# Patient Record
Sex: MALE | Race: WHITE | Employment: FULL TIME | URBAN - METROPOLITAN AREA
[De-identification: names, ages, dates, MRNs, and addresses within clinical notes are randomized per-mention and may not be internally consistent; named-entity substitution may affect disease eponyms.]

---

## 2020-02-20 ENCOUNTER — HOSPITAL ENCOUNTER (OUTPATIENT)
Dept: MRI IMAGING | Age: 75
Discharge: HOME | End: 2020-02-20
Attending: FAMILY MEDICINE
Payer: MEDICARE

## 2020-02-20 DIAGNOSIS — M25.551 PAIN IN RIGHT HIP: ICD-10-CM

## 2020-02-20 PROCEDURE — 73721 MRI JNT OF LWR EXTRE W/O DYE: CPT

## 2020-11-23 ENCOUNTER — APPOINTMENT (RX ONLY)
Dept: URBAN - METROPOLITAN AREA CLINIC 23 | Facility: CLINIC | Age: 75
Setting detail: DERMATOLOGY
End: 2020-11-23

## 2023-03-21 ENCOUNTER — OFFICE VISIT (OUTPATIENT)
Dept: PRIMARY CARE CLINIC | Facility: CLINIC | Age: 78
End: 2023-03-21
Payer: COMMERCIAL

## 2023-03-21 VITALS
SYSTOLIC BLOOD PRESSURE: 117 MMHG | WEIGHT: 140.6 LBS | DIASTOLIC BLOOD PRESSURE: 65 MMHG | OXYGEN SATURATION: 96 % | TEMPERATURE: 97.5 F | BODY MASS INDEX: 25.88 KG/M2 | HEIGHT: 62 IN | RESPIRATION RATE: 14 BRPM | HEART RATE: 75 BPM

## 2023-03-21 DIAGNOSIS — Z79.899 MEDICATION MANAGEMENT: ICD-10-CM

## 2023-03-21 DIAGNOSIS — D64.9 ANEMIA, UNSPECIFIED TYPE: ICD-10-CM

## 2023-03-21 DIAGNOSIS — I10 HYPERTENSION COMPLICATIONS: ICD-10-CM

## 2023-03-21 DIAGNOSIS — C91.10 CHRONIC LYMPHOCYTIC LEUKEMIA OF B-CELL TYPE NOT HAVING ACHIEVED REMISSION (HCC): ICD-10-CM

## 2023-03-21 DIAGNOSIS — I25.10 CORONARY ARTERY DISEASE DUE TO CALCIFIED CORONARY LESION: ICD-10-CM

## 2023-03-21 DIAGNOSIS — Z87.891 EX-SMOKER: ICD-10-CM

## 2023-03-21 DIAGNOSIS — E78.41 ELEVATED LIPOPROTEIN(A): ICD-10-CM

## 2023-03-21 DIAGNOSIS — J44.1 COPD EXACERBATION (HCC): ICD-10-CM

## 2023-03-21 DIAGNOSIS — I25.84 CORONARY ARTERY DISEASE DUE TO CALCIFIED CORONARY LESION: ICD-10-CM

## 2023-03-21 DIAGNOSIS — Z71.89 ADVANCE CARE PLANNING: ICD-10-CM

## 2023-03-21 DIAGNOSIS — R06.02 SHORTNESS OF BREATH: ICD-10-CM

## 2023-03-21 DIAGNOSIS — J90 BILATERAL PLEURAL EFFUSION: ICD-10-CM

## 2023-03-21 DIAGNOSIS — R06.02 SHORTNESS OF BREATH: Primary | ICD-10-CM

## 2023-03-21 PROBLEM — F32.A DEPRESSION: Status: ACTIVE | Noted: 2020-11-05

## 2023-03-21 PROBLEM — I69.30 UNSPECIFIED SEQUELAE OF CEREBRAL INFARCTION: Status: ACTIVE | Noted: 2020-11-08

## 2023-03-21 PROBLEM — F32.9 MAJOR DEPRESSIVE DISORDER, SINGLE EPISODE, UNSPECIFIED: Status: ACTIVE | Noted: 2020-11-08

## 2023-03-21 PROBLEM — K21.9 GASTRO-ESOPHAGEAL REFLUX DISEASE WITHOUT ESOPHAGITIS: Status: ACTIVE | Noted: 2020-11-08

## 2023-03-21 PROBLEM — J44.9 CHRONIC OBSTRUCTIVE PULMONARY DISEASE, UNSPECIFIED (HCC): Status: ACTIVE | Noted: 2020-11-08

## 2023-03-21 PROBLEM — M62.81 MUSCLE WEAKNESS (GENERALIZED): Status: ACTIVE | Noted: 2020-11-09

## 2023-03-21 PROBLEM — F41.9 ANXIETY DISORDER, UNSPECIFIED: Status: ACTIVE | Noted: 2020-11-08

## 2023-03-21 PROBLEM — E78.5 HYPERLIPEMIA: Status: ACTIVE | Noted: 2020-11-05

## 2023-03-21 LAB
ALBUMIN SERPL-MCNC: 3.7 G/DL (ref 3.2–4.6)
ALBUMIN/GLOB SERPL: 1.5 (ref 0.4–1.6)
ALP SERPL-CCNC: 85 U/L (ref 50–136)
ALT SERPL-CCNC: 26 U/L (ref 12–65)
ANION GAP SERPL CALC-SCNC: 8 MMOL/L (ref 2–11)
AST SERPL-CCNC: 20 U/L (ref 15–37)
BASOPHILS # BLD: 0 K/UL (ref 0–0.2)
BASOPHILS NFR BLD: 0 % (ref 0–2)
BILIRUB SERPL-MCNC: 0.5 MG/DL (ref 0.2–1.1)
BUN SERPL-MCNC: 21 MG/DL (ref 8–23)
CALCIUM SERPL-MCNC: 8.9 MG/DL (ref 8.3–10.4)
CHLORIDE SERPL-SCNC: 106 MMOL/L (ref 101–110)
CO2 SERPL-SCNC: 25 MMOL/L (ref 21–32)
CREAT SERPL-MCNC: 0.8 MG/DL (ref 0.8–1.5)
DIFFERENTIAL METHOD BLD: ABNORMAL
EOSINOPHIL # BLD: 0.1 K/UL (ref 0–0.8)
EOSINOPHIL NFR BLD: 1 % (ref 0.5–7.8)
ERYTHROCYTE [DISTWIDTH] IN BLOOD BY AUTOMATED COUNT: 16.1 % (ref 11.9–14.6)
GLOBULIN SER CALC-MCNC: 2.5 G/DL (ref 2.8–4.5)
GLUCOSE SERPL-MCNC: 90 MG/DL (ref 65–100)
HCT VFR BLD AUTO: 34.6 % (ref 41.1–50.3)
HGB BLD-MCNC: 10.2 G/DL (ref 13.6–17.2)
IMM GRANULOCYTES # BLD AUTO: 0 K/UL (ref 0–0.5)
IMM GRANULOCYTES NFR BLD AUTO: 0 % (ref 0–5)
LDLC SERPL DIRECT ASSAY-MCNC: 105 MG/DL
LYMPHOCYTES # BLD: 4.8 K/UL (ref 0.5–4.6)
LYMPHOCYTES NFR BLD: 51 % (ref 13–44)
MCH RBC QN AUTO: 24.6 PG (ref 26.1–32.9)
MCHC RBC AUTO-ENTMCNC: 29.5 G/DL (ref 31.4–35)
MCV RBC AUTO: 83.6 FL (ref 82–102)
MONOCYTES # BLD: 0.6 K/UL (ref 0.1–1.3)
MONOCYTES NFR BLD: 6 % (ref 4–12)
NEUTS SEG # BLD: 4 K/UL (ref 1.7–8.2)
NEUTS SEG NFR BLD: 42 % (ref 43–78)
NRBC # BLD: 0 K/UL (ref 0–0.2)
PLATELET # BLD AUTO: 320 K/UL (ref 150–450)
PLATELET COMMENT: ADEQUATE
PMV BLD AUTO: 10.3 FL (ref 9.4–12.3)
POTASSIUM SERPL-SCNC: 3.8 MMOL/L (ref 3.5–5.1)
PROT SERPL-MCNC: 6.2 G/DL (ref 6.3–8.2)
RBC # BLD AUTO: 4.14 M/UL (ref 4.23–5.6)
RBC MORPH BLD: ABNORMAL
SODIUM SERPL-SCNC: 139 MMOL/L (ref 133–143)
TSH, 3RD GENERATION: 2.53 UIU/ML (ref 0.36–3.74)
WBC # BLD AUTO: 9.5 K/UL (ref 4.3–11.1)
WBC MORPH BLD: ABNORMAL

## 2023-03-21 PROCEDURE — 99204 OFFICE O/P NEW MOD 45 MIN: CPT | Performed by: FAMILY MEDICINE

## 2023-03-21 PROCEDURE — 3078F DIAST BP <80 MM HG: CPT | Performed by: FAMILY MEDICINE

## 2023-03-21 PROCEDURE — 1123F ACP DISCUSS/DSCN MKR DOCD: CPT | Performed by: FAMILY MEDICINE

## 2023-03-21 PROCEDURE — 3074F SYST BP LT 130 MM HG: CPT | Performed by: FAMILY MEDICINE

## 2023-03-21 RX ORDER — CLOPIDOGREL BISULFATE 75 MG/1
75 TABLET ORAL DAILY
COMMUNITY
End: 2023-03-21 | Stop reason: SDUPTHER

## 2023-03-21 RX ORDER — DEXAMETHASONE 0.5 MG/1
TABLET ORAL
Qty: 30 TABLET | Refills: 0 | Status: SHIPPED | OUTPATIENT
Start: 2023-03-21

## 2023-03-21 RX ORDER — SPIRONOLACTONE 50 MG/1
50 TABLET, FILM COATED ORAL DAILY
Qty: 90 TABLET | Refills: 3 | Status: SHIPPED | OUTPATIENT
Start: 2023-03-21

## 2023-03-21 RX ORDER — ROSUVASTATIN CALCIUM 20 MG/1
10 TABLET, COATED ORAL NIGHTLY
COMMUNITY
End: 2023-03-21 | Stop reason: SDUPTHER

## 2023-03-21 RX ORDER — OMEPRAZOLE 20 MG/1
20 CAPSULE, DELAYED RELEASE ORAL DAILY
COMMUNITY
End: 2023-03-21 | Stop reason: SDUPTHER

## 2023-03-21 RX ORDER — CHLORTHALIDONE 25 MG/1
25 TABLET ORAL DAILY
Qty: 90 TABLET | Refills: 3 | Status: SHIPPED | OUTPATIENT
Start: 2023-03-21

## 2023-03-21 RX ORDER — BUDESONIDE AND FORMOTEROL FUMARATE DIHYDRATE 160; 4.5 UG/1; UG/1
1 AEROSOL RESPIRATORY (INHALATION) 2 TIMES DAILY
Qty: 10.2 G | Refills: 5 | Status: SHIPPED | OUTPATIENT
Start: 2023-03-21

## 2023-03-21 RX ORDER — MULTIVIT-MIN/IRON FUM/FOLIC AC 7.5 MG-4
TABLET ORAL
Qty: 100 TABLET | Refills: 3 | Status: SHIPPED | OUTPATIENT
Start: 2023-03-21

## 2023-03-21 RX ORDER — TAMSULOSIN HYDROCHLORIDE 0.4 MG/1
0.4 CAPSULE ORAL DAILY
Qty: 90 CAPSULE | Refills: 5 | Status: SHIPPED | OUTPATIENT
Start: 2023-03-21

## 2023-03-21 RX ORDER — CLOPIDOGREL BISULFATE 75 MG/1
75 TABLET ORAL DAILY
Qty: 90 TABLET | Refills: 5 | Status: SHIPPED | OUTPATIENT
Start: 2023-03-21

## 2023-03-21 RX ORDER — BUDESONIDE AND FORMOTEROL FUMARATE DIHYDRATE 160; 4.5 UG/1; UG/1
AEROSOL RESPIRATORY (INHALATION) 2 TIMES DAILY
COMMUNITY
End: 2023-03-21 | Stop reason: SDUPTHER

## 2023-03-21 RX ORDER — SERTRALINE HYDROCHLORIDE 100 MG/1
50 TABLET, FILM COATED ORAL DAILY
COMMUNITY
End: 2023-03-21 | Stop reason: SDUPTHER

## 2023-03-21 RX ORDER — OMEPRAZOLE 20 MG/1
20 CAPSULE, DELAYED RELEASE ORAL DAILY
Qty: 30 CAPSULE | Refills: 3 | Status: SHIPPED | OUTPATIENT
Start: 2023-03-21

## 2023-03-21 RX ORDER — SERTRALINE HYDROCHLORIDE 100 MG/1
50 TABLET, FILM COATED ORAL DAILY
Qty: 30 TABLET | Refills: 3 | Status: SHIPPED | OUTPATIENT
Start: 2023-03-21

## 2023-03-21 RX ORDER — AMLODIPINE BESYLATE 10 MG/1
10 TABLET ORAL DAILY
COMMUNITY
End: 2023-03-21 | Stop reason: SDUPTHER

## 2023-03-21 RX ORDER — AMOXICILLIN AND CLAVULANATE POTASSIUM 875; 125 MG/1; MG/1
1 TABLET, FILM COATED ORAL 2 TIMES DAILY
Qty: 20 TABLET | Refills: 0 | Status: SHIPPED | OUTPATIENT
Start: 2023-03-21 | End: 2023-03-31

## 2023-03-21 RX ORDER — AMLODIPINE BESYLATE 5 MG/1
5 TABLET ORAL DAILY
Qty: 90 TABLET | Refills: 5 | Status: SHIPPED | OUTPATIENT
Start: 2023-03-21

## 2023-03-21 RX ORDER — TAMSULOSIN HYDROCHLORIDE 0.4 MG/1
0.4 CAPSULE ORAL DAILY
COMMUNITY
End: 2023-03-21 | Stop reason: SDUPTHER

## 2023-03-21 RX ORDER — ROSUVASTATIN CALCIUM 20 MG/1
10 TABLET, COATED ORAL NIGHTLY
Qty: 90 TABLET | Refills: 5 | Status: SHIPPED | OUTPATIENT
Start: 2023-03-21

## 2023-03-21 ASSESSMENT — PATIENT HEALTH QUESTIONNAIRE - PHQ9
2. FEELING DOWN, DEPRESSED OR HOPELESS: 0
SUM OF ALL RESPONSES TO PHQ QUESTIONS 1-9: 0
SUM OF ALL RESPONSES TO PHQ9 QUESTIONS 1 & 2: 0
1. LITTLE INTEREST OR PLEASURE IN DOING THINGS: 0

## 2023-03-21 ASSESSMENT — ENCOUNTER SYMPTOMS
CONSTIPATION: 0
CHOKING: 0
VOMITING: 0
EYE DISCHARGE: 0
EYE REDNESS: 0
VOICE CHANGE: 0
ABDOMINAL PAIN: 0
TROUBLE SWALLOWING: 0
WHEEZING: 0
DIARRHEA: 0
PHOTOPHOBIA: 0
BLOOD IN STOOL: 0
ABDOMINAL DISTENTION: 0
SINUS PAIN: 0
RHINORRHEA: 0
SHORTNESS OF BREATH: 1
CHEST TIGHTNESS: 0
COLOR CHANGE: 0
EYE PAIN: 0
BACK PAIN: 0
NAUSEA: 0
SORE THROAT: 0
COUGH: 1
SINUS PRESSURE: 0

## 2023-03-21 NOTE — PROGRESS NOTES
tenderness, left CVA tenderness or guarding. Musculoskeletal:         General: No swelling, tenderness, deformity or signs of injury. Cervical back: Neck supple. No rigidity. Right lower leg: No edema. Left lower leg: No edema. Lymphadenopathy:      Cervical: No cervical adenopathy. Skin:     Coloration: Skin is not jaundiced. Findings: No bruising, erythema or lesion. Neurological:      General: No focal deficit present. Mental Status: He is alert and oriented to person, place, and time. Mental status is at baseline. Cranial Nerves: No cranial nerve deficit. Sensory: No sensory deficit. Motor: Weakness present. Coordination: Coordination normal.      Gait: Gait abnormal.      Deep Tendon Reflexes: Reflexes normal.   Psychiatric:         Attention and Perception: Attention normal. He is attentive. Mood and Affect: Mood normal. Mood is not anxious, depressed or elated. Affect is not labile, blunt, flat, angry, tearful or inappropriate. Speech: Speech normal. He is communicative. Speech is not rapid and pressured, delayed, slurred or tangential.         Behavior: Behavior normal. Behavior is not agitated, slowed, aggressive, withdrawn, hyperactive or combative. Behavior is cooperative. Thought Content: Thought content normal. Thought content does not include suicidal ideation. Thought content does not include suicidal plan. Cognition and Memory: Cognition is not impaired. Judgment: Judgment normal.        1. Shortness of breath  Extensive hospital evaluation reviewed bilateral pleural effusion 2D echocardiogram normal  - [address] Hematology & Oncology  - [address] Pulmonary and Critical Care  - CBC with Auto Differential; Future  - XR CHEST PA LAT (2 VIEWS); Future    2. Bilateral pleural effusion    - [facility] Pulmonary and Critical Care    3.  Chronic lymphocytic leukemia of B-cell type not having

## 2023-03-22 ENCOUNTER — CLINICAL DOCUMENTATION (OUTPATIENT)
Dept: SPIRITUAL SERVICES | Age: 78
End: 2023-03-22

## 2023-03-22 NOTE — ACP (ADVANCE CARE PLANNING)
letter mailed to patient with invitation to contact ACP Specialist if / when ready. [] Other (Specify here):       Thank you for this referral.

## 2023-03-23 ENCOUNTER — TELEPHONE (OUTPATIENT)
Dept: PRIMARY CARE CLINIC | Facility: CLINIC | Age: 78
End: 2023-03-23

## 2023-04-17 ENCOUNTER — OFFICE VISIT (OUTPATIENT)
Dept: PULMONOLOGY | Age: 78
End: 2023-04-17
Payer: OTHER GOVERNMENT

## 2023-04-17 VITALS
SYSTOLIC BLOOD PRESSURE: 124 MMHG | DIASTOLIC BLOOD PRESSURE: 78 MMHG | TEMPERATURE: 97.1 F | WEIGHT: 144 LBS | HEART RATE: 96 BPM | RESPIRATION RATE: 14 BRPM | HEIGHT: 62 IN | BODY MASS INDEX: 26.5 KG/M2 | OXYGEN SATURATION: 95 %

## 2023-04-17 DIAGNOSIS — Z87.891 PERSONAL HISTORY OF TOBACCO USE, PRESENTING HAZARDS TO HEALTH: ICD-10-CM

## 2023-04-17 DIAGNOSIS — Z12.9 CANCER SCREENING: ICD-10-CM

## 2023-04-17 DIAGNOSIS — J44.9 CHRONIC OBSTRUCTIVE PULMONARY DISEASE, UNSPECIFIED COPD TYPE (HCC): Primary | ICD-10-CM

## 2023-04-17 PROCEDURE — 3078F DIAST BP <80 MM HG: CPT | Performed by: INTERNAL MEDICINE

## 2023-04-17 PROCEDURE — 99204 OFFICE O/P NEW MOD 45 MIN: CPT | Performed by: INTERNAL MEDICINE

## 2023-04-17 PROCEDURE — G0296 VISIT TO DETERM LDCT ELIG: HCPCS | Performed by: INTERNAL MEDICINE

## 2023-04-17 PROCEDURE — 3074F SYST BP LT 130 MM HG: CPT | Performed by: INTERNAL MEDICINE

## 2023-04-17 PROCEDURE — 1123F ACP DISCUSS/DSCN MKR DOCD: CPT | Performed by: INTERNAL MEDICINE

## 2023-04-17 RX ORDER — PANTOPRAZOLE SODIUM 40 MG/1
40 GRANULE, DELAYED RELEASE ORAL
COMMUNITY

## 2023-04-17 RX ORDER — ALBUTEROL SULFATE 90 UG/1
INHALANT RESPIRATORY (INHALATION)
COMMUNITY

## 2023-04-17 RX ORDER — FLUTICASONE PROPIONATE AND SALMETEROL 500; 50 UG/1; UG/1
1 POWDER RESPIRATORY (INHALATION) EVERY 12 HOURS
COMMUNITY

## 2023-04-17 RX ORDER — DULOXETIN HYDROCHLORIDE 60 MG/1
60 CAPSULE, DELAYED RELEASE ORAL DAILY
COMMUNITY

## 2023-04-17 RX ORDER — ROFLUMILAST 500 UG/1
500 TABLET ORAL DAILY
Qty: 30 TABLET | Refills: 11 | Status: SHIPPED | OUTPATIENT
Start: 2023-04-17

## 2023-04-17 RX ORDER — LANOLIN ALCOHOL/MO/W.PET/CERES
3 CREAM (GRAM) TOPICAL DAILY
COMMUNITY

## 2023-04-17 RX ORDER — IBRUTINIB 420 MG/1
420 TABLET, FILM COATED ORAL DAILY
COMMUNITY

## 2023-04-17 RX ORDER — GUAIFENESIN 600 MG/1
1200 TABLET, EXTENDED RELEASE ORAL 2 TIMES DAILY
COMMUNITY

## 2023-04-17 NOTE — PROGRESS NOTES
Allergen Reactions    Ranitidine Hcl Hives     Current Outpatient Medications   Medication Instructions    Albuterol Sulfate, sensor, (PROAIR DIGIHALER) 108 (90 Base) MCG/ACT AEPB Inhalation    amLODIPine (NORVASC) 5 mg, Oral, DAILY    budesonide-formoterol (SYMBICORT) 160-4.5 MCG/ACT AERO 1 puff, Inhalation, 2 TIMES DAILY    chlorthalidone (HYGROTON) 25 mg, Oral, DAILY    clopidogrel (PLAVIX) 75 mg, Oral, DAILY    dexamethasone (DECADRON) 0.5 MG tablet 2 twice a day for 5 days, then once a day for 10 days    DULoxetine (CYMBALTA) 60 mg, Oral, DAILY    fluticasone-salmeterol (ADVAIR DISKUS) 500-50 MCG/ACT AEPB diskus inhaler 1 puff, Inhalation, EVERY 12 HOURS    guaiFENesin (MUCUS RELIEF) 1,200 mg, Oral, 2 TIMES DAILY    Imbruvica 420 mg, Oral, DAILY    melatonin 3 mg, Oral, DAILY    Multiple Vitamins-Minerals (MULTIVITAMIN WITH MINERALS) tablet Once daily OTC    omeprazole (PRILOSEC) 20 mg, Oral, DAILY    pantoprazole sodium (PROTONIX) 40 mg, Oral, DAILY BEFORE BREAKFAST    rosuvastatin (CRESTOR) 10 mg, Oral, NIGHTLY    sertraline (ZOLOFT) 50 mg, Oral, DAILY    spironolactone (ALDACTONE) 50 mg, Oral, DAILY    tamsulosin (FLOMAX) 0.4 mg, Oral, DAILY    tiotropium (SPIRIVA RESPIMAT) 2.5 MCG/ACT AERS inhaler 2 puffs, Inhalation, DAILY    tiotropium (SPIRIVA) 18 MCG inhalation capsule 1 capsule, Inhalation

## 2023-04-18 ENCOUNTER — OFFICE VISIT (OUTPATIENT)
Dept: PRIMARY CARE CLINIC | Facility: CLINIC | Age: 78
End: 2023-04-18
Payer: COMMERCIAL

## 2023-04-18 VITALS
OXYGEN SATURATION: 94 % | SYSTOLIC BLOOD PRESSURE: 113 MMHG | WEIGHT: 141.6 LBS | RESPIRATION RATE: 14 BRPM | DIASTOLIC BLOOD PRESSURE: 53 MMHG | BODY MASS INDEX: 26.06 KG/M2 | TEMPERATURE: 97.2 F | HEIGHT: 62 IN | HEART RATE: 78 BPM

## 2023-04-18 DIAGNOSIS — I50.42 CHRONIC COMBINED SYSTOLIC AND DIASTOLIC CONGESTIVE HEART FAILURE (HCC): ICD-10-CM

## 2023-04-18 DIAGNOSIS — Z87.891 EX-SMOKER: ICD-10-CM

## 2023-04-18 DIAGNOSIS — J43.9 PULMONARY EMPHYSEMA, UNSPECIFIED EMPHYSEMA TYPE (HCC): ICD-10-CM

## 2023-04-18 DIAGNOSIS — J18.9 PNEUMONIA OF LEFT LOWER LOBE DUE TO INFECTIOUS ORGANISM: Primary | ICD-10-CM

## 2023-04-18 DIAGNOSIS — M62.81 MUSCLE WEAKNESS (GENERALIZED): ICD-10-CM

## 2023-04-18 DIAGNOSIS — F32.9 CURRENT EPISODE OF MAJOR DEPRESSIVE DISORDER WITHOUT PRIOR EPISODE, UNSPECIFIED DEPRESSION EPISODE SEVERITY: ICD-10-CM

## 2023-04-18 DIAGNOSIS — C91.10 CHRONIC LYMPHOCYTIC LEUKEMIA OF B-CELL TYPE NOT HAVING ACHIEVED REMISSION (HCC): ICD-10-CM

## 2023-04-18 DIAGNOSIS — J44.1 COPD EXACERBATION (HCC): ICD-10-CM

## 2023-04-18 PROBLEM — C44.311 BASAL CELL CARCINOMA OF NOSE: Status: ACTIVE | Noted: 2023-04-18

## 2023-04-18 PROCEDURE — 3078F DIAST BP <80 MM HG: CPT | Performed by: FAMILY MEDICINE

## 2023-04-18 PROCEDURE — 1123F ACP DISCUSS/DSCN MKR DOCD: CPT | Performed by: FAMILY MEDICINE

## 2023-04-18 PROCEDURE — 3074F SYST BP LT 130 MM HG: CPT | Performed by: FAMILY MEDICINE

## 2023-04-18 PROCEDURE — 99214 OFFICE O/P EST MOD 30 MIN: CPT | Performed by: FAMILY MEDICINE

## 2023-04-18 RX ORDER — DEXAMETHASONE 0.5 MG/1
TABLET ORAL
Qty: 90 TABLET | Refills: 5 | Status: SHIPPED | OUTPATIENT
Start: 2023-04-18

## 2023-04-18 RX ORDER — VALSARTAN 80 MG/1
80 TABLET ORAL DAILY
Qty: 90 TABLET | Refills: 1 | Status: SHIPPED | OUTPATIENT
Start: 2023-04-18

## 2023-04-18 RX ORDER — LEVOFLOXACIN 500 MG/1
500 TABLET, FILM COATED ORAL DAILY
Qty: 10 TABLET | Refills: 0 | Status: SHIPPED | OUTPATIENT
Start: 2023-04-18 | End: 2023-04-28

## 2023-04-18 RX ORDER — AMOXICILLIN AND CLAVULANATE POTASSIUM 875; 125 MG/1; MG/1
1 TABLET, FILM COATED ORAL 2 TIMES DAILY
Qty: 20 TABLET | Refills: 0 | Status: SHIPPED | OUTPATIENT
Start: 2023-04-18 | End: 2023-04-28

## 2023-04-18 SDOH — ECONOMIC STABILITY: HOUSING INSECURITY
IN THE LAST 12 MONTHS, WAS THERE A TIME WHEN YOU DID NOT HAVE A STEADY PLACE TO SLEEP OR SLEPT IN A SHELTER (INCLUDING NOW)?: NO

## 2023-04-18 SDOH — ECONOMIC STABILITY: INCOME INSECURITY: HOW HARD IS IT FOR YOU TO PAY FOR THE VERY BASICS LIKE FOOD, HOUSING, MEDICAL CARE, AND HEATING?: NOT HARD AT ALL

## 2023-04-18 SDOH — ECONOMIC STABILITY: FOOD INSECURITY: WITHIN THE PAST 12 MONTHS, YOU WORRIED THAT YOUR FOOD WOULD RUN OUT BEFORE YOU GOT MONEY TO BUY MORE.: NEVER TRUE

## 2023-04-18 SDOH — ECONOMIC STABILITY: FOOD INSECURITY: WITHIN THE PAST 12 MONTHS, THE FOOD YOU BOUGHT JUST DIDN'T LAST AND YOU DIDN'T HAVE MONEY TO GET MORE.: NEVER TRUE

## 2023-04-18 ASSESSMENT — ENCOUNTER SYMPTOMS
CHEST TIGHTNESS: 0
VOMITING: 0
EYE DISCHARGE: 0
CONSTIPATION: 0
SHORTNESS OF BREATH: 1
WHEEZING: 0
NAUSEA: 0
CHOKING: 0
VOICE CHANGE: 0
BACK PAIN: 0
BLOOD IN STOOL: 0
DIARRHEA: 0
COLOR CHANGE: 0
EYE PAIN: 0
ABDOMINAL PAIN: 0
EYE REDNESS: 0
SORE THROAT: 0
COUGH: 1
PHOTOPHOBIA: 0
RHINORRHEA: 0
SINUS PRESSURE: 0
ABDOMINAL DISTENTION: 0
SINUS PAIN: 0
TROUBLE SWALLOWING: 0

## 2023-04-18 ASSESSMENT — PATIENT HEALTH QUESTIONNAIRE - PHQ9
1. LITTLE INTEREST OR PLEASURE IN DOING THINGS: 0
SUM OF ALL RESPONSES TO PHQ QUESTIONS 1-9: 0
SUM OF ALL RESPONSES TO PHQ9 QUESTIONS 1 & 2: 0
SUM OF ALL RESPONSES TO PHQ QUESTIONS 1-9: 0
2. FEELING DOWN, DEPRESSED OR HOPELESS: 0

## 2023-04-18 NOTE — PROGRESS NOTES
and rash. Neurological:  Negative for dizziness, tremors, seizures, syncope, speech difficulty, weakness, numbness and headaches. Hematological:  Negative for adenopathy. Does not bruise/bleed easily. Psychiatric/Behavioral:  Negative for behavioral problems, confusion, decreased concentration, hallucinations, self-injury, sleep disturbance and suicidal ideas. The patient is not nervous/anxious. Physical Exam  Vitals and nursing note reviewed. Constitutional:       General: He is not in acute distress. Appearance: He is not ill-appearing, toxic-appearing or diaphoretic. Comments: General debility   HENT:      Head: Atraumatic. Right Ear: External ear normal.      Left Ear: External ear normal.      Nose: Nose normal. No congestion or rhinorrhea. Mouth/Throat:      Mouth: Mucous membranes are dry. Pharynx: No oropharyngeal exudate or posterior oropharyngeal erythema. Comments: Left side seems to be dry with rash and some cracking  Eyes:      General: No scleral icterus. Right eye: No discharge. Left eye: No discharge. Extraocular Movements: Extraocular movements intact. Conjunctiva/sclera: Conjunctivae normal.      Pupils: Pupils are equal, round, and reactive to light. Cardiovascular:      Rate and Rhythm: Normal rate and regular rhythm. Pulses: Normal pulses. Heart sounds: Normal heart sounds. No murmur heard. No friction rub. Pulmonary:      Effort: Pulmonary effort is normal. No respiratory distress. Breath sounds: No stridor. Wheezing and rhonchi present. No rales. Comments: Mild wheezing rhonchi  Chest:      Chest wall: No tenderness. Abdominal:      General: Abdomen is flat. There is no distension. Palpations: Abdomen is soft. There is no mass. Tenderness: There is no abdominal tenderness. There is no right CVA tenderness, left CVA tenderness or guarding. Hernia: No hernia is present.

## 2023-04-19 ENCOUNTER — TELEPHONE (OUTPATIENT)
Dept: PRIMARY CARE CLINIC | Facility: CLINIC | Age: 78
End: 2023-04-19

## 2023-04-19 ENCOUNTER — CLINICAL DOCUMENTATION (OUTPATIENT)
Dept: CARE COORDINATION | Facility: CLINIC | Age: 78
End: 2023-04-19

## 2023-04-28 ENCOUNTER — CLINICAL DOCUMENTATION (OUTPATIENT)
Dept: CARE COORDINATION | Facility: CLINIC | Age: 78
End: 2023-04-28

## 2023-05-02 NOTE — ACP (ADVANCE CARE PLANNING)
Advance Care Planning     Advance Care Planning Clinical Specialist  Conversation Note      Date of ACP Conversation: 5/2/2023    Conversation Conducted with: Patient with Decision Making Capacity    ACP Clinical Specialist: PEMA Perez 33 Decision Maker:     Current Designated Healthcare Decision Maker:     Primary Decision Maker: Dayan Barros - [phone]  Click here to complete Healthcare Decision Makers including section of the Healthcare Decision Maker Relationship (ie \"Primary\")      Care Preferences    Hospitalization: \"If your health worsens and it becomes clear that your chance of recovery is unlikely, what would your preference be regarding hospitalization? \"    Choice:  [] The patient wants hospitalization. [x] The patient prefers comfort-focused treatment without hospitalization. Ventilation: \"If you were in your present state of health and suddenly became very ill and were unable to breathe on your own, what would your preference be about the use of a ventilator (breathing machine) if it were available to you? \"      If the patient would desire the use of ventilator (breathing machine), answer \"yes\". If not, \"no\": yes    \"If your health worsens and it becomes clear that your chance of recovery is unlikely, what would your preference be about the use of a ventilator (breathing machine) if it were available to you? \"     Would the patient desire the use of ventilator (breathing machine)?: No      Resuscitation  \"CPR works best to restart the heart when there is a sudden event, like a heart attack, in someone who is otherwise healthy. Unfortunately, CPR does not typically restart the heart for people who have serious health conditions or who are very sick. \"    \"In the event your heart stopped as a result of an underlying serious health condition, would you want attempts to be made to restart your heart (answer \"yes\" for attempt to resuscitate) or would you prefer a

## 2023-06-05 ENCOUNTER — APPOINTMENT (OUTPATIENT)
Dept: GENERAL RADIOLOGY | Age: 78
End: 2023-06-05
Payer: OTHER GOVERNMENT

## 2023-06-05 ENCOUNTER — HOSPITAL ENCOUNTER (EMERGENCY)
Age: 78
Discharge: HOME | End: 2023-06-05
Attending: EMERGENCY MEDICINE
Payer: OTHER GOVERNMENT

## 2023-06-05 VITALS
RESPIRATION RATE: 19 BRPM | HEART RATE: 80 BPM | DIASTOLIC BLOOD PRESSURE: 76 MMHG | OXYGEN SATURATION: 94 % | SYSTOLIC BLOOD PRESSURE: 139 MMHG | TEMPERATURE: 97.7 F

## 2023-06-05 DIAGNOSIS — R06.89 DYSPNEA AND RESPIRATORY ABNORMALITIES: Primary | ICD-10-CM

## 2023-06-05 DIAGNOSIS — R05.1 ACUTE COUGH: ICD-10-CM

## 2023-06-05 DIAGNOSIS — R06.00 DYSPNEA AND RESPIRATORY ABNORMALITIES: Primary | ICD-10-CM

## 2023-06-05 DIAGNOSIS — S70.01XA CONTUSION OF RIGHT HIP, INITIAL ENCOUNTER: ICD-10-CM

## 2023-06-05 LAB
ALBUMIN SERPL-MCNC: 3.1 G/DL (ref 3.2–4.6)
ALBUMIN/GLOB SERPL: 1.3 (ref 0.4–1.6)
ALP SERPL-CCNC: 58 U/L (ref 50–136)
ALT SERPL-CCNC: 20 U/L (ref 12–65)
ANION GAP SERPL CALC-SCNC: 8 MMOL/L (ref 2–11)
APPEARANCE UR: CLEAR
AST SERPL-CCNC: 37 U/L (ref 15–37)
BACTERIA URNS QL MICRO: NEGATIVE /HPF
BASOPHILS # BLD: 0 K/UL (ref 0–0.2)
BASOPHILS NFR BLD: 0 % (ref 0–2)
BILIRUB SERPL-MCNC: 0.8 MG/DL (ref 0.2–1.1)
BILIRUB UR QL: NEGATIVE
BUN SERPL-MCNC: 25 MG/DL (ref 8–23)
CALCIUM SERPL-MCNC: 8.2 MG/DL (ref 8.3–10.4)
CASTS URNS QL MICRO: ABNORMAL /LPF
CHLORIDE SERPL-SCNC: 111 MMOL/L (ref 101–110)
CO2 SERPL-SCNC: 20 MMOL/L (ref 21–32)
COLOR UR: ABNORMAL
CREAT SERPL-MCNC: 0.5 MG/DL (ref 0.8–1.5)
DIFFERENTIAL METHOD BLD: ABNORMAL
EOSINOPHIL # BLD: 0.1 K/UL (ref 0–0.8)
EOSINOPHIL NFR BLD: 1 % (ref 0.5–7.8)
EPI CELLS #/AREA URNS HPF: ABNORMAL /HPF
GLOBULIN SER CALC-MCNC: 2.3 G/DL (ref 2.8–4.5)
GLUCOSE SERPL-MCNC: 97 MG/DL (ref 65–100)
GLUCOSE UR STRIP.AUTO-MCNC: NEGATIVE MG/DL
HCT VFR BLD AUTO: 31.5 % (ref 41.1–50.3)
HGB BLD-MCNC: 9.5 G/DL (ref 13.6–17.2)
HGB UR QL STRIP: NEGATIVE
IMM GRANULOCYTES # BLD AUTO: 0 K/UL (ref 0–0.5)
IMM GRANULOCYTES NFR BLD AUTO: 0 % (ref 0–5)
KETONES UR QL STRIP.AUTO: 15 MG/DL
LACTATE SERPL-SCNC: 1.5 MMOL/L (ref 0.4–2)
LEUKOCYTE ESTERASE UR QL STRIP.AUTO: ABNORMAL
LYMPHOCYTES # BLD: 4 K/UL (ref 0.5–4.6)
LYMPHOCYTES NFR BLD: 51 % (ref 13–44)
MCH RBC QN AUTO: 25.7 PG (ref 26.1–32.9)
MCHC RBC AUTO-ENTMCNC: 30.2 G/DL (ref 31.4–35)
MCV RBC AUTO: 85.1 FL (ref 82–102)
MONOCYTES # BLD: 0.6 K/UL (ref 0.1–1.3)
MONOCYTES NFR BLD: 7 % (ref 4–12)
NEUTS SEG # BLD: 3.3 K/UL (ref 1.7–8.2)
NEUTS SEG NFR BLD: 41 % (ref 43–78)
NITRITE UR QL STRIP.AUTO: NEGATIVE
NRBC # BLD: 0 K/UL (ref 0–0.2)
PH UR STRIP: 5.5 (ref 5–9)
PLATELET # BLD AUTO: 164 K/UL (ref 150–450)
PLATELET COMMENT: ADEQUATE
PMV BLD AUTO: 12.6 FL (ref 9.4–12.3)
POTASSIUM SERPL-SCNC: 4.1 MMOL/L (ref 3.5–5.1)
PROCALCITONIN SERPL-MCNC: <0.05 NG/ML (ref 0–0.49)
PROT SERPL-MCNC: 5.4 G/DL (ref 6.3–8.2)
PROT UR STRIP-MCNC: 30 MG/DL
RBC # BLD AUTO: 3.7 M/UL (ref 4.23–5.6)
RBC #/AREA URNS HPF: ABNORMAL /HPF
RBC MORPH BLD: ABNORMAL
SODIUM SERPL-SCNC: 139 MMOL/L (ref 133–143)
SP GR UR REFRACTOMETRY: 1.03 (ref 1–1.02)
UROBILINOGEN UR QL STRIP.AUTO: 1 EU/DL (ref 0.2–1)
WBC # BLD AUTO: 8 K/UL (ref 4.3–11.1)
WBC URNS QL MICRO: ABNORMAL /HPF

## 2023-06-05 PROCEDURE — 81001 URINALYSIS AUTO W/SCOPE: CPT

## 2023-06-05 PROCEDURE — 87040 BLOOD CULTURE FOR BACTERIA: CPT

## 2023-06-05 PROCEDURE — 94640 AIRWAY INHALATION TREATMENT: CPT

## 2023-06-05 PROCEDURE — 6360000002 HC RX W HCPCS: Performed by: EMERGENCY MEDICINE

## 2023-06-05 PROCEDURE — 96374 THER/PROPH/DIAG INJ IV PUSH: CPT

## 2023-06-05 PROCEDURE — 6370000000 HC RX 637 (ALT 250 FOR IP): Performed by: EMERGENCY MEDICINE

## 2023-06-05 PROCEDURE — 83605 ASSAY OF LACTIC ACID: CPT

## 2023-06-05 PROCEDURE — 94664 DEMO&/EVAL PT USE INHALER: CPT

## 2023-06-05 PROCEDURE — 99285 EMERGENCY DEPT VISIT HI MDM: CPT

## 2023-06-05 PROCEDURE — 73502 X-RAY EXAM HIP UNI 2-3 VIEWS: CPT

## 2023-06-05 PROCEDURE — 84145 PROCALCITONIN (PCT): CPT

## 2023-06-05 PROCEDURE — 93005 ELECTROCARDIOGRAM TRACING: CPT | Performed by: EMERGENCY MEDICINE

## 2023-06-05 PROCEDURE — 85025 COMPLETE CBC W/AUTO DIFF WBC: CPT

## 2023-06-05 PROCEDURE — 94760 N-INVAS EAR/PLS OXIMETRY 1: CPT

## 2023-06-05 PROCEDURE — 80053 COMPREHEN METABOLIC PANEL: CPT

## 2023-06-05 PROCEDURE — 71045 X-RAY EXAM CHEST 1 VIEW: CPT

## 2023-06-05 RX ORDER — IPRATROPIUM BROMIDE AND ALBUTEROL SULFATE 2.5; .5 MG/3ML; MG/3ML
1 SOLUTION RESPIRATORY (INHALATION)
Status: COMPLETED | OUTPATIENT
Start: 2023-06-05 | End: 2023-06-05

## 2023-06-05 RX ORDER — DOXYCYCLINE HYCLATE 100 MG
100 TABLET ORAL 2 TIMES DAILY
Qty: 14 TABLET | Refills: 0 | Status: SHIPPED | OUTPATIENT
Start: 2023-06-05 | End: 2023-06-05 | Stop reason: SDUPTHER

## 2023-06-05 RX ORDER — DOXYCYCLINE HYCLATE 100 MG
100 TABLET ORAL 2 TIMES DAILY
Qty: 14 TABLET | Refills: 0 | Status: ON HOLD | OUTPATIENT
Start: 2023-06-05 | End: 2023-06-12

## 2023-06-05 RX ORDER — METHYLPREDNISOLONE SODIUM SUCCINATE 125 MG/2ML
125 INJECTION, POWDER, LYOPHILIZED, FOR SOLUTION INTRAMUSCULAR; INTRAVENOUS
Status: COMPLETED | OUTPATIENT
Start: 2023-06-05 | End: 2023-06-05

## 2023-06-05 RX ORDER — DEXAMETHASONE 2 MG/1
TABLET ORAL
Qty: 42 TABLET | Refills: 0 | Status: ON HOLD | OUTPATIENT
Start: 2023-06-05 | End: 2023-06-17

## 2023-06-05 RX ORDER — DEXAMETHASONE 2 MG/1
TABLET ORAL
Qty: 42 TABLET | Refills: 0 | Status: SHIPPED | OUTPATIENT
Start: 2023-06-05 | End: 2023-06-05 | Stop reason: SDUPTHER

## 2023-06-05 RX ADMIN — IPRATROPIUM BROMIDE AND ALBUTEROL SULFATE 1 DOSE: 2.5; .5 SOLUTION RESPIRATORY (INHALATION) at 16:52

## 2023-06-05 RX ADMIN — METHYLPREDNISOLONE SODIUM SUCCINATE 125 MG: 125 INJECTION, POWDER, FOR SOLUTION INTRAMUSCULAR; INTRAVENOUS at 16:35

## 2023-06-05 ASSESSMENT — PAIN DESCRIPTION - LOCATION: LOCATION: HIP

## 2023-06-05 ASSESSMENT — PAIN - FUNCTIONAL ASSESSMENT: PAIN_FUNCTIONAL_ASSESSMENT: 0-10

## 2023-06-05 NOTE — ED TRIAGE NOTES
Pt here from [place] clinic with c/o right hip pain and decrease in mobility. Per EMS, pt seen at [facility] on 6/1 after a mechanical fall and dx with cervical fx. Neck brace in place. Pt with recent dx of pneumonia. Duoneb by EMS enroute PTA.

## 2023-06-05 NOTE — ED PROVIDER NOTES
Emergency Department Provider Note       PCP: Ronda Stevens DO   Age: 66 y.o. Sex: male     DISPOSITION Decision To Discharge 06/05/2023 07:24:50 PM       ICD-10-CM    1. Dyspnea and respiratory abnormalities  R06.00     R06.89       2. Acute cough  R05.1       3. Contusion of right hip, initial encounter  S70.01XA           Medical Decision Making     Complexity of Problems Addressed:  1 or more acute illnesses that pose a threat to life or bodily function. Data Reviewed and Analyzed:  Category 1:   I independently ordered and reviewed each unique test.  I reviewed external records: ED visit note from an outside group. I reviewed external records: provider visit note from outside specialist.   Pt seen 5/31 for fall. He had an MRI with a C5 transverse process fracture. Seen by Ortho. He was admitted in April of this year for PNA. He had multiple rounds of steroids and abx prior to that. Category 2:   I independently ordered and interpreted the ED EKG in the absence of a Cardiologist.    Rate: 75  EKG Interpretation: EKG Interpretation: sinus rhythm, no evidence of arrhythmia  ST Segments: Nonspecific ST segments - NO STEMI  I interpreted the X-rays no acute infiltrate. Category 3: Discussion of management or test interpretation. Patient was seen by respiratory therapy. He was given a flutter valve. He had improved lung sounds and ability to clear secretions. He feels much better. He will be discharged with antibiotics given the radiology read of his chest x-ray and his congestion as well as steroids for congestion which will also likely help his bone pain. Risk of Complications and/or Morbidity of Patient Management:  Prescription drug management performed. Is this patient to be included in the SEP-1 core measure due to severe sepsis or septic shock?  No Exclusion criteria - the patient is NOT to be included for SEP-1 Core Measure due to: May have criteria for sepsis, but does

## 2023-06-05 NOTE — DISCHARGE INSTRUCTIONS
Use the flutter valve as instructed by respiratory therapy. Return for worsening shortness of breath, increased pain, or other concerns.

## 2023-06-06 LAB
EKG ATRIAL RATE: 75 BPM
EKG DIAGNOSIS: NORMAL
EKG P AXIS: 66 DEGREES
EKG P-R INTERVAL: 127 MS
EKG Q-T INTERVAL: 337 MS
EKG QRS DURATION: 82 MS
EKG QTC CALCULATION (BAZETT): 377 MS
EKG R AXIS: 75 DEGREES
EKG T AXIS: -47 DEGREES
EKG VENTRICULAR RATE: 75 BPM

## 2023-06-06 PROCEDURE — 93010 ELECTROCARDIOGRAM REPORT: CPT | Performed by: INTERNAL MEDICINE

## 2023-06-07 ENCOUNTER — TELEPHONE (OUTPATIENT)
Dept: PRIMARY CARE CLINIC | Facility: CLINIC | Age: 78
End: 2023-06-07

## 2023-06-07 DIAGNOSIS — C91.10 CHRONIC LYMPHOCYTIC LEUKEMIA OF B-CELL TYPE NOT HAVING ACHIEVED REMISSION (HCC): Primary | ICD-10-CM

## 2023-06-07 NOTE — PROGRESS NOTES
New Patient Abstract      Reason for Referral: Chronic lymphocytic leukemia of B-cell type not having achieved remission and Anemia, unspecified type    Referring Provider: Neil Ariza MD     Primary Care Provider: Neil Ariza MD    Family History of Cancer/Hematologic Disorders: No known family history    Presenting Symptoms: Night sweats, weakness, fatigue, anemia, and leukocytosis    Narrative with recent with Results/Procedures/Biopsies and Dates completed: Mr. Abril Yo is a 58-year-old white male with a history of tobacco use (1 pack per day x 20 years; quit in 2019), HLD, HTN, COPD, TIA/stroke, anemia, vertigo, osteopenia, osteoarthritis, GERD, HSV2, insomnia, major depressive disorder, fracture of left femur, and umbilical hernia. He also has a history of chronic lymphocytic leukemia of B-cell type for which he has been followed by Jm Hammond, Dr. Tiffany Pryor, and is currently being treated with Ibrutinib 420 mg PO daily and dexamethasone. His anemia is treated with 324 mg of ferrous gluconate PO BID taken with 250 mg of ascorbic acid PO BID and intermittent IV iron infusions. He was last treated with IV Iron dextran on 5/8/23. He was last seen by a heme/onc NP at the [facility] on 5/5/23. He was assessed with increasing fatigue, weakness, anemia, and leukocytosis. Labs drawn at the [place] on 5/2/23 were significant for WBC 15.6, RBC 4.09, HGB 9.0, HCT 29.8, MCV 73.0, MCH 22.1, MCHC 30.3, RDW 18.1, gran% 34.2, lymph% 59.7, lymph# 9.3, mono# 0.9, ferritin 7, and iron 13. SPEP also drawn the same day was significant for albumin 3.3, alpha-1 0.4 gamma 0.4, and total protein 5.3. A possible restricted band (M-Mohinder) was seen migrating in the gamma globin region, which was too small to quantitate. Immunofixation analysis showed no abnormal bands present.      Mr. Abril oY is now being referred to [facility] for Hematology community care as he lives in [place] and will benefit from care closer to

## 2023-06-07 NOTE — TELEPHONE ENCOUNTER
Ms Rex Vu nurse from [facility] phone number [phone] called and need dr Aleksey Bowman assistant please call her back regarding re certification for this patient because patient have a fall . Please call ms munoz back as soon is possible, thanks.

## 2023-06-08 PROBLEM — G93.40 ACUTE ENCEPHALOPATHY: Status: ACTIVE | Noted: 2023-06-08

## 2023-06-08 PROBLEM — M62.81 MUSCLE WEAKNESS (GENERALIZED): Status: RESOLVED | Noted: 2020-11-09 | Resolved: 2023-06-08

## 2023-06-08 PROBLEM — S12.400A: Status: ACTIVE | Noted: 2023-06-08

## 2023-06-08 PROBLEM — I63.81 RIGHT-SIDED LACUNAR INFARCTION (HCC): Status: ACTIVE | Noted: 2023-06-08

## 2023-06-08 PROBLEM — I10 HYPERTENSION COMPLICATIONS: Status: RESOLVED | Noted: 2020-11-05 | Resolved: 2023-06-08

## 2023-06-08 PROBLEM — J18.9 LEFT LOWER LOBE PNEUMONIA: Status: RESOLVED | Noted: 2023-04-03 | Resolved: 2023-06-08

## 2023-06-08 PROBLEM — J98.11 ATELECTASIS OF LEFT LUNG: Status: ACTIVE | Noted: 2023-06-08

## 2023-06-08 PROBLEM — J90 BILATERAL PLEURAL EFFUSION: Status: RESOLVED | Noted: 2023-03-21 | Resolved: 2023-06-08

## 2023-06-08 PROBLEM — Z87.891 EX-SMOKER: Status: RESOLVED | Noted: 2023-03-21 | Resolved: 2023-06-08

## 2023-06-08 PROBLEM — R06.02 SHORTNESS OF BREATH: Status: RESOLVED | Noted: 2023-03-21 | Resolved: 2023-06-08

## 2023-06-08 PROBLEM — Z79.899 MEDICATION MANAGEMENT: Status: RESOLVED | Noted: 2023-03-21 | Resolved: 2023-06-08

## 2023-06-10 PROBLEM — E87.6 HYPOKALEMIA: Status: ACTIVE | Noted: 2023-06-10

## 2023-06-10 LAB
BACTERIA SPEC CULT: NORMAL
SERVICE CMNT-IMP: NORMAL

## 2023-06-12 PROBLEM — R40.4 TRANSIENT ALTERATION OF AWARENESS: Status: ACTIVE | Noted: 2023-06-12

## 2023-07-26 ENCOUNTER — TELEPHONE (OUTPATIENT)
Dept: ONCOLOGY | Age: 78
End: 2023-07-26

## 2023-08-06 ENCOUNTER — APPOINTMENT (OUTPATIENT)
Dept: GENERAL RADIOLOGY | Age: 78
DRG: 193 | End: 2023-08-06
Payer: MEDICARE

## 2023-08-06 ENCOUNTER — HOSPITAL ENCOUNTER (INPATIENT)
Age: 78
LOS: 10 days | Discharge: HOME HEALTH | DRG: 193 | End: 2023-08-17
Attending: STUDENT IN AN ORGANIZED HEALTH CARE EDUCATION/TRAINING PROGRAM | Admitting: FAMILY MEDICINE
Payer: MEDICARE

## 2023-08-06 DIAGNOSIS — J44.9 STAGE 4 VERY SEVERE COPD BY GOLD CLASSIFICATION (HCC): Chronic | ICD-10-CM

## 2023-08-06 DIAGNOSIS — J18.9 PNEUMONIA OF LEFT LOWER LOBE DUE TO INFECTIOUS ORGANISM: Primary | ICD-10-CM

## 2023-08-06 PROCEDURE — 87635 SARS-COV-2 COVID-19 AMP PRB: CPT

## 2023-08-06 PROCEDURE — 93005 ELECTROCARDIOGRAM TRACING: CPT | Performed by: STUDENT IN AN ORGANIZED HEALTH CARE EDUCATION/TRAINING PROGRAM

## 2023-08-06 PROCEDURE — 84145 PROCALCITONIN (PCT): CPT

## 2023-08-06 PROCEDURE — 83605 ASSAY OF LACTIC ACID: CPT

## 2023-08-06 PROCEDURE — 83735 ASSAY OF MAGNESIUM: CPT

## 2023-08-06 PROCEDURE — 6370000000 HC RX 637 (ALT 250 FOR IP): Performed by: STUDENT IN AN ORGANIZED HEALTH CARE EDUCATION/TRAINING PROGRAM

## 2023-08-06 PROCEDURE — 71045 X-RAY EXAM CHEST 1 VIEW: CPT

## 2023-08-06 PROCEDURE — 96365 THER/PROPH/DIAG IV INF INIT: CPT

## 2023-08-06 PROCEDURE — 87040 BLOOD CULTURE FOR BACTERIA: CPT

## 2023-08-06 PROCEDURE — 96367 TX/PROPH/DG ADDL SEQ IV INF: CPT

## 2023-08-06 PROCEDURE — 94640 AIRWAY INHALATION TREATMENT: CPT

## 2023-08-06 PROCEDURE — 99285 EMERGENCY DEPT VISIT HI MDM: CPT

## 2023-08-06 PROCEDURE — 94664 DEMO&/EVAL PT USE INHALER: CPT

## 2023-08-06 RX ORDER — IPRATROPIUM BROMIDE AND ALBUTEROL SULFATE 2.5; .5 MG/3ML; MG/3ML
1 SOLUTION RESPIRATORY (INHALATION)
Status: COMPLETED | OUTPATIENT
Start: 2023-08-06 | End: 2023-08-06

## 2023-08-06 RX ADMIN — IPRATROPIUM BROMIDE AND ALBUTEROL SULFATE 1 DOSE: .5; 3 SOLUTION RESPIRATORY (INHALATION) at 23:47

## 2023-08-06 ASSESSMENT — ENCOUNTER SYMPTOMS
SHORTNESS OF BREATH: 1
COUGH: 1

## 2023-08-06 ASSESSMENT — PAIN - FUNCTIONAL ASSESSMENT: PAIN_FUNCTIONAL_ASSESSMENT: NONE - DENIES PAIN

## 2023-08-07 PROBLEM — J18.9 COMMUNITY ACQUIRED PNEUMONIA, UNSPECIFIED LATERALITY: Status: ACTIVE | Noted: 2023-08-07

## 2023-08-07 LAB
ALBUMIN SERPL-MCNC: 2.7 G/DL (ref 3.2–4.6)
ALBUMIN/GLOB SERPL: 1 (ref 0.4–1.6)
ALP SERPL-CCNC: 79 U/L (ref 50–136)
ALT SERPL-CCNC: 21 U/L (ref 12–65)
ANION GAP SERPL CALC-SCNC: 8 MMOL/L (ref 2–11)
ANION GAP SERPL CALC-SCNC: 9 MMOL/L (ref 2–11)
AST SERPL-CCNC: 17 U/L (ref 15–37)
BASOPHILS # BLD: 0.1 K/UL (ref 0–0.2)
BASOPHILS # BLD: 0.1 K/UL (ref 0–0.2)
BASOPHILS NFR BLD: 1 % (ref 0–2)
BASOPHILS NFR BLD: 1 % (ref 0–2)
BILIRUB SERPL-MCNC: 0.4 MG/DL (ref 0.2–1.1)
BUN SERPL-MCNC: 13 MG/DL (ref 8–23)
BUN SERPL-MCNC: 14 MG/DL (ref 8–23)
CALCIUM SERPL-MCNC: 8.7 MG/DL (ref 8.3–10.4)
CALCIUM SERPL-MCNC: 8.9 MG/DL (ref 8.3–10.4)
CHLORIDE SERPL-SCNC: 106 MMOL/L (ref 101–110)
CHLORIDE SERPL-SCNC: 108 MMOL/L (ref 101–110)
CO2 SERPL-SCNC: 21 MMOL/L (ref 21–32)
CO2 SERPL-SCNC: 24 MMOL/L (ref 21–32)
CREAT SERPL-MCNC: 0.6 MG/DL (ref 0.8–1.5)
CREAT SERPL-MCNC: 0.7 MG/DL (ref 0.8–1.5)
DIFFERENTIAL METHOD BLD: ABNORMAL
DIFFERENTIAL METHOD BLD: ABNORMAL
EKG ATRIAL RATE: 73 BPM
EKG DIAGNOSIS: NORMAL
EKG P AXIS: 69 DEGREES
EKG P-R INTERVAL: 127 MS
EKG Q-T INTERVAL: 387 MS
EKG QRS DURATION: 79 MS
EKG QTC CALCULATION (BAZETT): 421 MS
EKG R AXIS: 76 DEGREES
EKG T AXIS: 39 DEGREES
EKG VENTRICULAR RATE: 71 BPM
EOSINOPHIL # BLD: 0 K/UL (ref 0–0.8)
EOSINOPHIL # BLD: 0.1 K/UL (ref 0–0.8)
EOSINOPHIL NFR BLD: 0 % (ref 0.5–7.8)
EOSINOPHIL NFR BLD: 1 % (ref 0.5–7.8)
ERYTHROCYTE [DISTWIDTH] IN BLOOD BY AUTOMATED COUNT: 16.2 % (ref 11.9–14.6)
ERYTHROCYTE [DISTWIDTH] IN BLOOD BY AUTOMATED COUNT: 16.5 % (ref 11.9–14.6)
GLOBULIN SER CALC-MCNC: 2.8 G/DL (ref 2.8–4.5)
GLUCOSE SERPL-MCNC: 111 MG/DL (ref 65–100)
GLUCOSE SERPL-MCNC: 97 MG/DL (ref 65–100)
HCT VFR BLD AUTO: 34.5 % (ref 41.1–50.3)
HCT VFR BLD AUTO: 36.9 % (ref 41.1–50.3)
HGB BLD-MCNC: 10.7 G/DL (ref 13.6–17.2)
HGB BLD-MCNC: 11.2 G/DL (ref 13.6–17.2)
IMM GRANULOCYTES # BLD AUTO: 0 K/UL (ref 0–0.5)
IMM GRANULOCYTES # BLD AUTO: 0 K/UL (ref 0–0.5)
IMM GRANULOCYTES NFR BLD AUTO: 0 % (ref 0–5)
IMM GRANULOCYTES NFR BLD AUTO: 0 % (ref 0–5)
LACTATE SERPL-SCNC: 0.8 MMOL/L (ref 0.4–2)
LYMPHOCYTES # BLD: 3.5 K/UL (ref 0.5–4.6)
LYMPHOCYTES # BLD: 4.1 K/UL (ref 0.5–4.6)
LYMPHOCYTES NFR BLD: 40 % (ref 13–44)
LYMPHOCYTES NFR BLD: 48 % (ref 13–44)
MAGNESIUM SERPL-MCNC: 1.8 MG/DL (ref 1.8–2.4)
MAGNESIUM SERPL-MCNC: 1.9 MG/DL (ref 1.8–2.4)
MCH RBC QN AUTO: 26.5 PG (ref 26.1–32.9)
MCH RBC QN AUTO: 26.5 PG (ref 26.1–32.9)
MCHC RBC AUTO-ENTMCNC: 30.4 G/DL (ref 31.4–35)
MCHC RBC AUTO-ENTMCNC: 31 G/DL (ref 31.4–35)
MCV RBC AUTO: 85.4 FL (ref 82–102)
MCV RBC AUTO: 87.4 FL (ref 82–102)
MONOCYTES # BLD: 0.7 K/UL (ref 0.1–1.3)
MONOCYTES # BLD: 0.8 K/UL (ref 0.1–1.3)
MONOCYTES NFR BLD: 9 % (ref 4–12)
MONOCYTES NFR BLD: 9 % (ref 4–12)
NEUTS SEG # BLD: 3.5 K/UL (ref 1.7–8.2)
NEUTS SEG # BLD: 4.3 K/UL (ref 1.7–8.2)
NEUTS SEG NFR BLD: 41 % (ref 43–78)
NEUTS SEG NFR BLD: 50 % (ref 43–78)
NRBC # BLD: 0 K/UL (ref 0–0.2)
NRBC # BLD: 0 K/UL (ref 0–0.2)
PLATELET # BLD AUTO: 199 K/UL (ref 150–450)
PLATELET # BLD AUTO: 204 K/UL (ref 150–450)
PLATELET COMMENT: ADEQUATE
PMV BLD AUTO: 11.3 FL (ref 9.4–12.3)
PMV BLD AUTO: 11.5 FL (ref 9.4–12.3)
POTASSIUM SERPL-SCNC: 3.2 MMOL/L (ref 3.5–5.1)
POTASSIUM SERPL-SCNC: 3.2 MMOL/L (ref 3.5–5.1)
PROCALCITONIN SERPL-MCNC: <0.05 NG/ML (ref 0–0.49)
PROT SERPL-MCNC: 5.5 G/DL (ref 6.3–8.2)
RBC # BLD AUTO: 4.04 M/UL (ref 4.23–5.6)
RBC # BLD AUTO: 4.22 M/UL (ref 4.23–5.6)
RBC MORPH BLD: ABNORMAL
SARS-COV-2 RDRP RESP QL NAA+PROBE: NOT DETECTED
SODIUM SERPL-SCNC: 138 MMOL/L (ref 133–143)
SODIUM SERPL-SCNC: 138 MMOL/L (ref 133–143)
SOURCE: NORMAL
WBC # BLD AUTO: 8.6 K/UL (ref 4.3–11.1)
WBC # BLD AUTO: 8.7 K/UL (ref 4.3–11.1)
WBC MORPH BLD: ABNORMAL

## 2023-08-07 PROCEDURE — 2580000003 HC RX 258: Performed by: FAMILY MEDICINE

## 2023-08-07 PROCEDURE — 6360000002 HC RX W HCPCS: Performed by: FAMILY MEDICINE

## 2023-08-07 PROCEDURE — 93010 ELECTROCARDIOGRAM REPORT: CPT | Performed by: INTERNAL MEDICINE

## 2023-08-07 PROCEDURE — 94760 N-INVAS EAR/PLS OXIMETRY 1: CPT

## 2023-08-07 PROCEDURE — 36415 COLL VENOUS BLD VENIPUNCTURE: CPT

## 2023-08-07 PROCEDURE — 6360000002 HC RX W HCPCS: Performed by: STUDENT IN AN ORGANIZED HEALTH CARE EDUCATION/TRAINING PROGRAM

## 2023-08-07 PROCEDURE — 83735 ASSAY OF MAGNESIUM: CPT

## 2023-08-07 PROCEDURE — 6370000000 HC RX 637 (ALT 250 FOR IP): Performed by: HOSPITALIST

## 2023-08-07 PROCEDURE — 85025 COMPLETE CBC W/AUTO DIFF WBC: CPT

## 2023-08-07 PROCEDURE — 94640 AIRWAY INHALATION TREATMENT: CPT

## 2023-08-07 PROCEDURE — 6370000000 HC RX 637 (ALT 250 FOR IP): Performed by: FAMILY MEDICINE

## 2023-08-07 PROCEDURE — 80053 COMPREHEN METABOLIC PANEL: CPT

## 2023-08-07 PROCEDURE — 2700000000 HC OXYGEN THERAPY PER DAY

## 2023-08-07 PROCEDURE — 94761 N-INVAS EAR/PLS OXIMETRY MLT: CPT

## 2023-08-07 PROCEDURE — 2580000003 HC RX 258: Performed by: HOSPITALIST

## 2023-08-07 PROCEDURE — 6360000002 HC RX W HCPCS: Performed by: HOSPITALIST

## 2023-08-07 PROCEDURE — 1100000000 HC RM PRIVATE

## 2023-08-07 PROCEDURE — 2580000003 HC RX 258: Performed by: STUDENT IN AN ORGANIZED HEALTH CARE EDUCATION/TRAINING PROGRAM

## 2023-08-07 RX ORDER — FLUTICASONE PROPIONATE 110 UG/1
2 AEROSOL, METERED RESPIRATORY (INHALATION)
Status: DISCONTINUED | OUTPATIENT
Start: 2023-08-07 | End: 2023-08-09

## 2023-08-07 RX ORDER — POTASSIUM CHLORIDE 7.45 MG/ML
10 INJECTION INTRAVENOUS PRN
Status: DISCONTINUED | OUTPATIENT
Start: 2023-08-07 | End: 2023-08-07

## 2023-08-07 RX ORDER — ACETAMINOPHEN 650 MG/1
650 SUPPOSITORY RECTAL EVERY 6 HOURS PRN
Status: DISCONTINUED | OUTPATIENT
Start: 2023-08-07 | End: 2023-08-17 | Stop reason: HOSPADM

## 2023-08-07 RX ORDER — IPRATROPIUM BROMIDE AND ALBUTEROL SULFATE 2.5; .5 MG/3ML; MG/3ML
1 SOLUTION RESPIRATORY (INHALATION) EVERY 4 HOURS PRN
Status: DISCONTINUED | OUTPATIENT
Start: 2023-08-07 | End: 2023-08-17 | Stop reason: HOSPADM

## 2023-08-07 RX ORDER — SODIUM CHLORIDE 0.9 % (FLUSH) 0.9 %
5-40 SYRINGE (ML) INJECTION PRN
Status: DISCONTINUED | OUTPATIENT
Start: 2023-08-07 | End: 2023-08-17 | Stop reason: HOSPADM

## 2023-08-07 RX ORDER — ACETAMINOPHEN 325 MG/1
650 TABLET ORAL EVERY 6 HOURS PRN
Status: DISCONTINUED | OUTPATIENT
Start: 2023-08-07 | End: 2023-08-17 | Stop reason: HOSPADM

## 2023-08-07 RX ORDER — ALBUTEROL SULFATE 2.5 MG/3ML
2.5 SOLUTION RESPIRATORY (INHALATION) EVERY 4 HOURS PRN
Status: DISCONTINUED | OUTPATIENT
Start: 2023-08-07 | End: 2023-08-17 | Stop reason: HOSPADM

## 2023-08-07 RX ORDER — MAGNESIUM SULFATE IN WATER 40 MG/ML
2000 INJECTION, SOLUTION INTRAVENOUS PRN
Status: DISCONTINUED | OUTPATIENT
Start: 2023-08-07 | End: 2023-08-07

## 2023-08-07 RX ORDER — GUAIFENESIN 600 MG/1
1200 TABLET, EXTENDED RELEASE ORAL 2 TIMES DAILY
Status: DISCONTINUED | OUTPATIENT
Start: 2023-08-07 | End: 2023-08-07

## 2023-08-07 RX ORDER — AZITHROMYCIN 250 MG/1
500 TABLET, FILM COATED ORAL DAILY
Status: COMPLETED | OUTPATIENT
Start: 2023-08-07 | End: 2023-08-11

## 2023-08-07 RX ORDER — CODEINE PHOSPHATE AND GUAIFENESIN 10; 100 MG/5ML; MG/5ML
10 SOLUTION ORAL 4 TIMES DAILY
Status: DISCONTINUED | OUTPATIENT
Start: 2023-08-07 | End: 2023-08-07

## 2023-08-07 RX ORDER — HYDROCODONE BITARTRATE AND HOMATROPINE METHYLBROMIDE ORAL SOLUTION 5; 1.5 MG/5ML; MG/5ML
5 LIQUID ORAL EVERY 4 HOURS PRN
Status: DISCONTINUED | OUTPATIENT
Start: 2023-08-07 | End: 2023-08-07

## 2023-08-07 RX ORDER — ROFLUMILAST 500 UG/1
500 TABLET ORAL DAILY
Status: DISCONTINUED | OUTPATIENT
Start: 2023-08-07 | End: 2023-08-17 | Stop reason: HOSPADM

## 2023-08-07 RX ORDER — PROMETHAZINE HYDROCHLORIDE 25 MG/1
12.5 TABLET ORAL EVERY 6 HOURS PRN
Status: DISCONTINUED | OUTPATIENT
Start: 2023-08-07 | End: 2023-08-07

## 2023-08-07 RX ORDER — PREDNISONE 20 MG/1
20 TABLET ORAL DAILY
Status: DISCONTINUED | OUTPATIENT
Start: 2023-08-07 | End: 2023-08-07

## 2023-08-07 RX ORDER — HYDROCODONE BITARTRATE AND HOMATROPINE METHYLBROMIDE ORAL SOLUTION 5; 1.5 MG/5ML; MG/5ML
5 LIQUID ORAL EVERY 6 HOURS PRN
Status: DISCONTINUED | OUTPATIENT
Start: 2023-08-07 | End: 2023-08-17 | Stop reason: HOSPADM

## 2023-08-07 RX ORDER — MIDODRINE HYDROCHLORIDE 5 MG/1
5 TABLET ORAL
Status: DISCONTINUED | OUTPATIENT
Start: 2023-08-07 | End: 2023-08-17 | Stop reason: HOSPADM

## 2023-08-07 RX ORDER — IPRATROPIUM BROMIDE AND ALBUTEROL SULFATE 2.5; .5 MG/3ML; MG/3ML
1 SOLUTION RESPIRATORY (INHALATION) 3 TIMES DAILY
Status: DISCONTINUED | OUTPATIENT
Start: 2023-08-07 | End: 2023-08-07

## 2023-08-07 RX ORDER — TAMSULOSIN HYDROCHLORIDE 0.4 MG/1
0.4 CAPSULE ORAL DAILY
Status: DISCONTINUED | OUTPATIENT
Start: 2023-08-07 | End: 2023-08-17 | Stop reason: HOSPADM

## 2023-08-07 RX ORDER — ENOXAPARIN SODIUM 100 MG/ML
40 INJECTION SUBCUTANEOUS DAILY
Status: DISCONTINUED | OUTPATIENT
Start: 2023-08-07 | End: 2023-08-07

## 2023-08-07 RX ORDER — LANOLIN ALCOHOL/MO/W.PET/CERES
400 CREAM (GRAM) TOPICAL DAILY
Status: DISCONTINUED | OUTPATIENT
Start: 2023-08-07 | End: 2023-08-17 | Stop reason: HOSPADM

## 2023-08-07 RX ORDER — MAGNESIUM HYDROXIDE/ALUMINUM HYDROXICE/SIMETHICONE 120; 1200; 1200 MG/30ML; MG/30ML; MG/30ML
30 SUSPENSION ORAL EVERY 6 HOURS PRN
Status: DISCONTINUED | OUTPATIENT
Start: 2023-08-07 | End: 2023-08-17 | Stop reason: HOSPADM

## 2023-08-07 RX ORDER — POLYETHYLENE GLYCOL 3350 17 G/17G
17 POWDER, FOR SOLUTION ORAL DAILY
Status: DISCONTINUED | OUTPATIENT
Start: 2023-08-07 | End: 2023-08-07

## 2023-08-07 RX ORDER — PANTOPRAZOLE SODIUM 40 MG/1
40 TABLET, DELAYED RELEASE ORAL
Status: DISCONTINUED | OUTPATIENT
Start: 2023-08-07 | End: 2023-08-17 | Stop reason: HOSPADM

## 2023-08-07 RX ORDER — LANOLIN ALCOHOL/MO/W.PET/CERES
3 CREAM (GRAM) TOPICAL
Status: DISCONTINUED | OUTPATIENT
Start: 2023-08-07 | End: 2023-08-07

## 2023-08-07 RX ORDER — IPRATROPIUM BROMIDE AND ALBUTEROL SULFATE 2.5; .5 MG/3ML; MG/3ML
1 SOLUTION RESPIRATORY (INHALATION)
Status: DISCONTINUED | OUTPATIENT
Start: 2023-08-07 | End: 2023-08-09

## 2023-08-07 RX ORDER — BISACODYL 5 MG/1
5 TABLET, DELAYED RELEASE ORAL DAILY PRN
Status: DISCONTINUED | OUTPATIENT
Start: 2023-08-07 | End: 2023-08-17 | Stop reason: HOSPADM

## 2023-08-07 RX ORDER — SODIUM CHLORIDE 0.9 % (FLUSH) 0.9 %
5-40 SYRINGE (ML) INJECTION EVERY 12 HOURS SCHEDULED
Status: DISCONTINUED | OUTPATIENT
Start: 2023-08-07 | End: 2023-08-17 | Stop reason: HOSPADM

## 2023-08-07 RX ORDER — ASPIRIN 81 MG/1
81 TABLET, CHEWABLE ORAL DAILY
Status: DISCONTINUED | OUTPATIENT
Start: 2023-08-07 | End: 2023-08-17 | Stop reason: HOSPADM

## 2023-08-07 RX ORDER — POTASSIUM CHLORIDE 20 MEQ/1
40 TABLET, EXTENDED RELEASE ORAL PRN
Status: DISCONTINUED | OUTPATIENT
Start: 2023-08-07 | End: 2023-08-07

## 2023-08-07 RX ORDER — SODIUM CHLORIDE 9 MG/ML
INJECTION, SOLUTION INTRAVENOUS PRN
Status: DISCONTINUED | OUTPATIENT
Start: 2023-08-07 | End: 2023-08-17 | Stop reason: HOSPADM

## 2023-08-07 RX ORDER — ROSUVASTATIN CALCIUM 20 MG/1
40 TABLET, COATED ORAL NIGHTLY
Status: DISCONTINUED | OUTPATIENT
Start: 2023-08-07 | End: 2023-08-07

## 2023-08-07 RX ORDER — POTASSIUM CHLORIDE 20 MEQ/1
40 TABLET, EXTENDED RELEASE ORAL 2 TIMES DAILY WITH MEALS
Status: COMPLETED | OUTPATIENT
Start: 2023-08-07 | End: 2023-08-08

## 2023-08-07 RX ORDER — DULOXETIN HYDROCHLORIDE 30 MG/1
30 CAPSULE, DELAYED RELEASE ORAL DAILY
Status: DISCONTINUED | OUTPATIENT
Start: 2023-08-08 | End: 2023-08-17 | Stop reason: HOSPADM

## 2023-08-07 RX ORDER — ONDANSETRON 2 MG/ML
4 INJECTION INTRAMUSCULAR; INTRAVENOUS EVERY 6 HOURS PRN
Status: DISCONTINUED | OUTPATIENT
Start: 2023-08-07 | End: 2023-08-17 | Stop reason: HOSPADM

## 2023-08-07 RX ORDER — GUAIFENESIN 600 MG/1
1200 TABLET, EXTENDED RELEASE ORAL 3 TIMES DAILY PRN
Status: DISCONTINUED | OUTPATIENT
Start: 2023-08-07 | End: 2023-08-14

## 2023-08-07 RX ORDER — POLYETHYLENE GLYCOL 3350 17 G/17G
17 POWDER, FOR SOLUTION ORAL DAILY PRN
Status: DISCONTINUED | OUTPATIENT
Start: 2023-08-07 | End: 2023-08-17 | Stop reason: HOSPADM

## 2023-08-07 RX ORDER — DULOXETIN HYDROCHLORIDE 60 MG/1
60 CAPSULE, DELAYED RELEASE ORAL DAILY
Status: DISCONTINUED | OUTPATIENT
Start: 2023-08-07 | End: 2023-08-07

## 2023-08-07 RX ORDER — CLOPIDOGREL BISULFATE 75 MG/1
75 TABLET ORAL DAILY
Status: DISCONTINUED | OUTPATIENT
Start: 2023-08-07 | End: 2023-08-17 | Stop reason: HOSPADM

## 2023-08-07 RX ADMIN — ASPIRIN 81 MG 81 MG: 81 TABLET ORAL at 08:49

## 2023-08-07 RX ADMIN — HYDROCODONE BITARTRATE AND HOMATROPINE METHYLBROMIDE 5 ML: 5; 1.5 SOLUTION ORAL at 23:34

## 2023-08-07 RX ADMIN — FLUTICASONE PROPIONATE 2 PUFF: 110 AEROSOL, METERED RESPIRATORY (INHALATION) at 21:29

## 2023-08-07 RX ADMIN — SODIUM CHLORIDE, PRESERVATIVE FREE 10 ML: 5 INJECTION INTRAVENOUS at 08:51

## 2023-08-07 RX ADMIN — TAMSULOSIN HYDROCHLORIDE 0.4 MG: 0.4 CAPSULE ORAL at 08:49

## 2023-08-07 RX ADMIN — VANCOMYCIN HYDROCHLORIDE 1000 MG: 1 INJECTION, POWDER, LYOPHILIZED, FOR SOLUTION INTRAVENOUS at 01:00

## 2023-08-07 RX ADMIN — TIOTROPIUM BROMIDE INHALATION SPRAY 2 PUFF: 3.12 SPRAY, METERED RESPIRATORY (INHALATION) at 07:51

## 2023-08-07 RX ADMIN — GUAIFENESIN AND CODEINE PHOSPHATE 10 ML: 100; 10 SOLUTION ORAL at 16:04

## 2023-08-07 RX ADMIN — ONDANSETRON 4 MG: 2 INJECTION INTRAMUSCULAR; INTRAVENOUS at 23:34

## 2023-08-07 RX ADMIN — HYDROCODONE BITARTRATE AND HOMATROPINE METHYLBROMIDE 5 ML: 5; 1.5 SOLUTION ORAL at 04:21

## 2023-08-07 RX ADMIN — DULOXETINE HYDROCHLORIDE 60 MG: 60 CAPSULE, DELAYED RELEASE ORAL at 08:49

## 2023-08-07 RX ADMIN — SERTRALINE 50 MG: 50 TABLET, FILM COATED ORAL at 08:49

## 2023-08-07 RX ADMIN — PREDNISONE 20 MG: 20 TABLET ORAL at 10:27

## 2023-08-07 RX ADMIN — CEFTRIAXONE 1000 MG: 1 INJECTION, POWDER, FOR SOLUTION INTRAMUSCULAR; INTRAVENOUS at 04:21

## 2023-08-07 RX ADMIN — SODIUM CHLORIDE, PRESERVATIVE FREE 10 ML: 5 INJECTION INTRAVENOUS at 21:13

## 2023-08-07 RX ADMIN — IPRATROPIUM BROMIDE AND ALBUTEROL SULFATE 1 DOSE: 2.5; .5 SOLUTION RESPIRATORY (INHALATION) at 14:46

## 2023-08-07 RX ADMIN — IPRATROPIUM BROMIDE AND ALBUTEROL SULFATE 1 DOSE: 2.5; .5 SOLUTION RESPIRATORY (INHALATION) at 11:36

## 2023-08-07 RX ADMIN — MOMETASONE FUROATE AND FORMOTEROL FUMARATE DIHYDRATE 2 PUFF: 200; 5 AEROSOL RESPIRATORY (INHALATION) at 19:52

## 2023-08-07 RX ADMIN — MIDODRINE HYDROCHLORIDE 5 MG: 5 TABLET ORAL at 16:04

## 2023-08-07 RX ADMIN — IPRATROPIUM BROMIDE AND ALBUTEROL SULFATE 1 DOSE: 2.5; .5 SOLUTION RESPIRATORY (INHALATION) at 19:52

## 2023-08-07 RX ADMIN — POTASSIUM CHLORIDE 40 MEQ: 1500 TABLET, EXTENDED RELEASE ORAL at 08:55

## 2023-08-07 RX ADMIN — GUAIFENESIN 1200 MG: 600 TABLET ORAL at 23:34

## 2023-08-07 RX ADMIN — PANTOPRAZOLE SODIUM 40 MG: 40 TABLET, DELAYED RELEASE ORAL at 05:12

## 2023-08-07 RX ADMIN — POTASSIUM CHLORIDE 40 MEQ: 1500 TABLET, EXTENDED RELEASE ORAL at 16:04

## 2023-08-07 RX ADMIN — MIDODRINE HYDROCHLORIDE 5 MG: 5 TABLET ORAL at 12:02

## 2023-08-07 RX ADMIN — AZITHROMYCIN DIHYDRATE 500 MG: 250 TABLET ORAL at 08:49

## 2023-08-07 RX ADMIN — METHYLPREDNISOLONE SODIUM SUCCINATE 60 MG: 40 INJECTION, POWDER, LYOPHILIZED, FOR SOLUTION INTRAMUSCULAR; INTRAVENOUS at 21:41

## 2023-08-07 RX ADMIN — ACETAMINOPHEN 650 MG: 325 TABLET ORAL at 05:11

## 2023-08-07 RX ADMIN — GUAIFENESIN AND CODEINE PHOSPHATE 10 ML: 100; 10 SOLUTION ORAL at 12:02

## 2023-08-07 RX ADMIN — MOMETASONE FUROATE AND FORMOTEROL FUMARATE DIHYDRATE 2 PUFF: 200; 5 AEROSOL RESPIRATORY (INHALATION) at 07:51

## 2023-08-07 RX ADMIN — CLOPIDOGREL BISULFATE 75 MG: 75 TABLET ORAL at 08:49

## 2023-08-07 RX ADMIN — GUAIFENESIN 1200 MG: 600 TABLET ORAL at 08:49

## 2023-08-07 RX ADMIN — ENOXAPARIN SODIUM 40 MG: 100 INJECTION SUBCUTANEOUS at 08:50

## 2023-08-07 RX ADMIN — MIDODRINE HYDROCHLORIDE 5 MG: 5 TABLET ORAL at 08:49

## 2023-08-07 RX ADMIN — CEFEPIME 2000 MG: 2 INJECTION, POWDER, FOR SOLUTION INTRAVENOUS at 00:17

## 2023-08-07 RX ADMIN — MAGNESIUM GLUCONATE 500 MG ORAL TABLET 400 MG: 500 TABLET ORAL at 08:55

## 2023-08-07 ASSESSMENT — PAIN DESCRIPTION - LOCATION: LOCATION: HEAD

## 2023-08-07 ASSESSMENT — PAIN DESCRIPTION - DESCRIPTORS: DESCRIPTORS: ACHING

## 2023-08-07 ASSESSMENT — PAIN SCALES - GENERAL
PAINLEVEL_OUTOF10: 5
PAINLEVEL_OUTOF10: 2

## 2023-08-07 NOTE — H&P
Hospitalist History and Physical   Admit Date:  2023 11:27 PM   Name:  Yuliya Hsieh   Age:  66 y.o. Sex:  male  :  1945   MRN:  [id number]   Room:  ER08/08    Presenting Complaint: Cough     Reason(s) for Admission: Community acquired pneumonia, unspecified laterality [J18.9]     History of Present Illness:   Patient was discussed with the ER provider prior to seeing the patient. Yuliya Hsieh is a 66 y.o. male with medical history of COPD, who presented with cough and excessive mucus. Patient reports he has had an increased cough, some mild shortness of breath, and he is unable to \"cough this stuff up\". He denies fever/chills, NVD, abdominal pain, chest pain. He does report he has been getting worse over the last few days. He does not use oxygen at home because his concentrator is broken. However his O2 saturations have been in the 90s on room air. ER was concerned about a developing pneumonia and asked that we admit for observation. Review of Systems:  10 systems reviewed and negative except as noted in HPI. Assessment & Plan:     Principal Problem:    Community acquired pneumonia, unspecified laterality  Plan: I am not convinced that he has pneumonia, but only mild exacerbation of his COPD.   Will continue antibiotics of IV Rocephin and Zithromax for CAP versus COPD  Mucinex  Active Problems:    Chronic lymphocytic leukemia of B-cell type not having achieved remission (HCC)  Plan: Noted    Chronic obstructive pulmonary disease, unspecified ([address])  Plan: Continue home inhalers and as needed albuterol      Dispo/Discharge Planning:   Inpatient    Diet: Regular  VTE ppx:  Lovenox  Code status: Full    Hospital Problems             Last Modified POA    * (Principal) Community acquired pneumonia, unspecified laterality 2023 Yes    Chronic lymphocytic leukemia of B-cell type not having achieved remission ([address]) 2023 Yes    Chronic obstructive pulmonary disease,

## 2023-08-07 NOTE — ED PROVIDER NOTES
Emergency Department Provider Note       PCP: Seamus De La Cruz DO   Age: 66 y.o. Sex: male     DISPOSITION Decision To Admit 08/07/2023 02:36:35 AM       ICD-10-CM    1. Pneumonia of left lower lobe due to infectious organism  J18.9           Medical Decision Making     Complexity of Problems Addressed:  1 or more chronic illnesses with a severe exacerbation or progression. 1 or more acute illnesses that pose a threat to life or bodily function. Data Reviewed and Analyzed:  Category 1:   I independently ordered and reviewed each unique test.  I reviewed external records: ED visit note from an outside group. I reviewed external records: provider visit note from PCP. I reviewed external records: provider visit note from outside specialist.   The patients assessment required an independent historian: EMS providers at bedside. The reason they were needed is important historical information not provided by the patient. Category 2:   I interpreted the X-rays developing pneumonia in the left lower lobe. Category 3: Discussion of management or test interpretation. 55-year-old male patient presents from home via EMS with reports of shortness of breath and progressive cough/congestion. History of frequent respiratory infections requiring antibiotic treatment. Orders placed for labs, procalcitonin and lactic acid blood cultures. We will get COVID swab and x-ray. I will attempt DuoNeb treatment as well   Suspect patient will likely need admission. Awaiting lab work. 12:34 AM EDT The patient's care will be transitioned to Dr. Susan Ballesteros. At this time, the plan is await labs, admit. Please see that provider's documentation for further information. The patient was admitted and I have discussed patient management with the admitting provider.     Risk of Complications and/or Morbidity of Patient Management:      ED Course as of 08/07/23 0237   [facility] Aug 06, 2023   2349 EKG interpretation: Sinus rhythm, rate of

## 2023-08-07 NOTE — ED NOTES
TRANSFER - OUT REPORT:    Verbal report given to [facility] on [address]  being transferred to [phone] for routine progression of patient care       Report consisted of patient's Situation, Background, Assessment and   Recommendations(SBAR). Information from the following report(s) ED SBAR was reviewed with the receiving nurse. June Fall Assessment:    Presents to emergency department  because of falls (Syncope, seizure, or loss of consciousness): No  Age > 70: Yes  Altered Mental Status, Intoxication with alcohol or substance confusion (Disorientation, impaired judgment, poor safety awaremess, or inability to follow instructions): No  Impaired Mobility: Ambulates or transfers with assistive devices or assistance; Unable to ambulate or transer.: Yes  Nursing Judgement: Yes          Lines:   Peripheral IV 08/06/23 Distal;Left Forearm (Active)   Site Assessment Clean, dry & intact 08/07/23 0005   Line Status Blood return noted 08/07/23 0005   Phlebitis Assessment No symptoms 08/07/23 0005   Infiltration Assessment 0 08/07/23 0005        Opportunity for questions and clarification was provided.       Patient transported with:  Gregory Marcus RN  08/07/23 8056

## 2023-08-07 NOTE — PLAN OF CARE
Problem: Respiratory - Adult  Goal: Achieves optimal ventilation and oxygenation  Outcome: Progressing  Flowsheets (Taken 8/7/2023 0754)  Achieves optimal ventilation and oxygenation:   Assess for changes in respiratory status   Position to facilitate oxygenation and minimize respiratory effort   Respiratory therapy support as indicated   Assess and instruct to report shortness of breath or any respiratory difficulty   Encourage broncho-pulmonary hygiene including cough, deep breathe, incentive spirometry   Assess for changes in mentation and behavior

## 2023-08-07 NOTE — ED TRIAGE NOTES
Pt to ED via EMS with complaints by family of cough and pt unable to clear mucous. Pt was 93% RA on EMS arrival.   Ems given 3.5 combuvant,   EMS VS: 96% on RA, RR 30, /84, Hr 70, temp 99.0, BGL 96.

## 2023-08-08 PROBLEM — G93.40 ACUTE ENCEPHALOPATHY: Status: RESOLVED | Noted: 2023-06-08 | Resolved: 2023-08-08

## 2023-08-08 PROCEDURE — 94640 AIRWAY INHALATION TREATMENT: CPT

## 2023-08-08 PROCEDURE — 6370000000 HC RX 637 (ALT 250 FOR IP): Performed by: HOSPITALIST

## 2023-08-08 PROCEDURE — 6370000000 HC RX 637 (ALT 250 FOR IP): Performed by: FAMILY MEDICINE

## 2023-08-08 PROCEDURE — 2580000003 HC RX 258: Performed by: HOSPITALIST

## 2023-08-08 PROCEDURE — 2580000003 HC RX 258: Performed by: FAMILY MEDICINE

## 2023-08-08 PROCEDURE — 2700000000 HC OXYGEN THERAPY PER DAY

## 2023-08-08 PROCEDURE — 1100000003 HC PRIVATE W/ TELEMETRY

## 2023-08-08 PROCEDURE — 6360000002 HC RX W HCPCS: Performed by: HOSPITALIST

## 2023-08-08 PROCEDURE — 94760 N-INVAS EAR/PLS OXIMETRY 1: CPT

## 2023-08-08 RX ORDER — POTASSIUM CHLORIDE 7.45 MG/ML
10 INJECTION INTRAVENOUS PRN
Status: DISCONTINUED | OUTPATIENT
Start: 2023-08-08 | End: 2023-08-13

## 2023-08-08 RX ORDER — MAGNESIUM SULFATE IN WATER 40 MG/ML
2000 INJECTION, SOLUTION INTRAVENOUS PRN
Status: DISCONTINUED | OUTPATIENT
Start: 2023-08-08 | End: 2023-08-13

## 2023-08-08 RX ORDER — POTASSIUM CHLORIDE 20 MEQ/1
40 TABLET, EXTENDED RELEASE ORAL PRN
Status: DISCONTINUED | OUTPATIENT
Start: 2023-08-08 | End: 2023-08-13

## 2023-08-08 RX ADMIN — FLUTICASONE PROPIONATE 2 PUFF: 110 AEROSOL, METERED RESPIRATORY (INHALATION) at 08:44

## 2023-08-08 RX ADMIN — AZITHROMYCIN DIHYDRATE 500 MG: 250 TABLET ORAL at 08:27

## 2023-08-08 RX ADMIN — SODIUM CHLORIDE, PRESERVATIVE FREE 10 ML: 5 INJECTION INTRAVENOUS at 08:37

## 2023-08-08 RX ADMIN — GUAIFENESIN 1200 MG: 600 TABLET ORAL at 21:20

## 2023-08-08 RX ADMIN — HYDROCODONE BITARTRATE AND HOMATROPINE METHYLBROMIDE 5 ML: 5; 1.5 SOLUTION ORAL at 18:53

## 2023-08-08 RX ADMIN — IPRATROPIUM BROMIDE AND ALBUTEROL SULFATE 1 DOSE: 2.5; .5 SOLUTION RESPIRATORY (INHALATION) at 19:07

## 2023-08-08 RX ADMIN — TAMSULOSIN HYDROCHLORIDE 0.4 MG: 0.4 CAPSULE ORAL at 08:27

## 2023-08-08 RX ADMIN — SERTRALINE 25 MG: 50 TABLET, FILM COATED ORAL at 08:29

## 2023-08-08 RX ADMIN — METHYLPREDNISOLONE SODIUM SUCCINATE 60 MG: 40 INJECTION, POWDER, LYOPHILIZED, FOR SOLUTION INTRAMUSCULAR; INTRAVENOUS at 14:59

## 2023-08-08 RX ADMIN — POTASSIUM CHLORIDE 40 MEQ: 1500 TABLET, EXTENDED RELEASE ORAL at 08:26

## 2023-08-08 RX ADMIN — MIDODRINE HYDROCHLORIDE 5 MG: 5 TABLET ORAL at 11:23

## 2023-08-08 RX ADMIN — PANTOPRAZOLE SODIUM 40 MG: 40 TABLET, DELAYED RELEASE ORAL at 05:32

## 2023-08-08 RX ADMIN — CLOPIDOGREL BISULFATE 75 MG: 75 TABLET ORAL at 08:26

## 2023-08-08 RX ADMIN — IPRATROPIUM BROMIDE AND ALBUTEROL SULFATE 1 DOSE: 2.5; .5 SOLUTION RESPIRATORY (INHALATION) at 08:39

## 2023-08-08 RX ADMIN — HYDROCODONE BITARTRATE AND HOMATROPINE METHYLBROMIDE 5 ML: 5; 1.5 SOLUTION ORAL at 11:23

## 2023-08-08 RX ADMIN — METHYLPREDNISOLONE SODIUM SUCCINATE 60 MG: 40 INJECTION, POWDER, LYOPHILIZED, FOR SOLUTION INTRAMUSCULAR; INTRAVENOUS at 05:32

## 2023-08-08 RX ADMIN — MIDODRINE HYDROCHLORIDE 5 MG: 5 TABLET ORAL at 08:26

## 2023-08-08 RX ADMIN — MOMETASONE FUROATE AND FORMOTEROL FUMARATE DIHYDRATE 2 PUFF: 200; 5 AEROSOL RESPIRATORY (INHALATION) at 08:44

## 2023-08-08 RX ADMIN — METHYLPREDNISOLONE SODIUM SUCCINATE 60 MG: 40 INJECTION, POWDER, LYOPHILIZED, FOR SOLUTION INTRAMUSCULAR; INTRAVENOUS at 21:13

## 2023-08-08 RX ADMIN — IPRATROPIUM BROMIDE AND ALBUTEROL SULFATE 1 DOSE: 2.5; .5 SOLUTION RESPIRATORY (INHALATION) at 14:52

## 2023-08-08 RX ADMIN — DULOXETINE HYDROCHLORIDE 30 MG: 30 CAPSULE, DELAYED RELEASE ORAL at 08:27

## 2023-08-08 RX ADMIN — FLUTICASONE PROPIONATE 2 PUFF: 110 AEROSOL, METERED RESPIRATORY (INHALATION) at 19:07

## 2023-08-08 RX ADMIN — MIDODRINE HYDROCHLORIDE 5 MG: 5 TABLET ORAL at 17:11

## 2023-08-08 RX ADMIN — MAGNESIUM GLUCONATE 500 MG ORAL TABLET 400 MG: 500 TABLET ORAL at 08:27

## 2023-08-08 RX ADMIN — MOMETASONE FUROATE AND FORMOTEROL FUMARATE DIHYDRATE 2 PUFF: 200; 5 AEROSOL RESPIRATORY (INHALATION) at 19:07

## 2023-08-08 RX ADMIN — ASPIRIN 81 MG 81 MG: 81 TABLET ORAL at 08:26

## 2023-08-08 RX ADMIN — CEFTRIAXONE SODIUM 2000 MG: 2 INJECTION, POWDER, FOR SOLUTION INTRAMUSCULAR; INTRAVENOUS at 05:31

## 2023-08-08 RX ADMIN — SODIUM CHLORIDE, PRESERVATIVE FREE 10 ML: 5 INJECTION INTRAVENOUS at 21:13

## 2023-08-08 ASSESSMENT — PAIN SCALES - GENERAL: PAINLEVEL_OUTOF10: 0

## 2023-08-09 PROCEDURE — 99222 1ST HOSP IP/OBS MODERATE 55: CPT | Performed by: INTERNAL MEDICINE

## 2023-08-09 PROCEDURE — 2580000003 HC RX 258: Performed by: HOSPITALIST

## 2023-08-09 PROCEDURE — 6360000002 HC RX W HCPCS: Performed by: INTERNAL MEDICINE

## 2023-08-09 PROCEDURE — 2580000003 HC RX 258: Performed by: FAMILY MEDICINE

## 2023-08-09 PROCEDURE — 2700000000 HC OXYGEN THERAPY PER DAY

## 2023-08-09 PROCEDURE — 94640 AIRWAY INHALATION TREATMENT: CPT

## 2023-08-09 PROCEDURE — 6370000000 HC RX 637 (ALT 250 FOR IP): Performed by: FAMILY MEDICINE

## 2023-08-09 PROCEDURE — 6370000000 HC RX 637 (ALT 250 FOR IP): Performed by: HOSPITALIST

## 2023-08-09 PROCEDURE — 6360000002 HC RX W HCPCS: Performed by: HOSPITALIST

## 2023-08-09 PROCEDURE — 1100000003 HC PRIVATE W/ TELEMETRY

## 2023-08-09 PROCEDURE — 94761 N-INVAS EAR/PLS OXIMETRY MLT: CPT

## 2023-08-09 PROCEDURE — 6370000000 HC RX 637 (ALT 250 FOR IP): Performed by: INTERNAL MEDICINE

## 2023-08-09 RX ORDER — BUDESONIDE 0.5 MG/2ML
0.5 INHALANT ORAL
Status: DISCONTINUED | OUTPATIENT
Start: 2023-08-09 | End: 2023-08-17 | Stop reason: HOSPADM

## 2023-08-09 RX ORDER — IPRATROPIUM BROMIDE AND ALBUTEROL SULFATE 2.5; .5 MG/3ML; MG/3ML
1 SOLUTION RESPIRATORY (INHALATION)
Status: DISCONTINUED | OUTPATIENT
Start: 2023-08-09 | End: 2023-08-12

## 2023-08-09 RX ADMIN — METHYLPREDNISOLONE SODIUM SUCCINATE 60 MG: 40 INJECTION, POWDER, LYOPHILIZED, FOR SOLUTION INTRAMUSCULAR; INTRAVENOUS at 06:12

## 2023-08-09 RX ADMIN — SODIUM CHLORIDE, PRESERVATIVE FREE 10 ML: 5 INJECTION INTRAVENOUS at 08:47

## 2023-08-09 RX ADMIN — MAGNESIUM GLUCONATE 500 MG ORAL TABLET 400 MG: 500 TABLET ORAL at 08:35

## 2023-08-09 RX ADMIN — METHYLPREDNISOLONE SODIUM SUCCINATE 60 MG: 40 INJECTION, POWDER, LYOPHILIZED, FOR SOLUTION INTRAMUSCULAR; INTRAVENOUS at 22:23

## 2023-08-09 RX ADMIN — METHYLPREDNISOLONE SODIUM SUCCINATE 60 MG: 40 INJECTION, POWDER, LYOPHILIZED, FOR SOLUTION INTRAMUSCULAR; INTRAVENOUS at 14:20

## 2023-08-09 RX ADMIN — IPRATROPIUM BROMIDE AND ALBUTEROL SULFATE 1 DOSE: 2.5; .5 SOLUTION RESPIRATORY (INHALATION) at 13:43

## 2023-08-09 RX ADMIN — TAMSULOSIN HYDROCHLORIDE 0.4 MG: 0.4 CAPSULE ORAL at 08:34

## 2023-08-09 RX ADMIN — HYDROCODONE BITARTRATE AND HOMATROPINE METHYLBROMIDE 5 ML: 5; 1.5 SOLUTION ORAL at 21:32

## 2023-08-09 RX ADMIN — IPRATROPIUM BROMIDE AND ALBUTEROL SULFATE 1 DOSE: 2.5; .5 SOLUTION RESPIRATORY (INHALATION) at 05:35

## 2023-08-09 RX ADMIN — MOMETASONE FUROATE AND FORMOTEROL FUMARATE DIHYDRATE 2 PUFF: 200; 5 AEROSOL RESPIRATORY (INHALATION) at 08:11

## 2023-08-09 RX ADMIN — FLUTICASONE PROPIONATE 2 PUFF: 110 AEROSOL, METERED RESPIRATORY (INHALATION) at 08:11

## 2023-08-09 RX ADMIN — IPRATROPIUM BROMIDE AND ALBUTEROL SULFATE 1 DOSE: 2.5; .5 SOLUTION RESPIRATORY (INHALATION) at 08:11

## 2023-08-09 RX ADMIN — AZITHROMYCIN DIHYDRATE 500 MG: 250 TABLET ORAL at 08:35

## 2023-08-09 RX ADMIN — DULOXETINE HYDROCHLORIDE 30 MG: 30 CAPSULE, DELAYED RELEASE ORAL at 08:34

## 2023-08-09 RX ADMIN — SODIUM CHLORIDE, PRESERVATIVE FREE 10 ML: 5 INJECTION INTRAVENOUS at 23:14

## 2023-08-09 RX ADMIN — MOMETASONE FUROATE AND FORMOTEROL FUMARATE DIHYDRATE 2 PUFF: 200; 5 AEROSOL RESPIRATORY (INHALATION) at 20:07

## 2023-08-09 RX ADMIN — CEFTRIAXONE SODIUM 2000 MG: 2 INJECTION, POWDER, FOR SOLUTION INTRAMUSCULAR; INTRAVENOUS at 06:11

## 2023-08-09 RX ADMIN — MIDODRINE HYDROCHLORIDE 5 MG: 5 TABLET ORAL at 17:13

## 2023-08-09 RX ADMIN — ASPIRIN 81 MG 81 MG: 81 TABLET ORAL at 08:35

## 2023-08-09 RX ADMIN — SERTRALINE 25 MG: 50 TABLET, FILM COATED ORAL at 08:35

## 2023-08-09 RX ADMIN — IPRATROPIUM BROMIDE AND ALBUTEROL SULFATE 1 DOSE: 2.5; .5 SOLUTION RESPIRATORY (INHALATION) at 20:06

## 2023-08-09 RX ADMIN — MIDODRINE HYDROCHLORIDE 5 MG: 5 TABLET ORAL at 12:10

## 2023-08-09 RX ADMIN — PANTOPRAZOLE SODIUM 40 MG: 40 TABLET, DELAYED RELEASE ORAL at 06:12

## 2023-08-09 RX ADMIN — MIDODRINE HYDROCHLORIDE 5 MG: 5 TABLET ORAL at 08:35

## 2023-08-09 RX ADMIN — BUDESONIDE 500 MCG: 0.5 INHALANT RESPIRATORY (INHALATION) at 20:06

## 2023-08-09 RX ADMIN — CLOPIDOGREL BISULFATE 75 MG: 75 TABLET ORAL at 08:35

## 2023-08-09 RX ADMIN — HYDROCODONE BITARTRATE AND HOMATROPINE METHYLBROMIDE 5 ML: 5; 1.5 SOLUTION ORAL at 01:38

## 2023-08-09 RX ADMIN — HYDROCODONE BITARTRATE AND HOMATROPINE METHYLBROMIDE 5 ML: 5; 1.5 SOLUTION ORAL at 08:36

## 2023-08-09 RX ADMIN — TIOTROPIUM BROMIDE INHALATION SPRAY 2 PUFF: 3.12 SPRAY, METERED RESPIRATORY (INHALATION) at 08:11

## 2023-08-09 ASSESSMENT — PAIN SCALES - GENERAL: PAINLEVEL_OUTOF10: 0

## 2023-08-09 NOTE — ADT AUTH CERT
Supplied)  500 mcg Oral Daily    tamsulosin (FLOMAX) capsule 0.4 mg  0.4 mg Oral Daily    tiotropium (SPIRIVA RESPIMAT) 2.5 MCG/ACT inhaler 2 puff  2 puff Inhalation Daily RT    sodium chloride flush 0.9 % injection 5-40 mL  5-40 mL IntraVENous 2 times per day    sodium chloride flush 0.9 % injection 5-40 mL  5-40 mL IntraVENous PRN    0.9 % sodium chloride infusion   IntraVENous PRN    ondansetron (ZOFRAN) injection 4 mg  4 mg IntraVENous Q6H PRN    bisacodyl (DULCOLAX) EC tablet 5 mg  5 mg Oral Daily PRN    aluminum & magnesium hydroxide-simethicone (MAALOX) 200-200-20 MG/5ML suspension 30 mL  30 mL Oral Q6H PRN    acetaminophen (TYLENOL) tablet 650 mg  650 mg Oral Q6H PRN     Or    acetaminophen (TYLENOL) suppository 650 mg  650 mg Rectal Q6H PRN    azithromycin (ZITHROMAX) tablet 500 mg  500 mg Oral Daily    albuterol (PROVENTIL) (2.5 MG/3ML) 0.083% nebulizer solution 2.5 mg  2.5 mg Nebulization Q4H PRN    cefTRIAXone (ROCEPHIN) 2,000 mg in sodium chloride 0.9 % 50 mL IVPB (mini-bag)  2,000 mg IntraVENous Q24H    magnesium oxide (MAG-OX) tablet 400 mg  400 mg Oral Daily    ipratropium 0.5 mg-albuterol 2.5 mg (DUONEB) nebulizer solution 1 Dose  1 Dose Inhalation Q4H PRN    ipratropium 0.5 mg-albuterol 2.5 mg (DUONEB) nebulizer solution 1 Dose  1 Dose Inhalation TID RT    DULoxetine (CYMBALTA) extended release capsule 30 mg  30 mg Oral Daily    guaiFENesin (MUCINEX) extended release tablet 1,200 mg  1,200 mg Oral TID PRN    sertraline (ZOLOFT) tablet 25 mg  25 mg Oral Daily    polyethylene glycol (GLYCOLAX) packet 17 g  17 g Oral Daily PRN    methylPREDNISolone sodium succ (SOLU-MEDROL) 60 mg in sterile water 1.5 mL injection  60 mg IntraVENous Q8H    fluticasone (FLOVENT HFA) 110 MCG/ACT inhaler 2 puff  2 puff Inhalation BID RT    HYDROcodone homatropine (HYCODAN) 5-1.5 MG/5ML solution 5 mL  5 mL Oral Q6H PRN         Signed:  Harjit Kwan MD              ED to Hosp-Admission (Current) on 8/6/2023    ED to

## 2023-08-09 NOTE — MANAGEMENT PLAN
on file   Intimate Partner Violence: Not on file   Housing Stability: Unknown    Unable to Pay for Housing in the Last Year: Not on file    Number of [address] in the Last Year: Not on file    Unstable Housing in the Last Year: No     Current Facility-Administered Medications   Medication Dose Route Frequency Provider Last Rate Last Admin    potassium chloride (KLOR-CON M) extended release tablet 40 mEq  40 mEq Oral PRN Justina Brody MD        Or    potassium bicarb-citric acid (EFFER-K) effervescent tablet 40 mEq  40 mEq Oral PRN Justina Brody MD        Or    potassium chloride 10 mEq/100 mL IVPB (Peripheral Line)  10 mEq IntraVENous PRN Justina Brody MD        magnesium sulfate 2000 mg in 50 mL IVPB premix  2,000 mg IntraVENous PRN Justina Brody MD        sodium phosphate 10 mmol in sodium chloride 0.9 % 250 mL IVPB  10 mmol IntraVENous PRN Justina Brody MD        Or    sodium phosphate 15 mmol in sodium chloride 0.9 % 250 mL IVPB  15 mmol IntraVENous PRJOSE Brody MD        Or    sodium phosphate 20 mmol in sodium chloride 0.9 % 500 mL IVPB  20 mmol IntraVENous PRN Justina Brody MD        aspirin chewable tablet 81 mg  81 mg Oral Daily Donny West MD   81 mg at 08/09/23 0835    mometasone-formoterol (DULERA) 200-5 MCG/ACT inhaler 2 puff  2 puff Inhalation BID RT Donny West MD   2 puff at 08/09/23 0811    clopidogrel (PLAVIX) tablet 75 mg  75 mg Oral Daily Donny West MD   75 mg at 08/09/23 0835    ibrutinib (IMBRUVICA) chemo tablet 420 mg- patient supplied (Patient Supplied)  420 mg Oral Daily Donny West MD   420 mg at 08/09/23 0846    midodrine (PROAMATINE) tablet 5 mg  5 mg Oral TID WC Donny West MD   5 mg at 08/09/23 1210    pantoprazole (PROTONIX) tablet 40 mg  40 mg Oral QAM AC Donny West MD   40 mg at 08/09/23 0612    Roflumilast (DALIRESP) tablet 500 mcg- patient supplied (Patient Supplied)  500 mcg Oral Daily Singh Plummer

## 2023-08-09 NOTE — PLAN OF CARE
Problem: Respiratory - Adult  Goal: Achieves optimal ventilation and oxygenation  Outcome: Progressing  Flowsheets (Taken 8/9/2023 1777)  Achieves optimal ventilation and oxygenation:   Assess for changes in respiratory status   Assess for changes in mentation and behavior   Position to facilitate oxygenation and minimize respiratory effort   Oxygen supplementation based on oxygen saturation or arterial blood gases   Assess and instruct to report shortness of breath or any respiratory difficulty   Respiratory therapy support as indicated

## 2023-08-10 PROCEDURE — 6370000000 HC RX 637 (ALT 250 FOR IP): Performed by: INTERNAL MEDICINE

## 2023-08-10 PROCEDURE — 6370000000 HC RX 637 (ALT 250 FOR IP): Performed by: NURSE PRACTITIONER

## 2023-08-10 PROCEDURE — 94761 N-INVAS EAR/PLS OXIMETRY MLT: CPT

## 2023-08-10 PROCEDURE — 6370000000 HC RX 637 (ALT 250 FOR IP): Performed by: FAMILY MEDICINE

## 2023-08-10 PROCEDURE — 6360000002 HC RX W HCPCS: Performed by: HOSPITALIST

## 2023-08-10 PROCEDURE — 6370000000 HC RX 637 (ALT 250 FOR IP): Performed by: HOSPITALIST

## 2023-08-10 PROCEDURE — 94640 AIRWAY INHALATION TREATMENT: CPT

## 2023-08-10 PROCEDURE — 6360000002 HC RX W HCPCS: Performed by: INTERNAL MEDICINE

## 2023-08-10 PROCEDURE — 2580000003 HC RX 258: Performed by: FAMILY MEDICINE

## 2023-08-10 PROCEDURE — 1100000003 HC PRIVATE W/ TELEMETRY

## 2023-08-10 PROCEDURE — 2700000000 HC OXYGEN THERAPY PER DAY

## 2023-08-10 PROCEDURE — 2580000003 HC RX 258: Performed by: HOSPITALIST

## 2023-08-10 PROCEDURE — 1100000000 HC RM PRIVATE

## 2023-08-10 RX ORDER — SULFAMETHOXAZOLE AND TRIMETHOPRIM 800; 160 MG/1; MG/1
1 TABLET ORAL DAILY
Status: DISCONTINUED | OUTPATIENT
Start: 2023-08-10 | End: 2023-08-14

## 2023-08-10 RX ORDER — LANOLIN ALCOHOL/MO/W.PET/CERES
3 CREAM (GRAM) TOPICAL NIGHTLY PRN
Status: DISCONTINUED | OUTPATIENT
Start: 2023-08-10 | End: 2023-08-17 | Stop reason: HOSPADM

## 2023-08-10 RX ADMIN — METHYLPREDNISOLONE SODIUM SUCCINATE 60 MG: 40 INJECTION, POWDER, LYOPHILIZED, FOR SOLUTION INTRAMUSCULAR; INTRAVENOUS at 21:38

## 2023-08-10 RX ADMIN — METHYLPREDNISOLONE SODIUM SUCCINATE 60 MG: 40 INJECTION, POWDER, LYOPHILIZED, FOR SOLUTION INTRAMUSCULAR; INTRAVENOUS at 05:49

## 2023-08-10 RX ADMIN — DULOXETINE HYDROCHLORIDE 30 MG: 30 CAPSULE, DELAYED RELEASE ORAL at 08:42

## 2023-08-10 RX ADMIN — MAGNESIUM GLUCONATE 500 MG ORAL TABLET 400 MG: 500 TABLET ORAL at 08:42

## 2023-08-10 RX ADMIN — BUDESONIDE 500 MCG: 0.5 INHALANT RESPIRATORY (INHALATION) at 07:12

## 2023-08-10 RX ADMIN — CLOPIDOGREL BISULFATE 75 MG: 75 TABLET ORAL at 08:42

## 2023-08-10 RX ADMIN — ASPIRIN 81 MG 81 MG: 81 TABLET ORAL at 08:42

## 2023-08-10 RX ADMIN — SERTRALINE 25 MG: 50 TABLET, FILM COATED ORAL at 08:43

## 2023-08-10 RX ADMIN — IPRATROPIUM BROMIDE AND ALBUTEROL SULFATE 1 DOSE: 2.5; .5 SOLUTION RESPIRATORY (INHALATION) at 03:28

## 2023-08-10 RX ADMIN — BUDESONIDE 500 MCG: 0.5 INHALANT RESPIRATORY (INHALATION) at 21:08

## 2023-08-10 RX ADMIN — SODIUM CHLORIDE, PRESERVATIVE FREE 10 ML: 5 INJECTION INTRAVENOUS at 08:49

## 2023-08-10 RX ADMIN — METHYLPREDNISOLONE SODIUM SUCCINATE 60 MG: 40 INJECTION, POWDER, LYOPHILIZED, FOR SOLUTION INTRAMUSCULAR; INTRAVENOUS at 14:17

## 2023-08-10 RX ADMIN — AZITHROMYCIN DIHYDRATE 500 MG: 250 TABLET ORAL at 08:42

## 2023-08-10 RX ADMIN — MOMETASONE FUROATE AND FORMOTEROL FUMARATE DIHYDRATE 2 PUFF: 200; 5 AEROSOL RESPIRATORY (INHALATION) at 07:12

## 2023-08-10 RX ADMIN — SULFAMETHOXAZOLE AND TRIMETHOPRIM 1 TABLET: 800; 160 TABLET ORAL at 14:18

## 2023-08-10 RX ADMIN — TIOTROPIUM BROMIDE INHALATION SPRAY 2 PUFF: 3.12 SPRAY, METERED RESPIRATORY (INHALATION) at 07:12

## 2023-08-10 RX ADMIN — MIDODRINE HYDROCHLORIDE 5 MG: 5 TABLET ORAL at 11:44

## 2023-08-10 RX ADMIN — HYDROCODONE BITARTRATE AND HOMATROPINE METHYLBROMIDE 5 ML: 5; 1.5 SOLUTION ORAL at 21:40

## 2023-08-10 RX ADMIN — CEFTRIAXONE SODIUM 2000 MG: 2 INJECTION, POWDER, FOR SOLUTION INTRAMUSCULAR; INTRAVENOUS at 05:49

## 2023-08-10 RX ADMIN — PANTOPRAZOLE SODIUM 40 MG: 40 TABLET, DELAYED RELEASE ORAL at 05:49

## 2023-08-10 RX ADMIN — IPRATROPIUM BROMIDE AND ALBUTEROL SULFATE 1 DOSE: 2.5; .5 SOLUTION RESPIRATORY (INHALATION) at 15:24

## 2023-08-10 RX ADMIN — TAMSULOSIN HYDROCHLORIDE 0.4 MG: 0.4 CAPSULE ORAL at 08:42

## 2023-08-10 RX ADMIN — IPRATROPIUM BROMIDE AND ALBUTEROL SULFATE 1 DOSE: 2.5; .5 SOLUTION RESPIRATORY (INHALATION) at 21:08

## 2023-08-10 RX ADMIN — IPRATROPIUM BROMIDE AND ALBUTEROL SULFATE 1 DOSE: 2.5; .5 SOLUTION RESPIRATORY (INHALATION) at 11:32

## 2023-08-10 RX ADMIN — Medication 3 MG: at 22:01

## 2023-08-10 RX ADMIN — MIDODRINE HYDROCHLORIDE 5 MG: 5 TABLET ORAL at 08:42

## 2023-08-10 RX ADMIN — MIDODRINE HYDROCHLORIDE 5 MG: 5 TABLET ORAL at 17:19

## 2023-08-10 RX ADMIN — IPRATROPIUM BROMIDE AND ALBUTEROL SULFATE 1 DOSE: 2.5; .5 SOLUTION RESPIRATORY (INHALATION) at 07:12

## 2023-08-10 ASSESSMENT — PAIN SCALES - GENERAL: PAINLEVEL_OUTOF10: 0

## 2023-08-11 PROCEDURE — 6360000002 HC RX W HCPCS: Performed by: INTERNAL MEDICINE

## 2023-08-11 PROCEDURE — 2580000003 HC RX 258: Performed by: HOSPITALIST

## 2023-08-11 PROCEDURE — 97535 SELF CARE MNGMENT TRAINING: CPT

## 2023-08-11 PROCEDURE — 5A0945A ASSISTANCE WITH RESPIRATORY VENTILATION, 24-96 CONSECUTIVE HOURS, HIGH NASAL FLOW/VELOCITY: ICD-10-PCS | Performed by: INTERNAL MEDICINE

## 2023-08-11 PROCEDURE — 97165 OT EVAL LOW COMPLEX 30 MIN: CPT

## 2023-08-11 PROCEDURE — 94761 N-INVAS EAR/PLS OXIMETRY MLT: CPT

## 2023-08-11 PROCEDURE — 1100000003 HC PRIVATE W/ TELEMETRY

## 2023-08-11 PROCEDURE — 6370000000 HC RX 637 (ALT 250 FOR IP): Performed by: FAMILY MEDICINE

## 2023-08-11 PROCEDURE — 2700000000 HC OXYGEN THERAPY PER DAY

## 2023-08-11 PROCEDURE — 94760 N-INVAS EAR/PLS OXIMETRY 1: CPT

## 2023-08-11 PROCEDURE — 94640 AIRWAY INHALATION TREATMENT: CPT

## 2023-08-11 PROCEDURE — 6370000000 HC RX 637 (ALT 250 FOR IP): Performed by: INTERNAL MEDICINE

## 2023-08-11 PROCEDURE — 2580000003 HC RX 258: Performed by: FAMILY MEDICINE

## 2023-08-11 PROCEDURE — 6370000000 HC RX 637 (ALT 250 FOR IP): Performed by: HOSPITALIST

## 2023-08-11 PROCEDURE — 1100000000 HC RM PRIVATE

## 2023-08-11 PROCEDURE — 2580000003 HC RX 258: Performed by: INTERNAL MEDICINE

## 2023-08-11 PROCEDURE — 6360000002 HC RX W HCPCS: Performed by: FAMILY MEDICINE

## 2023-08-11 PROCEDURE — 97530 THERAPEUTIC ACTIVITIES: CPT

## 2023-08-11 PROCEDURE — 97161 PT EVAL LOW COMPLEX 20 MIN: CPT

## 2023-08-11 PROCEDURE — 6360000002 HC RX W HCPCS: Performed by: HOSPITALIST

## 2023-08-11 RX ADMIN — ALBUTEROL SULFATE 2.5 MG: 2.5 SOLUTION RESPIRATORY (INHALATION) at 23:59

## 2023-08-11 RX ADMIN — PANTOPRAZOLE SODIUM 40 MG: 40 TABLET, DELAYED RELEASE ORAL at 05:48

## 2023-08-11 RX ADMIN — CLOPIDOGREL BISULFATE 75 MG: 75 TABLET ORAL at 09:44

## 2023-08-11 RX ADMIN — MAGNESIUM GLUCONATE 500 MG ORAL TABLET 400 MG: 500 TABLET ORAL at 09:44

## 2023-08-11 RX ADMIN — SODIUM CHLORIDE, PRESERVATIVE FREE 10 ML: 5 INJECTION INTRAVENOUS at 21:27

## 2023-08-11 RX ADMIN — IPRATROPIUM BROMIDE AND ALBUTEROL SULFATE 1 DOSE: 2.5; .5 SOLUTION RESPIRATORY (INHALATION) at 22:31

## 2023-08-11 RX ADMIN — AZITHROMYCIN DIHYDRATE 500 MG: 250 TABLET ORAL at 09:44

## 2023-08-11 RX ADMIN — MIDODRINE HYDROCHLORIDE 5 MG: 5 TABLET ORAL at 09:44

## 2023-08-11 RX ADMIN — HYDROCODONE BITARTRATE AND HOMATROPINE METHYLBROMIDE 5 ML: 5; 1.5 SOLUTION ORAL at 23:53

## 2023-08-11 RX ADMIN — DULOXETINE HYDROCHLORIDE 30 MG: 30 CAPSULE, DELAYED RELEASE ORAL at 09:44

## 2023-08-11 RX ADMIN — IPRATROPIUM BROMIDE AND ALBUTEROL SULFATE 1 DOSE: 2.5; .5 SOLUTION RESPIRATORY (INHALATION) at 08:53

## 2023-08-11 RX ADMIN — SODIUM CHLORIDE, PRESERVATIVE FREE 10 ML: 5 INJECTION INTRAVENOUS at 09:44

## 2023-08-11 RX ADMIN — SULFAMETHOXAZOLE AND TRIMETHOPRIM 1 TABLET: 800; 160 TABLET ORAL at 09:43

## 2023-08-11 RX ADMIN — IPRATROPIUM BROMIDE AND ALBUTEROL SULFATE 1 DOSE: 2.5; .5 SOLUTION RESPIRATORY (INHALATION) at 16:03

## 2023-08-11 RX ADMIN — MOMETASONE FUROATE AND FORMOTEROL FUMARATE DIHYDRATE 2 PUFF: 200; 5 AEROSOL RESPIRATORY (INHALATION) at 08:53

## 2023-08-11 RX ADMIN — ASPIRIN 81 MG 81 MG: 81 TABLET ORAL at 09:44

## 2023-08-11 RX ADMIN — TIOTROPIUM BROMIDE INHALATION SPRAY 2 PUFF: 3.12 SPRAY, METERED RESPIRATORY (INHALATION) at 08:53

## 2023-08-11 RX ADMIN — CEFTRIAXONE SODIUM 2000 MG: 2 INJECTION, POWDER, FOR SOLUTION INTRAMUSCULAR; INTRAVENOUS at 05:48

## 2023-08-11 RX ADMIN — SERTRALINE 25 MG: 50 TABLET, FILM COATED ORAL at 09:44

## 2023-08-11 RX ADMIN — METHYLPREDNISOLONE SODIUM SUCCINATE 60 MG: 40 INJECTION, POWDER, LYOPHILIZED, FOR SOLUTION INTRAMUSCULAR; INTRAVENOUS at 05:49

## 2023-08-11 RX ADMIN — MOMETASONE FUROATE AND FORMOTEROL FUMARATE DIHYDRATE 2 PUFF: 200; 5 AEROSOL RESPIRATORY (INHALATION) at 22:31

## 2023-08-11 RX ADMIN — TAMSULOSIN HYDROCHLORIDE 0.4 MG: 0.4 CAPSULE ORAL at 09:44

## 2023-08-11 RX ADMIN — MIDODRINE HYDROCHLORIDE 5 MG: 5 TABLET ORAL at 16:50

## 2023-08-11 RX ADMIN — BUDESONIDE 500 MCG: 0.5 INHALANT RESPIRATORY (INHALATION) at 22:31

## 2023-08-11 RX ADMIN — IPRATROPIUM BROMIDE AND ALBUTEROL SULFATE 1 DOSE: 2.5; .5 SOLUTION RESPIRATORY (INHALATION) at 12:07

## 2023-08-11 RX ADMIN — BUDESONIDE 500 MCG: 0.5 INHALANT RESPIRATORY (INHALATION) at 08:53

## 2023-08-11 RX ADMIN — METHYLPREDNISOLONE SODIUM SUCCINATE 60 MG: 40 INJECTION, POWDER, LYOPHILIZED, FOR SOLUTION INTRAMUSCULAR; INTRAVENOUS at 18:17

## 2023-08-11 RX ADMIN — MIDODRINE HYDROCHLORIDE 5 MG: 5 TABLET ORAL at 12:05

## 2023-08-11 RX ADMIN — HYDROCODONE BITARTRATE AND HOMATROPINE METHYLBROMIDE 5 ML: 5; 1.5 SOLUTION ORAL at 15:28

## 2023-08-11 ASSESSMENT — PAIN SCALES - GENERAL
PAINLEVEL_OUTOF10: 0
PAINLEVEL_OUTOF10: 0

## 2023-08-11 NOTE — PLAN OF CARE
Problem: Discharge Planning  Goal: Discharge to home or other facility with appropriate resources  Outcome: Progressing     Problem: Skin/Tissue Integrity  Goal: Absence of new skin breakdown  Description: 1. Monitor for areas of redness and/or skin breakdown  2. Assess vascular access sites hourly  3. Every 4-6 hours minimum:  Change oxygen saturation probe site  4. Every 4-6 hours:  If on nasal continuous positive airway pressure, respiratory therapy assess nares and determine need for appliance change or resting period.   Outcome: Progressing     Problem: Safety - Adult  Goal: Free from fall injury  Outcome: Progressing     Problem: Pain  Goal: Verbalizes/displays adequate comfort level or baseline comfort level  Outcome: Progressing     Problem: Respiratory - Adult  Goal: Achieves optimal ventilation and oxygenation  Outcome: Progressing

## 2023-08-12 ENCOUNTER — APPOINTMENT (OUTPATIENT)
Dept: GENERAL RADIOLOGY | Age: 78
DRG: 193 | End: 2023-08-12
Payer: MEDICARE

## 2023-08-12 LAB
ANION GAP SERPL CALC-SCNC: 9 MMOL/L (ref 2–11)
BACTERIA SPEC CULT: NORMAL
BACTERIA SPEC CULT: NORMAL
BASOPHILS # BLD: 0 K/UL (ref 0–0.2)
BASOPHILS NFR BLD: 0 % (ref 0–2)
BUN SERPL-MCNC: 25 MG/DL (ref 8–23)
CALCIUM SERPL-MCNC: 9.1 MG/DL (ref 8.3–10.4)
CHLORIDE SERPL-SCNC: 103 MMOL/L (ref 101–110)
CO2 SERPL-SCNC: 22 MMOL/L (ref 21–32)
CREAT SERPL-MCNC: 0.9 MG/DL (ref 0.8–1.5)
DIFFERENTIAL METHOD BLD: ABNORMAL
EOSINOPHIL # BLD: 0 K/UL (ref 0–0.8)
EOSINOPHIL NFR BLD: 0 % (ref 0.5–7.8)
ERYTHROCYTE [DISTWIDTH] IN BLOOD BY AUTOMATED COUNT: 16.8 % (ref 11.9–14.6)
GLUCOSE SERPL-MCNC: 234 MG/DL (ref 65–100)
HCT VFR BLD AUTO: 34 % (ref 41.1–50.3)
HGB BLD-MCNC: 11 G/DL (ref 13.6–17.2)
IMM GRANULOCYTES # BLD AUTO: 0.2 K/UL (ref 0–0.5)
IMM GRANULOCYTES NFR BLD AUTO: 2 % (ref 0–5)
LYMPHOCYTES # BLD: 3.6 K/UL (ref 0.5–4.6)
LYMPHOCYTES NFR BLD: 26 % (ref 13–44)
MCH RBC QN AUTO: 27 PG (ref 26.1–32.9)
MCHC RBC AUTO-ENTMCNC: 32.4 G/DL (ref 31.4–35)
MCV RBC AUTO: 83.5 FL (ref 82–102)
MONOCYTES # BLD: 0.4 K/UL (ref 0.1–1.3)
MONOCYTES NFR BLD: 3 % (ref 4–12)
NEUTS SEG # BLD: 9.8 K/UL (ref 1.7–8.2)
NEUTS SEG NFR BLD: 69 % (ref 43–78)
NRBC # BLD: 0 K/UL (ref 0–0.2)
NT PRO BNP: 126 PG/ML
PLATELET # BLD AUTO: 241 K/UL (ref 150–450)
PMV BLD AUTO: 11.4 FL (ref 9.4–12.3)
POTASSIUM SERPL-SCNC: 4.5 MMOL/L (ref 3.5–5.1)
RBC # BLD AUTO: 4.07 M/UL (ref 4.23–5.6)
SERVICE CMNT-IMP: NORMAL
SERVICE CMNT-IMP: NORMAL
SODIUM SERPL-SCNC: 134 MMOL/L (ref 133–143)
WBC # BLD AUTO: 14 K/UL (ref 4.3–11.1)

## 2023-08-12 PROCEDURE — 2700000000 HC OXYGEN THERAPY PER DAY

## 2023-08-12 PROCEDURE — 6370000000 HC RX 637 (ALT 250 FOR IP): Performed by: HOSPITALIST

## 2023-08-12 PROCEDURE — 2580000003 HC RX 258: Performed by: INTERNAL MEDICINE

## 2023-08-12 PROCEDURE — 1100000003 HC PRIVATE W/ TELEMETRY

## 2023-08-12 PROCEDURE — 6370000000 HC RX 637 (ALT 250 FOR IP): Performed by: FAMILY MEDICINE

## 2023-08-12 PROCEDURE — 71045 X-RAY EXAM CHEST 1 VIEW: CPT

## 2023-08-12 PROCEDURE — 6370000000 HC RX 637 (ALT 250 FOR IP): Performed by: INTERNAL MEDICINE

## 2023-08-12 PROCEDURE — 6360000002 HC RX W HCPCS: Performed by: INTERNAL MEDICINE

## 2023-08-12 PROCEDURE — 2580000003 HC RX 258: Performed by: FAMILY MEDICINE

## 2023-08-12 PROCEDURE — 80048 BASIC METABOLIC PNL TOTAL CA: CPT

## 2023-08-12 PROCEDURE — 83880 ASSAY OF NATRIURETIC PEPTIDE: CPT

## 2023-08-12 PROCEDURE — 1100000000 HC RM PRIVATE

## 2023-08-12 PROCEDURE — 94760 N-INVAS EAR/PLS OXIMETRY 1: CPT

## 2023-08-12 PROCEDURE — 85025 COMPLETE CBC W/AUTO DIFF WBC: CPT

## 2023-08-12 PROCEDURE — 36415 COLL VENOUS BLD VENIPUNCTURE: CPT

## 2023-08-12 PROCEDURE — 94761 N-INVAS EAR/PLS OXIMETRY MLT: CPT

## 2023-08-12 PROCEDURE — 93005 ELECTROCARDIOGRAM TRACING: CPT | Performed by: INTERNAL MEDICINE

## 2023-08-12 PROCEDURE — 94640 AIRWAY INHALATION TREATMENT: CPT

## 2023-08-12 RX ORDER — FUROSEMIDE 10 MG/ML
20 INJECTION INTRAMUSCULAR; INTRAVENOUS ONCE
Status: COMPLETED | OUTPATIENT
Start: 2023-08-12 | End: 2023-08-12

## 2023-08-12 RX ORDER — CLONAZEPAM 0.5 MG/1
0.25 TABLET ORAL EVERY 12 HOURS
Status: DISCONTINUED | OUTPATIENT
Start: 2023-08-12 | End: 2023-08-14

## 2023-08-12 RX ORDER — ALBUTEROL SULFATE 2.5 MG/3ML
2.5 SOLUTION RESPIRATORY (INHALATION)
Status: DISCONTINUED | OUTPATIENT
Start: 2023-08-12 | End: 2023-08-17 | Stop reason: HOSPADM

## 2023-08-12 RX ORDER — ARFORMOTEROL TARTRATE 15 UG/2ML
15 SOLUTION RESPIRATORY (INHALATION)
Status: DISCONTINUED | OUTPATIENT
Start: 2023-08-12 | End: 2023-08-17 | Stop reason: HOSPADM

## 2023-08-12 RX ORDER — BUSPIRONE HYDROCHLORIDE 5 MG/1
5 TABLET ORAL 3 TIMES DAILY
Status: DISCONTINUED | OUTPATIENT
Start: 2023-08-12 | End: 2023-08-17 | Stop reason: HOSPADM

## 2023-08-12 RX ORDER — LEVOFLOXACIN 500 MG/1
500 TABLET, FILM COATED ORAL DAILY
Status: DISCONTINUED | OUTPATIENT
Start: 2023-08-12 | End: 2023-08-15

## 2023-08-12 RX ADMIN — ALBUTEROL SULFATE 2.5 MG: 2.5 SOLUTION RESPIRATORY (INHALATION) at 14:56

## 2023-08-12 RX ADMIN — IPRATROPIUM BROMIDE AND ALBUTEROL SULFATE 1 DOSE: 2.5; .5 SOLUTION RESPIRATORY (INHALATION) at 12:36

## 2023-08-12 RX ADMIN — ASPIRIN 81 MG 81 MG: 81 TABLET ORAL at 08:58

## 2023-08-12 RX ADMIN — SERTRALINE 25 MG: 50 TABLET, FILM COATED ORAL at 08:58

## 2023-08-12 RX ADMIN — BUSPIRONE HYDROCHLORIDE 5 MG: 5 TABLET ORAL at 15:33

## 2023-08-12 RX ADMIN — METHYLPREDNISOLONE SODIUM SUCCINATE 60 MG: 40 INJECTION, POWDER, LYOPHILIZED, FOR SOLUTION INTRAMUSCULAR; INTRAVENOUS at 17:39

## 2023-08-12 RX ADMIN — DULOXETINE HYDROCHLORIDE 30 MG: 30 CAPSULE, DELAYED RELEASE ORAL at 08:58

## 2023-08-12 RX ADMIN — TIOTROPIUM BROMIDE INHALATION SPRAY 2 PUFF: 3.12 SPRAY, METERED RESPIRATORY (INHALATION) at 09:28

## 2023-08-12 RX ADMIN — MIDODRINE HYDROCHLORIDE 5 MG: 5 TABLET ORAL at 08:57

## 2023-08-12 RX ADMIN — FUROSEMIDE 20 MG: 10 INJECTION, SOLUTION INTRAMUSCULAR; INTRAVENOUS at 15:33

## 2023-08-12 RX ADMIN — MAGNESIUM GLUCONATE 500 MG ORAL TABLET 400 MG: 500 TABLET ORAL at 08:58

## 2023-08-12 RX ADMIN — TAMSULOSIN HYDROCHLORIDE 0.4 MG: 0.4 CAPSULE ORAL at 08:58

## 2023-08-12 RX ADMIN — HYDROCODONE BITARTRATE AND HOMATROPINE METHYLBROMIDE 5 ML: 5; 1.5 SOLUTION ORAL at 14:54

## 2023-08-12 RX ADMIN — BUDESONIDE 500 MCG: 0.5 INHALANT RESPIRATORY (INHALATION) at 09:04

## 2023-08-12 RX ADMIN — MOMETASONE FUROATE AND FORMOTEROL FUMARATE DIHYDRATE 2 PUFF: 200; 5 AEROSOL RESPIRATORY (INHALATION) at 09:29

## 2023-08-12 RX ADMIN — BUSPIRONE HYDROCHLORIDE 5 MG: 5 TABLET ORAL at 21:13

## 2023-08-12 RX ADMIN — IPRATROPIUM BROMIDE AND ALBUTEROL SULFATE 1 DOSE: 2.5; .5 SOLUTION RESPIRATORY (INHALATION) at 09:04

## 2023-08-12 RX ADMIN — BUDESONIDE 500 MCG: 0.5 INHALANT RESPIRATORY (INHALATION) at 20:27

## 2023-08-12 RX ADMIN — CLOPIDOGREL BISULFATE 75 MG: 75 TABLET ORAL at 08:58

## 2023-08-12 RX ADMIN — ARFORMOTEROL TARTRATE 15 MCG: 15 SOLUTION RESPIRATORY (INHALATION) at 20:27

## 2023-08-12 RX ADMIN — LEVOFLOXACIN 500 MG: 500 TABLET, FILM COATED ORAL at 14:15

## 2023-08-12 RX ADMIN — PANTOPRAZOLE SODIUM 40 MG: 40 TABLET, DELAYED RELEASE ORAL at 06:24

## 2023-08-12 RX ADMIN — SODIUM CHLORIDE, PRESERVATIVE FREE 10 ML: 5 INJECTION INTRAVENOUS at 21:13

## 2023-08-12 RX ADMIN — METHYLPREDNISOLONE SODIUM SUCCINATE 60 MG: 40 INJECTION, POWDER, LYOPHILIZED, FOR SOLUTION INTRAMUSCULAR; INTRAVENOUS at 06:24

## 2023-08-12 RX ADMIN — CLONAZEPAM 0.25 MG: 0.5 TABLET ORAL at 15:33

## 2023-08-12 RX ADMIN — MIDODRINE HYDROCHLORIDE 5 MG: 5 TABLET ORAL at 17:39

## 2023-08-12 RX ADMIN — SULFAMETHOXAZOLE AND TRIMETHOPRIM 1 TABLET: 800; 160 TABLET ORAL at 08:58

## 2023-08-12 RX ADMIN — ALBUTEROL SULFATE 2.5 MG: 2.5 SOLUTION RESPIRATORY (INHALATION) at 20:27

## 2023-08-12 ASSESSMENT — PAIN SCALES - GENERAL
PAINLEVEL_OUTOF10: 0

## 2023-08-12 NOTE — PLAN OF CARE
Problem: Respiratory - Adult  Goal: Achieves optimal ventilation and oxygenation  Outcome: Progressing  Flowsheets  Taken 8/12/2023 0909 by Gideon Ac RCP  Achieves optimal ventilation and oxygenation:   Assess for changes in respiratory status   Assess for changes in mentation and behavior   Assess and instruct to report shortness of breath or any respiratory difficulty   Respiratory therapy support as indicated   Encourage broncho-pulmonary hygiene including cough, deep breathe, incentive spirometry  Taken 8/12/2023 0753 by Angelo Issa RN  Achieves optimal ventilation and oxygenation: Assess for changes in respiratory status

## 2023-08-13 LAB
ANION GAP SERPL CALC-SCNC: 7 MMOL/L (ref 2–11)
BASOPHILS # BLD: 0 K/UL (ref 0–0.2)
BASOPHILS NFR BLD: 0 % (ref 0–2)
BUN SERPL-MCNC: 25 MG/DL (ref 8–23)
CALCIUM SERPL-MCNC: 8.4 MG/DL (ref 8.3–10.4)
CHLORIDE SERPL-SCNC: 104 MMOL/L (ref 101–110)
CO2 SERPL-SCNC: 25 MMOL/L (ref 21–32)
CREAT SERPL-MCNC: 0.7 MG/DL (ref 0.8–1.5)
DIFFERENTIAL METHOD BLD: ABNORMAL
EKG ATRIAL RATE: 85 BPM
EKG DIAGNOSIS: NORMAL
EKG P AXIS: 58 DEGREES
EKG P-R INTERVAL: 124 MS
EKG Q-T INTERVAL: 356 MS
EKG QRS DURATION: 84 MS
EKG QTC CALCULATION (BAZETT): 423 MS
EKG R AXIS: 48 DEGREES
EKG T AXIS: 16 DEGREES
EKG VENTRICULAR RATE: 85 BPM
EOSINOPHIL # BLD: 0 K/UL (ref 0–0.8)
EOSINOPHIL NFR BLD: 0 % (ref 0.5–7.8)
ERYTHROCYTE [DISTWIDTH] IN BLOOD BY AUTOMATED COUNT: 16.6 % (ref 11.9–14.6)
GLUCOSE SERPL-MCNC: 204 MG/DL (ref 65–100)
HCT VFR BLD AUTO: 30.6 % (ref 41.1–50.3)
HGB BLD-MCNC: 9.6 G/DL (ref 13.6–17.2)
IMM GRANULOCYTES # BLD AUTO: 0.2 K/UL (ref 0–0.5)
IMM GRANULOCYTES NFR BLD AUTO: 2 % (ref 0–5)
LYMPHOCYTES # BLD: 3.7 K/UL (ref 0.5–4.6)
LYMPHOCYTES NFR BLD: 40 % (ref 13–44)
MCH RBC QN AUTO: 26.5 PG (ref 26.1–32.9)
MCHC RBC AUTO-ENTMCNC: 31.4 G/DL (ref 31.4–35)
MCV RBC AUTO: 84.5 FL (ref 82–102)
MONOCYTES # BLD: 0.4 K/UL (ref 0.1–1.3)
MONOCYTES NFR BLD: 5 % (ref 4–12)
NEUTS SEG # BLD: 4.8 K/UL (ref 1.7–8.2)
NEUTS SEG NFR BLD: 53 % (ref 43–78)
NRBC # BLD: 0 K/UL (ref 0–0.2)
PLATELET # BLD AUTO: 239 K/UL (ref 150–450)
PMV BLD AUTO: 11.8 FL (ref 9.4–12.3)
POTASSIUM SERPL-SCNC: 4.6 MMOL/L (ref 3.5–5.1)
RBC # BLD AUTO: 3.62 M/UL (ref 4.23–5.6)
SODIUM SERPL-SCNC: 136 MMOL/L (ref 133–143)
WBC # BLD AUTO: 9.1 K/UL (ref 4.3–11.1)

## 2023-08-13 PROCEDURE — 2580000003 HC RX 258: Performed by: NURSE PRACTITIONER

## 2023-08-13 PROCEDURE — 94761 N-INVAS EAR/PLS OXIMETRY MLT: CPT

## 2023-08-13 PROCEDURE — 6370000000 HC RX 637 (ALT 250 FOR IP): Performed by: HOSPITALIST

## 2023-08-13 PROCEDURE — 2580000003 HC RX 258: Performed by: INTERNAL MEDICINE

## 2023-08-13 PROCEDURE — 94640 AIRWAY INHALATION TREATMENT: CPT

## 2023-08-13 PROCEDURE — 6360000002 HC RX W HCPCS: Performed by: INTERNAL MEDICINE

## 2023-08-13 PROCEDURE — 6360000002 HC RX W HCPCS: Performed by: NURSE PRACTITIONER

## 2023-08-13 PROCEDURE — 1100000000 HC RM PRIVATE

## 2023-08-13 PROCEDURE — 6370000000 HC RX 637 (ALT 250 FOR IP): Performed by: INTERNAL MEDICINE

## 2023-08-13 PROCEDURE — 85025 COMPLETE CBC W/AUTO DIFF WBC: CPT

## 2023-08-13 PROCEDURE — 2580000003 HC RX 258: Performed by: FAMILY MEDICINE

## 2023-08-13 PROCEDURE — 2700000000 HC OXYGEN THERAPY PER DAY

## 2023-08-13 PROCEDURE — 36415 COLL VENOUS BLD VENIPUNCTURE: CPT

## 2023-08-13 PROCEDURE — 6370000000 HC RX 637 (ALT 250 FOR IP): Performed by: FAMILY MEDICINE

## 2023-08-13 PROCEDURE — 93010 ELECTROCARDIOGRAM REPORT: CPT | Performed by: INTERNAL MEDICINE

## 2023-08-13 PROCEDURE — 80048 BASIC METABOLIC PNL TOTAL CA: CPT

## 2023-08-13 PROCEDURE — 1100000003 HC PRIVATE W/ TELEMETRY

## 2023-08-13 RX ADMIN — MIDODRINE HYDROCHLORIDE 5 MG: 5 TABLET ORAL at 08:51

## 2023-08-13 RX ADMIN — ASPIRIN 81 MG 81 MG: 81 TABLET ORAL at 08:51

## 2023-08-13 RX ADMIN — ALBUTEROL SULFATE 2.5 MG: 2.5 SOLUTION RESPIRATORY (INHALATION) at 07:34

## 2023-08-13 RX ADMIN — METHYLPREDNISOLONE SODIUM SUCCINATE 60 MG: 40 INJECTION, POWDER, LYOPHILIZED, FOR SOLUTION INTRAMUSCULAR; INTRAVENOUS at 06:02

## 2023-08-13 RX ADMIN — DULOXETINE HYDROCHLORIDE 30 MG: 30 CAPSULE, DELAYED RELEASE ORAL at 08:50

## 2023-08-13 RX ADMIN — CLONAZEPAM 0.25 MG: 0.5 TABLET ORAL at 15:24

## 2023-08-13 RX ADMIN — BUSPIRONE HYDROCHLORIDE 5 MG: 5 TABLET ORAL at 08:51

## 2023-08-13 RX ADMIN — LEVOFLOXACIN 500 MG: 500 TABLET, FILM COATED ORAL at 08:51

## 2023-08-13 RX ADMIN — SODIUM CHLORIDE, PRESERVATIVE FREE 10 ML: 5 INJECTION INTRAVENOUS at 21:03

## 2023-08-13 RX ADMIN — TAMSULOSIN HYDROCHLORIDE 0.4 MG: 0.4 CAPSULE ORAL at 08:51

## 2023-08-13 RX ADMIN — ALBUTEROL SULFATE 2.5 MG: 2.5 SOLUTION RESPIRATORY (INHALATION) at 15:44

## 2023-08-13 RX ADMIN — SODIUM CHLORIDE, PRESERVATIVE FREE 10 ML: 5 INJECTION INTRAVENOUS at 08:52

## 2023-08-13 RX ADMIN — SULFAMETHOXAZOLE AND TRIMETHOPRIM 1 TABLET: 800; 160 TABLET ORAL at 08:50

## 2023-08-13 RX ADMIN — CLOPIDOGREL BISULFATE 75 MG: 75 TABLET ORAL at 08:51

## 2023-08-13 RX ADMIN — BUSPIRONE HYDROCHLORIDE 5 MG: 5 TABLET ORAL at 13:54

## 2023-08-13 RX ADMIN — ALBUTEROL SULFATE 2.5 MG: 2.5 SOLUTION RESPIRATORY (INHALATION) at 11:46

## 2023-08-13 RX ADMIN — ARFORMOTEROL TARTRATE 15 MCG: 15 SOLUTION RESPIRATORY (INHALATION) at 07:33

## 2023-08-13 RX ADMIN — BUDESONIDE 500 MCG: 0.5 INHALANT RESPIRATORY (INHALATION) at 19:58

## 2023-08-13 RX ADMIN — CLONAZEPAM 0.25 MG: 0.5 TABLET ORAL at 03:19

## 2023-08-13 RX ADMIN — ALBUTEROL SULFATE 2.5 MG: 2.5 SOLUTION RESPIRATORY (INHALATION) at 19:58

## 2023-08-13 RX ADMIN — MAGNESIUM GLUCONATE 500 MG ORAL TABLET 400 MG: 500 TABLET ORAL at 08:50

## 2023-08-13 RX ADMIN — SERTRALINE 25 MG: 50 TABLET, FILM COATED ORAL at 08:50

## 2023-08-13 RX ADMIN — PANTOPRAZOLE SODIUM 40 MG: 40 TABLET, DELAYED RELEASE ORAL at 06:06

## 2023-08-13 RX ADMIN — MIDODRINE HYDROCHLORIDE 5 MG: 5 TABLET ORAL at 15:24

## 2023-08-13 RX ADMIN — METHYLPREDNISOLONE SODIUM SUCCINATE 40 MG: 40 INJECTION, POWDER, LYOPHILIZED, FOR SOLUTION INTRAMUSCULAR; INTRAVENOUS at 16:48

## 2023-08-13 RX ADMIN — HYDROCODONE BITARTRATE AND HOMATROPINE METHYLBROMIDE 5 ML: 5; 1.5 SOLUTION ORAL at 09:52

## 2023-08-13 RX ADMIN — MIDODRINE HYDROCHLORIDE 5 MG: 5 TABLET ORAL at 12:11

## 2023-08-13 RX ADMIN — BUDESONIDE 500 MCG: 0.5 INHALANT RESPIRATORY (INHALATION) at 07:34

## 2023-08-13 RX ADMIN — BUSPIRONE HYDROCHLORIDE 5 MG: 5 TABLET ORAL at 21:03

## 2023-08-13 RX ADMIN — TIOTROPIUM BROMIDE INHALATION SPRAY 2 PUFF: 3.12 SPRAY, METERED RESPIRATORY (INHALATION) at 07:44

## 2023-08-13 ASSESSMENT — PAIN SCALES - GENERAL
PAINLEVEL_OUTOF10: 0

## 2023-08-14 ENCOUNTER — APPOINTMENT (OUTPATIENT)
Dept: CT IMAGING | Age: 78
DRG: 193 | End: 2023-08-14
Payer: MEDICARE

## 2023-08-14 PROBLEM — F41.9 ANXIETY DISORDER, UNSPECIFIED: Chronic | Status: ACTIVE | Noted: 2020-11-08

## 2023-08-14 PROBLEM — K21.00 GASTROESOPHAGEAL REFLUX DISEASE WITH ESOPHAGITIS WITHOUT HEMORRHAGE: Status: ACTIVE | Noted: 2023-08-14

## 2023-08-14 PROBLEM — I69.30 UNSPECIFIED SEQUELAE OF CEREBRAL INFARCTION: Chronic | Status: ACTIVE | Noted: 2020-11-08

## 2023-08-14 PROBLEM — K22.2 STRICTURE ESOPHAGUS: Status: ACTIVE | Noted: 2023-08-14

## 2023-08-14 PROBLEM — J69.0 ASPIRATION PNEUMONIA OF BOTH LOWER LOBES (HCC): Status: ACTIVE | Noted: 2023-08-14

## 2023-08-14 PROBLEM — J44.9 COPD, SEVERE (HCC): Chronic | Status: ACTIVE | Noted: 2020-11-08

## 2023-08-14 PROBLEM — C91.10 CHRONIC LYMPHOCYTIC LEUKEMIA OF B-CELL TYPE NOT HAVING ACHIEVED REMISSION (HCC): Chronic | Status: ACTIVE | Noted: 2020-11-08

## 2023-08-14 PROBLEM — K21.9 GASTRO-ESOPHAGEAL REFLUX DISEASE WITHOUT ESOPHAGITIS: Chronic | Status: ACTIVE | Noted: 2020-11-08

## 2023-08-14 LAB
ARTERIAL PATENCY WRIST A: POSITIVE
BASE EXCESS BLD CALC-SCNC: 0.4 MMOL/L
BDY SITE: ABNORMAL
GAS FLOW.O2 O2 DELIVERY SYS: ABNORMAL
HCO3 BLD-SCNC: 22.8 MMOL/L (ref 22–26)
PCO2 BLD: 28.7 MMHG (ref 35–45)
PH BLD: 7.51 (ref 7.35–7.45)
PO2 BLD: 59 MMHG (ref 75–100)
SAO2 % BLD: 92.9 % (ref 95–98)
SERVICE CMNT-IMP: ABNORMAL
SPECIMEN TYPE: ABNORMAL

## 2023-08-14 PROCEDURE — 94640 AIRWAY INHALATION TREATMENT: CPT

## 2023-08-14 PROCEDURE — 6360000002 HC RX W HCPCS: Performed by: FAMILY MEDICINE

## 2023-08-14 PROCEDURE — 2580000003 HC RX 258: Performed by: FAMILY MEDICINE

## 2023-08-14 PROCEDURE — 6370000000 HC RX 637 (ALT 250 FOR IP): Performed by: HOSPITALIST

## 2023-08-14 PROCEDURE — 6360000002 HC RX W HCPCS: Performed by: INTERNAL MEDICINE

## 2023-08-14 PROCEDURE — 6370000000 HC RX 637 (ALT 250 FOR IP): Performed by: FAMILY MEDICINE

## 2023-08-14 PROCEDURE — 2580000003 HC RX 258: Performed by: INTERNAL MEDICINE

## 2023-08-14 PROCEDURE — 71250 CT THORAX DX C-: CPT

## 2023-08-14 PROCEDURE — 2700000000 HC OXYGEN THERAPY PER DAY

## 2023-08-14 PROCEDURE — 2580000003 HC RX 258: Performed by: NURSE PRACTITIONER

## 2023-08-14 PROCEDURE — 36600 WITHDRAWAL OF ARTERIAL BLOOD: CPT

## 2023-08-14 PROCEDURE — 6370000000 HC RX 637 (ALT 250 FOR IP): Performed by: INTERNAL MEDICINE

## 2023-08-14 PROCEDURE — 6360000002 HC RX W HCPCS: Performed by: NURSE PRACTITIONER

## 2023-08-14 PROCEDURE — 99223 1ST HOSP IP/OBS HIGH 75: CPT | Performed by: INTERNAL MEDICINE

## 2023-08-14 PROCEDURE — 82803 BLOOD GASES ANY COMBINATION: CPT

## 2023-08-14 PROCEDURE — 97110 THERAPEUTIC EXERCISES: CPT

## 2023-08-14 PROCEDURE — 1100000000 HC RM PRIVATE

## 2023-08-14 PROCEDURE — 94761 N-INVAS EAR/PLS OXIMETRY MLT: CPT

## 2023-08-14 RX ORDER — CLONAZEPAM 0.5 MG/1
0.25 TABLET ORAL EVERY 12 HOURS PRN
Status: DISCONTINUED | OUTPATIENT
Start: 2023-08-14 | End: 2023-08-17 | Stop reason: HOSPADM

## 2023-08-14 RX ORDER — PREDNISONE 20 MG/1
40 TABLET ORAL
Status: DISCONTINUED | OUTPATIENT
Start: 2023-08-14 | End: 2023-08-17 | Stop reason: HOSPADM

## 2023-08-14 RX ORDER — GUAIFENESIN 600 MG/1
1200 TABLET, EXTENDED RELEASE ORAL 2 TIMES DAILY
Status: DISCONTINUED | OUTPATIENT
Start: 2023-08-14 | End: 2023-08-17 | Stop reason: HOSPADM

## 2023-08-14 RX ORDER — BUSPIRONE HYDROCHLORIDE 10 MG/1
10 TABLET ORAL 3 TIMES DAILY
Qty: 90 TABLET | Refills: 1 | Status: SHIPPED | OUTPATIENT
Start: 2023-08-14 | End: 2023-08-17 | Stop reason: SDUPTHER

## 2023-08-14 RX ORDER — DEXTROMETHORPHAN HBR. AND GUAIFENESIN 10; 100 MG/5ML; MG/5ML
10 SOLUTION ORAL EVERY 4 HOURS PRN
Qty: 420 ML | Refills: 0 | COMMUNITY
Start: 2023-08-14 | End: 2023-08-17 | Stop reason: HOSPADM

## 2023-08-14 RX ORDER — PREDNISONE 10 MG/1
TABLET ORAL
Qty: 18 TABLET | Refills: 0 | Status: SHIPPED | OUTPATIENT
Start: 2023-08-14 | End: 2023-08-17 | Stop reason: SDUPTHER

## 2023-08-14 RX ORDER — SODIUM CHLORIDE FOR INHALATION 3 %
4 VIAL, NEBULIZER (ML) INHALATION
Status: DISCONTINUED | OUTPATIENT
Start: 2023-08-14 | End: 2023-08-17 | Stop reason: HOSPADM

## 2023-08-14 RX ADMIN — CLONAZEPAM 0.25 MG: 0.5 TABLET ORAL at 03:27

## 2023-08-14 RX ADMIN — MIDODRINE HYDROCHLORIDE 5 MG: 5 TABLET ORAL at 17:01

## 2023-08-14 RX ADMIN — SERTRALINE 25 MG: 50 TABLET, FILM COATED ORAL at 09:19

## 2023-08-14 RX ADMIN — ALBUTEROL SULFATE 2.5 MG: 2.5 SOLUTION RESPIRATORY (INHALATION) at 11:51

## 2023-08-14 RX ADMIN — TAMSULOSIN HYDROCHLORIDE 0.4 MG: 0.4 CAPSULE ORAL at 09:19

## 2023-08-14 RX ADMIN — ARFORMOTEROL TARTRATE 15 MCG: 15 SOLUTION RESPIRATORY (INHALATION) at 19:27

## 2023-08-14 RX ADMIN — BUDESONIDE 500 MCG: 0.5 INHALANT RESPIRATORY (INHALATION) at 19:27

## 2023-08-14 RX ADMIN — ALBUTEROL SULFATE 2.5 MG: 2.5 SOLUTION RESPIRATORY (INHALATION) at 08:12

## 2023-08-14 RX ADMIN — HYDROCODONE BITARTRATE AND HOMATROPINE METHYLBROMIDE 5 ML: 5; 1.5 SOLUTION ORAL at 10:48

## 2023-08-14 RX ADMIN — SODIUM CHLORIDE, PRESERVATIVE FREE 5 ML: 5 INJECTION INTRAVENOUS at 20:01

## 2023-08-14 RX ADMIN — METHYLPREDNISOLONE SODIUM SUCCINATE 40 MG: 40 INJECTION, POWDER, LYOPHILIZED, FOR SOLUTION INTRAMUSCULAR; INTRAVENOUS at 06:10

## 2023-08-14 RX ADMIN — MAGNESIUM GLUCONATE 500 MG ORAL TABLET 400 MG: 500 TABLET ORAL at 09:19

## 2023-08-14 RX ADMIN — ALBUTEROL SULFATE 2.5 MG: 2.5 SOLUTION RESPIRATORY (INHALATION) at 19:27

## 2023-08-14 RX ADMIN — TIOTROPIUM BROMIDE INHALATION SPRAY 2 PUFF: 3.12 SPRAY, METERED RESPIRATORY (INHALATION) at 08:12

## 2023-08-14 RX ADMIN — ASPIRIN 81 MG 81 MG: 81 TABLET ORAL at 09:19

## 2023-08-14 RX ADMIN — SULFAMETHOXAZOLE AND TRIMETHOPRIM 1 TABLET: 800; 160 TABLET ORAL at 09:19

## 2023-08-14 RX ADMIN — CLOPIDOGREL BISULFATE 75 MG: 75 TABLET ORAL at 09:19

## 2023-08-14 RX ADMIN — BUSPIRONE HYDROCHLORIDE 5 MG: 5 TABLET ORAL at 20:04

## 2023-08-14 RX ADMIN — PANTOPRAZOLE SODIUM 40 MG: 40 TABLET, DELAYED RELEASE ORAL at 06:10

## 2023-08-14 RX ADMIN — HYDROCODONE BITARTRATE AND HOMATROPINE METHYLBROMIDE 5 ML: 5; 1.5 SOLUTION ORAL at 23:27

## 2023-08-14 RX ADMIN — SODIUM CHLORIDE SOLN NEBU 3% 4 ML: 3 NEBU SOLN at 19:27

## 2023-08-14 RX ADMIN — DULOXETINE HYDROCHLORIDE 30 MG: 30 CAPSULE, DELAYED RELEASE ORAL at 09:19

## 2023-08-14 RX ADMIN — SODIUM CHLORIDE, PRESERVATIVE FREE 10 ML: 5 INJECTION INTRAVENOUS at 09:48

## 2023-08-14 RX ADMIN — GUAIFENESIN 1200 MG: 600 TABLET ORAL at 20:01

## 2023-08-14 RX ADMIN — ALBUTEROL SULFATE 2.5 MG: 2.5 SOLUTION RESPIRATORY (INHALATION) at 15:52

## 2023-08-14 RX ADMIN — GUAIFENESIN 1200 MG: 600 TABLET ORAL at 14:00

## 2023-08-14 RX ADMIN — ARFORMOTEROL TARTRATE 15 MCG: 15 SOLUTION RESPIRATORY (INHALATION) at 08:12

## 2023-08-14 RX ADMIN — BUDESONIDE 500 MCG: 0.5 INHALANT RESPIRATORY (INHALATION) at 08:12

## 2023-08-14 RX ADMIN — BUSPIRONE HYDROCHLORIDE 5 MG: 5 TABLET ORAL at 09:19

## 2023-08-14 RX ADMIN — BUSPIRONE HYDROCHLORIDE 5 MG: 5 TABLET ORAL at 14:00

## 2023-08-14 RX ADMIN — ALBUTEROL SULFATE 2.5 MG: 2.5 SOLUTION RESPIRATORY (INHALATION) at 23:53

## 2023-08-14 RX ADMIN — MIDODRINE HYDROCHLORIDE 5 MG: 5 TABLET ORAL at 09:19

## 2023-08-14 RX ADMIN — LEVOFLOXACIN 500 MG: 500 TABLET, FILM COATED ORAL at 09:19

## 2023-08-14 RX ADMIN — MIDODRINE HYDROCHLORIDE 5 MG: 5 TABLET ORAL at 12:28

## 2023-08-14 RX ADMIN — PREDNISONE 40 MG: 20 TABLET ORAL at 17:01

## 2023-08-14 ASSESSMENT — PAIN SCALES - GENERAL: PAINLEVEL_OUTOF10: 0

## 2023-08-14 NOTE — PLAN OF CARE
Problem: Respiratory - Adult  Goal: Achieves optimal ventilation and oxygenation  Outcome: Progressing  Flowsheets (Taken 8/12/2023 0909 by Lilian Aldana RCP)  Achieves optimal ventilation and oxygenation:   Assess for changes in respiratory status   Assess for changes in mentation and behavior   Assess and instruct to report shortness of breath or any respiratory difficulty   Respiratory therapy support as indicated   Encourage broncho-pulmonary hygiene including cough, deep breathe, incentive spirometry

## 2023-08-15 ENCOUNTER — ANESTHESIA (OUTPATIENT)
Dept: ENDOSCOPY | Age: 78
DRG: 193 | End: 2023-08-15
Payer: MEDICARE

## 2023-08-15 ENCOUNTER — ANESTHESIA EVENT (OUTPATIENT)
Dept: ENDOSCOPY | Age: 78
DRG: 193 | End: 2023-08-15
Payer: MEDICARE

## 2023-08-15 ENCOUNTER — APPOINTMENT (OUTPATIENT)
Dept: GENERAL RADIOLOGY | Age: 78
DRG: 193 | End: 2023-08-15
Payer: MEDICARE

## 2023-08-15 PROCEDURE — 2580000003 HC RX 258: Performed by: NURSE PRACTITIONER

## 2023-08-15 PROCEDURE — 6370000000 HC RX 637 (ALT 250 FOR IP): Performed by: HOSPITALIST

## 2023-08-15 PROCEDURE — 0DB68ZX EXCISION OF STOMACH, VIA NATURAL OR ARTIFICIAL OPENING ENDOSCOPIC, DIAGNOSTIC: ICD-10-PCS | Performed by: INTERNAL MEDICINE

## 2023-08-15 PROCEDURE — 2580000003 HC RX 258: Performed by: INTERNAL MEDICINE

## 2023-08-15 PROCEDURE — 7100000000 HC PACU RECOVERY - FIRST 15 MIN: Performed by: INTERNAL MEDICINE

## 2023-08-15 PROCEDURE — 6370000000 HC RX 637 (ALT 250 FOR IP): Performed by: ANESTHESIOLOGY

## 2023-08-15 PROCEDURE — 6360000002 HC RX W HCPCS: Performed by: NURSE ANESTHETIST, CERTIFIED REGISTERED

## 2023-08-15 PROCEDURE — 6370000000 HC RX 637 (ALT 250 FOR IP): Performed by: INTERNAL MEDICINE

## 2023-08-15 PROCEDURE — 88305 TISSUE EXAM BY PATHOLOGIST: CPT

## 2023-08-15 PROCEDURE — 7100000001 HC PACU RECOVERY - ADDTL 15 MIN: Performed by: INTERNAL MEDICINE

## 2023-08-15 PROCEDURE — 2700000000 HC OXYGEN THERAPY PER DAY

## 2023-08-15 PROCEDURE — 3700000001 HC ADD 15 MINUTES (ANESTHESIA): Performed by: INTERNAL MEDICINE

## 2023-08-15 PROCEDURE — 97110 THERAPEUTIC EXERCISES: CPT

## 2023-08-15 PROCEDURE — 1100000000 HC RM PRIVATE

## 2023-08-15 PROCEDURE — 3700000000 HC ANESTHESIA ATTENDED CARE: Performed by: INTERNAL MEDICINE

## 2023-08-15 PROCEDURE — 0DB58ZX EXCISION OF ESOPHAGUS, VIA NATURAL OR ARTIFICIAL OPENING ENDOSCOPIC, DIAGNOSTIC: ICD-10-PCS | Performed by: INTERNAL MEDICINE

## 2023-08-15 PROCEDURE — 2500000003 HC RX 250 WO HCPCS: Performed by: NURSE ANESTHETIST, CERTIFIED REGISTERED

## 2023-08-15 PROCEDURE — 99232 SBSQ HOSP IP/OBS MODERATE 35: CPT | Performed by: INTERNAL MEDICINE

## 2023-08-15 PROCEDURE — 94640 AIRWAY INHALATION TREATMENT: CPT

## 2023-08-15 PROCEDURE — 6360000002 HC RX W HCPCS: Performed by: INTERNAL MEDICINE

## 2023-08-15 PROCEDURE — 2580000003 HC RX 258: Performed by: FAMILY MEDICINE

## 2023-08-15 PROCEDURE — 3609012400 HC EGD TRANSORAL BIOPSY SINGLE/MULTIPLE: Performed by: INTERNAL MEDICINE

## 2023-08-15 PROCEDURE — 2580000003 HC RX 258: Performed by: NURSE ANESTHETIST, CERTIFIED REGISTERED

## 2023-08-15 PROCEDURE — 6370000000 HC RX 637 (ALT 250 FOR IP): Performed by: NURSE PRACTITIONER

## 2023-08-15 PROCEDURE — 88312 SPECIAL STAINS GROUP 1: CPT

## 2023-08-15 PROCEDURE — 6370000000 HC RX 637 (ALT 250 FOR IP): Performed by: FAMILY MEDICINE

## 2023-08-15 PROCEDURE — 94761 N-INVAS EAR/PLS OXIMETRY MLT: CPT

## 2023-08-15 PROCEDURE — 92610 EVALUATE SWALLOWING FUNCTION: CPT

## 2023-08-15 PROCEDURE — 2709999900 HC NON-CHARGEABLE SUPPLY: Performed by: INTERNAL MEDICINE

## 2023-08-15 RX ORDER — ROCURONIUM BROMIDE 10 MG/ML
INJECTION, SOLUTION INTRAVENOUS PRN
Status: DISCONTINUED | OUTPATIENT
Start: 2023-08-15 | End: 2023-08-15 | Stop reason: SDUPTHER

## 2023-08-15 RX ORDER — SENNOSIDES A AND B 8.6 MG/1
1 TABLET, FILM COATED ORAL NIGHTLY
Status: DISCONTINUED | OUTPATIENT
Start: 2023-08-15 | End: 2023-08-17 | Stop reason: HOSPADM

## 2023-08-15 RX ORDER — LIDOCAINE HYDROCHLORIDE 20 MG/ML
INJECTION, SOLUTION EPIDURAL; INFILTRATION; INTRACAUDAL; PERINEURAL PRN
Status: DISCONTINUED | OUTPATIENT
Start: 2023-08-15 | End: 2023-08-15 | Stop reason: SDUPTHER

## 2023-08-15 RX ORDER — DEXAMETHASONE SODIUM PHOSPHATE 10 MG/ML
INJECTION INTRAMUSCULAR; INTRAVENOUS PRN
Status: DISCONTINUED | OUTPATIENT
Start: 2023-08-15 | End: 2023-08-15 | Stop reason: SDUPTHER

## 2023-08-15 RX ORDER — SODIUM CHLORIDE, SODIUM LACTATE, POTASSIUM CHLORIDE, CALCIUM CHLORIDE 600; 310; 30; 20 MG/100ML; MG/100ML; MG/100ML; MG/100ML
INJECTION, SOLUTION INTRAVENOUS CONTINUOUS
Status: DISCONTINUED | OUTPATIENT
Start: 2023-08-15 | End: 2023-08-17

## 2023-08-15 RX ORDER — ONDANSETRON 2 MG/ML
INJECTION INTRAMUSCULAR; INTRAVENOUS PRN
Status: DISCONTINUED | OUTPATIENT
Start: 2023-08-15 | End: 2023-08-15 | Stop reason: SDUPTHER

## 2023-08-15 RX ORDER — IPRATROPIUM BROMIDE AND ALBUTEROL SULFATE 2.5; .5 MG/3ML; MG/3ML
1 SOLUTION RESPIRATORY (INHALATION)
Status: COMPLETED | OUTPATIENT
Start: 2023-08-15 | End: 2023-08-15

## 2023-08-15 RX ORDER — EPHEDRINE SULFATE/0.9% NACL/PF 50 MG/5 ML
SYRINGE (ML) INTRAVENOUS PRN
Status: DISCONTINUED | OUTPATIENT
Start: 2023-08-15 | End: 2023-08-15 | Stop reason: SDUPTHER

## 2023-08-15 RX ORDER — SUCCINYLCHOLINE/SOD CL,ISO/PF 200MG/10ML
SYRINGE (ML) INTRAVENOUS PRN
Status: DISCONTINUED | OUTPATIENT
Start: 2023-08-15 | End: 2023-08-15 | Stop reason: SDUPTHER

## 2023-08-15 RX ORDER — AMOXICILLIN AND CLAVULANATE POTASSIUM 875; 125 MG/1; MG/1
1 TABLET, FILM COATED ORAL EVERY 12 HOURS SCHEDULED
Status: DISCONTINUED | OUTPATIENT
Start: 2023-08-15 | End: 2023-08-17 | Stop reason: HOSPADM

## 2023-08-15 RX ORDER — PROPOFOL 10 MG/ML
INJECTION, EMULSION INTRAVENOUS PRN
Status: DISCONTINUED | OUTPATIENT
Start: 2023-08-15 | End: 2023-08-15 | Stop reason: SDUPTHER

## 2023-08-15 RX ADMIN — MAGNESIUM GLUCONATE 500 MG ORAL TABLET 400 MG: 500 TABLET ORAL at 09:17

## 2023-08-15 RX ADMIN — Medication 5 MG: at 13:39

## 2023-08-15 RX ADMIN — DULOXETINE HYDROCHLORIDE 30 MG: 30 CAPSULE, DELAYED RELEASE ORAL at 09:26

## 2023-08-15 RX ADMIN — PANTOPRAZOLE SODIUM 40 MG: 40 TABLET, DELAYED RELEASE ORAL at 05:33

## 2023-08-15 RX ADMIN — AMOXICILLIN AND CLAVULANATE POTASSIUM 1 TABLET: 875; 125 TABLET, FILM COATED ORAL at 20:53

## 2023-08-15 RX ADMIN — SODIUM CHLORIDE SOLN NEBU 3% 4 ML: 3 NEBU SOLN at 21:12

## 2023-08-15 RX ADMIN — GUAIFENESIN 1200 MG: 600 TABLET ORAL at 09:17

## 2023-08-15 RX ADMIN — BUDESONIDE 500 MCG: 0.5 INHALANT RESPIRATORY (INHALATION) at 07:29

## 2023-08-15 RX ADMIN — BUSPIRONE HYDROCHLORIDE 5 MG: 5 TABLET ORAL at 20:53

## 2023-08-15 RX ADMIN — ROCURONIUM BROMIDE 5 MG: 10 INJECTION, SOLUTION INTRAVENOUS at 13:33

## 2023-08-15 RX ADMIN — SODIUM CHLORIDE, POTASSIUM CHLORIDE, SODIUM LACTATE AND CALCIUM CHLORIDE: 600; 310; 30; 20 INJECTION, SOLUTION INTRAVENOUS at 09:30

## 2023-08-15 RX ADMIN — AMOXICILLIN AND CLAVULANATE POTASSIUM 1 TABLET: 875; 125 TABLET, FILM COATED ORAL at 09:00

## 2023-08-15 RX ADMIN — PROPOFOL 150 MG: 10 INJECTION, EMULSION INTRAVENOUS at 13:33

## 2023-08-15 RX ADMIN — SODIUM CHLORIDE SOLN NEBU 3% 4 ML: 3 NEBU SOLN at 07:29

## 2023-08-15 RX ADMIN — ARFORMOTEROL TARTRATE 15 MCG: 15 SOLUTION RESPIRATORY (INHALATION) at 21:11

## 2023-08-15 RX ADMIN — TIOTROPIUM BROMIDE INHALATION SPRAY 2 PUFF: 3.12 SPRAY, METERED RESPIRATORY (INHALATION) at 07:31

## 2023-08-15 RX ADMIN — IPRATROPIUM BROMIDE AND ALBUTEROL SULFATE 1 DOSE: 2.5; .5 SOLUTION RESPIRATORY (INHALATION) at 14:25

## 2023-08-15 RX ADMIN — ONDANSETRON 4 MG: 2 INJECTION INTRAMUSCULAR; INTRAVENOUS at 13:44

## 2023-08-15 RX ADMIN — LIDOCAINE HYDROCHLORIDE 100 MG: 20 INJECTION, SOLUTION EPIDURAL; INFILTRATION; INTRACAUDAL; PERINEURAL at 13:33

## 2023-08-15 RX ADMIN — MIDODRINE HYDROCHLORIDE 5 MG: 5 TABLET ORAL at 09:17

## 2023-08-15 RX ADMIN — ALBUTEROL SULFATE 2.5 MG: 2.5 SOLUTION RESPIRATORY (INHALATION) at 16:11

## 2023-08-15 RX ADMIN — SODIUM CHLORIDE, POTASSIUM CHLORIDE, SODIUM LACTATE AND CALCIUM CHLORIDE: 600; 310; 30; 20 INJECTION, SOLUTION INTRAVENOUS at 15:51

## 2023-08-15 RX ADMIN — GUAIFENESIN 1200 MG: 600 TABLET ORAL at 20:53

## 2023-08-15 RX ADMIN — BUSPIRONE HYDROCHLORIDE 5 MG: 5 TABLET ORAL at 15:56

## 2023-08-15 RX ADMIN — SODIUM CHLORIDE, PRESERVATIVE FREE 10 ML: 5 INJECTION INTRAVENOUS at 09:28

## 2023-08-15 RX ADMIN — MIDODRINE HYDROCHLORIDE 5 MG: 5 TABLET ORAL at 15:56

## 2023-08-15 RX ADMIN — PREDNISONE 40 MG: 20 TABLET ORAL at 15:56

## 2023-08-15 RX ADMIN — CLOPIDOGREL BISULFATE 75 MG: 75 TABLET ORAL at 09:17

## 2023-08-15 RX ADMIN — BUDESONIDE 500 MCG: 0.5 INHALANT RESPIRATORY (INHALATION) at 21:09

## 2023-08-15 RX ADMIN — PHENYLEPHRINE HYDROCHLORIDE 100 MCG: 10 INJECTION INTRAVENOUS at 13:39

## 2023-08-15 RX ADMIN — SERTRALINE 25 MG: 50 TABLET, FILM COATED ORAL at 09:18

## 2023-08-15 RX ADMIN — SODIUM CHLORIDE, PRESERVATIVE FREE 10 ML: 5 INJECTION INTRAVENOUS at 20:56

## 2023-08-15 RX ADMIN — ARFORMOTEROL TARTRATE 15 MCG: 15 SOLUTION RESPIRATORY (INHALATION) at 07:29

## 2023-08-15 RX ADMIN — TAMSULOSIN HYDROCHLORIDE 0.4 MG: 0.4 CAPSULE ORAL at 09:17

## 2023-08-15 RX ADMIN — ALBUTEROL SULFATE 2.5 MG: 2.5 SOLUTION RESPIRATORY (INHALATION) at 07:29

## 2023-08-15 RX ADMIN — SENNOSIDES 8.6 MG: 8.6 TABLET, FILM COATED ORAL at 20:54

## 2023-08-15 RX ADMIN — ALBUTEROL SULFATE 2.5 MG: 2.5 SOLUTION RESPIRATORY (INHALATION) at 21:10

## 2023-08-15 RX ADMIN — Medication 120 MG: at 13:33

## 2023-08-15 RX ADMIN — BUSPIRONE HYDROCHLORIDE 5 MG: 5 TABLET ORAL at 09:26

## 2023-08-15 RX ADMIN — SODIUM CHLORIDE, POTASSIUM CHLORIDE, SODIUM LACTATE AND CALCIUM CHLORIDE: 600; 310; 30; 20 INJECTION, SOLUTION INTRAVENOUS at 12:36

## 2023-08-15 RX ADMIN — DEXAMETHASONE SODIUM PHOSPHATE 10 MG: 10 INJECTION INTRAMUSCULAR; INTRAVENOUS at 13:44

## 2023-08-15 ASSESSMENT — PAIN SCALES - GENERAL
PAINLEVEL_OUTOF10: 0

## 2023-08-15 ASSESSMENT — PAIN - FUNCTIONAL ASSESSMENT: PAIN_FUNCTIONAL_ASSESSMENT: 0-10

## 2023-08-15 NOTE — ANESTHESIA PRE PROCEDURE
Department of Anesthesiology  Preprocedure Note       Name:  Radha Vázquez   Age:  66 y.o.  :  1945                                          MRN:  [id number]         Date:  8/15/2023      Surgeon: Leslie Newman):  Sweetie Beal MD    Procedure: Procedure(s):  EGD ESOPHAGOGASTRODUODENOSCOPY    Medications prior to admission:   Prior to Admission medications    Medication Sig Start Date End Date Taking? Authorizing Provider   dextromethorphan-guaiFENesin (ROBITUSSIN-DM)  MG/5ML syrup Take 10 mLs by mouth every 4 hours as needed for Cough 23  Yes Frannie Taylor MD   predniSONE (DELTASONE) 10 MG tablet Take 40mg daily with food x3d, then 20mg daily x2d, then 10mg daily x2d 23  Yes Frannie Taylor MD   busPIRone (BUSPAR) 10 MG tablet Take 1 tablet by mouth 3 times daily 23  Yes Frannie Taylor MD   aspirin 81 MG chewable tablet Take 1 tablet by mouth daily 23   Ani Brannon MD   rosuvastatin (CRESTOR) 40 MG tablet Take 1 tablet by mouth nightly 23   Ani Brannon MD   midodrine (PROAMATINE) 5 MG tablet Take 1 tablet by mouth 3 times daily (with meals) Hold if BP > 140/80 23   Ani Brannon MD   tiotropium (SPIRIVA RESPIMAT) 2.5 MCG/ACT AERS inhaler Inhale 2 puffs into the lungs daily    Historical Provider, MD   melatonin 3 MG TABS tablet Take 1 tablet by mouth daily    Historical Provider, MD   DULoxetine (CYMBALTA) 60 MG extended release capsule Take 1 capsule by mouth daily  Patient not taking: Reported on 2023    Historical Provider, MD   fluticasone-salmeterol (ADVAIR) 500-50 MCG/ACT AEPB diskus inhaler Inhale 1 puff into the lungs in the morning and 1 puff in the evening.     Historical Provider, MD   Albuterol Sulfate, sensor, (PROAIR DIGIHALER) 108 (90 Base) MCG/ACT AEPB Inhale into the lungs    Historical Provider, MD   guaiFENesin (MUCINEX) 600 MG extended release tablet Take 2 tablets by mouth 2 times daily    Historical Provider, MD

## 2023-08-15 NOTE — OP NOTE
condition. Findings long segments barretts, hital hernia, nonerosive gastritis       Recommendation:  follow up path, ppi daily. Repeat egd in 1 year.         Signed By: Erin Serna MD     8/15/2023  1:49 PM

## 2023-08-15 NOTE — CONSULTS
[facility]        Inpatient Hematology / Oncology Consult Note    Reason for Consult:  Pneumonia of left lower lobe due to infectious organism [J18.9]  Community acquired pneumonia, unspecified laterality [J18.9]  Referring Physician:  Ricarda Harada, MD    History of Present Illness:  Mr. Barbara Quick is a 66 y.o. male admitted on 8/6/2023 with a primary diagnosis of The encounter diagnosis was Pneumonia of left lower lobe due to infectious organism. .      66 y.o. male past medical history of COPD, hyperlipidemia, hypertension and CLL managed on ibrutinib by [place] oncology who presented to ER on 8/6/2023 with coughing and excessive mucus, chest x-ray showed left lower lobe atelectasis with small effusion present, admitted with pneumonia of left lower lobe and started azithromycin, ceftriaxone, oxygen, consulted oncology due to concern of ibrutinib causes immunosuppression given current pneumonia. Review of Systems:  Constitutional Denies fever, chills, weight loss, appetite changes, fatigue, night sweats. HEENT Denies trauma, blurry vision, hearing loss, ear pain, nosebleeds, sore throat, neck pain and ear discharge. Skin Denies lesions or rashes. Lungs dyspnea, cough, sputum production. Denies hemoptysis. Cardiovascular Denies chest pain, palpitations, or lower extremity edema. Gastrointestinal Denies nausea, vomiting, changes in bowel habits, bloody or black stools, abdominal pain.  Denies dysuria, frequency or hesitancy of urination. Neuro Denies headaches, visual changes or ataxia. Denies dizziness, tingling, tremors, sensory change, speech change, focal weakness or headaches. Hematology History of CLL. Denies easy bruising or bleeding, denies gingival bleeding or epistaxis. Endo Denies heat/cold intolerance, denies diabetes or thyroid abnormalities. MSK Denies back pain, arthralgias, myalgias or frequent falls.      Psychiatric/Behavioral Denies
History and Physical Initial Visit NOTE           8/14/2023    Anneliese Zurita                        Date of Admission:  8/6/2023    The patient's chart is reviewed and the patient is discussed with the staff. Subjective:     Patient is a 66 y.o.  male seen and evaluated at the request of Dr. Dimple Stanley for family request.    He has a history of former tobacco abuse, CLL being treated ibrutinib (on hold with recent infection) and was seen by us in April for evaluation of COPD. He has had frequent ER visits in the past year on maximal inhaled therapy. Recent hosptialization and CXR 4/3 showing LLL infiltrates which need to be followed for resolution. He was here in June and admitted again over a week ago for CAP. He has no PFT to date. At home, he is on spiriva, wixela 500 and prn albuterol. Lena Heymann was added due to frequent exacerbations. Patient has 25 pack year history of cigarette smoking and currently quit in 2019. He has received most of his care at [facility] prior to 2023. He is on oxygen at home. He was admitted here on 8/6 for pneumonia. He completed antibiotics. Chest CT was done here showing B patulous esophagus with large amount of debris within the esophageal lumenconsolidation concerning for aspiration and . He is on 4 lpm with sat of 95%. Plan was to discharge home but we were consulted to see by family request. Speech has not seen to date that I can see.    + nonproductive cough. He was prescribed Oxygen at home but his tank\" messed up. \" SW now trying to arrange through the [place]. He reports cough and congestion, worse with eating.      Review of Systems: Comprehensive ROS negative except in HPI    Current Outpatient Medications   Medication Instructions    Albuterol Sulfate, sensor, (PROAIR DIGIHALER) 108 (90 Base) MCG/ACT AEPB Inhalation    aspirin 81 mg, Oral, DAILY    budesonide-formoterol (SYMBICORT) 160-4.5 MCG/ACT AERO 1 puff, Inhalation, 2 TIMES
Vitamins-Minerals (MULTIVITAMIN WITH MINERALS) tablet Once daily OTC 3/21/23   Abdulkadir Baxter MD        lactated ringers IV soln infusion, Continuous  amoxicillin-clavulanate (AUGMENTIN) 875-125 MG per tablet 1 tablet, 2 times per day  senna (SENOKOT) tablet 8.6 mg, Nightly  predniSONE (DELTASONE) tablet 40 mg, Dinner  clonazePAM (KLONOPIN) tablet 0.25 mg, Q12H PRN  guaiFENesin (MUCINEX) extended release tablet 1,200 mg, BID  sodium chloride (Inhalant) 3 % nebulizer solution 4 mL, BID RT  albuterol (PROVENTIL) (2.5 MG/3ML) 0.083% nebulizer solution 2.5 mg, 4x Daily RT  arformoterol tartrate (BROVANA) nebulizer solution 15 mcg, BID RT  busPIRone (BUSPAR) tablet 5 mg, TID  melatonin tablet 3 mg, Nightly PRN  budesonide (PULMICORT) nebulizer suspension 500 mcg, BID RT  aspirin chewable tablet 81 mg, Daily  clopidogrel (PLAVIX) tablet 75 mg, Daily  midodrine (PROAMATINE) tablet 5 mg, TID WC  pantoprazole (PROTONIX) tablet 40 mg, QAM AC  Roflumilast (DALIRESP) tablet 500 mcg- patient supplied (Patient Supplied), Daily  tamsulosin (FLOMAX) capsule 0.4 mg, Daily  tiotropium (SPIRIVA RESPIMAT) 2.5 MCG/ACT inhaler 2 puff, Daily RT  sodium chloride flush 0.9 % injection 5-40 mL, 2 times per day  sodium chloride flush 0.9 % injection 5-40 mL, PRN  0.9 % sodium chloride infusion, PRN  ondansetron (ZOFRAN) injection 4 mg, Q6H PRN  bisacodyl (DULCOLAX) EC tablet 5 mg, Daily PRN  aluminum & magnesium hydroxide-simethicone (MAALOX) 200-200-20 MG/5ML suspension 30 mL, Q6H PRN  acetaminophen (TYLENOL) tablet 650 mg, Q6H PRN   Or  acetaminophen (TYLENOL) suppository 650 mg, Q6H PRN  albuterol (PROVENTIL) (2.5 MG/3ML) 0.083% nebulizer solution 2.5 mg, Q4H PRN  magnesium oxide (MAG-OX) tablet 400 mg, Daily  ipratropium 0.5 mg-albuterol 2.5 mg (DUONEB) nebulizer solution 1 Dose, Q4H PRN  DULoxetine (CYMBALTA) extended release capsule 30 mg, Daily  sertraline (ZOLOFT) tablet 25 mg, Daily  polyethylene glycol (GLYCOLAX) packet 17 g,

## 2023-08-15 NOTE — ANESTHESIA PROCEDURE NOTES
Airway  Date/Time: 8/15/2023 1:34 PM  Urgency: elective      General Information and Staff    Patient location during procedure: OR  Resident/CRNA: DAVID Shelley - CRNA    Indications and Patient Condition  Indications for airway management: anesthesia and airway protection  Sedation level: deep  Preoxygenated: yes  Patient position: sniffing  Mask difficulty assessment: not attempted    Final Airway Details  Final airway type: endotracheal airway      Successful airway: ETT     Successful intubation technique: video laryngoscopy  Blade: Yuri  Blade size: #4  ETT size (mm): 7.5  Cormack-Lehane Classification: grade I - full view of glottis  Placement verified by: chest auscultation and capnometry   Number of attempts at approach: 1  Number of other approaches attempted: 0    Additional Comments  Lips and dentition as pre-op.

## 2023-08-15 NOTE — ANESTHESIA POSTPROCEDURE EVALUATION
Department of Anesthesiology  Postprocedure Note    Patient: Soto Duggan  MRN: [id number]  YOB: 1945  Date of evaluation: 8/15/2023      Procedure Summary     Date: 08/15/23 Room / Location: [facility] ENDO FLOURO 1 / [facility] ENDOSCOPY    Anesthesia Start: 4856 Anesthesia Stop: 1406    Procedure: EGD BIOPSY (Upper GI Region) Diagnosis:       Dysphagia, unspecified type      (Dysphagia, unspecified type [R13.10])    Surgeons: Ilya Mullins MD Responsible Provider: Mita Dey MD    Anesthesia Type: general ASA Status: 4          Anesthesia Type: No value filed.     Yamil Phase I: Yamil Score: 8    Yamil Phase II:        Anesthesia Post Evaluation    Patient location during evaluation: PACU  Patient participation: complete - patient participated  Level of consciousness: awake and awake and alert  Airway patency: patent  Nausea & Vomiting: no nausea  Complications: no  Cardiovascular status: hemodynamically stable  Respiratory status: acceptable  Hydration status: euvolemic  Comments: Post op nebulizer  Multimodal analgesia pain management approach  Pain management: adequate

## 2023-08-16 ENCOUNTER — APPOINTMENT (OUTPATIENT)
Dept: GENERAL RADIOLOGY | Age: 78
DRG: 193 | End: 2023-08-16
Payer: MEDICARE

## 2023-08-16 PROCEDURE — 2580000003 HC RX 258: Performed by: FAMILY MEDICINE

## 2023-08-16 PROCEDURE — 1100000000 HC RM PRIVATE

## 2023-08-16 PROCEDURE — 6370000000 HC RX 637 (ALT 250 FOR IP): Performed by: INTERNAL MEDICINE

## 2023-08-16 PROCEDURE — 92611 MOTION FLUOROSCOPY/SWALLOW: CPT

## 2023-08-16 PROCEDURE — 74230 X-RAY XM SWLNG FUNCJ C+: CPT

## 2023-08-16 PROCEDURE — 94761 N-INVAS EAR/PLS OXIMETRY MLT: CPT

## 2023-08-16 PROCEDURE — 99232 SBSQ HOSP IP/OBS MODERATE 35: CPT | Performed by: INTERNAL MEDICINE

## 2023-08-16 PROCEDURE — 92526 ORAL FUNCTION THERAPY: CPT

## 2023-08-16 PROCEDURE — 97530 THERAPEUTIC ACTIVITIES: CPT

## 2023-08-16 PROCEDURE — 2700000000 HC OXYGEN THERAPY PER DAY

## 2023-08-16 PROCEDURE — 6370000000 HC RX 637 (ALT 250 FOR IP): Performed by: NURSE PRACTITIONER

## 2023-08-16 PROCEDURE — 6360000002 HC RX W HCPCS: Performed by: INTERNAL MEDICINE

## 2023-08-16 PROCEDURE — 94640 AIRWAY INHALATION TREATMENT: CPT

## 2023-08-16 PROCEDURE — 2500000003 HC RX 250 WO HCPCS: Performed by: INTERNAL MEDICINE

## 2023-08-16 PROCEDURE — 2580000003 HC RX 258: Performed by: INTERNAL MEDICINE

## 2023-08-16 PROCEDURE — 6370000000 HC RX 637 (ALT 250 FOR IP): Performed by: FAMILY MEDICINE

## 2023-08-16 PROCEDURE — 6370000000 HC RX 637 (ALT 250 FOR IP): Performed by: HOSPITALIST

## 2023-08-16 RX ORDER — SUCRALFATE 1 G/1
1 TABLET ORAL EVERY 6 HOURS SCHEDULED
Status: DISCONTINUED | OUTPATIENT
Start: 2023-08-16 | End: 2023-08-17 | Stop reason: HOSPADM

## 2023-08-16 RX ADMIN — ALBUTEROL SULFATE 2.5 MG: 2.5 SOLUTION RESPIRATORY (INHALATION) at 19:13

## 2023-08-16 RX ADMIN — ARFORMOTEROL TARTRATE 15 MCG: 15 SOLUTION RESPIRATORY (INHALATION) at 09:20

## 2023-08-16 RX ADMIN — SODIUM CHLORIDE SOLN NEBU 3% 4 ML: 3 NEBU SOLN at 09:20

## 2023-08-16 RX ADMIN — BUSPIRONE HYDROCHLORIDE 5 MG: 5 TABLET ORAL at 15:48

## 2023-08-16 RX ADMIN — DULOXETINE HYDROCHLORIDE 30 MG: 30 CAPSULE, DELAYED RELEASE ORAL at 09:00

## 2023-08-16 RX ADMIN — PANTOPRAZOLE SODIUM 40 MG: 40 TABLET, DELAYED RELEASE ORAL at 06:03

## 2023-08-16 RX ADMIN — ALBUTEROL SULFATE 2.5 MG: 2.5 SOLUTION RESPIRATORY (INHALATION) at 17:01

## 2023-08-16 RX ADMIN — BARIUM SULFATE 15 ML: 400 PASTE ORAL at 14:04

## 2023-08-16 RX ADMIN — AMOXICILLIN AND CLAVULANATE POTASSIUM 1 TABLET: 875; 125 TABLET, FILM COATED ORAL at 09:00

## 2023-08-16 RX ADMIN — CLOPIDOGREL BISULFATE 75 MG: 75 TABLET ORAL at 09:00

## 2023-08-16 RX ADMIN — ASPIRIN 81 MG 81 MG: 81 TABLET ORAL at 09:00

## 2023-08-16 RX ADMIN — MAGNESIUM GLUCONATE 500 MG ORAL TABLET 400 MG: 500 TABLET ORAL at 09:00

## 2023-08-16 RX ADMIN — SENNOSIDES 8.6 MG: 8.6 TABLET, FILM COATED ORAL at 21:49

## 2023-08-16 RX ADMIN — MIDODRINE HYDROCHLORIDE 5 MG: 5 TABLET ORAL at 09:00

## 2023-08-16 RX ADMIN — TIOTROPIUM BROMIDE INHALATION SPRAY 2 PUFF: 3.12 SPRAY, METERED RESPIRATORY (INHALATION) at 09:22

## 2023-08-16 RX ADMIN — SERTRALINE 25 MG: 50 TABLET, FILM COATED ORAL at 09:00

## 2023-08-16 RX ADMIN — SODIUM CHLORIDE, PRESERVATIVE FREE 10 ML: 5 INJECTION INTRAVENOUS at 09:00

## 2023-08-16 RX ADMIN — TAMSULOSIN HYDROCHLORIDE 0.4 MG: 0.4 CAPSULE ORAL at 09:00

## 2023-08-16 RX ADMIN — MIDODRINE HYDROCHLORIDE 5 MG: 5 TABLET ORAL at 17:20

## 2023-08-16 RX ADMIN — Medication 3 MG: at 22:04

## 2023-08-16 RX ADMIN — BARIUM SULFATE 30 ML: 0.81 POWDER, FOR SUSPENSION ORAL at 14:04

## 2023-08-16 RX ADMIN — AMOXICILLIN AND CLAVULANATE POTASSIUM 1 TABLET: 875; 125 TABLET, FILM COATED ORAL at 21:49

## 2023-08-16 RX ADMIN — SUCRALFATE 1 G: 1 TABLET ORAL at 17:18

## 2023-08-16 RX ADMIN — PREDNISONE 40 MG: 20 TABLET ORAL at 15:49

## 2023-08-16 RX ADMIN — ARFORMOTEROL TARTRATE 15 MCG: 15 SOLUTION RESPIRATORY (INHALATION) at 19:13

## 2023-08-16 RX ADMIN — ALBUTEROL SULFATE 2.5 MG: 2.5 SOLUTION RESPIRATORY (INHALATION) at 09:20

## 2023-08-16 RX ADMIN — GUAIFENESIN 1200 MG: 600 TABLET ORAL at 21:50

## 2023-08-16 RX ADMIN — ALBUTEROL SULFATE 2.5 MG: 2.5 SOLUTION RESPIRATORY (INHALATION) at 11:44

## 2023-08-16 RX ADMIN — BUSPIRONE HYDROCHLORIDE 5 MG: 5 TABLET ORAL at 09:00

## 2023-08-16 RX ADMIN — BUSPIRONE HYDROCHLORIDE 5 MG: 5 TABLET ORAL at 21:49

## 2023-08-16 RX ADMIN — SODIUM CHLORIDE SOLN NEBU 3% 4 ML: 3 NEBU SOLN at 19:14

## 2023-08-16 RX ADMIN — SODIUM CHLORIDE, PRESERVATIVE FREE 10 ML: 5 INJECTION INTRAVENOUS at 21:49

## 2023-08-16 RX ADMIN — BUDESONIDE 500 MCG: 0.5 INHALANT RESPIRATORY (INHALATION) at 09:20

## 2023-08-16 RX ADMIN — BUDESONIDE 500 MCG: 0.5 INHALANT RESPIRATORY (INHALATION) at 19:14

## 2023-08-16 ASSESSMENT — PAIN SCALES - GENERAL: PAINLEVEL_OUTOF10: 0

## 2023-08-17 VITALS
DIASTOLIC BLOOD PRESSURE: 69 MMHG | HEART RATE: 81 BPM | HEIGHT: 62 IN | OXYGEN SATURATION: 93 % | WEIGHT: 136 LBS | TEMPERATURE: 97.7 F | BODY MASS INDEX: 25.03 KG/M2 | SYSTOLIC BLOOD PRESSURE: 121 MMHG | RESPIRATION RATE: 18 BRPM

## 2023-08-17 PROBLEM — K29.50 CHRONIC GASTRITIS WITHOUT BLEEDING: Chronic | Status: ACTIVE | Noted: 2023-08-17

## 2023-08-17 PROBLEM — K22.2 STRICTURE ESOPHAGUS: Status: ACTIVE | Noted: 2023-08-17

## 2023-08-17 PROBLEM — K22.70 BARRETT'S ESOPHAGUS: Chronic | Status: ACTIVE | Noted: 2020-11-08

## 2023-08-17 PROBLEM — K22.4 ESOPHAGEAL DYSMOTILITY DUE TO SYSTEMIC DISEASE: Status: ACTIVE | Noted: 2023-08-17

## 2023-08-17 PROBLEM — C91.11 CHRONIC LYMPHOCYTIC LEUKEMIA OF B-CELL TYPE IN REMISSION (HCC): Status: ACTIVE | Noted: 2020-11-08

## 2023-08-17 PROBLEM — J98.11 ATELECTASIS: Chronic | Status: ACTIVE | Noted: 2023-06-08

## 2023-08-17 PROBLEM — K44.9 HIATAL HERNIA WITH GASTROESOPHAGEAL REFLUX DISEASE AND ESOPHAGITIS: Chronic | Status: ACTIVE | Noted: 2023-08-17

## 2023-08-17 PROBLEM — J39.2 CRICOPHARYNGEAL HYPERTROPHY: Status: ACTIVE | Noted: 2023-08-17

## 2023-08-17 PROBLEM — C91.11 CHRONIC LYMPHOCYTIC LEUKEMIA OF B-CELL TYPE IN REMISSION (HCC): Chronic | Status: ACTIVE | Noted: 2020-11-08

## 2023-08-17 PROBLEM — K21.00 HIATAL HERNIA WITH GASTROESOPHAGEAL REFLUX DISEASE AND ESOPHAGITIS: Chronic | Status: ACTIVE | Noted: 2023-08-17

## 2023-08-17 PROBLEM — R06.2 WHEEZING: Chronic | Status: ACTIVE | Noted: 2023-08-17

## 2023-08-17 PROBLEM — K29.50 CHRONIC GASTRITIS WITHOUT BLEEDING: Status: ACTIVE | Noted: 2023-08-17

## 2023-08-17 PROBLEM — K21.00 GASTROESOPHAGEAL REFLUX DISEASE WITH ESOPHAGITIS WITHOUT HEMORRHAGE: Chronic | Status: ACTIVE | Noted: 2023-08-14

## 2023-08-17 LAB
ANION GAP SERPL CALC-SCNC: 8 MMOL/L (ref 2–11)
BUN SERPL-MCNC: 20 MG/DL (ref 8–23)
CALCIUM SERPL-MCNC: 8 MG/DL (ref 8.3–10.4)
CHLORIDE SERPL-SCNC: 103 MMOL/L (ref 101–110)
CO2 SERPL-SCNC: 25 MMOL/L (ref 21–32)
CREAT SERPL-MCNC: 0.8 MG/DL (ref 0.8–1.5)
ERYTHROCYTE [DISTWIDTH] IN BLOOD BY AUTOMATED COUNT: 16.6 % (ref 11.9–14.6)
GLUCOSE SERPL-MCNC: 163 MG/DL (ref 65–100)
HCT VFR BLD AUTO: 29.2 % (ref 41.1–50.3)
HGB BLD-MCNC: 9.2 G/DL (ref 13.6–17.2)
MCH RBC QN AUTO: 26.3 PG (ref 26.1–32.9)
MCHC RBC AUTO-ENTMCNC: 31.5 G/DL (ref 31.4–35)
MCV RBC AUTO: 83.4 FL (ref 82–102)
NRBC # BLD: 0 K/UL (ref 0–0.2)
PLATELET # BLD AUTO: 240 K/UL (ref 150–450)
PMV BLD AUTO: 10.7 FL (ref 9.4–12.3)
POTASSIUM SERPL-SCNC: 3.9 MMOL/L (ref 3.5–5.1)
RBC # BLD AUTO: 3.5 M/UL (ref 4.23–5.6)
SODIUM SERPL-SCNC: 136 MMOL/L (ref 133–143)
WBC # BLD AUTO: 10.5 K/UL (ref 4.3–11.1)

## 2023-08-17 PROCEDURE — 6370000000 HC RX 637 (ALT 250 FOR IP): Performed by: HOSPITALIST

## 2023-08-17 PROCEDURE — 6360000002 HC RX W HCPCS: Performed by: INTERNAL MEDICINE

## 2023-08-17 PROCEDURE — 6370000000 HC RX 637 (ALT 250 FOR IP): Performed by: FAMILY MEDICINE

## 2023-08-17 PROCEDURE — 6370000000 HC RX 637 (ALT 250 FOR IP): Performed by: INTERNAL MEDICINE

## 2023-08-17 PROCEDURE — 2580000003 HC RX 258: Performed by: INTERNAL MEDICINE

## 2023-08-17 PROCEDURE — 99232 SBSQ HOSP IP/OBS MODERATE 35: CPT | Performed by: INTERNAL MEDICINE

## 2023-08-17 PROCEDURE — 94640 AIRWAY INHALATION TREATMENT: CPT

## 2023-08-17 PROCEDURE — 97530 THERAPEUTIC ACTIVITIES: CPT

## 2023-08-17 PROCEDURE — 80048 BASIC METABOLIC PNL TOTAL CA: CPT

## 2023-08-17 PROCEDURE — 36415 COLL VENOUS BLD VENIPUNCTURE: CPT

## 2023-08-17 PROCEDURE — 94761 N-INVAS EAR/PLS OXIMETRY MLT: CPT

## 2023-08-17 PROCEDURE — 85027 COMPLETE CBC AUTOMATED: CPT

## 2023-08-17 PROCEDURE — 2700000000 HC OXYGEN THERAPY PER DAY

## 2023-08-17 RX ORDER — BUSPIRONE HYDROCHLORIDE 10 MG/1
10 TABLET ORAL 3 TIMES DAILY
Qty: 90 TABLET | Refills: 1 | Status: SHIPPED | OUTPATIENT
Start: 2023-08-17

## 2023-08-17 RX ORDER — ALBUTEROL SULFATE 2.5 MG/3ML
2.5 SOLUTION RESPIRATORY (INHALATION) EVERY 4 HOURS PRN
Qty: 120 EACH | Refills: 1 | Status: SHIPPED | OUTPATIENT
Start: 2023-08-17

## 2023-08-17 RX ORDER — AMOXICILLIN AND CLAVULANATE POTASSIUM 875; 125 MG/1; MG/1
1 TABLET, FILM COATED ORAL EVERY 12 HOURS SCHEDULED
Qty: 14 TABLET | Refills: 0 | Status: SHIPPED | OUTPATIENT
Start: 2023-08-17 | End: 2023-08-24

## 2023-08-17 RX ORDER — AMOXICILLIN AND CLAVULANATE POTASSIUM 875; 125 MG/1; MG/1
1 TABLET, FILM COATED ORAL EVERY 12 HOURS SCHEDULED
Qty: 14 TABLET | Refills: 0 | Status: SHIPPED | OUTPATIENT
Start: 2023-08-17 | End: 2023-08-17 | Stop reason: SDUPTHER

## 2023-08-17 RX ORDER — ALBUTEROL SULFATE 2.5 MG/3ML
2.5 SOLUTION RESPIRATORY (INHALATION) EVERY 4 HOURS PRN
Qty: 120 EACH | Refills: 1 | Status: SHIPPED | OUTPATIENT
Start: 2023-08-17 | End: 2023-08-17 | Stop reason: SDUPTHER

## 2023-08-17 RX ORDER — PREDNISONE 10 MG/1
TABLET ORAL
Qty: 18 TABLET | Refills: 0 | Status: SHIPPED | OUTPATIENT
Start: 2023-08-17

## 2023-08-17 RX ORDER — BUDESONIDE AND FORMOTEROL FUMARATE DIHYDRATE 160; 4.5 UG/1; UG/1
2 AEROSOL RESPIRATORY (INHALATION) 2 TIMES DAILY
Qty: 10.2 G | Refills: 5 | Status: SHIPPED
Start: 2023-08-17

## 2023-08-17 RX ADMIN — SUCRALFATE 1 G: 1 TABLET ORAL at 12:50

## 2023-08-17 RX ADMIN — SODIUM CHLORIDE, POTASSIUM CHLORIDE, SODIUM LACTATE AND CALCIUM CHLORIDE: 600; 310; 30; 20 INJECTION, SOLUTION INTRAVENOUS at 05:01

## 2023-08-17 RX ADMIN — HYDROCODONE BITARTRATE AND HOMATROPINE METHYLBROMIDE 5 ML: 5; 1.5 SOLUTION ORAL at 06:02

## 2023-08-17 RX ADMIN — ARFORMOTEROL TARTRATE 15 MCG: 15 SOLUTION RESPIRATORY (INHALATION) at 08:09

## 2023-08-17 RX ADMIN — SUCRALFATE 1 G: 1 TABLET ORAL at 01:26

## 2023-08-17 RX ADMIN — DULOXETINE HYDROCHLORIDE 30 MG: 30 CAPSULE, DELAYED RELEASE ORAL at 08:47

## 2023-08-17 RX ADMIN — CLOPIDOGREL BISULFATE 75 MG: 75 TABLET ORAL at 08:47

## 2023-08-17 RX ADMIN — SUCRALFATE 1 G: 1 TABLET ORAL at 05:58

## 2023-08-17 RX ADMIN — AMOXICILLIN AND CLAVULANATE POTASSIUM 1 TABLET: 875; 125 TABLET, FILM COATED ORAL at 08:47

## 2023-08-17 RX ADMIN — TAMSULOSIN HYDROCHLORIDE 0.4 MG: 0.4 CAPSULE ORAL at 08:47

## 2023-08-17 RX ADMIN — MAGNESIUM GLUCONATE 500 MG ORAL TABLET 400 MG: 500 TABLET ORAL at 08:47

## 2023-08-17 RX ADMIN — BUDESONIDE 500 MCG: 0.5 INHALANT RESPIRATORY (INHALATION) at 08:08

## 2023-08-17 RX ADMIN — SERTRALINE 25 MG: 50 TABLET, FILM COATED ORAL at 08:47

## 2023-08-17 RX ADMIN — MIDODRINE HYDROCHLORIDE 5 MG: 5 TABLET ORAL at 12:50

## 2023-08-17 RX ADMIN — ASPIRIN 81 MG 81 MG: 81 TABLET ORAL at 08:47

## 2023-08-17 RX ADMIN — BUSPIRONE HYDROCHLORIDE 5 MG: 5 TABLET ORAL at 08:48

## 2023-08-17 RX ADMIN — PANTOPRAZOLE SODIUM 40 MG: 40 TABLET, DELAYED RELEASE ORAL at 05:58

## 2023-08-17 RX ADMIN — SODIUM CHLORIDE SOLN NEBU 3% 4 ML: 3 NEBU SOLN at 08:09

## 2023-08-17 RX ADMIN — ALBUTEROL SULFATE 2.5 MG: 2.5 SOLUTION RESPIRATORY (INHALATION) at 08:09

## 2023-08-17 RX ADMIN — MIDODRINE HYDROCHLORIDE 5 MG: 5 TABLET ORAL at 08:48

## 2023-08-17 RX ADMIN — GUAIFENESIN 1200 MG: 600 TABLET ORAL at 08:48

## 2023-08-17 NOTE — CARE COORDINATION
DES spoke with IRENE Estrada CM.  He sent all the 02 documentation to the [place] respiratory therapist. Braden Acevedo home 02 approval.
Patient is discharging home with [facility]. Patient's son will transport home and has patient's prescriptions that he will get filled at the [place] today.
Patient needs 4 liters 02 ambulating. Qualifier and order faxed to the [place]. CM left a vm for patient's VA CM, Maty Navarro to call .
Patient signed VA preference form and it was faxed back to the [place].
Assistance Independent   Active  No   Patient's  Info Son   Mode of Transportation SUV   Occupation On disability   Discharge Planning   Type of Residence [employer]   Living Arrangements Children   Current Services Prior To Admission Meals On Wheels;Durable [facility]  ([facility])   Current DME Prior to [employer]; Shower Chair;Walker; Wheelchair   DME Ordered? No   Potential Assistance Purchasing Medications No   Type of Home Care Services Aide Services;OT;PT;Nursing Services;Meals on Wheels   Patient expects to be discharged to: House   One/Two Story Residence Two story   History of falls?  1

## 2023-08-17 NOTE — DISCHARGE SUMMARY
Hospitalist Discharge Summary   Admit Date:  2023 11:27 PM   DC Note date: 2023  Name:  Renny Brown   Age:  66 y.o. Sex:  male  :  1945   MRN:  [id number]   Room:  [facility]  PCP:  Joanie Lopez DO    Presenting Complaint: Cough     Initial Admission Diagnosis: Pneumonia of left lower lobe due to infectious organism [J18.9]  Community acquired pneumonia, unspecified laterality [J18.9]     Problem List for this Hospitalization (present on admission):    Principal Problem:    Aspiration pneumonia of both lower lobes ([address])  Active Problems:    Anxiety disorder, unspecified    Chronic lymphocytic leukemia of B-cell type in remission ([address])    Stage 4 very severe COPD by GOLD classification ([address])    Paz's esophagus    Unspecified sequelae of cerebral infarction    Pneumonia of left lower lobe due to infectious organism    Cricopharyngeal hypertrophy    Esophageal dysmotility due to systemic disease    Chronic gastritis without bleeding    Hiatal hernia with gastroesophageal reflux disease and esophagitis    Wheezing  Resolved Problems:    * No resolved hospital problems. *      Hospital Course:  Renny Brown is a 66 y.o. CM w/ a PMH of COPD, chronic respiratory failure who presented with cough and excessive mucus. Patient reports he has had an increased cough, some mild shortness of breath, and he is unable to \"cough this stuff up\". Admitted with bacterial PNA. Completed course of rocephin and azithro and also got a few days of levaquin. Discharge delayed due to concerns about aspiration. CT chest obtained showing bilateral lower lobe PNA and retained food in esophagus. GI consulted. EGD did not show any strictures, but did show long segments barretts, hital hernia, nonerosive gastritis. Repeat EGD in 1 year per GI. MBS did not show any aspiration but he is on a modified diet and needs to follow strict aspiration precautions.   This is especially true with his significant
Date/Time    LABA1C 5.0 06/13/2023 03:07 AM    EAG 97 06/13/2023 03:07 AM      Lipids Lab Results   Component Value Date/Time    CHOL 82 06/13/2023 03:07 AM    LDLCALC 33.8 06/13/2023 03:07 AM    LABVLDL 12.2 06/13/2023 03:07 AM    HDL 36 06/13/2023 03:07 AM    CHOLHDLRATIO 2.3 06/13/2023 03:07 AM    TRIG 61 06/13/2023 03:07 AM      Thyroid  Lab Results   Component Value Date/Time    TSHELE 0.40 06/08/2023 05:39 PM    QTH8YNX 2.530 03/21/2023 11:34 AM        Most Recent UA Lab Results   Component Value Date/Time    COLORU YELLOW/STRAW 06/08/2023 08:12 PM    APPEARANCE CLEAR 06/08/2023 08:12 PM    SPECGRAV 1.015 06/08/2023 08:12 PM    LABPH 7.0 06/08/2023 08:12 PM    PROTEINU Negative 06/08/2023 08:12 PM    GLUCOSEU Negative 06/08/2023 08:12 PM    KETUA Negative 06/08/2023 08:12 PM    BILIRUBINUR Negative 06/08/2023 08:12 PM    BLOODU Negative 06/08/2023 08:12 PM    UROBILINOGEN 1.0 06/08/2023 08:12 PM    NITRU Negative 06/08/2023 08:12 PM    LEUKOCYTESUR Negative 06/08/2023 08:12 PM    WBCUA 0-4 06/05/2023 06:31 PM    RBCUA 0-5 06/05/2023 06:31 PM    EPITHUA 0-5 06/05/2023 06:31 PM    BACTERIA Negative 06/05/2023 06:31 PM    LABCAST 0-2 06/05/2023 06:31 PM        Microbiology:  Results       Procedure Component Value Units Date/Time    Culture, Blood 1 [[id number]] Collected: 08/07/23 0028    Order Status: Completed Specimen: Blood Updated: 08/12/23 0848     Special Requests RIGHT FOREARM        Culture NO GROWTH 5 DAYS       Culture, Blood 1 [[id number]] Collected: 08/06/23 3450    Order Status: Completed Specimen: Blood Updated: 08/12/23 0848     Special Requests LEFT FOREARM        Culture NO GROWTH 5 DAYS       COVID-19, Rapid [[id number]] Collected: 08/06/23 2345    Order Status: Completed Specimen: Nasopharyngeal Updated: 08/07/23 0025     Source NASAL        SARS-CoV-2, Rapid Not detected        Comment:    The specimen is NEGATIVE for SARS-CoV-2, the novel coronavirus associated with COVID-19.   A negative

## 2023-08-18 ENCOUNTER — CARE COORDINATION (OUTPATIENT)
Dept: CARE COORDINATION | Facility: CLINIC | Age: 78
End: 2023-08-18

## 2023-08-18 NOTE — CARE COORDINATION
Care Transitions Outreach Attempt    Call within 2 business days of discharge: Yes   Attempted to reach patient for transitions of care follow up. Unable to reach patient. Patient: Kristyn Zurita Patient : 1945 MRN: [id number]    Last Discharge [address]       Date Complaint Diagnosis Description Type Department Provider    23 Cough Pneumonia of left lower lobe due to infectious organism . .. ED to Hosp-Admission (Discharged) (ADMITTED) SFD8MS Екатерина Torres MD; Hernesto Candelaria. .. Was this an external facility discharge?  No Discharge Facility: [facility]    Noted following upcoming appointments from discharge chart review:   [facility] follow up appointment(s):   Future Appointments   Date Time Provider [address]   2023  9:50 AM DAVID Fletcher - CNP [facility] AMB   2023 10:00 AM Melissa R Grinnell, PA-C [facility] AMB     Non-[facility] follow up appointment(s): none noted

## 2023-08-21 ENCOUNTER — CARE COORDINATION (OUTPATIENT)
Dept: CARE COORDINATION | Facility: CLINIC | Age: 78
End: 2023-08-21

## 2023-08-22 NOTE — CARE COORDINATION
Care Transitions Outreach Attempt    Call within 2 business days of discharge: Yes   Attempted to reach patient for transitions of care follow up. Unable to reach patient. Patient: Nakul Zurita Patient : 1945 MRN: [id number]    Last Discharge [address]       Date Complaint Diagnosis Description Type Department Provider    23 Cough Pneumonia of left lower lobe due to infectious organism . .. ED to Hosp-Admission (Discharged) (ADMITTED) SFD8MS Jerry Quinones MD; [place]. .. Was this an external facility discharge?  No Discharge Facility: [facility]    Noted following upcoming appointments from discharge chart review:   [facility] follow up appointment(s):   Future Appointments   Date Time Provider [address]   2023  9:50 AM DAVID Scott - CNP [facility] AMB   2023 10:00 AM Melissa R Grinnell, PA-C [facility] AMB     Non-[facility] follow up appointment(s): none noted

## 2023-08-22 NOTE — PROGRESS NOTES
Name:  Renny Brown  YOB: 1945   MRN: [id number]      Office Visit: 8/23/2023        ASSESSMENT AND PLAN:  (Medical Decision Making)    Impression: 66 y.o. male with recent hospitalization for aspiration pneumonia. 1. Chronic obstructive pulmonary disease, unspecified COPD type ([address])  --Spirometry today is normal.  He has been on Spiriva and Symbicort, but Symbicort is no longer on the [address] [place] [address] preferred list.  This has been changed to [place] and we will continue this. --Needs complete PFTs prior to next appointment. - Spirometry Without Bronchodilator  - albuterol (PROVENTIL) (2.5 MG/3ML) 0.083% nebulizer solution; Take 3 mLs by nebulization every 4 hours as needed for Wheezing or Shortness of Breath  Dispense: 120 each; Refill: 1  - tiotropium (SPIRIVA RESPIMAT) 2.5 MCG/ACT AERS inhaler; Inhale 2 puffs into the lungs daily Dx code j44.9  Dispense: 3 each; Refill: 3  - Albuterol Sulfate, sensor, (PROAIR DIGIHALER) 108 (90 Base) MCG/ACT AEPB; Inhale 2 puffs into the lungs every 4 hours as needed (wheezing) Dx code j44.9  Dispense: 1 each; Refill: 3    2. Mucus plugging of bronchi  --He is to use albuterol in the nebulizer twice daily, and we will add saline to use twice daily. Each nebulizer treatment is to be followed by 10 repetitions of his flutter valve. 3. Personal history of tobacco use, presenting hazards to health  --He is a candidate for screening CT scan, but just had a CT scan during hospitalization so next CT scan would be due in August, 2024. We will order this at next visit. 4. Aspiration pneumonia, unspecified aspiration pneumonia type, unspecified laterality, unspecified part of lung (HCC)  --Complete Augmentin. We have reviewed his dietary restrictions, avoid eating and drinking within 2 hours of going to bed, chew food thoroughly, swallow using chin tuck. --follow up CXR at next visit. 5. Nocturnal hypoxemia  --Continue nocturnal oxygen at 2 L/min.     6.

## 2023-08-23 ENCOUNTER — OFFICE VISIT (OUTPATIENT)
Dept: PULMONOLOGY | Age: 78
End: 2023-08-23

## 2023-08-23 VITALS
HEART RATE: 85 BPM | DIASTOLIC BLOOD PRESSURE: 64 MMHG | WEIGHT: 129.9 LBS | TEMPERATURE: 98.1 F | RESPIRATION RATE: 17 BRPM | HEIGHT: 62 IN | BODY MASS INDEX: 23.9 KG/M2 | SYSTOLIC BLOOD PRESSURE: 120 MMHG | OXYGEN SATURATION: 97 %

## 2023-08-23 DIAGNOSIS — G47.34 NOCTURNAL HYPOXEMIA: ICD-10-CM

## 2023-08-23 DIAGNOSIS — J44.9 CHRONIC OBSTRUCTIVE PULMONARY DISEASE, UNSPECIFIED COPD TYPE (HCC): Primary | ICD-10-CM

## 2023-08-23 DIAGNOSIS — Z87.891 PERSONAL HISTORY OF TOBACCO USE, PRESENTING HAZARDS TO HEALTH: ICD-10-CM

## 2023-08-23 DIAGNOSIS — J69.0 ASPIRATION PNEUMONIA, UNSPECIFIED ASPIRATION PNEUMONIA TYPE, UNSPECIFIED LATERALITY, UNSPECIFIED PART OF LUNG (HCC): ICD-10-CM

## 2023-08-23 DIAGNOSIS — R06.09 DYSPNEA ON EXERTION: ICD-10-CM

## 2023-08-23 DIAGNOSIS — Z09 HOSPITAL DISCHARGE FOLLOW-UP: ICD-10-CM

## 2023-08-23 DIAGNOSIS — T17.500A MUCUS PLUGGING OF BRONCHI: ICD-10-CM

## 2023-08-23 LAB
EXPIRATORY TIME: NORMAL
FEF 25-75% %PRED-PRE: NORMAL
FEF 25-75% PRED: NORMAL
FEF 25-75%-PRE: NORMAL
FEV1 %PRED-PRE: 84 %
FEV1 PRED: NORMAL
FEV1/FVC %PRED-PRE: NORMAL
FEV1/FVC PRED: NORMAL
FEV1/FVC: 70 %
FEV1: 1.67 L
FVC %PRED-PRE: 84 %
FVC PRED: NORMAL
FVC: 2.39 L
PEF %PRED-PRE: NORMAL
PEF PRED: NORMAL
PEF-PRE: NORMAL

## 2023-08-23 RX ORDER — FLUTICASONE PROPIONATE AND SALMETEROL 250; 50 UG/1; UG/1
1 POWDER RESPIRATORY (INHALATION) EVERY 12 HOURS
Qty: 3 EACH | Refills: 3 | Status: SHIPPED | OUTPATIENT
Start: 2023-08-23

## 2023-08-23 RX ORDER — ALBUTEROL SULFATE 2.5 MG/3ML
2.5 SOLUTION RESPIRATORY (INHALATION) EVERY 4 HOURS PRN
Qty: 120 EACH | Refills: 1 | Status: SHIPPED | OUTPATIENT
Start: 2023-08-23

## 2023-08-23 RX ORDER — ALBUTEROL SULFATE 90 UG/1
2 INHALANT RESPIRATORY (INHALATION) EVERY 4 HOURS PRN
Qty: 1 EACH | Refills: 3 | Status: SHIPPED | OUTPATIENT
Start: 2023-08-23

## 2023-08-23 RX ORDER — SODIUM CHLORIDE FOR INHALATION 3 %
4 VIAL, NEBULIZER (ML) INHALATION 2 TIMES DAILY
Qty: 240 ML | Refills: 11 | Status: SHIPPED | OUTPATIENT
Start: 2023-08-23

## 2023-08-23 ASSESSMENT — PULMONARY FUNCTION TESTS
FEV1_PERCENT_PREDICTED_PRE: 84
FVC: 2.39
FEV1: 1.67
FEV1/FVC: 70
FVC_PERCENT_PREDICTED_PRE: 84

## 2023-08-23 NOTE — PATIENT INSTRUCTIONS
Wixela 1 puff twice daily  Spiriva 2 puffs once daily    Use albuterol in nebulizer morning and bedtime  Use saline in nebulizer at lunch time and in the afternoon. After each nebulizer treatment, use flutter valve 10 reps    Chew food thoroughly.   Tuck chin when you swallow

## 2023-08-25 ENCOUNTER — CARE COORDINATION (OUTPATIENT)
Dept: CARE COORDINATION | Facility: CLINIC | Age: 78
End: 2023-08-25

## 2023-08-25 NOTE — CARE COORDINATION
[facility] Care Transitions Initial Follow Up Call    Call within 2 business days of discharge: Yes    Patient Current Location:  Home: [address] [place] [address]    Care Transition Nurse contacted the patient and son  by telephone to perform post hospital discharge assessment. Verified name and  with patient and son  as identifiers. Provided introduction to self, and explanation of the Care Transition Nurse role. Patient: Kristyn Zurita Patient : 1945   MRN: [id number]  Reason for Admission: PNA  Discharge Date: 23 RARS: Readmission Risk Score: 23.2      Last Discharge [address] [facility]       Date Complaint Diagnosis Description Type Department Provider    23 Cough Pneumonia of left lower lobe due to infectious organism . .. ED to Hosp-Admission (Discharged) (ADMITTED) SFD8MS Екатерина Torres MD; Hernesto Candelaria. .. Was this an external facility discharge? No Discharge Facility:     Challenges to be reviewed by the provider   Additional needs identified to be addressed with provider: No  none               Method of communication with provider: none. Spoke with patient's son, Ignacio Quinones. They report that patient is doing well. Ignacio Quinones is a POA. Documents in Media tab. Son is supportive, he helps patient with his medications, drives him to appointments and cooks his meals. Patient/Family attended Pulmonology appointment. Per Ignacio Quinones, instructions were given to patient/family. NO concerns or questions about that at this time. Care Transition Nurse reviewed discharge instructions with patient and son  who verbalized understanding. The patient and son  was given an opportunity to ask questions and does not have any further questions or concerns at this time. Were discharge instructions available to patient? Yes. Reviewed appropriate site of care based on symptoms and resources available to patient including: PCP  Specialist  Urgent care clinics  When to call 911.  The patient and son

## 2023-09-15 ENCOUNTER — TELEPHONE (OUTPATIENT)
Dept: ONCOLOGY | Age: 78
End: 2023-09-15

## 2023-09-27 ENCOUNTER — INITIAL CONSULT (OUTPATIENT)
Age: 78
End: 2023-09-27
Payer: OTHER GOVERNMENT

## 2023-09-27 VITALS
HEART RATE: 72 BPM | BODY MASS INDEX: 24.29 KG/M2 | HEIGHT: 62 IN | SYSTOLIC BLOOD PRESSURE: 100 MMHG | DIASTOLIC BLOOD PRESSURE: 62 MMHG | WEIGHT: 132 LBS

## 2023-09-27 DIAGNOSIS — I63.50 CEREBROVASCULAR ACCIDENT (CVA) DUE TO OCCLUSION OF CEREBRAL ARTERY (HCC): ICD-10-CM

## 2023-09-27 PROCEDURE — 1123F ACP DISCUSS/DSCN MKR DOCD: CPT | Performed by: INTERNAL MEDICINE

## 2023-09-27 PROCEDURE — 99204 OFFICE O/P NEW MOD 45 MIN: CPT | Performed by: INTERNAL MEDICINE

## 2023-09-27 ASSESSMENT — ENCOUNTER SYMPTOMS
SHORTNESS OF BREATH: 0
ORTHOPNEA: 0
ABDOMINAL PAIN: 0

## 2023-09-27 NOTE — PROGRESS NOTES
[facility] CARDIOLOGY  [address] [address], [address] [place] [address]  PHONE: [phone]      23    NAME:  Yuliya Hsieh  : 1945  MRN: [id number]         SUBJECTIVE:   Yuliya Hsieh is a 66 y.o. male seen for a consultation visit regarding the following:     Chief Complaint   Patient presents with    Consultation    Irregular Heart Beat            HPI:  Consultation is requested by Ivett Hummel DO for evaluation of Consultation and Irregular Heart Beat   . Mr. Alexis Peña presents today for follow-up after hospitalization back in  for encephalopathy. Patient found to have a CVA at that time. Said a prior history of stroke. Describes lacunar infarcts. Echo showed no evidence of PFO, otherwise normal.  Has no prior history of heart disease. He is a former smoker, has fairly advanced COPD. He denies chest pain, orthopnea, PND, palpitations, syncope or lower extremity swelling. EKGs of showed a sinus rhythm. He drinks minimal caffeine. He quit smoking about 4 years ago. This was his second documented CVA. Irregular Heart Beat   Associated symptoms include an irregular heartbeat. Pertinent negatives include no chest pain, diaphoresis, fever, near-syncope or shortness of breath. Key CAD CHF Meds            rosuvastatin (CRESTOR) 40 MG tablet (Taking)    Sig - Route: Take 1 tablet by mouth nightly - Oral          Key Antihyperglycemic Medications       Patient is on no antihyperglycemic meds. Past Medical History, Past Surgical History, Family history, Social History, and Medications were all reviewed with the patient today and updated as necessary. Prior to Admission medications    Medication Sig Start Date End Date Taking?  Authorizing Provider   albuterol (PROVENTIL) (2.5 MG/3ML) 0.083% nebulizer solution Take 3 mLs by nebulization every 4 hours as needed for Wheezing or Shortness of Breath 23  Yes DAVID Sherman - CNP

## 2023-10-05 ENCOUNTER — CARE COORDINATION (OUTPATIENT)
Dept: CARE COORDINATION | Facility: CLINIC | Age: 78
End: 2023-10-05

## 2023-10-05 NOTE — CARE COORDINATION
Patient has graduated from the Care Transitions program on 10/5/2023. Patient/family has the ability to self-manage at this time. Patient has no further care management needs, no referral to the [facility] team for further management. Patient has Care Transition Nurse's contact information for any further questions, concerns, or needs.   Patients upcoming visits:    Future Appointments   Date Time Provider 4600  46[facility]   10/16/2023  1:20 PM PERIPHERAL GCCOIG GCC   10/16/2023  2:00 PM Brandon Cruz MD [facility] AMB   11/3/2023 11:15 AM PP PFT LAB PPS GVL AMB   11/27/2023 11:30 AM DAVID Reis - CNP PPS G[facility] AMB

## 2023-10-16 ENCOUNTER — OFFICE VISIT (OUTPATIENT)
Dept: ONCOLOGY | Age: 78
End: 2023-10-16
Payer: OTHER GOVERNMENT

## 2023-10-16 VITALS
HEIGHT: 62 IN | DIASTOLIC BLOOD PRESSURE: 73 MMHG | RESPIRATION RATE: 23 BRPM | BODY MASS INDEX: 24.01 KG/M2 | HEART RATE: 65 BPM | WEIGHT: 130.5 LBS | SYSTOLIC BLOOD PRESSURE: 126 MMHG | OXYGEN SATURATION: 97 % | TEMPERATURE: 97.6 F

## 2023-10-16 DIAGNOSIS — Z09 HOSPITAL DISCHARGE FOLLOW-UP: ICD-10-CM

## 2023-10-16 DIAGNOSIS — C91.10 CLL (CHRONIC LYMPHOCYTIC LEUKEMIA) (HCC): Primary | ICD-10-CM

## 2023-10-16 PROCEDURE — 1036F TOBACCO NON-USER: CPT | Performed by: INTERNAL MEDICINE

## 2023-10-16 PROCEDURE — 99214 OFFICE O/P EST MOD 30 MIN: CPT | Performed by: INTERNAL MEDICINE

## 2023-10-16 PROCEDURE — G8420 CALC BMI NORM PARAMETERS: HCPCS | Performed by: INTERNAL MEDICINE

## 2023-10-16 PROCEDURE — G8484 FLU IMMUNIZE NO ADMIN: HCPCS | Performed by: INTERNAL MEDICINE

## 2023-10-16 PROCEDURE — G8427 DOCREV CUR MEDS BY ELIG CLIN: HCPCS | Performed by: INTERNAL MEDICINE

## 2023-10-16 PROCEDURE — 1123F ACP DISCUSS/DSCN MKR DOCD: CPT | Performed by: INTERNAL MEDICINE

## 2023-10-16 ASSESSMENT — PATIENT HEALTH QUESTIONNAIRE - PHQ9
SUM OF ALL RESPONSES TO PHQ QUESTIONS 1-9: 0
SUM OF ALL RESPONSES TO PHQ9 QUESTIONS 1 & 2: 0
SUM OF ALL RESPONSES TO PHQ QUESTIONS 1-9: 0
1. LITTLE INTEREST OR PLEASURE IN DOING THINGS: 0
SUM OF ALL RESPONSES TO PHQ QUESTIONS 1-9: 0
2. FEELING DOWN, DEPRESSED OR HOPELESS: 0
SUM OF ALL RESPONSES TO PHQ QUESTIONS 1-9: 0

## 2023-10-16 NOTE — PROGRESS NOTES
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 He was discharged and presented to [facility] in the office on 10/16/2023, now he is able to provide more information that his CLL was diagnosed in 2000, initially monitored and started ibrutinib with Dr. Bud Lee in [facility] about a year ago, he has followed recently with virtual visit and restarted ibrutinib, discussed that this sounds proper and he should continue with Dr. Connor Purdy and we will be available as needed. All questions are answered to his satisfaction. He will call for further questions and concerns. ECOG PERFORMANCE STATUS - 1- Restricted in physically strenuous activity but ambulatory and able to carry out work of a light or sedentary nature such as light house work, office work. Pain - 3/10. None/Minimal pain - not affecting QOL     Fatigue - No flowsheet data found. Distress -        No data to display                    Total time independently spent on today's visit was 30 min. This time included: face-to-face time evaluating the patient as well as additional non-face-to-face time spent on: Preparing to see the patient by obtaining and reviewing previous test results, records and medical history, Performing a medically appropriate history and exam and documenting relevant clinical information for this visit, Counseling and educating patient, discussing medications, Communicating with other health care professionals and Referring patient to another health care provider. Elements of this note have been dictated via voice recognition software. Text and phrases may be limited by the accuracy and autoconversion of the software. The chart has been reviewed, but errors may still be present. Brandy Thomas M.D.   [address] [place] [address]  Office : [phone]  Fax : [phone]

## 2023-10-16 NOTE — PATIENT INSTRUCTIONS
Patient Instructions from Today's Visit    Reason for Visit:  Hospital follow up    Diagnosis Information:  https://www.[employer].com/. net/about-us/asco-answers-patient-education-materials/osdb-oscobih-jikr-sheets      Plan:  Follow up with Dr. Kellie Young who treats your CLL. Come back as needed    Follow Up:  As needed    Recent Lab Results:  N/a    Treatment Summary has been discussed and given to patient: n/a        -------------------------------------------------------------------------------------------------------------------  Please call our office at [phone] if you have any  of the following symptoms:   Fever of 100.5 or greater  Chills  Shortness of breath  Swelling or pain in one leg    After office hours an answering service is available and will contact a provider for emergencies or if you are experiencing any of the above symptoms. Patient does not express an interest in My Chart. My Chart log in information explained on the after visit summary printout at the [address]  desk.     Shakila Bobo RN

## 2023-10-24 ENCOUNTER — TELEPHONE (OUTPATIENT)
Dept: CARDIOLOGY CLINIC | Age: 78
End: 2023-10-24

## 2023-10-24 NOTE — TELEPHONE ENCOUNTER
Received critical zywie notification regarding pt's holter monitor. Indicates 1 episode afib, RVR, rate up to 180. report uploaded to pt's chart on 10/20.

## 2023-10-24 NOTE — TELEPHONE ENCOUNTER
MD Gerry Rocha, RN  Caller: Unspecified (Today,  1:11 PM)  Reviewed. He will need to start eliquis 5mg BID. I can see him in the office to discuss. Thanks   wgw     Spoke to pt's son after reviewing KYLEE. Explained monitor results. Son has multiple questions regarding safety of eliquis due to current dx leukemia and on ASA, plavix. Appt scheduled tomorrow at 9:45 in [address] office. Verb understanding.

## 2023-10-25 ENCOUNTER — OFFICE VISIT (OUTPATIENT)
Age: 78
End: 2023-10-25
Payer: OTHER GOVERNMENT

## 2023-10-25 VITALS
HEART RATE: 75 BPM | BODY MASS INDEX: 24.51 KG/M2 | DIASTOLIC BLOOD PRESSURE: 60 MMHG | WEIGHT: 134 LBS | SYSTOLIC BLOOD PRESSURE: 128 MMHG

## 2023-10-25 DIAGNOSIS — I48.0 PAF (PAROXYSMAL ATRIAL FIBRILLATION) (HCC): Primary | ICD-10-CM

## 2023-10-25 DIAGNOSIS — I63.50 CEREBROVASCULAR ACCIDENT (CVA) DUE TO OCCLUSION OF CEREBRAL ARTERY (HCC): ICD-10-CM

## 2023-10-25 PROCEDURE — 99214 OFFICE O/P EST MOD 30 MIN: CPT | Performed by: INTERNAL MEDICINE

## 2023-10-25 PROCEDURE — 1123F ACP DISCUSS/DSCN MKR DOCD: CPT | Performed by: INTERNAL MEDICINE

## 2023-10-25 ASSESSMENT — ENCOUNTER SYMPTOMS
ORTHOPNEA: 0
ABDOMINAL PAIN: 0
SHORTNESS OF BREATH: 0

## 2023-10-25 NOTE — PROGRESS NOTES
fibrillation) (HCC)  -     apixaban (ELIQUIS) 5 MG TABS tablet; Take 1 tablet by mouth 2 times daily    Cerebrovascular accident (CVA) due to occlusion of cerebral artery (HCC)  -     apixaban (ELIQUIS) 5 MG TABS tablet; Take 1 tablet by mouth 2 times daily          IMPRESSION:    Mr. Romario Vásquez presents today for follow-up. He is stable from cardiac standpoint today. We reviewed his Holter monitor which did show some paroxysmal episodes of atrial fibrillation. Given his risk factors including prior CVA, we will plan to start him on Eliquis. We will discontinue his Plavix. As his A-fib burden is low, I do not think he requires any additional heart rate or rhythm management at this time. We will continue to see him back in 4 months for follow-up        Return in about 4 months (around 2/25/2024). Thank you for allowing me to participate in this patient's care. Please call or contact me if there are any questions or concerns regarding the above.       Zahra Felix III, MD  10/25/23  10:08 AM

## 2023-11-06 ENCOUNTER — NURSE ONLY (OUTPATIENT)
Dept: PULMONOLOGY | Age: 78
End: 2023-11-06
Payer: OTHER GOVERNMENT

## 2023-11-06 DIAGNOSIS — R06.09 DYSPNEA ON EXERTION: Primary | ICD-10-CM

## 2023-11-06 DIAGNOSIS — J44.9 CHRONIC OBSTRUCTIVE PULMONARY DISEASE, UNSPECIFIED COPD TYPE (HCC): ICD-10-CM

## 2023-11-06 LAB
FEV 1 , POC: 1.84 L
FEV1 % PRED, POC: 76 %
FEV1/FVC, POC: NORMAL
FVC % PRED, POC: 81 %
FVC, POC: NORMAL

## 2023-11-06 PROCEDURE — 94726 PLETHYSMOGRAPHY LUNG VOLUMES: CPT | Performed by: INTERNAL MEDICINE

## 2023-11-06 PROCEDURE — 94060 EVALUATION OF WHEEZING: CPT | Performed by: INTERNAL MEDICINE

## 2023-11-06 PROCEDURE — 94729 DIFFUSING CAPACITY: CPT | Performed by: INTERNAL MEDICINE

## 2023-11-06 ASSESSMENT — PULMONARY FUNCTION TESTS
FVC_PERCENT_PREDICTED_POC: 81
FEV1_PERCENT_PREDICTED_POC: 76

## 2023-11-15 DIAGNOSIS — J44.9 STAGE 4 VERY SEVERE COPD BY GOLD CLASSIFICATION (HCC): Primary | Chronic | ICD-10-CM

## 2023-11-19 ENCOUNTER — APPOINTMENT (OUTPATIENT)
Dept: GENERAL RADIOLOGY | Age: 78
End: 2023-11-19
Payer: OTHER GOVERNMENT

## 2023-11-19 ENCOUNTER — HOSPITAL ENCOUNTER (EMERGENCY)
Age: 78
Discharge: HOME | End: 2023-11-20
Payer: OTHER GOVERNMENT

## 2023-11-19 DIAGNOSIS — R05.1 ACUTE COUGH: ICD-10-CM

## 2023-11-19 DIAGNOSIS — J44.1 COPD EXACERBATION (HCC): Primary | ICD-10-CM

## 2023-11-19 LAB
ANION GAP SERPL CALC-SCNC: 4 MMOL/L (ref 2–11)
B PERT DNA SPEC QL NAA+PROBE: NOT DETECTED
BASOPHILS # BLD: 0 K/UL (ref 0–0.2)
BASOPHILS NFR BLD: 0 % (ref 0–2)
BORDETELLA PARAPERTUSSIS BY PCR: NOT DETECTED
BUN SERPL-MCNC: 22 MG/DL (ref 8–23)
C PNEUM DNA SPEC QL NAA+PROBE: NOT DETECTED
CALCIUM SERPL-MCNC: 9 MG/DL (ref 8.3–10.4)
CHLORIDE SERPL-SCNC: 106 MMOL/L (ref 101–110)
CO2 SERPL-SCNC: 28 MMOL/L (ref 21–32)
CREAT SERPL-MCNC: 0.8 MG/DL (ref 0.8–1.5)
DIFFERENTIAL METHOD BLD: ABNORMAL
EKG ATRIAL RATE: 61 BPM
EKG DIAGNOSIS: NORMAL
EKG P-R INTERVAL: 124 MS
EKG Q-T INTERVAL: 416 MS
EKG QRS DURATION: 88 MS
EKG QTC CALCULATION (BAZETT): 418 MS
EKG R AXIS: 102 DEGREES
EKG T AXIS: 67 DEGREES
EKG VENTRICULAR RATE: 61 BPM
EOSINOPHIL # BLD: 0.2 K/UL (ref 0–0.8)
EOSINOPHIL NFR BLD: 3 % (ref 0.5–7.8)
ERYTHROCYTE [DISTWIDTH] IN BLOOD BY AUTOMATED COUNT: 16.9 % (ref 11.9–14.6)
FLUAV SUBTYP SPEC NAA+PROBE: NOT DETECTED
FLUBV RNA SPEC QL NAA+PROBE: NOT DETECTED
GLUCOSE SERPL-MCNC: 90 MG/DL (ref 65–100)
HADV DNA SPEC QL NAA+PROBE: NOT DETECTED
HCOV 229E RNA SPEC QL NAA+PROBE: NOT DETECTED
HCOV HKU1 RNA SPEC QL NAA+PROBE: NOT DETECTED
HCOV NL63 RNA SPEC QL NAA+PROBE: NOT DETECTED
HCOV OC43 RNA SPEC QL NAA+PROBE: NOT DETECTED
HCT VFR BLD AUTO: 40 % (ref 41.1–50.3)
HGB BLD-MCNC: 12.5 G/DL (ref 13.6–17.2)
HMPV RNA SPEC QL NAA+PROBE: NOT DETECTED
HPIV1 RNA SPEC QL NAA+PROBE: NOT DETECTED
HPIV2 RNA SPEC QL NAA+PROBE: NOT DETECTED
HPIV3 RNA SPEC QL NAA+PROBE: NOT DETECTED
HPIV4 RNA SPEC QL NAA+PROBE: NOT DETECTED
IMM GRANULOCYTES # BLD AUTO: 0 K/UL (ref 0–0.5)
IMM GRANULOCYTES NFR BLD AUTO: 0 % (ref 0–5)
LACTATE SERPL-SCNC: 1.6 MMOL/L (ref 0.4–2)
LYMPHOCYTES # BLD: 3.9 K/UL (ref 0.5–4.6)
LYMPHOCYTES NFR BLD: 54 % (ref 13–44)
M PNEUMO DNA SPEC QL NAA+PROBE: NOT DETECTED
MCH RBC QN AUTO: 27.8 PG (ref 26.1–32.9)
MCHC RBC AUTO-ENTMCNC: 31.3 G/DL (ref 31.4–35)
MCV RBC AUTO: 88.9 FL (ref 82–102)
MONOCYTES # BLD: 0.7 K/UL (ref 0.1–1.3)
MONOCYTES NFR BLD: 9 % (ref 4–12)
NEUTS SEG # BLD: 2.5 K/UL (ref 1.7–8.2)
NEUTS SEG NFR BLD: 34 % (ref 43–78)
NRBC # BLD: 0 K/UL (ref 0–0.2)
PLATELET # BLD AUTO: 171 K/UL (ref 150–450)
PMV BLD AUTO: 12.8 FL (ref 9.4–12.3)
POTASSIUM SERPL-SCNC: 3.8 MMOL/L (ref 3.5–5.1)
PROCALCITONIN SERPL-MCNC: <0.05 NG/ML (ref 0–0.49)
RBC # BLD AUTO: 4.5 M/UL (ref 4.23–5.6)
RSV RNA SPEC QL NAA+PROBE: NOT DETECTED
RV+EV RNA SPEC QL NAA+PROBE: NOT DETECTED
SARS-COV-2 RNA RESP QL NAA+PROBE: NOT DETECTED
SODIUM SERPL-SCNC: 138 MMOL/L (ref 133–143)
WBC # BLD AUTO: 7.3 K/UL (ref 4.3–11.1)

## 2023-11-19 PROCEDURE — 2580000003 HC RX 258

## 2023-11-19 PROCEDURE — 99285 EMERGENCY DEPT VISIT HI MDM: CPT

## 2023-11-19 PROCEDURE — 6370000000 HC RX 637 (ALT 250 FOR IP)

## 2023-11-19 PROCEDURE — 96368 THER/DIAG CONCURRENT INF: CPT

## 2023-11-19 PROCEDURE — 87040 BLOOD CULTURE FOR BACTERIA: CPT

## 2023-11-19 PROCEDURE — 80048 BASIC METABOLIC PNL TOTAL CA: CPT

## 2023-11-19 PROCEDURE — 94640 AIRWAY INHALATION TREATMENT: CPT

## 2023-11-19 PROCEDURE — 6360000002 HC RX W HCPCS

## 2023-11-19 PROCEDURE — 84145 PROCALCITONIN (PCT): CPT

## 2023-11-19 PROCEDURE — 83605 ASSAY OF LACTIC ACID: CPT

## 2023-11-19 PROCEDURE — 85025 COMPLETE CBC W/AUTO DIFF WBC: CPT

## 2023-11-19 PROCEDURE — 93005 ELECTROCARDIOGRAM TRACING: CPT

## 2023-11-19 PROCEDURE — 0202U NFCT DS 22 TRGT SARS-COV-2: CPT

## 2023-11-19 PROCEDURE — 96365 THER/PROPH/DIAG IV INF INIT: CPT

## 2023-11-19 PROCEDURE — 71046 X-RAY EXAM CHEST 2 VIEWS: CPT

## 2023-11-19 PROCEDURE — 96375 TX/PRO/DX INJ NEW DRUG ADDON: CPT

## 2023-11-19 RX ORDER — IPRATROPIUM BROMIDE AND ALBUTEROL SULFATE 2.5; .5 MG/3ML; MG/3ML
1 SOLUTION RESPIRATORY (INHALATION)
Status: COMPLETED | OUTPATIENT
Start: 2023-11-19 | End: 2023-11-19

## 2023-11-19 RX ORDER — 0.9 % SODIUM CHLORIDE 0.9 %
1000 INTRAVENOUS SOLUTION INTRAVENOUS ONCE
Status: COMPLETED | OUTPATIENT
Start: 2023-11-19 | End: 2023-11-19

## 2023-11-19 RX ADMIN — IPRATROPIUM BROMIDE AND ALBUTEROL SULFATE 1 DOSE: 2.5; .5 SOLUTION RESPIRATORY (INHALATION) at 22:42

## 2023-11-19 RX ADMIN — SODIUM CHLORIDE 1000 ML: 9 INJECTION, SOLUTION INTRAVENOUS at 22:26

## 2023-11-19 RX ADMIN — CEFTRIAXONE 1000 MG: 1 INJECTION, POWDER, FOR SOLUTION INTRAMUSCULAR; INTRAVENOUS at 22:23

## 2023-11-19 RX ADMIN — IPRATROPIUM BROMIDE AND ALBUTEROL SULFATE 1 DOSE: 2.5; .5 SOLUTION RESPIRATORY (INHALATION) at 20:48

## 2023-11-19 RX ADMIN — AZITHROMYCIN MONOHYDRATE 500 MG: 500 INJECTION, POWDER, LYOPHILIZED, FOR SOLUTION INTRAVENOUS at 22:51

## 2023-11-19 RX ADMIN — METHYLPREDNISOLONE SODIUM SUCCINATE 125 MG: 125 INJECTION, POWDER, FOR SOLUTION INTRAMUSCULAR; INTRAVENOUS at 22:20

## 2023-11-19 ASSESSMENT — PAIN - FUNCTIONAL ASSESSMENT: PAIN_FUNCTIONAL_ASSESSMENT: NONE - DENIES PAIN

## 2023-11-19 ASSESSMENT — ENCOUNTER SYMPTOMS
COUGH: 1
SHORTNESS OF BREATH: 1

## 2023-11-20 VITALS
TEMPERATURE: 97.6 F | HEIGHT: 62 IN | BODY MASS INDEX: 23.92 KG/M2 | DIASTOLIC BLOOD PRESSURE: 87 MMHG | WEIGHT: 130 LBS | OXYGEN SATURATION: 96 % | RESPIRATION RATE: 24 BRPM | HEART RATE: 62 BPM | SYSTOLIC BLOOD PRESSURE: 133 MMHG

## 2023-11-20 RX ORDER — PREDNISONE 50 MG/1
50 TABLET ORAL DAILY
Qty: 5 TABLET | Refills: 0 | Status: SHIPPED | OUTPATIENT
Start: 2023-11-20 | End: 2023-11-25

## 2023-11-20 RX ORDER — AZITHROMYCIN 250 MG/1
250 TABLET, FILM COATED ORAL SEE ADMIN INSTRUCTIONS
Qty: 6 TABLET | Refills: 0 | Status: SHIPPED | OUTPATIENT
Start: 2023-11-20 | End: 2023-11-25

## 2023-11-20 RX ORDER — AMOXICILLIN AND CLAVULANATE POTASSIUM 875; 125 MG/1; MG/1
1 TABLET, FILM COATED ORAL 2 TIMES DAILY
Qty: 14 TABLET | Refills: 0 | Status: SHIPPED | OUTPATIENT
Start: 2023-11-20 | End: 2023-11-27

## 2023-11-20 NOTE — ED TRIAGE NOTES
Pt ambulatory to ER with c/o of SOB x1 day. Reports feeling congested did nebulizer treatment at home earlier today. Uses an inhaler daily. Does wear O2 2L as needed. Denies any CP.

## 2023-11-20 NOTE — ED PROVIDER NOTES
Emergency Department Provider Note       PCP: Huma Alba DO   Age: 66 y.o. Sex: male     DISPOSITION Decision To Discharge 11/20/2023 12:14:46 AM       ICD-10-CM    1. COPD exacerbation ([address])  J44.1       2. Acute cough  R05.1           Medical Decision Making     Complexity of Problems Addressed:  1 or more acute illnesses that pose a threat to life or bodily function. Data Reviewed and Analyzed:   I independently ordered and reviewed each unique test.  I reviewed external records: ED visit note from an outside group. I reviewed external records: provider visit note from PCP. I reviewed external records: provider visit note from outside specialist.       I independently ordered and interpreted the ED EKG in the absence of a Cardiologist.    Rate: 61  EKG Interpretation: EKG Interpretation: sinus rhythm, no evidence of arrhythmia  ST Segments: Normal ST segments - NO STEMI      I interpreted the X-rays left basilar atelectasis. Discussion of management or test interpretation. See ED course below      Risk of Complications and/or Morbidity of Patient Management:  Prescription drug management performed. Patient was discharged risks and benefits of hospitalization were considered. Shared medical decision making was utilized in creating the patients health plan today. ED Course as of 11/20/23 0220   Eulalio Barnhart Nov 19, 2023   216 77-year-old male with history of CLL and COPD presents for cough shortness of breath with wheezing. Attempted DuoNeb at home. Patient is wheezing bilaterally. Will obtain broad-based work-up as patient was recently admitted for a pneumonia. Currently is not hypoxic on room air. [CJ]   2122 CXR:  Impression:  No evidence of an acute pleural or pulmonary parenchymal abnormality. Left   basilar atelectasis. [CJ]   2123 Chest x-ray shows left basilar atelectasis, will add on antibiotics to cover for possible pneumonia [CJ]   2229 Patient now receiving IV steroids.   States he is

## 2023-11-20 NOTE — ED NOTES
Assumed care of pt at this time. Pt c/o SOB at this time. Pt presents w/ congested cough at this time. Pt A&O 4/4. GCS 15. Speech clear. Pt resting quietly in bed, attached to bedside monitor.       Jayna Acuña RN  11/19/23 3822

## 2023-11-20 NOTE — ED NOTES
Report given to [facility], RN.       Sakshi Llanos RN  11/19/23 6469

## 2023-11-20 NOTE — ED NOTES
Prior to ambulation patient remained 91% on RA. Upon ambulation patient remained 90% on RA. Patient denied any SHOB at the time. Provider made aware.       Audelia Molina RN  11/20/23 2702

## 2023-11-20 NOTE — DISCHARGE INSTRUCTIONS
Start antibiotics and steroids. Use nebulizers frequently at home. Return for any worsening symptoms or concerns.

## 2023-11-24 LAB
BACTERIA SPEC CULT: NORMAL
SERVICE CMNT-IMP: NORMAL

## 2023-12-29 ENCOUNTER — TELEPHONE (OUTPATIENT)
Dept: ONCOLOGY | Age: 78
End: 2023-12-29

## 2023-12-29 NOTE — TELEPHONE ENCOUNTER
Physician provider: hKoa Mcelroy MD  Reason for today's call: Verify medication   Last office visit:N/A    Patient notified that their information will be routed to the [facility] clinical triage team for review. Patient is advised that they will receive a phone call from the triage department. If symptoms worsen before receiving a call back, the patient has been advised to proceed to the nearest ED. Received call from Cece Mckeon with [facility] @[phone] Option 2 Ext [phone]. Stephie request a call back to  Clarify if Pt supposed to be on Medication  Imbruvica 420 mg .

## 2023-12-29 NOTE — TELEPHONE ENCOUNTER
Returned call. Informed, per dictation, Dr. Mickey Mejias @ [facility] is managing. Verbalized understanding.

## 2024-01-02 ENCOUNTER — TELEPHONE (OUTPATIENT)
Dept: ONCOLOGY | Age: 79
End: 2024-01-02

## 2024-01-02 NOTE — TELEPHONE ENCOUNTER
Physician provider: Rhiannon Sr MD  Reason for today's call: [facility] referred [facility]  Last office visit: n/a    Stephanie Jeanne w[facility] called stating Caitlyn Do NP referred Pt to Dr Sr due to Pt's location and convenience for care in treatment. She noted that [facility] and wanted to have Pt continue care with Dr Sr. Stephanie can be reached at: [phone] ext: [phone].

## 2024-01-18 ENCOUNTER — TELEPHONE (OUTPATIENT)
Dept: RADIATION ONCOLOGY | Age: 79
End: 2024-01-18

## 2024-01-18 NOTE — TELEPHONE ENCOUNTER
Returned call.  Left message with son that prescription will be filled @ scheduled OV on 1/22/24.  Verbalized understanding.

## 2024-01-22 ENCOUNTER — OFFICE VISIT (OUTPATIENT)
Dept: ONCOLOGY | Age: 79
End: 2024-01-22
Payer: OTHER GOVERNMENT

## 2024-01-22 ENCOUNTER — HOSPITAL ENCOUNTER (OUTPATIENT)
Dept: LAB | Age: 79
Discharge: HOME | End: 2024-01-25
Payer: OTHER GOVERNMENT

## 2024-01-22 VITALS
OXYGEN SATURATION: 99 % | SYSTOLIC BLOOD PRESSURE: 140 MMHG | DIASTOLIC BLOOD PRESSURE: 68 MMHG | TEMPERATURE: 97.8 F | HEIGHT: 62 IN | WEIGHT: 132 LBS | HEART RATE: 52 BPM | RESPIRATION RATE: 16 BRPM | BODY MASS INDEX: 24.29 KG/M2

## 2024-01-22 DIAGNOSIS — Z79.899 HIGH RISK MEDICATION USE: ICD-10-CM

## 2024-01-22 DIAGNOSIS — I48.91 ATRIAL FIBRILLATION, UNSPECIFIED TYPE (HCC): ICD-10-CM

## 2024-01-22 DIAGNOSIS — R23.3 EASY BRUISING: ICD-10-CM

## 2024-01-22 DIAGNOSIS — Z86.73 HISTORY OF CVA (CEREBROVASCULAR ACCIDENT): ICD-10-CM

## 2024-01-22 DIAGNOSIS — C91.10 CHRONIC LYMPHOCYTIC LEUKEMIA OF B-CELL TYPE NOT HAVING ACHIEVED REMISSION (HCC): ICD-10-CM

## 2024-01-22 DIAGNOSIS — Z51.11 ENCOUNTER FOR ANTINEOPLASTIC CHEMOTHERAPY: ICD-10-CM

## 2024-01-22 DIAGNOSIS — C91.10 CLL (CHRONIC LYMPHOCYTIC LEUKEMIA) (HCC): Primary | ICD-10-CM

## 2024-01-22 LAB
ALBUMIN SERPL-MCNC: 3.6 G/DL (ref 3.2–4.6)
ALBUMIN/GLOB SERPL: 1.3 (ref 0.4–1.6)
ALP SERPL-CCNC: 80 U/L (ref 50–136)
ALT SERPL-CCNC: 19 U/L (ref 12–65)
ANION GAP SERPL CALC-SCNC: 3 MMOL/L (ref 2–11)
AST SERPL-CCNC: 21 U/L (ref 15–37)
BASOPHILS # BLD: 0 K/UL (ref 0–0.2)
BASOPHILS NFR BLD: 1 % (ref 0–2)
BILIRUB SERPL-MCNC: 0.6 MG/DL (ref 0.2–1.1)
BUN SERPL-MCNC: 18 MG/DL (ref 8–23)
CALCIUM SERPL-MCNC: 9 MG/DL (ref 8.3–10.4)
CHLORIDE SERPL-SCNC: 107 MMOL/L (ref 103–113)
CO2 SERPL-SCNC: 29 MMOL/L (ref 21–32)
CREAT SERPL-MCNC: 0.7 MG/DL (ref 0.8–1.5)
DIFFERENTIAL METHOD BLD: ABNORMAL
EOSINOPHIL # BLD: 0.2 K/UL (ref 0–0.8)
EOSINOPHIL NFR BLD: 2 % (ref 0.5–7.8)
ERYTHROCYTE [DISTWIDTH] IN BLOOD BY AUTOMATED COUNT: 16.2 % (ref 11.9–14.6)
GLOBULIN SER CALC-MCNC: 2.7 G/DL (ref 2.8–4.5)
GLUCOSE SERPL-MCNC: 97 MG/DL (ref 65–100)
HCT VFR BLD AUTO: 40.5 % (ref 41.1–50.3)
HGB BLD-MCNC: 12.5 G/DL (ref 13.6–17.2)
IMM GRANULOCYTES # BLD AUTO: 0 K/UL (ref 0–0.5)
IMM GRANULOCYTES NFR BLD AUTO: 0 % (ref 0–5)
LDH SERPL L TO P-CCNC: 170 U/L (ref 110–210)
LYMPHOCYTES # BLD: 4 K/UL (ref 0.5–4.6)
LYMPHOCYTES NFR BLD: 49 % (ref 13–44)
MCH RBC QN AUTO: 28.3 PG (ref 26.1–32.9)
MCHC RBC AUTO-ENTMCNC: 30.9 G/DL (ref 31.4–35)
MCV RBC AUTO: 91.8 FL (ref 82–102)
MONOCYTES # BLD: 0.8 K/UL (ref 0.1–1.3)
MONOCYTES NFR BLD: 10 % (ref 4–12)
NEUTS SEG # BLD: 3.1 K/UL (ref 1.7–8.2)
NEUTS SEG NFR BLD: 38 % (ref 43–78)
NRBC # BLD: 0 K/UL (ref 0–0.2)
PLATELET # BLD AUTO: 140 K/UL (ref 150–450)
PMV BLD AUTO: 13.3 FL (ref 9.4–12.3)
POTASSIUM SERPL-SCNC: 4.6 MMOL/L (ref 3.5–5.1)
PROT SERPL-MCNC: 6.3 G/DL (ref 6.3–8.2)
RBC # BLD AUTO: 4.41 M/UL (ref 4.23–5.6)
SODIUM SERPL-SCNC: 139 MMOL/L (ref 136–146)
URATE SERPL-MCNC: 5.6 MG/DL (ref 2.6–6)
WBC # BLD AUTO: 8.1 K/UL (ref 4.3–11.1)

## 2024-01-22 PROCEDURE — 83615 LACTATE (LD) (LDH) ENZYME: CPT

## 2024-01-22 PROCEDURE — 36415 COLL VENOUS BLD VENIPUNCTURE: CPT

## 2024-01-22 PROCEDURE — 84550 ASSAY OF BLOOD/URIC ACID: CPT

## 2024-01-22 PROCEDURE — 1123F ACP DISCUSS/DSCN MKR DOCD: CPT | Performed by: INTERNAL MEDICINE

## 2024-01-22 PROCEDURE — 99215 OFFICE O/P EST HI 40 MIN: CPT | Performed by: INTERNAL MEDICINE

## 2024-01-22 PROCEDURE — 85025 COMPLETE CBC W/AUTO DIFF WBC: CPT

## 2024-01-22 PROCEDURE — 80053 COMPREHEN METABOLIC PANEL: CPT

## 2024-01-22 RX ORDER — IBRUTINIB 420 MG/1
420 TABLET, FILM COATED ORAL DAILY
Qty: 30 TABLET | Refills: 3 | Status: ACTIVE | OUTPATIENT
Start: 2024-01-22

## 2024-01-22 NOTE — PATIENT INSTRUCTIONS
Patient Instructions from Today's Visit    Reason for Visit:  Follow up    Diagnosis Information:  https://www.cancer.net/about-us/asco-answers-patient-education-materials/wdqf-tgheezu-yvhy-sheets      Plan:    Hold Eliquis and let your cardiologist know since you are already taking baby Aspirin and Ibrutinib. This put you at a higher risk for bleeding  We will start seeing you every month.   Continue taking Baby Aspirin and Ibrutinib.     Oral Chemotherapy Adherence:     Current Regimen:  Drug Name: Ibrutinib  Dose: 420mg  Frequency: daily    Barriers to care identified including (financial, physical, psychosocial) : No    Missed doses reported: No    Patient verbalizes understanding of what to do in the event of a missed dose: Yes    Adverse reactions/toxicities reported:None, See Review of Systems         Follow Up:  2 weeks    Recent Lab Results:  N/a    Treatment Summary has been discussed and given to patient: n/a        -------------------------------------------------------------------------------------------------------------------  Please call our office at [phone] if you have any  of the following symptoms:   Fever of 100.5 or greater  Chills  Shortness of breath  Swelling or pain in one leg    After office hours an answering service is available and will contact a provider for emergencies or if you are experiencing any of the above symptoms.    Patient does not express an interest in My Chart.  My Chart log in information explained on the after visit summary printout at the check-out desk.    OBDULIO Gray RN

## 2024-01-25 ENCOUNTER — TELEPHONE (OUTPATIENT)
Dept: ONCOLOGY | Age: 79
End: 2024-01-25

## 2024-01-25 NOTE — TELEPHONE ENCOUNTER
Per Dr. Walters from cardiology \"I would prefer to hold his aspirin as opposed to the Eliquis because of his A-fib.\" Notified patient's son that patient will need to stay on Eliquis as prescribed and stop baby Aspirin. Patient's son verbalized understanding.

## 2024-02-12 ENCOUNTER — OFFICE VISIT (OUTPATIENT)
Dept: ONCOLOGY | Age: 79
End: 2024-02-12
Payer: OTHER GOVERNMENT

## 2024-02-12 ENCOUNTER — HOSPITAL ENCOUNTER (OUTPATIENT)
Dept: LAB | Age: 79
Discharge: HOME | End: 2024-02-15
Payer: OTHER GOVERNMENT

## 2024-02-12 VITALS
HEIGHT: 62 IN | SYSTOLIC BLOOD PRESSURE: 134 MMHG | OXYGEN SATURATION: 97 % | RESPIRATION RATE: 16 BRPM | WEIGHT: 136 LBS | HEART RATE: 45 BPM | TEMPERATURE: 98.4 F | BODY MASS INDEX: 25.03 KG/M2 | DIASTOLIC BLOOD PRESSURE: 66 MMHG

## 2024-02-12 DIAGNOSIS — I48.91 ATRIAL FIBRILLATION, UNSPECIFIED TYPE (HCC): ICD-10-CM

## 2024-02-12 DIAGNOSIS — Z51.11 ENCOUNTER FOR ANTINEOPLASTIC CHEMOTHERAPY: ICD-10-CM

## 2024-02-12 DIAGNOSIS — Z79.899 HIGH RISK MEDICATION USE: ICD-10-CM

## 2024-02-12 DIAGNOSIS — R23.3 EASY BRUISING: ICD-10-CM

## 2024-02-12 DIAGNOSIS — C91.10 CLL (CHRONIC LYMPHOCYTIC LEUKEMIA) (HCC): ICD-10-CM

## 2024-02-12 DIAGNOSIS — Z86.73 HISTORY OF CVA (CEREBROVASCULAR ACCIDENT): ICD-10-CM

## 2024-02-12 DIAGNOSIS — C91.10 CLL (CHRONIC LYMPHOCYTIC LEUKEMIA) (HCC): Primary | ICD-10-CM

## 2024-02-12 LAB
ALBUMIN SERPL-MCNC: 3.5 G/DL (ref 3.2–4.6)
ALBUMIN/GLOB SERPL: 1.4 (ref 0.4–1.6)
ALP SERPL-CCNC: 70 U/L (ref 50–136)
ALT SERPL-CCNC: 18 U/L (ref 12–65)
ANION GAP SERPL CALC-SCNC: 7 MMOL/L (ref 2–11)
AST SERPL-CCNC: 18 U/L (ref 15–37)
BASOPHILS # BLD: 0 K/UL (ref 0–0.2)
BASOPHILS NFR BLD: 1 % (ref 0–2)
BILIRUB SERPL-MCNC: 0.6 MG/DL (ref 0.2–1.1)
BUN SERPL-MCNC: 21 MG/DL (ref 8–23)
CALCIUM SERPL-MCNC: 8.9 MG/DL (ref 8.3–10.4)
CHLORIDE SERPL-SCNC: 106 MMOL/L (ref 103–113)
CO2 SERPL-SCNC: 27 MMOL/L (ref 21–32)
CREAT SERPL-MCNC: 0.7 MG/DL (ref 0.8–1.5)
DIFFERENTIAL METHOD BLD: ABNORMAL
EOSINOPHIL # BLD: 0.2 K/UL (ref 0–0.8)
EOSINOPHIL NFR BLD: 3 % (ref 0.5–7.8)
ERYTHROCYTE [DISTWIDTH] IN BLOOD BY AUTOMATED COUNT: 16 % (ref 11.9–14.6)
GLOBULIN SER CALC-MCNC: 2.5 G/DL (ref 2.8–4.5)
GLUCOSE SERPL-MCNC: 96 MG/DL (ref 65–100)
HCT VFR BLD AUTO: 40.9 % (ref 41.1–50.3)
HGB BLD-MCNC: 12.7 G/DL (ref 13.6–17.2)
IMM GRANULOCYTES # BLD AUTO: 0 K/UL (ref 0–0.5)
IMM GRANULOCYTES NFR BLD AUTO: 0 % (ref 0–5)
LYMPHOCYTES # BLD: 3.6 K/UL (ref 0.5–4.6)
LYMPHOCYTES NFR BLD: 52 % (ref 13–44)
MCH RBC QN AUTO: 28.3 PG (ref 26.1–32.9)
MCHC RBC AUTO-ENTMCNC: 31.1 G/DL (ref 31.4–35)
MCV RBC AUTO: 91.3 FL (ref 82–102)
MONOCYTES # BLD: 0.7 K/UL (ref 0.1–1.3)
MONOCYTES NFR BLD: 10 % (ref 4–12)
NEUTS SEG # BLD: 2.3 K/UL (ref 1.7–8.2)
NEUTS SEG NFR BLD: 34 % (ref 43–78)
NRBC # BLD: 0 K/UL (ref 0–0.2)
PLATELET # BLD AUTO: 143 K/UL (ref 150–450)
PMV BLD AUTO: 12.6 FL (ref 9.4–12.3)
POTASSIUM SERPL-SCNC: 4.2 MMOL/L (ref 3.5–5.1)
RBC # BLD AUTO: 4.48 M/UL (ref 4.23–5.6)
SODIUM SERPL-SCNC: 140 MMOL/L (ref 136–146)
WBC # BLD AUTO: 6.7 K/UL (ref 4.3–11.1)

## 2024-02-12 PROCEDURE — 36415 COLL VENOUS BLD VENIPUNCTURE: CPT

## 2024-02-12 PROCEDURE — 80053 COMPREHEN METABOLIC PANEL: CPT

## 2024-02-12 PROCEDURE — 85025 COMPLETE CBC W/AUTO DIFF WBC: CPT

## 2024-02-12 PROCEDURE — 99215 OFFICE O/P EST HI 40 MIN: CPT | Performed by: INTERNAL MEDICINE

## 2024-02-12 PROCEDURE — 1123F ACP DISCUSS/DSCN MKR DOCD: CPT | Performed by: INTERNAL MEDICINE

## 2024-02-12 RX ORDER — TRAMADOL HYDROCHLORIDE 50 MG/1
1 TABLET ORAL DAILY PRN
COMMUNITY

## 2024-02-12 RX ORDER — SERTRALINE HYDROCHLORIDE 100 MG/1
1 TABLET, FILM COATED ORAL
COMMUNITY

## 2024-02-12 NOTE — PROGRESS NOTES
Oral Chemotherapy Adherence:     Current Regimen:  Drug Name: ibrutinib  Dose: 420 mg   Frequency: daily    Barriers to care identified including (financial, physical, psychosocial) : No    Missed doses reported: No    Patient verbalizes understanding of what to do in the event of a missed dose: Yes    Adverse reactions/toxicities reported:None, See Review of Systems

## 2024-02-12 NOTE — PATIENT INSTRUCTIONS
Patient Instructions from Today's Visit    Reason for Visit:  Follow up    Plan:  Continue imbruvica 420 mg daily.    Follow Up:  Follow up in one month    Recent Lab Results:  Hospital Outpatient Visit on 02/12/2024   Component Date Value Ref Range Status    Sodium 02/12/2024 140  136 - 146 mmol/L Final    Potassium 02/12/2024 4.2  3.5 - 5.1 mmol/L Final    Chloride 02/12/2024 106  103 - 113 mmol/L Final    CO2 02/12/2024 27  21 - 32 mmol/L Final    Anion Gap 02/12/2024 7  2 - 11 mmol/L Final    Glucose 02/12/2024 96  65 - 100 mg/dL Final    BUN 02/12/2024 21  8 - 23 MG/DL Final    Creatinine 02/12/2024 0.70 (L)  0.8 - 1.5 MG/DL Final    Est, Glom Filt Rate 02/12/2024 >60  >60 ml/min/1.73m2 Final    Comment:    Pediatric calculator link: https://www.kidney.org/professionals/kdoqi/gfr_calculatorped     These results are not intended for use in patients <18 years of age.     eGFR results are calculated without a race factor using  the 2021 CKD-EPI equation. Careful clinical correlation is recommended, particularly when comparing to results calculated using previous equations.  The CKD-EPI equation is less accurate in patients with extremes of muscle mass, extra-renal metabolism of creatinine, excessive creatine ingestion, or following therapy that affects renal tubular secretion.      Calcium 02/12/2024 8.9  8.3 - 10.4 MG/DL Final    Total Bilirubin 02/12/2024 0.6  0.2 - 1.1 MG/DL Final    ALT 02/12/2024 18  12 - 65 U/L Final    AST 02/12/2024 18  15 - 37 U/L Final    Alk Phosphatase 02/12/2024 70  50 - 136 U/L Final    Total Protein 02/12/2024 6.0 (L)  6.3 - 8.2 g/dL Final    Albumin 02/12/2024 3.5  3.2 - 4.6 g/dL Final    Globulin 02/12/2024 2.5 (L)  2.8 - 4.5 g/dL Final    Albumin/Globulin Ratio 02/12/2024 1.4  0.4 - 1.6   Final    WBC 02/12/2024 6.7  4.3 - 11.1 K/uL Final    RBC 02/12/2024 4.48  4.23 - 5.6 M/uL Final    Hemoglobin 02/12/2024 12.7 (L)  13.6 - 17.2 g/dL Final    Hematocrit 02/12/2024 40.9 (L)

## 2024-02-12 NOTE — PROGRESS NOTES
[facility]: Office Visit Progress Note    Chief Complaint:    CLL    History of Present Illness:  78 y.o. male past medical history of COPD, hyperlipidemia, hypertension and CLL [facility] who presented to ER on 8/6/2023 with coughing and excessive mucus, chest x-ray showed left lower lobe atelectasis with small effusion present, admitted with pneumonia of left lower lobe and started azithromycin, ceftriaxone, oxygen, consulted oncology due to concern of ibrutinib causes immunosuppression given current pneumonia.        Review of Systems:  Constitutional Denies fever, chills, weight loss, appetite changes, fatigue, night sweats.   HEENT Denies trauma, blurry vision, hearing loss, ear pain, nosebleeds, sore throat, neck pain and ear discharge.    Skin Denies lesions or rashes.   Lungs dyspnea, cough, sputum production.  Denies hemoptysis.   Cardiovascular Denies chest pain, palpitations, or lower extremity edema.   Gastrointestinal Denies nausea, vomiting, changes in bowel habits, bloody or black stools, abdominal pain.    Denies dysuria, frequency or hesitancy of urination.   Neuro Denies headaches, visual changes or ataxia. Denies dizziness, tingling, tremors, sensory change, speech change, focal weakness or headaches.      Hematology History of CLL.  Skin bruising.  Denies bleeding, denies gingival bleeding or epistaxis.   Endo Denies heat/cold intolerance, denies diabetes or thyroid abnormalities.   MSK Denies back pain, arthralgias, myalgias or frequent falls.      Psychiatric/Behavioral Denies depression and substance abuse. The patient is not nervous/anxious.           Allergies   Allergen Reactions    Ranitidine Hcl Hives     Past Medical History:   Diagnosis Date    Acute encephalopathy 6/8/2023    Hyperlipidemia     Hypertension      Past Surgical History:   Procedure Laterality Date    UPPER GASTROINTESTINAL ENDOSCOPY N/A 8/15/2023    EGD BIOPSY performed

## 2024-02-26 ENCOUNTER — OFFICE VISIT (OUTPATIENT)
Age: 79
End: 2024-02-26
Payer: OTHER GOVERNMENT

## 2024-02-26 VITALS
DIASTOLIC BLOOD PRESSURE: 70 MMHG | SYSTOLIC BLOOD PRESSURE: 124 MMHG | BODY MASS INDEX: 25.62 KG/M2 | HEIGHT: 62 IN | HEART RATE: 72 BPM | WEIGHT: 139.2 LBS

## 2024-02-26 DIAGNOSIS — I63.50 CEREBROVASCULAR ACCIDENT (CVA) DUE TO OCCLUSION OF CEREBRAL ARTERY (HCC): ICD-10-CM

## 2024-02-26 DIAGNOSIS — I48.0 PAF (PAROXYSMAL ATRIAL FIBRILLATION) (HCC): Primary | ICD-10-CM

## 2024-02-26 PROCEDURE — 99214 OFFICE O/P EST MOD 30 MIN: CPT | Performed by: INTERNAL MEDICINE

## 2024-02-26 PROCEDURE — 1123F ACP DISCUSS/DSCN MKR DOCD: CPT | Performed by: INTERNAL MEDICINE

## 2024-02-26 RX ORDER — NAPROXEN 500 MG/1
500 TABLET ORAL 2 TIMES DAILY WITH MEALS
COMMUNITY
Start: 2024-02-22 | End: 2025-02-21

## 2024-02-26 RX ORDER — AMLODIPINE BESYLATE 5 MG/1
5 TABLET ORAL
COMMUNITY
Start: 2024-01-30 | End: 2024-02-29

## 2024-02-26 RX ORDER — CYCLOBENZAPRINE HCL 10 MG
10 TABLET ORAL 2 TIMES DAILY PRN
COMMUNITY
Start: 2024-02-22 | End: 2024-03-03

## 2024-02-26 ASSESSMENT — ENCOUNTER SYMPTOMS
ABDOMINAL PAIN: 0
SHORTNESS OF BREATH: 0

## 2024-02-26 NOTE — PROGRESS NOTES
m (5' 2\")   Wt 63.1 kg (139 lb 3.2 oz)   BMI 25.46 kg/m²      Wt Readings from Last 3 Encounters:   02/26/24 63.1 kg (139 lb 3.2 oz)   02/12/24 61.7 kg (136 lb)   01/22/24 59.9 kg (132 lb)     BP Readings from Last 3 Encounters:   02/26/24 124/70   02/12/24 134/66   01/22/24 (!) 140/68     Pulse Readings from Last 3 Encounters:   02/26/24 72   02/12/24 (!) 45   01/22/24 52           Physical Exam  Vitals reviewed.   Constitutional:       General: He is not in acute distress.     Appearance: Normal appearance.   HENT:      Head: Normocephalic and atraumatic.   Eyes:      General: No scleral icterus.  Neck:      Vascular: No carotid bruit.   Cardiovascular:      Rate and Rhythm: Normal rate and regular rhythm.      Heart sounds: No murmur heard.  Pulmonary:      Breath sounds: Normal breath sounds.   Abdominal:      General: Abdomen is flat.      Palpations: Abdomen is soft.   Musculoskeletal:         General: No swelling.      Cervical back: Neck supple.   Skin:     General: Skin is warm and dry.   Neurological:      Mental Status: He is alert and oriented to person, place, and time.   Psychiatric:         Mood and Affect: Mood normal.         Medical problems and test results were reviewed with the patient today.     DATA REVIEW    LIPID PANEL     Lab Results   Component Value Date    CHOL 82 06/13/2023     Lab Results   Component Value Date    TRIG 61 06/13/2023     Lab Results   Component Value Date    HDL 36 (L) 06/13/2023     Lab Results   Component Value Date    LDLCALC 33.8 06/13/2023     No results found for: \"VLDL\"  Lab Results   Component Value Date    CHOLHDLRATIO 2.3 06/13/2023       BMP  Lab Results   Component Value Date/Time     02/12/2024 09:26 AM    K 4.2 02/12/2024 09:26 AM     02/12/2024 09:26 AM    CO2 27 02/12/2024 09:26 AM    BUN 21 02/12/2024 09:26 AM    CREATININE 0.70 02/12/2024 09:26 AM    GLUCOSE 96 02/12/2024 09:26 AM    CALCIUM 8.9 02/12/2024 09:26 AM

## 2024-03-07 DIAGNOSIS — C91.10 CLL (CHRONIC LYMPHOCYTIC LEUKEMIA) (HCC): Primary | ICD-10-CM

## 2024-03-11 ENCOUNTER — HOSPITAL ENCOUNTER (OUTPATIENT)
Dept: LAB | Age: 79
Discharge: HOME | End: 2024-03-14
Payer: OTHER GOVERNMENT

## 2024-03-11 ENCOUNTER — OFFICE VISIT (OUTPATIENT)
Dept: ONCOLOGY | Age: 79
End: 2024-03-11
Payer: OTHER GOVERNMENT

## 2024-03-11 VITALS
BODY MASS INDEX: 24.55 KG/M2 | WEIGHT: 134.2 LBS | HEART RATE: 65 BPM | SYSTOLIC BLOOD PRESSURE: 147 MMHG | RESPIRATION RATE: 16 BRPM | TEMPERATURE: 97.9 F | DIASTOLIC BLOOD PRESSURE: 70 MMHG | OXYGEN SATURATION: 97 %

## 2024-03-11 DIAGNOSIS — C91.10 CLL (CHRONIC LYMPHOCYTIC LEUKEMIA) (HCC): ICD-10-CM

## 2024-03-11 DIAGNOSIS — C91.10 CLL (CHRONIC LYMPHOCYTIC LEUKEMIA) (HCC): Primary | ICD-10-CM

## 2024-03-11 LAB
ALBUMIN SERPL-MCNC: 3.4 G/DL (ref 3.2–4.6)
ALBUMIN/GLOB SERPL: 1.2 (ref 0.4–1.6)
ALP SERPL-CCNC: 89 U/L (ref 50–136)
ALT SERPL-CCNC: 26 U/L (ref 12–65)
ANION GAP SERPL CALC-SCNC: 6 MMOL/L (ref 2–11)
AST SERPL-CCNC: 22 U/L (ref 15–37)
BASOPHILS # BLD: 0 K/UL (ref 0–0.2)
BASOPHILS NFR BLD: 0 % (ref 0–2)
BILIRUB SERPL-MCNC: 0.7 MG/DL (ref 0.2–1.1)
BUN SERPL-MCNC: 23 MG/DL (ref 8–23)
CALCIUM SERPL-MCNC: 9 MG/DL (ref 8.3–10.4)
CHLORIDE SERPL-SCNC: 102 MMOL/L (ref 103–113)
CO2 SERPL-SCNC: 30 MMOL/L (ref 21–32)
CREAT SERPL-MCNC: 0.8 MG/DL (ref 0.8–1.5)
DIFFERENTIAL METHOD BLD: ABNORMAL
EOSINOPHIL # BLD: 0.1 K/UL (ref 0–0.8)
EOSINOPHIL NFR BLD: 1 % (ref 0.5–7.8)
ERYTHROCYTE [DISTWIDTH] IN BLOOD BY AUTOMATED COUNT: 15.7 % (ref 11.9–14.6)
GLOBULIN SER CALC-MCNC: 2.8 G/DL (ref 2.8–4.5)
GLUCOSE SERPL-MCNC: 120 MG/DL (ref 65–100)
HCT VFR BLD AUTO: 39.3 % (ref 41.1–50.3)
HGB BLD-MCNC: 12.7 G/DL (ref 13.6–17.2)
IMM GRANULOCYTES # BLD AUTO: 0.1 K/UL (ref 0–0.5)
IMM GRANULOCYTES NFR BLD AUTO: 0 % (ref 0–5)
LYMPHOCYTES # BLD: 4.3 K/UL (ref 0.5–4.6)
LYMPHOCYTES NFR BLD: 36 % (ref 13–44)
MCH RBC QN AUTO: 29.3 PG (ref 26.1–32.9)
MCHC RBC AUTO-ENTMCNC: 32.3 G/DL (ref 31.4–35)
MCV RBC AUTO: 90.8 FL (ref 82–102)
MONOCYTES # BLD: 0.8 K/UL (ref 0.1–1.3)
MONOCYTES NFR BLD: 6 % (ref 4–12)
NEUTS SEG # BLD: 6.8 K/UL (ref 1.7–8.2)
NEUTS SEG NFR BLD: 56 % (ref 43–78)
NRBC # BLD: 0 K/UL (ref 0–0.2)
PLATELET # BLD AUTO: 225 K/UL (ref 150–450)
PMV BLD AUTO: 11.3 FL (ref 9.4–12.3)
POTASSIUM SERPL-SCNC: 4.2 MMOL/L (ref 3.5–5.1)
PROT SERPL-MCNC: 6.2 G/DL (ref 6.3–8.2)
RBC # BLD AUTO: 4.33 M/UL (ref 4.23–5.6)
SODIUM SERPL-SCNC: 138 MMOL/L (ref 136–146)
WBC # BLD AUTO: 12 K/UL (ref 4.3–11.1)

## 2024-03-11 PROCEDURE — 99214 OFFICE O/P EST MOD 30 MIN: CPT | Performed by: NURSE PRACTITIONER

## 2024-03-11 PROCEDURE — 36415 COLL VENOUS BLD VENIPUNCTURE: CPT

## 2024-03-11 PROCEDURE — 80053 COMPREHEN METABOLIC PANEL: CPT

## 2024-03-11 PROCEDURE — 1123F ACP DISCUSS/DSCN MKR DOCD: CPT | Performed by: NURSE PRACTITIONER

## 2024-03-11 PROCEDURE — 85025 COMPLETE CBC W/AUTO DIFF WBC: CPT

## 2024-03-11 ASSESSMENT — ANXIETY QUESTIONNAIRES
GAD7 TOTAL SCORE: 9
IF YOU CHECKED OFF ANY PROBLEMS ON THIS QUESTIONNAIRE, HOW DIFFICULT HAVE THESE PROBLEMS MADE IT FOR YOU TO DO YOUR WORK, TAKE CARE OF THINGS AT HOME, OR GET ALONG WITH OTHER PEOPLE: SOMEWHAT DIFFICULT
1. FEELING NERVOUS, ANXIOUS, OR ON EDGE: 3
7. FEELING AFRAID AS IF SOMETHING AWFUL MIGHT HAPPEN: 0
5. BEING SO RESTLESS THAT IT IS HARD TO SIT STILL: 0
6. BECOMING EASILY ANNOYED OR IRRITABLE: 0
2. NOT BEING ABLE TO STOP OR CONTROL WORRYING: 3
4. TROUBLE RELAXING: 0
3. WORRYING TOO MUCH ABOUT DIFFERENT THINGS: 3

## 2024-03-11 ASSESSMENT — PATIENT HEALTH QUESTIONNAIRE - PHQ9
SUM OF ALL RESPONSES TO PHQ QUESTIONS 1-9: 4
6. FEELING BAD ABOUT YOURSELF - OR THAT YOU ARE A FAILURE OR HAVE LET YOURSELF OR YOUR FAMILY DOWN: 0
5. POOR APPETITE OR OVEREATING: 0
4. FEELING TIRED OR HAVING LITTLE ENERGY: 2
2. FEELING DOWN, DEPRESSED OR HOPELESS: 0
SUM OF ALL RESPONSES TO PHQ QUESTIONS 1-9: 4
7. TROUBLE CONCENTRATING ON THINGS, SUCH AS READING THE NEWSPAPER OR WATCHING TELEVISION: 0
8. MOVING OR SPEAKING SO SLOWLY THAT OTHER PEOPLE COULD HAVE NOTICED. OR THE OPPOSITE, BEING SO FIGETY OR RESTLESS THAT YOU HAVE BEEN MOVING AROUND A LOT MORE THAN USUAL: 0
1. LITTLE INTEREST OR PLEASURE IN DOING THINGS: 0
SUM OF ALL RESPONSES TO PHQ QUESTIONS 1-9: 4
3. TROUBLE FALLING OR STAYING ASLEEP: 2
9. THOUGHTS THAT YOU WOULD BE BETTER OFF DEAD, OR OF HURTING YOURSELF: 0
SUM OF ALL RESPONSES TO PHQ9 QUESTIONS 1 & 2: 0
SUM OF ALL RESPONSES TO PHQ QUESTIONS 1-9: 4

## 2024-03-11 NOTE — PROGRESS NOTES
[facility]: Office Visit Progress Note    Chief Complaint:    CLL    History of Present Illness:  78 y.o. male past medical history of COPD, hyperlipidemia, hypertension and CLL [facility] who presented to ER on 8/6/2023 with coughing and excessive mucus, chest x-ray showed left lower lobe atelectasis with small effusion present, admitted with pneumonia of left lower lobe and started azithromycin, ceftriaxone, oxygen, consulted oncology due to concern of ibrutinib causes immunosuppression given current pneumonia.        Review of Systems:  Constitutional Denies fever, chills, weight loss, appetite changes, fatigue, night sweats.   HEENT Denies trauma, blurry vision, hearing loss, ear pain, nosebleeds, sore throat, neck pain and ear discharge.    Skin Denies lesions or rashes.   Lungs dyspnea, cough, sputum production.  Denies hemoptysis.   Cardiovascular Denies chest pain, palpitations, or lower extremity edema.   Gastrointestinal Denies nausea, vomiting, changes in bowel habits, bloody or black stools, abdominal pain.    Denies dysuria, frequency or hesitancy of urination.   Neuro Denies headaches, visual changes or ataxia. Denies dizziness, tingling, tremors, sensory change, speech change, focal weakness or headaches.      Hematology History of CLL.  Skin bruising.  Denies bleeding, denies gingival bleeding or epistaxis.   Endo Denies heat/cold intolerance, denies diabetes or thyroid abnormalities.   MSK Denies back pain, arthralgias, myalgias or frequent falls.      Psychiatric/Behavioral +anxiety, depression            Allergies   Allergen Reactions    Ranitidine Hcl Hives     Past Medical History:   Diagnosis Date    Acute encephalopathy 6/8/2023    Hyperlipidemia     Hypertension      Past Surgical History:   Procedure Laterality Date    UPPER GASTROINTESTINAL ENDOSCOPY N/A 8/15/2023    EGD BIOPSY performed by Javier Moss MD at [facility] ENDOSCOPY     History

## 2024-04-10 ENCOUNTER — TELEPHONE (OUTPATIENT)
Dept: ONCOLOGY | Age: 79
End: 2024-04-10

## 2024-04-11 NOTE — TELEPHONE ENCOUNTER
Spoke with patient's son and caregiver.  His care in the shop and he does not know when it will be ready.  He will call us back to reschedule.

## 2024-04-14 NOTE — PATIENT INSTRUCTIONS
Need follow-up with pulmonary services for further treatment options continue inhalers somatic medications as directed    heart failure seem to be improved continue medications    Hematology follow-up English

## 2024-06-12 RX ORDER — IBRUTINIB 420 MG/1
420 TABLET, FILM COATED ORAL DAILY
Qty: 30 TABLET | Refills: 3 | Status: ACTIVE | OUTPATIENT
Start: 2024-06-12

## 2024-06-12 NOTE — TELEPHONE ENCOUNTER
Physician provider: Dr. Sr  Reason for today's call (Please detail here patients chief complaint): Medication refill  Last office visit:N/A  Preferred pharmacy (If refill request): [facility] [address]  Calls to office within the last 48 hours?:No    Patient notified that their information will be routed to the [facility] clinical triage team for review. Patient is advised that they will receive a phone call from the triage department. If symptom related and symptoms worsen before receiving a call back, the patient has been advised to proceed to the nearest ED.      Pt called to request refill of Imbruvica 420 mg tablet  [facility] [address]

## 2024-06-12 NOTE — TELEPHONE ENCOUNTER
Medication Requested: Imbruvica    Date last prescribed: 1/22/24    Requested Pharmacy: [facility]    Action Taken: chart reviewed, spoke with patient in regards to missed last appointment. States his car is in the shop and he has no other way to get here. Once he gets it back he will call and make appointment.  Prescription pended to MD for signature

## 2024-10-18 ENCOUNTER — TELEPHONE (OUTPATIENT)
Dept: RADIATION ONCOLOGY | Age: 79
End: 2024-10-18

## 2024-10-18 ENCOUNTER — TELEPHONE (OUTPATIENT)
Dept: ONCOLOGY | Age: 79
End: 2024-10-18

## 2024-10-18 NOTE — TELEPHONE ENCOUNTER
Patient has not been seen since 3/11/2024. Needs to make appt before we can refill any medications. LVM for patient to call back to schedule appt.

## 2024-10-18 NOTE — TELEPHONE ENCOUNTER
Physician provider: Dr. Sr  Reason for today's call (Please detail here patients chief complaint): Pt request refill for Imbruvica 420 mg.     Last office visit:03/11/24  Patient Callback Number: [phone]  Was callback number verified?: Yes  [facility]   Calls to office within the last 48 hours?:No    Patient notified that their information will be routed to the [facility] clinical triage team for review. Patient is advised that they will receive a phone call from the triage department. If symptom related and symptoms worsen before receiving a call back, the patient has been advised to proceed to the nearest ED.

## 2024-10-29 ENCOUNTER — TELEPHONE (OUTPATIENT)
Dept: PULMONOLOGY | Age: 79
End: 2024-10-29

## 2024-11-12 ENCOUNTER — HOSPITAL ENCOUNTER (OUTPATIENT)
Dept: LAB | Age: 79
Discharge: HOME | End: 2024-11-12
Payer: OTHER GOVERNMENT

## 2024-11-12 ENCOUNTER — OFFICE VISIT (OUTPATIENT)
Dept: ONCOLOGY | Age: 79
End: 2024-11-12
Payer: OTHER GOVERNMENT

## 2024-11-12 VITALS
BODY MASS INDEX: 26.81 KG/M2 | OXYGEN SATURATION: 96 % | HEIGHT: 62 IN | DIASTOLIC BLOOD PRESSURE: 75 MMHG | HEART RATE: 63 BPM | RESPIRATION RATE: 16 BRPM | TEMPERATURE: 97.7 F | SYSTOLIC BLOOD PRESSURE: 148 MMHG | WEIGHT: 145.7 LBS

## 2024-11-12 DIAGNOSIS — Z79.899 HIGH RISK MEDICATION USE: ICD-10-CM

## 2024-11-12 DIAGNOSIS — I48.91 ATRIAL FIBRILLATION, UNSPECIFIED TYPE (HCC): ICD-10-CM

## 2024-11-12 DIAGNOSIS — C91.10 CLL (CHRONIC LYMPHOCYTIC LEUKEMIA) (HCC): Primary | ICD-10-CM

## 2024-11-12 DIAGNOSIS — Z51.11 ENCOUNTER FOR ANTINEOPLASTIC CHEMOTHERAPY: ICD-10-CM

## 2024-11-12 DIAGNOSIS — I10 UNCONTROLLED HYPERTENSION: ICD-10-CM

## 2024-11-12 DIAGNOSIS — R23.3 EASY BRUISING: ICD-10-CM

## 2024-11-12 DIAGNOSIS — C91.10 CLL (CHRONIC LYMPHOCYTIC LEUKEMIA) (HCC): ICD-10-CM

## 2024-11-12 DIAGNOSIS — R31.9 HEMATURIA, UNSPECIFIED TYPE: ICD-10-CM

## 2024-11-12 DIAGNOSIS — Z91.199 NONCOMPLIANCE: ICD-10-CM

## 2024-11-12 LAB
ALBUMIN SERPL-MCNC: 3.9 G/DL (ref 3.2–4.6)
ALBUMIN/GLOB SERPL: 1.6 (ref 1–1.9)
ALP SERPL-CCNC: 102 U/L (ref 40–129)
ALT SERPL-CCNC: 22 U/L (ref 8–55)
ANION GAP SERPL CALC-SCNC: 10 MMOL/L (ref 7–16)
AST SERPL-CCNC: 25 U/L (ref 15–37)
BASOPHILS # BLD: 0.1 K/UL (ref 0–0.2)
BASOPHILS NFR BLD: 1 % (ref 0–2)
BILIRUB SERPL-MCNC: 0.5 MG/DL (ref 0–1.2)
BUN SERPL-MCNC: 25 MG/DL (ref 8–23)
CALCIUM SERPL-MCNC: 8.9 MG/DL (ref 8.8–10.2)
CHLORIDE SERPL-SCNC: 104 MMOL/L (ref 98–107)
CO2 SERPL-SCNC: 26 MMOL/L (ref 20–29)
CREAT SERPL-MCNC: 0.86 MG/DL (ref 0.8–1.3)
DIFFERENTIAL METHOD BLD: NORMAL
EOSINOPHIL # BLD: 0.2 K/UL (ref 0–0.8)
EOSINOPHIL NFR BLD: 2 % (ref 0.5–7.8)
ERYTHROCYTE [DISTWIDTH] IN BLOOD BY AUTOMATED COUNT: 14.5 % (ref 11.9–14.6)
GLOBULIN SER CALC-MCNC: 2.4 G/DL (ref 2.3–3.5)
GLUCOSE SERPL-MCNC: 118 MG/DL (ref 70–99)
HCT VFR BLD AUTO: 46.6 % (ref 41.1–50.3)
HGB BLD-MCNC: 15.2 G/DL (ref 13.6–17.2)
IMM GRANULOCYTES # BLD AUTO: 0 K/UL (ref 0–0.5)
IMM GRANULOCYTES NFR BLD AUTO: 0 % (ref 0–5)
LDH SERPL L TO P-CCNC: 194 U/L (ref 127–281)
LYMPHOCYTES # BLD: 3.4 K/UL (ref 0.5–4.6)
LYMPHOCYTES NFR BLD: 42 % (ref 13–44)
MCH RBC QN AUTO: 30.6 PG (ref 26.1–32.9)
MCHC RBC AUTO-ENTMCNC: 32.6 G/DL (ref 31.4–35)
MCV RBC AUTO: 94 FL (ref 82–102)
MONOCYTES # BLD: 0.9 K/UL (ref 0.1–1.3)
MONOCYTES NFR BLD: 11 % (ref 4–12)
NEUTS SEG # BLD: 3.6 K/UL (ref 1.7–8.2)
NEUTS SEG NFR BLD: 44 % (ref 43–78)
NRBC # BLD: 0 K/UL (ref 0–0.2)
PLATELET # BLD AUTO: 188 K/UL (ref 150–450)
PMV BLD AUTO: 11.1 FL (ref 9.4–12.3)
POTASSIUM SERPL-SCNC: 4.8 MMOL/L (ref 3.5–5.1)
PROT SERPL-MCNC: 6.3 G/DL (ref 6.3–8.2)
RBC # BLD AUTO: 4.96 M/UL (ref 4.23–5.6)
SODIUM SERPL-SCNC: 140 MMOL/L (ref 136–145)
URATE SERPL-MCNC: 5.7 MG/DL (ref 3.9–8.2)
WBC # BLD AUTO: 8.1 K/UL (ref 4.3–11.1)

## 2024-11-12 PROCEDURE — 84550 ASSAY OF BLOOD/URIC ACID: CPT

## 2024-11-12 PROCEDURE — 80053 COMPREHEN METABOLIC PANEL: CPT

## 2024-11-12 PROCEDURE — 99215 OFFICE O/P EST HI 40 MIN: CPT | Performed by: INTERNAL MEDICINE

## 2024-11-12 PROCEDURE — 1123F ACP DISCUSS/DSCN MKR DOCD: CPT | Performed by: INTERNAL MEDICINE

## 2024-11-12 PROCEDURE — 3077F SYST BP >= 140 MM HG: CPT | Performed by: INTERNAL MEDICINE

## 2024-11-12 PROCEDURE — 83615 LACTATE (LD) (LDH) ENZYME: CPT

## 2024-11-12 PROCEDURE — 85025 COMPLETE CBC W/AUTO DIFF WBC: CPT

## 2024-11-12 PROCEDURE — 3078F DIAST BP <80 MM HG: CPT | Performed by: INTERNAL MEDICINE

## 2024-11-12 PROCEDURE — 36415 COLL VENOUS BLD VENIPUNCTURE: CPT

## 2024-11-12 RX ORDER — IBRUTINIB 420 MG/1
420 TABLET, FILM COATED ORAL DAILY
Qty: 30 TABLET | Refills: 2 | Status: ACTIVE | OUTPATIENT
Start: 2024-11-12

## 2024-11-12 ASSESSMENT — PATIENT HEALTH QUESTIONNAIRE - PHQ9
2. FEELING DOWN, DEPRESSED OR HOPELESS: NOT AT ALL
SUM OF ALL RESPONSES TO PHQ QUESTIONS 1-9: 0
1. LITTLE INTEREST OR PLEASURE IN DOING THINGS: NOT AT ALL
SUM OF ALL RESPONSES TO PHQ QUESTIONS 1-9: 0
SUM OF ALL RESPONSES TO PHQ QUESTIONS 1-9: 0
SUM OF ALL RESPONSES TO PHQ9 QUESTIONS 1 & 2: 0
SUM OF ALL RESPONSES TO PHQ QUESTIONS 1-9: 0

## 2024-11-12 NOTE — PROGRESS NOTES
Oral Chemotherapy Adherence:     Current Regimen:  Drug Name: ibrutinib  Dose: 420 mg  Frequency: daily    Barriers to care identified including (financial, physical, psychosocial) : No    Missed doses reported: No    Patient verbalizes understanding of what to do in the event of a missed dose: Yes    Adverse reactions/toxicities reported:None, See Review of Systems            
None/Minimal pain - not affecting QOL     Fatigue - Failed to redirect to the Timeline version of the REVFS SmartLink.  Distress -        No data to display                  Elements of this note have been dictated via voice recognition software.  Text and phrases may be limited by the accuracy and autoconversion of the software.  The chart has been reviewed, but errors may still be present.          [place]remaine [place] [address]  Office : [phone]  Fax : [phone]

## 2024-11-12 NOTE — PATIENT INSTRUCTIONS
Patient Information from Today's Visit    The members of your Oncology Medical Home are listed below:    Physician Provider: Rhiannon Sr Medical Oncologist  Advanced Practice Clinician: Noemi Figueroa NP  Registered Nurse: Josephine MURRAY   Navigator:   Medical Assistant: Marisa CARDONA MA  : Naomie GRANADOS   Supportive Care Services: Lacie GUTIERREZ LMSW    Diagnosis: CLL       Follow Up Instructions:   - Reviewed labs  - Continue ibrutinib as prescribed  -It is extremely important that you come to your appointments because it is dangerous to not follow up. We can try to work with you and see you every 2 months but if you cannot be regular with these appointments then we will not be able to prescribe the ibrutinib anymore and [facility]    Treatment Summary has been discussed and given to patient:      Current Labs:   No visits with results within 3 Day(s) from this visit.   Latest known visit with results is:   Hospital Outpatient Visit on 03/11/2024   Component Date Value Ref Range Status    Sodium 03/11/2024 138  136 - 146 mmol/L Final    Potassium 03/11/2024 4.2  3.5 - 5.1 mmol/L Final    Chloride 03/11/2024 102 (L)  103 - 113 mmol/L Final    CO2 03/11/2024 30  21 - 32 mmol/L Final    Anion Gap 03/11/2024 6  2 - 11 mmol/L Final    Glucose 03/11/2024 120 (H)  65 - 100 mg/dL Final    BUN 03/11/2024 23  8 - 23 MG/DL Final    Creatinine 03/11/2024 0.80  0.8 - 1.5 MG/DL Final    Est, Glom Filt Rate 03/11/2024 >60  >60 ml/min/1.73m2 Final    Comment:    Pediatric calculator link: https://www.kidney.org/professionals/kdoqi/gfr_calculatorped     These results are not intended for use in patients <18 years of age.     eGFR results are calculated without a race factor using  the 2021 CKD-EPI equation. Careful clinical correlation is recommended, particularly when comparing to results calculated using previous equations.  The CKD-EPI equation is less accurate in patients with extremes of

## 2024-11-13 ENCOUNTER — CLINICAL DOCUMENTATION (OUTPATIENT)
Dept: ONCOLOGY | Age: 79
End: 2024-11-13

## 2024-11-13 ENCOUNTER — TELEPHONE (OUTPATIENT)
Dept: UROLOGY | Age: 79
End: 2024-11-13

## 2024-11-13 NOTE — TELEPHONE ENCOUNTER
New patient appt 12/26/24 @ 130 with Gayle in the [place] office. Time and location confirmed with son.

## 2024-11-13 NOTE — PROGRESS NOTES
11/12/24  [facility] Form [id number] Request for Services Form completed and faxed to Wm. Deejay Clemens [facility] at #[phone] along with the hematology office visit progress note dated 2/12/24, hematology office visit progress note dated 3/11/24, and lab results from specimens collected during February and March 2024.     Proficient Transaction ID [id number]

## 2024-11-15 ENCOUNTER — NURSE ONLY (OUTPATIENT)
Dept: PULMONOLOGY | Age: 79
End: 2024-11-15

## 2024-11-15 DIAGNOSIS — J44.9 CHRONIC OBSTRUCTIVE PULMONARY DISEASE, UNSPECIFIED COPD TYPE (HCC): Primary | ICD-10-CM

## 2024-11-15 LAB
FEF25-27, POC: 1.23 L/S
FET, POC: NORMAL
FEV 1 , POC: 1.73 L
FEV1/FVC, POC: NORMAL
FVC, POC: NORMAL
LUNG AGE, POC: NORMAL
PEF, POC: 3.81 L/S

## 2024-12-17 DIAGNOSIS — C91.10 CLL (CHRONIC LYMPHOCYTIC LEUKEMIA) (HCC): ICD-10-CM

## 2024-12-17 RX ORDER — IBRUTINIB 420 MG/1
420 TABLET, FILM COATED ORAL DAILY
Qty: 30 TABLET | Refills: 2 | Status: ACTIVE | OUTPATIENT
Start: 2024-12-17

## 2024-12-17 NOTE — TELEPHONE ENCOUNTER
Chart reviewed. Noted a prescription was sent out on 11/12/24. Also noted documentation from  in regards to [facility] referral/authorization.  [facility] the [facility]. She looked into this and told me that when we sent the last prescription in that he didn't have an active contract but as of 11/20/24 he now does so we need to send a new prescription  Prescription pended to Dr. Sr for signature

## 2024-12-17 NOTE — TELEPHONE ENCOUNTER
Physician provider: Dr. Sr  Reason for today's call (Please detail here patients chief complaint): medication  Last office visit:n/a  Patient Callback Number: [phone]  Was callback number verified?: Yes  [facility]  Calls to office within the last 48 hours?:No    Patient notified that their information will be routed to the [facility] clinical triage team for review. Patient is advised that they will receive a phone call from the triage department. If symptom related and symptoms worsen before receiving a call back, the patient has been advised to proceed to the nearest ED.          Pt stated he was prescribed Imbruvica and pt stated he has not received it yet      [phone]

## 2024-12-26 ENCOUNTER — OFFICE VISIT (OUTPATIENT)
Dept: UROLOGY | Age: 79
End: 2024-12-26

## 2024-12-26 ENCOUNTER — TELEPHONE (OUTPATIENT)
Dept: UROLOGY | Age: 79
End: 2024-12-26

## 2024-12-26 DIAGNOSIS — Z87.891 FORMER SMOKER: ICD-10-CM

## 2024-12-26 DIAGNOSIS — R31.29 OTHER MICROSCOPIC HEMATURIA: Primary | ICD-10-CM

## 2024-12-26 LAB
BILIRUBIN, URINE, POC: NEGATIVE
BLOOD URINE, POC: NORMAL
GLUCOSE URINE, POC: NEGATIVE MG/DL
KETONES, URINE, POC: NEGATIVE MG/DL
LEUKOCYTE ESTERASE, URINE, POC: NEGATIVE
NITRITE, URINE, POC: NEGATIVE
PH, URINE, POC: 7 (ref 4.6–8)
PROTEIN,URINE, POC: NEGATIVE MG/DL
SPECIFIC GRAVITY, URINE, POC: 1.01 (ref 1–1.03)
URINALYSIS CLARITY, POC: NORMAL
URINALYSIS COLOR, POC: NORMAL
UROBILINOGEN, POC: NORMAL MG/DL

## 2024-12-26 RX ORDER — VENLAFAXINE 75 MG/1
75 TABLET ORAL 3 TIMES DAILY
COMMUNITY

## 2024-12-26 NOTE — PROGRESS NOTES
[facility]  [address] [place] [id number]  [phone]          Jonathan Zurita  : 1945    Chief Complaint   Patient presents with    Hematuria          HPI     Jonathan Zurita is a 79 y.o. male    Referred secondary to ongoing issues with microscopic hematuria.  Patient has significant medical history of COPD, hypertension, hyperlipidemia and CLL.  Has seen hematology in the past, however CLL currently is [facility].    Patient is not currently an everyday smoker.  But he did smoke a pack of cigarettes for 25 years.  He has not experienced any gross blood.  He is accompanied by his daughter who actually has history of renal cancer.  She had partial nephrectomy due to the cancer.  [facility] Leticia Murguia.    Patient had a CT scan done in May of this year.  This was done at [facility].  Results of that are as below.    Impression  1. No acute process in the abdomen or pelvis.  2. Moderate colonic stool burden with colonic diverticulosis.  3. Cardiomegaly with atherosclerosis including coronary artery involvement.  4. Moderate hiatal hernia.    I did review the report of that imaging with patient and the daughter.    As far as urination he says he urinates \"okay.\"  He does complain that the Macrobid years brought by the [facility]. he is unsure as to whether he has ever had a PSA blood test done I feel that the [facility] would likely have results of that and the daughter will ask.    Patient takes 5 mg Eliquis twice daily.    Past Medical History:   Diagnosis Date    Acute encephalopathy 2023    Hyperlipidemia     Hypertension      Past Surgical History:   Procedure Laterality Date    UPPER GASTROINTESTINAL ENDOSCOPY N/A 8/15/2023    EGD BIOPSY performed by Javier Moss MD at [facility] ENDOSCOPY     Current Outpatient Medications   Medication Sig Dispense Refill    venlafaxine (EFFEXOR) 75 MG

## 2025-01-14 ENCOUNTER — HOSPITAL ENCOUNTER (OUTPATIENT)
Dept: LAB | Age: 80
Discharge: HOME | End: 2025-01-14
Payer: OTHER GOVERNMENT

## 2025-01-14 ENCOUNTER — OFFICE VISIT (OUTPATIENT)
Dept: ONCOLOGY | Age: 80
End: 2025-01-14
Payer: OTHER GOVERNMENT

## 2025-01-14 VITALS
HEIGHT: 62 IN | BODY MASS INDEX: 26.98 KG/M2 | DIASTOLIC BLOOD PRESSURE: 53 MMHG | RESPIRATION RATE: 16 BRPM | HEART RATE: 58 BPM | TEMPERATURE: 98 F | SYSTOLIC BLOOD PRESSURE: 154 MMHG | OXYGEN SATURATION: 95 % | WEIGHT: 146.6 LBS

## 2025-01-14 DIAGNOSIS — Z51.11 ENCOUNTER FOR ANTINEOPLASTIC CHEMOTHERAPY: ICD-10-CM

## 2025-01-14 DIAGNOSIS — Z79.899 HIGH RISK MEDICATION USE: Primary | ICD-10-CM

## 2025-01-14 DIAGNOSIS — C91.10 CLL (CHRONIC LYMPHOCYTIC LEUKEMIA) (HCC): ICD-10-CM

## 2025-01-14 DIAGNOSIS — I10 UNCONTROLLED HYPERTENSION: ICD-10-CM

## 2025-01-14 DIAGNOSIS — R31.9 HEMATURIA, UNSPECIFIED TYPE: ICD-10-CM

## 2025-01-14 LAB
ALBUMIN SERPL-MCNC: 3.8 G/DL (ref 3.2–4.6)
ALBUMIN/GLOB SERPL: 1.7 (ref 1–1.9)
ALP SERPL-CCNC: 84 U/L (ref 40–129)
ALT SERPL-CCNC: 13 U/L (ref 8–55)
ANION GAP SERPL CALC-SCNC: 10 MMOL/L (ref 7–16)
AST SERPL-CCNC: 21 U/L (ref 15–37)
BASOPHILS # BLD: 0.03 K/UL (ref 0–0.2)
BASOPHILS NFR BLD: 0.4 % (ref 0–2)
BILIRUB SERPL-MCNC: 0.6 MG/DL (ref 0–1.2)
BUN SERPL-MCNC: 16 MG/DL (ref 8–23)
CALCIUM SERPL-MCNC: 9.4 MG/DL (ref 8.8–10.2)
CHLORIDE SERPL-SCNC: 102 MMOL/L (ref 98–107)
CO2 SERPL-SCNC: 25 MMOL/L (ref 20–29)
CREAT SERPL-MCNC: 0.83 MG/DL (ref 0.8–1.3)
DIFFERENTIAL METHOD BLD: ABNORMAL
EOSINOPHIL # BLD: 0.15 K/UL (ref 0–0.8)
EOSINOPHIL NFR BLD: 1.9 % (ref 0.5–7.8)
ERYTHROCYTE [DISTWIDTH] IN BLOOD BY AUTOMATED COUNT: 13.9 % (ref 11.9–14.6)
GLOBULIN SER CALC-MCNC: 2.2 G/DL (ref 2.3–3.5)
GLUCOSE SERPL-MCNC: 105 MG/DL (ref 70–99)
HCT VFR BLD AUTO: 42.9 % (ref 41.1–50.3)
HGB BLD-MCNC: 13.8 G/DL (ref 13.6–17.2)
IMM GRANULOCYTES # BLD AUTO: 0.02 K/UL (ref 0–0.5)
IMM GRANULOCYTES NFR BLD AUTO: 0.3 % (ref 0–5)
LDH SERPL L TO P-CCNC: 165 U/L (ref 127–281)
LYMPHOCYTES # BLD: 5.43 K/UL (ref 0.5–4.6)
LYMPHOCYTES NFR BLD: 68.8 % (ref 13–44)
MCH RBC QN AUTO: 29.9 PG (ref 26.1–32.9)
MCHC RBC AUTO-ENTMCNC: 32.2 G/DL (ref 31.4–35)
MCV RBC AUTO: 93.1 FL (ref 82–102)
MONOCYTES # BLD: 0.56 K/UL (ref 0.1–1.3)
MONOCYTES NFR BLD: 7.1 % (ref 4–12)
NEUTS SEG # BLD: 1.7 K/UL (ref 1.7–8.2)
NEUTS SEG NFR BLD: 21.5 % (ref 43–78)
NRBC # BLD: 0 K/UL (ref 0–0.2)
PLATELET # BLD AUTO: 191 K/UL (ref 150–450)
PMV BLD AUTO: 11.5 FL (ref 9.4–12.3)
POTASSIUM SERPL-SCNC: 4.2 MMOL/L (ref 3.5–5.1)
PROT SERPL-MCNC: 6 G/DL (ref 6.3–8.2)
RBC # BLD AUTO: 4.61 M/UL (ref 4.23–5.6)
SODIUM SERPL-SCNC: 137 MMOL/L (ref 136–145)
URATE SERPL-MCNC: 5 MG/DL (ref 3.9–8.2)
WBC # BLD AUTO: 7.9 K/UL (ref 4.3–11.1)

## 2025-01-14 PROCEDURE — 80053 COMPREHEN METABOLIC PANEL: CPT

## 2025-01-14 PROCEDURE — 83615 LACTATE (LD) (LDH) ENZYME: CPT

## 2025-01-14 PROCEDURE — 1123F ACP DISCUSS/DSCN MKR DOCD: CPT | Performed by: INTERNAL MEDICINE

## 2025-01-14 PROCEDURE — 99215 OFFICE O/P EST HI 40 MIN: CPT | Performed by: INTERNAL MEDICINE

## 2025-01-14 PROCEDURE — 3077F SYST BP >= 140 MM HG: CPT | Performed by: INTERNAL MEDICINE

## 2025-01-14 PROCEDURE — 84550 ASSAY OF BLOOD/URIC ACID: CPT

## 2025-01-14 PROCEDURE — 3078F DIAST BP <80 MM HG: CPT | Performed by: INTERNAL MEDICINE

## 2025-01-14 PROCEDURE — 85025 COMPLETE CBC W/AUTO DIFF WBC: CPT

## 2025-01-14 PROCEDURE — 36415 COLL VENOUS BLD VENIPUNCTURE: CPT

## 2025-01-14 RX ORDER — IBRUTINIB 420 MG/1
420 TABLET, FILM COATED ORAL DAILY
Qty: 30 TABLET | Refills: 5 | Status: ACTIVE | OUTPATIENT
Start: 2025-01-14

## 2025-01-14 ASSESSMENT — PATIENT HEALTH QUESTIONNAIRE - PHQ9
SUM OF ALL RESPONSES TO PHQ9 QUESTIONS 1 & 2: 0
SUM OF ALL RESPONSES TO PHQ QUESTIONS 1-9: 0
SUM OF ALL RESPONSES TO PHQ QUESTIONS 1-9: 0
1. LITTLE INTEREST OR PLEASURE IN DOING THINGS: NOT AT ALL
2. FEELING DOWN, DEPRESSED OR HOPELESS: NOT AT ALL
SUM OF ALL RESPONSES TO PHQ QUESTIONS 1-9: 0
SUM OF ALL RESPONSES TO PHQ QUESTIONS 1-9: 0

## 2025-01-14 NOTE — PATIENT INSTRUCTIONS
Patient Information from Today's Visit    The members of your Oncology Medical Home are listed below:    Physician Provider: Rhiannon Sr Medical Oncologist  Advanced Practice Clinician: Noemi Figueroa NP  Registered Nurse: Josephine MURRAY   Navigator:   Medical Assistant: Marisa CARDONA MA  : Naomie GRANADOS   Supportive Care Services: Candy MANN LMSW    Diagnosis: CLL       Follow Up Instructions:   Take blood pressures at home   Reviewed labs  Treatment Summary has been discussed and given to patient:      Current Labs:   Hospital Outpatient Visit on 01/14/2025   Component Date Value Ref Range Status    WBC 01/14/2025 7.9  4.3 - 11.1 K/uL Final    RBC 01/14/2025 4.61  4.23 - 5.6 M/uL Final    Hemoglobin 01/14/2025 13.8  13.6 - 17.2 g/dL Final    Hematocrit 01/14/2025 42.9  41.1 - 50.3 % Final    MCV 01/14/2025 93.1  82.0 - 102.0 FL Final    MCH 01/14/2025 29.9  26.1 - 32.9 PG Final    MCHC 01/14/2025 32.2  31.4 - 35.0 g/dL Final    RDW 01/14/2025 13.9  11.9 - 14.6 % Final    Platelets 01/14/2025 191  150 - 450 K/uL Final    MPV 01/14/2025 11.5  9.4 - 12.3 FL Final    nRBC 01/14/2025 0.00  0.0 - 0.2 K/uL Final    **Note: Absolute NRBC parameter is now reported with Hemogram**    Neutrophils % 01/14/2025 21.5 (L)  43.0 - 78.0 % Final    Lymphocytes % 01/14/2025 68.8 (H)  13.0 - 44.0 % Final    Monocytes % 01/14/2025 7.1  4.0 - 12.0 % Final    Eosinophils % 01/14/2025 1.9  0.5 - 7.8 % Final    Basophils % 01/14/2025 0.4  0.0 - 2.0 % Final    Immature Granulocytes % 01/14/2025 0.3  0.0 - 5.0 % Final    Neutrophils Absolute 01/14/2025 1.70  1.70 - 8.20 K/UL Final    Lymphocytes Absolute 01/14/2025 5.43 (H)  0.50 - 4.60 K/UL Final    Monocytes Absolute 01/14/2025 0.56  0.10 - 1.30 K/UL Final    Eosinophils Absolute 01/14/2025 0.15  0.00 - 0.80 K/UL Final    Basophils Absolute 01/14/2025 0.03  0.00 - 0.20 K/UL Final    Immature Granulocytes Absolute 01/14/2025 0.02  0.00 - 0.50 K/UL Final    Differential Type

## 2025-01-14 NOTE — PROGRESS NOTES
[facility]: Office Visit Progress Note    Chief Complaint:    CLL    History of Present Illness:  79 y.o. male past medical history of COPD, hyperlipidemia, hypertension and CLL [facility] who presented to ER on 8/6/2023 with coughing and excessive mucus, chest x-ray showed left lower lobe atelectasis with small effusion present, admitted with pneumonia of left lower lobe and started azithromycin, ceftriaxone, oxygen, consulted oncology due to concern of ibrutinib causes immunosuppression given current pneumonia.    Interim history update in A/P.        Review of Systems:  Constitutional Denies fever, chills, weight loss, appetite changes, fatigue, night sweats.   HEENT Denies trauma, blurry vision, hearing loss, ear pain, nosebleeds, sore throat, neck pain and ear discharge.    Skin Denies lesions or rashes.   Lungs dyspnea, cough, sputum production.  Denies hemoptysis.   Cardiovascular Denies chest pain, palpitations, or lower extremity edema.   Gastrointestinal Denies nausea, vomiting, changes in bowel habits, bloody or black stools, abdominal pain.    Denies dysuria, frequency or hesitancy of urination.   Neuro Denies headaches, visual changes or ataxia. Denies dizziness, tingling, tremors, sensory change, speech change, focal weakness or headaches.      Hematology History of CLL.  Skin bruising.  Denies bleeding, denies gingival bleeding or epistaxis.   Endo Denies heat/cold intolerance, denies diabetes or thyroid abnormalities.   MSK Denies back pain, arthralgias, myalgias or frequent falls.      Psychiatric/Behavioral Denies depression and substance abuse. The patient is not nervous/anxious.           Allergies   Allergen Reactions    Ranitidine Hcl Hives     Past Medical History:   Diagnosis Date    Acute encephalopathy 6/8/2023    Hyperlipidemia     Hypertension      Past Surgical History:   Procedure Laterality Date    UPPER GASTROINTESTINAL ENDOSCOPY N/A

## 2025-01-31 ENCOUNTER — PROCEDURE VISIT (OUTPATIENT)
Dept: UROLOGY | Age: 80
End: 2025-01-31

## 2025-01-31 DIAGNOSIS — R31.29 OTHER MICROSCOPIC HEMATURIA: Primary | ICD-10-CM

## 2025-01-31 DIAGNOSIS — R31.29 MICROHEMATURIA: ICD-10-CM

## 2025-01-31 LAB
BILIRUBIN, URINE, POC: NEGATIVE
BLOOD URINE, POC: NORMAL
GLUCOSE URINE, POC: NEGATIVE MG/DL
KETONES, URINE, POC: NEGATIVE MG/DL
LEUKOCYTE ESTERASE, URINE, POC: NEGATIVE
NITRITE, URINE, POC: NEGATIVE
PH, URINE, POC: 6 (ref 4.6–8)
PROTEIN,URINE, POC: NEGATIVE MG/DL
PSA SERPL-MCNC: 1.8 NG/ML (ref 0–4)
SPECIFIC GRAVITY, URINE, POC: 1.01 (ref 1–1.03)
URINALYSIS CLARITY, POC: NORMAL
URINALYSIS COLOR, POC: NORMAL
UROBILINOGEN, POC: NORMAL MG/DL

## 2025-01-31 NOTE — PROGRESS NOTES
Paying Living Expenses: Not hard at all   Food Insecurity: Not on file (4/18/2023)   Transportation Needs: Unknown (4/18/2023)    PRAPARE - Transportation     Lack of Transportation (Medical): Not on file     Lack of Transportation (Non-Medical): No   Physical Activity: Inactive (4/6/2023)    [facility]    Exercise Vital Sign     Days of Exercise per Week: 0 days     Minutes of Exercise per Session: 0 min   Stress: Patient Declined (4/6/2023)    Received from [facility]    [place] [place] [employer] - Occupational Stress Questionnaire     Feeling of Stress : Patient declined   Social Connections: Unknown (4/19/2023)    Received from [facility]    Social Connections     Frequency of Communication with Friends and Family: Not asked     Frequency of Social Gatherings with Friends and Family: Not asked   Recent Concern: Social Connections - Moderately Isolated (4/6/2023)    [facility]    Social Connection and Isolation Panel [NHANES]     Frequency of Communication with Friends and Family: Not on file     Frequency of Social Gatherings with Friends and Family: More than three times a week     Attends Caoda[employer] Services: More than 4 times per year     Active Member of Clubs or Organizations: No     Attends Club or Organization Meetings: Not on file     Marital Status:    Intimate Partner Violence: Unknown (4/19/2023)    Received from [facility]    Intimate Partner Violence     Fear of Current or Ex-Partner: Not asked     Emotionally Abused: Not asked     Physically Abused: Not asked     Sexually Abused: Not asked   Housing Stability: Unknown (4/18/2023)    Housing Stability Vital Sign     Unable to Pay for Housing in the Last Year: Not

## 2025-02-02 ENCOUNTER — HOSPITAL ENCOUNTER (EMERGENCY)
Age: 80
Discharge: HOME | End: 2025-02-02
Attending: EMERGENCY MEDICINE
Payer: OTHER GOVERNMENT

## 2025-02-02 ENCOUNTER — APPOINTMENT (OUTPATIENT)
Dept: CT IMAGING | Age: 80
End: 2025-02-02
Payer: OTHER GOVERNMENT

## 2025-02-02 ENCOUNTER — APPOINTMENT (OUTPATIENT)
Dept: GENERAL RADIOLOGY | Age: 80
End: 2025-02-02
Payer: OTHER GOVERNMENT

## 2025-02-02 VITALS
HEART RATE: 90 BPM | OXYGEN SATURATION: 93 % | RESPIRATION RATE: 17 BRPM | SYSTOLIC BLOOD PRESSURE: 130 MMHG | DIASTOLIC BLOOD PRESSURE: 71 MMHG | TEMPERATURE: 97.9 F

## 2025-02-02 DIAGNOSIS — M79.641 RIGHT HAND PAIN: ICD-10-CM

## 2025-02-02 DIAGNOSIS — S60.511A HAND ABRASION, RIGHT, INITIAL ENCOUNTER: ICD-10-CM

## 2025-02-02 DIAGNOSIS — S09.90XA CLOSED HEAD INJURY WITHOUT LOSS OF CONSCIOUSNESS, INITIAL ENCOUNTER: ICD-10-CM

## 2025-02-02 DIAGNOSIS — S00.81XA ABRASION OF FACE, INITIAL ENCOUNTER: Primary | ICD-10-CM

## 2025-02-02 PROCEDURE — 70450 CT HEAD/BRAIN W/O DYE: CPT

## 2025-02-02 PROCEDURE — 99284 EMERGENCY DEPT VISIT MOD MDM: CPT

## 2025-02-02 PROCEDURE — 70486 CT MAXILLOFACIAL W/O DYE: CPT

## 2025-02-02 PROCEDURE — 73110 X-RAY EXAM OF WRIST: CPT

## 2025-02-02 PROCEDURE — 73130 X-RAY EXAM OF HAND: CPT

## 2025-02-02 PROCEDURE — 6370000000 HC RX 637 (ALT 250 FOR IP): Performed by: EMERGENCY MEDICINE

## 2025-02-02 RX ORDER — GINSENG 100 MG
CAPSULE ORAL ONCE
Status: COMPLETED | OUTPATIENT
Start: 2025-02-02 | End: 2025-02-02

## 2025-02-02 RX ORDER — ACETAMINOPHEN 325 MG/1
650 TABLET ORAL ONCE
Status: COMPLETED | OUTPATIENT
Start: 2025-02-02 | End: 2025-02-02

## 2025-02-02 RX ADMIN — ACETAMINOPHEN 650 MG: 325 TABLET ORAL at 15:22

## 2025-02-02 RX ADMIN — BACITRACIN: 500 OINTMENT TOPICAL at 15:22

## 2025-02-02 ASSESSMENT — LIFESTYLE VARIABLES
HOW MANY STANDARD DRINKS CONTAINING ALCOHOL DO YOU HAVE ON A TYPICAL DAY: PATIENT DOES NOT DRINK
HOW OFTEN DO YOU HAVE A DRINK CONTAINING ALCOHOL: NEVER

## 2025-02-02 ASSESSMENT — PAIN DESCRIPTION - DESCRIPTORS: DESCRIPTORS: ACHING

## 2025-02-02 ASSESSMENT — PAIN DESCRIPTION - LOCATION: LOCATION: GENERALIZED

## 2025-02-02 ASSESSMENT — PAIN - FUNCTIONAL ASSESSMENT: PAIN_FUNCTIONAL_ASSESSMENT: 0-10

## 2025-02-02 ASSESSMENT — PAIN SCALES - GENERAL: PAINLEVEL_OUTOF10: 4

## 2025-02-02 NOTE — ED NOTES
Patient mobility status  with no difficulty.     I have reviewed discharge instructions with the patient.  The patient verbalized understanding.    Patient left ED via Discharge Method: ambulatory to Home with  se;f .    Opportunity for questions and clarification provided.     Patient given 0 scripts.            Speaks, Jondrea, RN  02/02/25 1920

## 2025-02-02 NOTE — DISCHARGE INSTRUCTIONS
Please return for any questions, concerns or worsening symptoms, particularly weakness of the arms or legs, changes in behavior, repeated falls, increasing/unremitting dizziness, recurrent vomiting, difficulty speaking, increasing/unremitting headache.    You may use Tylenol as needed for pain symptoms, you may ice your wrist/hand as needed, we do recommend follow-up with a primary care provider within 1 to 2 weeks.

## 2025-02-02 NOTE — ED TRIAGE NOTES
Patient arrives via [facility] from [facility] with CO mechanical fall. Patient landed on face after tripping on curb. Abrasions to face both hands, nose. Denies LOC or blood thinners

## 2025-02-02 NOTE — ED PROVIDER NOTES
Emergency Department Provider Note                   PCP:                Brian Davies DO               Age: 79 y.o.      Sex: male   Final diagnosis/impression:  1. Abrasion of face, initial encounter    2. Hand abrasion, right, initial encounter    3. Right hand pain    4. Closed head injury without loss of consciousness, initial encounter       Disposition: Discharged    MDM/Clinical Course:  Patient seen by myself at the [facility] emergency department. Patient had signs symptoms and clinical history most consistent with abrasion of multiple areas with close head injury, some right hand pain. Radiology shows no acute radiographic abnormality of the head, facial bones, hands or wrist. While under my care, patient received p.o. Tylenol, bacitracin applied to abrasions.  Recommended follow-up with primary care provider, wound care, OTC Tylenol as needed.  I recommended close monitoring of symptoms, low threshold to return as needed.  Patient/family given instructions to return as needed for any questions, concerns or worsening symptoms, particularly those as outlined in the disposition section / discharge section of patient discharge paperwork. Patient/family verbalizes understanding and agreement with ED course/plan in shared medical decision making. Questions answered.    Complexity of Problems Addressed:  1 or more acute illnesses that pose a threat to life or bodily function.     Data Reviewed and Analyzed:    Category 1:   I ordered each unique test.  I reviewed the results of each unique test.    Category 2:     Category 3: Discussion of management or test interpretation.  See MDM / clinical course section above for details    Risk of Complications and/or Morbidity of Patient Management:  Over the counter drug management performed.  The following clinical decision tools were used in the care of this patient [place] CT rule.  Shared medical decision making was utilized in creating the

## 2025-02-03 ENCOUNTER — TELEPHONE (OUTPATIENT)
Dept: UROLOGY | Age: 80
End: 2025-02-03

## 2025-02-03 NOTE — TELEPHONE ENCOUNTER
----- Message from Dr. Nuno Jeffrey, DO sent at 2/2/2025  6:26 PM EST -----  Please let pt know that PSA is WNL.  See note for plan.

## 2025-03-19 ENCOUNTER — OFFICE VISIT (OUTPATIENT)
Dept: ONCOLOGY | Age: 80
End: 2025-03-19

## 2025-03-19 ENCOUNTER — HOSPITAL ENCOUNTER (OUTPATIENT)
Dept: LAB | Age: 80
Discharge: HOME | End: 2025-03-19
Payer: OTHER GOVERNMENT

## 2025-03-19 VITALS
OXYGEN SATURATION: 96 % | HEIGHT: 62 IN | SYSTOLIC BLOOD PRESSURE: 119 MMHG | RESPIRATION RATE: 18 BRPM | HEART RATE: 58 BPM | TEMPERATURE: 97.7 F | WEIGHT: 143.1 LBS | DIASTOLIC BLOOD PRESSURE: 72 MMHG | BODY MASS INDEX: 26.33 KG/M2

## 2025-03-19 DIAGNOSIS — I10 UNCONTROLLED HYPERTENSION: ICD-10-CM

## 2025-03-19 DIAGNOSIS — Z51.11 ENCOUNTER FOR ANTINEOPLASTIC CHEMOTHERAPY: ICD-10-CM

## 2025-03-19 DIAGNOSIS — R42 DIZZINESS: ICD-10-CM

## 2025-03-19 DIAGNOSIS — Z79.899 HIGH RISK MEDICATION USE: ICD-10-CM

## 2025-03-19 DIAGNOSIS — C91.10 CLL (CHRONIC LYMPHOCYTIC LEUKEMIA) (HCC): ICD-10-CM

## 2025-03-19 DIAGNOSIS — C91.10 CLL (CHRONIC LYMPHOCYTIC LEUKEMIA) (HCC): Primary | ICD-10-CM

## 2025-03-19 LAB
ALBUMIN SERPL-MCNC: 4.1 G/DL (ref 3.2–4.6)
ALBUMIN/GLOB SERPL: 1.6 (ref 1–1.9)
ALP SERPL-CCNC: 81 U/L (ref 40–129)
ALT SERPL-CCNC: 16 U/L (ref 8–55)
ANION GAP SERPL CALC-SCNC: 12 MMOL/L (ref 7–16)
AST SERPL-CCNC: 23 U/L (ref 15–37)
BASOPHILS # BLD: 0.05 K/UL (ref 0–0.2)
BASOPHILS NFR BLD: 0.6 % (ref 0–2)
BILIRUB SERPL-MCNC: 0.6 MG/DL (ref 0–1.2)
BUN SERPL-MCNC: 15 MG/DL (ref 8–23)
CALCIUM SERPL-MCNC: 9.4 MG/DL (ref 8.8–10.2)
CHLORIDE SERPL-SCNC: 104 MMOL/L (ref 98–107)
CO2 SERPL-SCNC: 25 MMOL/L (ref 20–29)
CREAT SERPL-MCNC: 0.79 MG/DL (ref 0.8–1.3)
DIFFERENTIAL METHOD BLD: ABNORMAL
EOSINOPHIL # BLD: 0.09 K/UL (ref 0–0.8)
EOSINOPHIL NFR BLD: 1.2 % (ref 0.5–7.8)
ERYTHROCYTE [DISTWIDTH] IN BLOOD BY AUTOMATED COUNT: 14.3 % (ref 11.9–14.6)
GLOBULIN SER CALC-MCNC: 2.5 G/DL (ref 2.3–3.5)
GLUCOSE SERPL-MCNC: 98 MG/DL (ref 70–99)
HCT VFR BLD AUTO: 47 % (ref 41.1–50.3)
HGB BLD-MCNC: 15.4 G/DL (ref 13.6–17.2)
IMM GRANULOCYTES # BLD AUTO: 0.02 K/UL (ref 0–0.5)
IMM GRANULOCYTES NFR BLD AUTO: 0.3 % (ref 0–5)
LDH SERPL L TO P-CCNC: 205 U/L (ref 127–281)
LYMPHOCYTES # BLD: 4.25 K/UL (ref 0.5–4.6)
LYMPHOCYTES NFR BLD: 55 % (ref 13–44)
MCH RBC QN AUTO: 30.7 PG (ref 26.1–32.9)
MCHC RBC AUTO-ENTMCNC: 32.8 G/DL (ref 31.4–35)
MCV RBC AUTO: 93.6 FL (ref 82–102)
MONOCYTES # BLD: 0.74 K/UL (ref 0.1–1.3)
MONOCYTES NFR BLD: 9.6 % (ref 4–12)
NEUTS SEG # BLD: 2.58 K/UL (ref 1.7–8.2)
NEUTS SEG NFR BLD: 33.3 % (ref 43–78)
NRBC # BLD: 0 K/UL (ref 0–0.2)
PLATELET # BLD AUTO: 214 K/UL (ref 150–450)
PMV BLD AUTO: 11.8 FL (ref 9.4–12.3)
POTASSIUM SERPL-SCNC: 4.4 MMOL/L (ref 3.5–5.1)
PROT SERPL-MCNC: 6.5 G/DL (ref 6.3–8.2)
RBC # BLD AUTO: 5.02 M/UL (ref 4.23–5.6)
SODIUM SERPL-SCNC: 141 MMOL/L (ref 136–145)
URATE SERPL-MCNC: 6.2 MG/DL (ref 3.9–8.2)
WBC # BLD AUTO: 7.7 K/UL (ref 4.3–11.1)

## 2025-03-19 PROCEDURE — 84550 ASSAY OF BLOOD/URIC ACID: CPT

## 2025-03-19 PROCEDURE — 83615 LACTATE (LD) (LDH) ENZYME: CPT

## 2025-03-19 PROCEDURE — 85025 COMPLETE CBC W/AUTO DIFF WBC: CPT

## 2025-03-19 PROCEDURE — 80053 COMPREHEN METABOLIC PANEL: CPT

## 2025-03-19 PROCEDURE — 36415 COLL VENOUS BLD VENIPUNCTURE: CPT

## 2025-03-19 NOTE — PATIENT INSTRUCTIONS
Patient Information from Today's Visit    The members of your Oncology Medical Home are listed below:    Physician Provider: Rhiannon Sr Medical Oncologist  Advanced Practice Clinician: Noemi Figueroa NP  Registered Nurse: Josephine MURRAY   Navigator: N/A  Medical Assistant: Marisa CARDONA MA  : Naomie GRANADOS   Supportive Care Services: Candy MANN LMSW    Diagnosis: CLL      Follow Up Instructions:   - Labs reviewed  - Continue taking ibrutinib as prescribed     Follow up in 2 months with labs prior    Treatment Summary has been discussed and given to patient:No      Current Labs:   Hospital Outpatient Visit on 03/19/2025   Component Date Value Ref Range Status    Sodium 03/19/2025 141  136 - 145 mmol/L Final    Potassium 03/19/2025 4.4  3.5 - 5.1 mmol/L Final    Chloride 03/19/2025 104  98 - 107 mmol/L Final    CO2 03/19/2025 25  20 - 29 mmol/L Final    Anion Gap 03/19/2025 12  7 - 16 mmol/L Final    Glucose 03/19/2025 98  70 - 99 mg/dL Final    Comment: <70 mg/dL Consistent with, but not fully diagnostic of hypoglycemia.  100 - 125 mg/dL Impaired fasting glucose/consistent with pre-diabetes mellitus.  > 126 mg/dl Fasting glucose consistent with overt diabetes mellitus      BUN 03/19/2025 15  8 - 23 MG/DL Final    Creatinine 03/19/2025 0.79 (L)  0.80 - 1.30 MG/DL Final    Est, Glom Filt Rate 03/19/2025 >90  >60 ml/min/1.73m2 Final    Comment:    Pediatric calculator link: https://www.kidney.org/professionals/kdoqi/gfr_calculatorped     These results are not intended for use in patients <18 years of age.     eGFR results are calculated without a race factor using  the 2021 CKD-EPI equation. Careful clinical correlation is recommended, particularly when comparing to results calculated using previous equations.  The CKD-EPI equation is less accurate in patients with extremes of muscle mass, extra-renal metabolism of creatinine, excessive creatine ingestion, or following therapy that affects renal tubular

## 2025-03-19 NOTE — PROGRESS NOTES
Oral Chemotherapy Adherence:     Current Regimen:  Drug Name: ibrutinib  Dose: 420mg  Frequency: daily    Barriers to care identified including (financial, physical, psychosocial) : No    Missed doses reported: No    Patient verbalizes understanding of what to do in the event of a missed dose: Yes    Adverse reactions/toxicities reported:None, See Review of Systems             
toxicity but call as needed.      All questions are answered to his satisfaction.  He will call for further questions and concerns.        ECOG PERFORMANCE STATUS - 1- Restricted in physically strenuous activity but ambulatory and able to carry out work of a light or sedentary nature such as light house work, office work.     Pain - 5/10. None/Minimal pain - not affecting QOL     Fatigue - Failed to redirect to the Timeline version of the REVFS SmartLink.  Distress -        No data to display                  Elements of this note have been dictated via voice recognition software.  Text and phrases may be limited by the accuracy and autoconversion of the software.  The chart has been reviewed, but errors may still be present.          Tremaine [place] [address]  Office : [phone]  Fax : [phone]

## 2025-05-28 ENCOUNTER — HOSPITAL ENCOUNTER (OUTPATIENT)
Dept: LAB | Age: 80
Discharge: HOME | End: 2025-05-28
Payer: OTHER GOVERNMENT

## 2025-05-28 ENCOUNTER — OFFICE VISIT (OUTPATIENT)
Dept: ONCOLOGY | Age: 80
End: 2025-05-28
Payer: OTHER GOVERNMENT

## 2025-05-28 VITALS
DIASTOLIC BLOOD PRESSURE: 73 MMHG | HEART RATE: 60 BPM | TEMPERATURE: 97.6 F | BODY MASS INDEX: 25.91 KG/M2 | SYSTOLIC BLOOD PRESSURE: 131 MMHG | RESPIRATION RATE: 18 BRPM | OXYGEN SATURATION: 92 % | HEIGHT: 62 IN | WEIGHT: 140.8 LBS

## 2025-05-28 DIAGNOSIS — Z51.11 ENCOUNTER FOR ANTINEOPLASTIC CHEMOTHERAPY: ICD-10-CM

## 2025-05-28 DIAGNOSIS — C91.10 CLL (CHRONIC LYMPHOCYTIC LEUKEMIA) (HCC): ICD-10-CM

## 2025-05-28 DIAGNOSIS — C91.10 CLL (CHRONIC LYMPHOCYTIC LEUKEMIA) (HCC): Primary | ICD-10-CM

## 2025-05-28 DIAGNOSIS — R61 NIGHT SWEATS: ICD-10-CM

## 2025-05-28 DIAGNOSIS — Z79.899 HIGH RISK MEDICATION USE: ICD-10-CM

## 2025-05-28 LAB
ALBUMIN SERPL-MCNC: 3.7 G/DL (ref 3.2–4.6)
ALBUMIN/GLOB SERPL: 1.6 (ref 1–1.9)
ALP SERPL-CCNC: 81 U/L (ref 40–129)
ALT SERPL-CCNC: 16 U/L (ref 8–55)
ANION GAP SERPL CALC-SCNC: 12 MMOL/L (ref 7–16)
AST SERPL-CCNC: 20 U/L (ref 15–37)
BASOPHILS # BLD: 0.04 K/UL (ref 0–0.2)
BASOPHILS NFR BLD: 0.4 % (ref 0–2)
BILIRUB SERPL-MCNC: 0.5 MG/DL (ref 0–1.2)
BUN SERPL-MCNC: 16 MG/DL (ref 8–23)
CALCIUM SERPL-MCNC: 9.3 MG/DL (ref 8.8–10.2)
CHLORIDE SERPL-SCNC: 103 MMOL/L (ref 98–107)
CO2 SERPL-SCNC: 24 MMOL/L (ref 20–29)
CREAT SERPL-MCNC: 0.92 MG/DL (ref 0.8–1.3)
DIFFERENTIAL METHOD BLD: ABNORMAL
EOSINOPHIL # BLD: 0.09 K/UL (ref 0–0.8)
EOSINOPHIL NFR BLD: 0.8 % (ref 0.5–7.8)
ERYTHROCYTE [DISTWIDTH] IN BLOOD BY AUTOMATED COUNT: 14.6 % (ref 11.9–14.6)
GLOBULIN SER CALC-MCNC: 2.3 G/DL (ref 2.3–3.5)
GLUCOSE SERPL-MCNC: 107 MG/DL (ref 70–99)
HCT VFR BLD AUTO: 43.5 % (ref 41.1–50.3)
HGB BLD-MCNC: 14.5 G/DL (ref 13.6–17.2)
IMM GRANULOCYTES # BLD AUTO: 0.02 K/UL (ref 0–0.5)
IMM GRANULOCYTES NFR BLD AUTO: 0.2 % (ref 0–5)
LDH SERPL L TO P-CCNC: 167 U/L (ref 127–281)
LYMPHOCYTES # BLD: 7.46 K/UL (ref 0.5–4.6)
LYMPHOCYTES NFR BLD: 68.4 % (ref 13–44)
MCH RBC QN AUTO: 30 PG (ref 26.1–32.9)
MCHC RBC AUTO-ENTMCNC: 33.3 G/DL (ref 31.4–35)
MCV RBC AUTO: 90.1 FL (ref 82–102)
MONOCYTES # BLD: 0.8 K/UL (ref 0.1–1.3)
MONOCYTES NFR BLD: 7.3 % (ref 4–12)
NEUTS SEG # BLD: 2.5 K/UL (ref 1.7–8.2)
NEUTS SEG NFR BLD: 22.9 % (ref 43–78)
NRBC # BLD: 0 K/UL (ref 0–0.2)
PLATELET # BLD AUTO: 209 K/UL (ref 150–450)
PLATELET COMMENT: ADEQUATE
PMV BLD AUTO: 10.5 FL (ref 9.4–12.3)
POTASSIUM SERPL-SCNC: 4.1 MMOL/L (ref 3.5–5.1)
PROT SERPL-MCNC: 6 G/DL (ref 6.3–8.2)
RBC # BLD AUTO: 4.83 M/UL (ref 4.23–5.6)
RBC MORPH BLD: ABNORMAL
SODIUM SERPL-SCNC: 139 MMOL/L (ref 136–145)
URATE SERPL-MCNC: 6.1 MG/DL (ref 3.9–8.2)
WBC # BLD AUTO: 10.9 K/UL (ref 4.3–11.1)
WBC MORPH BLD: ABNORMAL

## 2025-05-28 PROCEDURE — 80053 COMPREHEN METABOLIC PANEL: CPT

## 2025-05-28 PROCEDURE — 84550 ASSAY OF BLOOD/URIC ACID: CPT

## 2025-05-28 PROCEDURE — 99215 OFFICE O/P EST HI 40 MIN: CPT | Performed by: INTERNAL MEDICINE

## 2025-05-28 PROCEDURE — 85025 COMPLETE CBC W/AUTO DIFF WBC: CPT

## 2025-05-28 PROCEDURE — 83615 LACTATE (LD) (LDH) ENZYME: CPT

## 2025-05-28 PROCEDURE — 36415 COLL VENOUS BLD VENIPUNCTURE: CPT

## 2025-05-28 PROCEDURE — 1123F ACP DISCUSS/DSCN MKR DOCD: CPT | Performed by: INTERNAL MEDICINE

## 2025-05-28 RX ORDER — IBRUTINIB 420 MG/1
420 TABLET, FILM COATED ORAL DAILY
Qty: 30 TABLET | Refills: 5 | Status: ACTIVE | OUTPATIENT
Start: 2025-05-28

## 2025-05-28 NOTE — PATIENT INSTRUCTIONS
Patient Information from Today's Visit    The members of your Oncology Medical Home are listed below:    Physician Provider: Rhiannon Sr Medical Oncologist  Advanced Practice Clinician: Noemi Figueroa NP  Medical Assistant: Nahid CARDONA CMA  :Donita MERCHANT  Supportive Care Services: Edouard MANN LMSW    Diagnosis (Information Sheet Provided on Day of Diagnosis): CLL    Follow Up Instructions:   Labs reviewed   Continue the Imbrutinb as prescribed.  Please remember the next time you come to bring your medications.  We will plan to see you back in 2 months. If you need anything prior please do not hesitate to call our office.  Has Treatment Plan Been Finalized? No    Current Labs:   Hospital Outpatient Visit on 05/28/2025   Component Date Value Ref Range Status    WBC 05/28/2025 10.9  4.3 - 11.1 K/uL Final    PERIPHERAL REVIEW TO FOLLOW    RBC 05/28/2025 4.83  4.23 - 5.6 M/uL Final    Hemoglobin 05/28/2025 14.5  13.6 - 17.2 g/dL Final    Hematocrit 05/28/2025 43.5  41.1 - 50.3 % Final    MCV 05/28/2025 90.1  82.0 - 102.0 FL Final    MCH 05/28/2025 30.0  26.1 - 32.9 PG Final    MCHC 05/28/2025 33.3  31.4 - 35.0 g/dL Final    RDW 05/28/2025 14.6  11.9 - 14.6 % Final    Platelets 05/28/2025 209  150 - 450 K/uL Final    MPV 05/28/2025 10.5  9.4 - 12.3 FL Final    nRBC 05/28/2025 0.00  0.0 - 0.2 K/uL Final    **Note: Absolute NRBC parameter is now reported with Hemogram**    Differential Type 05/28/2025 PENDING   Incomplete    Sodium 05/28/2025 139  136 - 145 mmol/L Final    Potassium 05/28/2025 4.1  3.5 - 5.1 mmol/L Final    Chloride 05/28/2025 103  98 - 107 mmol/L Final    CO2 05/28/2025 24  20 - 29 mmol/L Final    Anion Gap 05/28/2025 12  7 - 16 mmol/L Final    Glucose 05/28/2025 107 (H)  70 - 99 mg/dL Final    Comment: <70 mg/dL Consistent with, but not fully diagnostic of hypoglycemia.  100 - 125 mg/dL Impaired fasting glucose/consistent with pre-diabetes mellitus.  > 126 mg/dl Fasting glucose consistent

## 2025-05-28 NOTE — PROGRESS NOTES
[facility]: Office Visit Progress Note    Chief Complaint:    CLL    History of Present Illness:  80 y.o. male past medical history of COPD, hyperlipidemia, hypertension and CLL [facility] who presented to ER on 8/6/2023 with coughing and excessive mucus, chest x-ray showed left lower lobe atelectasis with small effusion present, admitted with pneumonia of left lower lobe and started azithromycin, ceftriaxone, oxygen, consulted oncology due to concern of ibrutinib causes immunosuppression given current pneumonia.    Interim history update in A/P.        Review of Systems:  Constitutional Denies fever, chills, weight loss, appetite changes, fatigue, night sweats.   HEENT Denies trauma, blurry vision, hearing loss, ear pain, nosebleeds, sore throat, neck pain and ear discharge.    Skin Denies lesions or rashes.   Lungs dyspnea, cough, sputum production.  Denies hemoptysis.   Cardiovascular Denies chest pain, palpitations, or lower extremity edema.   Gastrointestinal Denies nausea, vomiting, changes in bowel habits, bloody or black stools, abdominal pain.    Denies dysuria, frequency or hesitancy of urination.   Neuro Denies headaches, visual changes or ataxia. Denies dizziness, tingling, tremors, sensory change, speech change, focal weakness or headaches.      Hematology History of CLL.  Skin bruising.  Denies bleeding, denies gingival bleeding or epistaxis.   Endo Denies heat/cold intolerance, denies diabetes or thyroid abnormalities.   MSK Denies back pain, arthralgias, myalgias or frequent falls.      Psychiatric/Behavioral Denies depression and substance abuse. The patient is not nervous/anxious.           Allergies   Allergen Reactions    Ranitidine Hcl Hives     Past Medical History:   Diagnosis Date    Acute encephalopathy 6/8/2023    Hyperlipidemia     Hypertension      Past Surgical History:   Procedure Laterality Date    UPPER GASTROINTESTINAL ENDOSCOPY N/A

## 2025-06-13 ENCOUNTER — OFFICE VISIT (OUTPATIENT)
Dept: PULMONOLOGY | Age: 80
End: 2025-06-13
Payer: OTHER GOVERNMENT

## 2025-06-13 VITALS
HEART RATE: 110 BPM | RESPIRATION RATE: 20 BRPM | SYSTOLIC BLOOD PRESSURE: 98 MMHG | DIASTOLIC BLOOD PRESSURE: 62 MMHG | BODY MASS INDEX: 25.76 KG/M2 | TEMPERATURE: 97.5 F | HEIGHT: 62 IN | OXYGEN SATURATION: 95 % | WEIGHT: 140 LBS

## 2025-06-13 DIAGNOSIS — T17.908A ASPIRATION INTO AIRWAY, INITIAL ENCOUNTER: ICD-10-CM

## 2025-06-13 DIAGNOSIS — Z87.891 PERSONAL HISTORY OF TOBACCO USE, PRESENTING HAZARDS TO HEALTH: ICD-10-CM

## 2025-06-13 DIAGNOSIS — Z87.891 PERSONAL HISTORY OF TOBACCO USE: ICD-10-CM

## 2025-06-13 DIAGNOSIS — T17.500A MUCUS PLUGGING OF BRONCHI: Primary | ICD-10-CM

## 2025-06-13 DIAGNOSIS — R13.10 DYSPHAGIA, UNSPECIFIED TYPE: ICD-10-CM

## 2025-06-13 DIAGNOSIS — J44.9 CHRONIC OBSTRUCTIVE PULMONARY DISEASE, UNSPECIFIED COPD TYPE (HCC): ICD-10-CM

## 2025-06-13 PROCEDURE — G0296 VISIT TO DETERM LDCT ELIG: HCPCS | Performed by: INTERNAL MEDICINE

## 2025-06-13 PROCEDURE — G2211 COMPLEX E/M VISIT ADD ON: HCPCS | Performed by: INTERNAL MEDICINE

## 2025-06-13 PROCEDURE — 1123F ACP DISCUSS/DSCN MKR DOCD: CPT | Performed by: INTERNAL MEDICINE

## 2025-06-13 PROCEDURE — 99214 OFFICE O/P EST MOD 30 MIN: CPT | Performed by: INTERNAL MEDICINE

## 2025-06-13 RX ORDER — ALBUTEROL SULFATE 90 UG/1
2 INHALANT RESPIRATORY (INHALATION) EVERY 4 HOURS PRN
Qty: 1 EACH | Refills: 3 | Status: ON HOLD | OUTPATIENT
Start: 2025-06-13

## 2025-06-13 RX ORDER — ALBUTEROL SULFATE 0.83 MG/ML
2.5 SOLUTION RESPIRATORY (INHALATION) EVERY 4 HOURS PRN
Qty: 120 EACH | Refills: 1 | Status: ON HOLD | OUTPATIENT
Start: 2025-06-13

## 2025-06-13 RX ORDER — FLUTICASONE PROPIONATE AND SALMETEROL 250; 50 UG/1; UG/1
1 POWDER RESPIRATORY (INHALATION) EVERY 12 HOURS
Qty: 3 EACH | Refills: 3 | Status: ON HOLD | OUTPATIENT
Start: 2025-06-13

## 2025-06-13 RX ORDER — ROFLUMILAST 500 UG/1
500 TABLET ORAL DAILY
Qty: 30 TABLET | Refills: 11 | Status: CANCELLED | OUTPATIENT
Start: 2025-06-13

## 2025-06-13 RX ORDER — SODIUM CHLORIDE FOR INHALATION 3 %
4 VIAL, NEBULIZER (ML) INHALATION 2 TIMES DAILY
Qty: 240 ML | Refills: 11 | Status: ON HOLD | OUTPATIENT
Start: 2025-06-13

## 2025-06-13 NOTE — PROGRESS NOTES
(CRESTOR) 40 mg, Oral, NIGHTLY    sertraline (ZOLOFT) 100 MG tablet 1 tablet, Every Day    sodium chloride, Inhalant, 3 % nebulizer solution 4 mLs, Nebulization, 2 times daily, J44.9    tamsulosin (FLOMAX) 0.4 mg, Oral, DAILY    tiotropium (SPIRIVA RESPIMAT) 5 mcg, Inhalation, DAILY RESP, Dx code j44.9    traMADol (ULTRAM) 50 MG tablet 1 tablet, DAILY PRN    venlafaxine (EFFEXOR) 75 mg, 3 TIMES DAILY

## 2025-06-14 ENCOUNTER — APPOINTMENT (OUTPATIENT)
Dept: GENERAL RADIOLOGY | Age: 80
End: 2025-06-14
Payer: OTHER GOVERNMENT

## 2025-06-14 ENCOUNTER — HOSPITAL ENCOUNTER (INPATIENT)
Age: 80
LOS: 6 days | Discharge: HOME HEALTH | End: 2025-06-20
Attending: EMERGENCY MEDICINE | Admitting: FAMILY MEDICINE
Payer: OTHER GOVERNMENT

## 2025-06-14 DIAGNOSIS — J18.9 PNEUMONIA DUE TO INFECTIOUS ORGANISM, UNSPECIFIED LATERALITY, UNSPECIFIED PART OF LUNG: Primary | ICD-10-CM

## 2025-06-14 DIAGNOSIS — I63.50 CEREBROVASCULAR ACCIDENT (CVA) DUE TO OCCLUSION OF CEREBRAL ARTERY (HCC): ICD-10-CM

## 2025-06-14 DIAGNOSIS — I48.0 PAF (PAROXYSMAL ATRIAL FIBRILLATION) (HCC): ICD-10-CM

## 2025-06-14 DIAGNOSIS — I48.91 ATRIAL FIBRILLATION WITH RVR (HCC): ICD-10-CM

## 2025-06-14 PROBLEM — J84.9 BILATERAL INTERSTITIAL PNEUMONIA (HCC): Status: ACTIVE | Noted: 2025-06-14

## 2025-06-14 LAB
ALBUMIN SERPL-MCNC: 3.3 G/DL (ref 3.2–4.6)
ALBUMIN/GLOB SERPL: 1.5 (ref 1–1.9)
ALP SERPL-CCNC: 71 U/L (ref 40–129)
ALT SERPL-CCNC: 14 U/L (ref 8–55)
ANION GAP SERPL CALC-SCNC: 12 MMOL/L (ref 7–16)
AST SERPL-CCNC: 23 U/L (ref 15–37)
BASOPHILS # BLD: 0.02 K/UL (ref 0–0.2)
BASOPHILS NFR BLD: 0.2 % (ref 0–2)
BILIRUB SERPL-MCNC: 0.8 MG/DL (ref 0–1.2)
BUN SERPL-MCNC: 26 MG/DL (ref 8–23)
CALCIUM SERPL-MCNC: 8.3 MG/DL (ref 8.8–10.2)
CHLORIDE SERPL-SCNC: 103 MMOL/L (ref 98–107)
CO2 SERPL-SCNC: 20 MMOL/L (ref 20–29)
CREAT SERPL-MCNC: 0.86 MG/DL (ref 0.8–1.3)
DIFFERENTIAL METHOD BLD: ABNORMAL
EOSINOPHIL # BLD: 0 K/UL (ref 0–0.8)
EOSINOPHIL NFR BLD: 0 % (ref 0.5–7.8)
ERYTHROCYTE [DISTWIDTH] IN BLOOD BY AUTOMATED COUNT: 15.3 % (ref 11.9–14.6)
GLOBULIN SER CALC-MCNC: 2.2 G/DL (ref 2.3–3.5)
GLUCOSE SERPL-MCNC: 104 MG/DL (ref 70–99)
HCT VFR BLD AUTO: 40.1 % (ref 41.1–50.3)
HGB BLD-MCNC: 13.7 G/DL (ref 13.6–17.2)
IMM GRANULOCYTES # BLD AUTO: 0.05 K/UL (ref 0–0.5)
IMM GRANULOCYTES NFR BLD AUTO: 0.5 % (ref 0–5)
LACTATE SERPL-SCNC: 1 MMOL/L (ref 0.5–2)
LYMPHOCYTES # BLD: 2.99 K/UL (ref 0.5–4.6)
LYMPHOCYTES NFR BLD: 30.8 % (ref 13–44)
MCH RBC QN AUTO: 30 PG (ref 26.1–32.9)
MCHC RBC AUTO-ENTMCNC: 34.2 G/DL (ref 31.4–35)
MCV RBC AUTO: 87.7 FL (ref 82–102)
MONOCYTES # BLD: 0.5 K/UL (ref 0.1–1.3)
MONOCYTES NFR BLD: 5.2 % (ref 4–12)
NEUTS SEG # BLD: 6.14 K/UL (ref 1.7–8.2)
NEUTS SEG NFR BLD: 63.3 % (ref 43–78)
NRBC # BLD: 0 K/UL (ref 0–0.2)
NT PRO BNP: 104 PG/ML (ref 0–450)
PLATELET # BLD AUTO: 146 K/UL (ref 150–450)
PMV BLD AUTO: 12.4 FL (ref 9.4–12.3)
POTASSIUM SERPL-SCNC: 4 MMOL/L (ref 3.5–5.1)
PROCALCITONIN SERPL-MCNC: 0.04 NG/ML (ref 0–0.1)
PROT SERPL-MCNC: 5.4 G/DL (ref 6.3–8.2)
RBC # BLD AUTO: 4.57 M/UL (ref 4.23–5.6)
SODIUM SERPL-SCNC: 135 MMOL/L (ref 136–145)
TROPONIN T SERPL HS-MCNC: 11 NG/L (ref 0–22)
WBC # BLD AUTO: 9.7 K/UL (ref 4.3–11.1)

## 2025-06-14 PROCEDURE — 94760 N-INVAS EAR/PLS OXIMETRY 1: CPT

## 2025-06-14 PROCEDURE — 3E033XZ INTRODUCTION OF VASOPRESSOR INTO PERIPHERAL VEIN, PERCUTANEOUS APPROACH: ICD-10-PCS | Performed by: FAMILY MEDICINE

## 2025-06-14 PROCEDURE — 85025 COMPLETE CBC W/AUTO DIFF WBC: CPT

## 2025-06-14 PROCEDURE — 83605 ASSAY OF LACTIC ACID: CPT

## 2025-06-14 PROCEDURE — 2580000003 HC RX 258: Performed by: EMERGENCY MEDICINE

## 2025-06-14 PROCEDURE — 6370000000 HC RX 637 (ALT 250 FOR IP): Performed by: EMERGENCY MEDICINE

## 2025-06-14 PROCEDURE — 84145 PROCALCITONIN (PCT): CPT

## 2025-06-14 PROCEDURE — 80053 COMPREHEN METABOLIC PANEL: CPT

## 2025-06-14 PROCEDURE — 2700000000 HC OXYGEN THERAPY PER DAY

## 2025-06-14 PROCEDURE — 83880 ASSAY OF NATRIURETIC PEPTIDE: CPT

## 2025-06-14 PROCEDURE — 85379 FIBRIN DEGRADATION QUANT: CPT

## 2025-06-14 PROCEDURE — 6360000002 HC RX W HCPCS: Performed by: EMERGENCY MEDICINE

## 2025-06-14 PROCEDURE — 84484 ASSAY OF TROPONIN QUANT: CPT

## 2025-06-14 PROCEDURE — 99285 EMERGENCY DEPT VISIT HI MDM: CPT

## 2025-06-14 PROCEDURE — 1100000000 HC RM PRIVATE

## 2025-06-14 PROCEDURE — 71045 X-RAY EXAM CHEST 1 VIEW: CPT

## 2025-06-14 PROCEDURE — 0202U NFCT DS 22 TRGT SARS-COV-2: CPT

## 2025-06-14 PROCEDURE — 96365 THER/PROPH/DIAG IV INF INIT: CPT

## 2025-06-14 PROCEDURE — 93005 ELECTROCARDIOGRAM TRACING: CPT | Performed by: EMERGENCY MEDICINE

## 2025-06-14 PROCEDURE — 87040 BLOOD CULTURE FOR BACTERIA: CPT

## 2025-06-14 RX ORDER — ACETAMINOPHEN 500 MG
1000 TABLET ORAL
Status: COMPLETED | OUTPATIENT
Start: 2025-06-14 | End: 2025-06-14

## 2025-06-14 RX ORDER — IPRATROPIUM BROMIDE AND ALBUTEROL SULFATE 2.5; .5 MG/3ML; MG/3ML
1 SOLUTION RESPIRATORY (INHALATION) ONCE
Status: COMPLETED | OUTPATIENT
Start: 2025-06-14 | End: 2025-06-14

## 2025-06-14 RX ORDER — 0.9 % SODIUM CHLORIDE 0.9 %
1000 INTRAVENOUS SOLUTION INTRAVENOUS ONCE
Status: COMPLETED | OUTPATIENT
Start: 2025-06-14 | End: 2025-06-14

## 2025-06-14 RX ADMIN — SODIUM CHLORIDE 1000 ML: 0.9 INJECTION, SOLUTION INTRAVENOUS at 22:51

## 2025-06-14 RX ADMIN — IPRATROPIUM BROMIDE AND ALBUTEROL SULFATE 1 DOSE: 2.5; .5 SOLUTION RESPIRATORY (INHALATION) at 23:10

## 2025-06-14 RX ADMIN — ACETAMINOPHEN 1000 MG: 500 TABLET, FILM COATED ORAL at 22:48

## 2025-06-14 RX ADMIN — PIPERACILLIN AND TAZOBACTAM 4500 MG: 4; .5 INJECTION, POWDER, LYOPHILIZED, FOR SOLUTION INTRAVENOUS at 22:53

## 2025-06-14 ASSESSMENT — PAIN - FUNCTIONAL ASSESSMENT: PAIN_FUNCTIONAL_ASSESSMENT: NONE - DENIES PAIN

## 2025-06-14 ASSESSMENT — LIFESTYLE VARIABLES
HOW MANY STANDARD DRINKS CONTAINING ALCOHOL DO YOU HAVE ON A TYPICAL DAY: 1 OR 2
HOW OFTEN DO YOU HAVE A DRINK CONTAINING ALCOHOL: MONTHLY OR LESS

## 2025-06-15 PROBLEM — I95.9 HYPOTENSION: Status: ACTIVE | Noted: 2025-06-15

## 2025-06-15 PROBLEM — I48.0 PAROXYSMAL ATRIAL FIBRILLATION (HCC): Status: ACTIVE | Noted: 2025-06-15

## 2025-06-15 PROBLEM — A41.9 SEPTIC SHOCK (HCC): Status: ACTIVE | Noted: 2025-06-15

## 2025-06-15 PROBLEM — J44.9 STAGE 4 VERY SEVERE COPD BY GOLD CLASSIFICATION (HCC): Chronic | Status: RESOLVED | Noted: 2020-11-08 | Resolved: 2025-06-15

## 2025-06-15 PROBLEM — J44.9 MODERATE COPD (CHRONIC OBSTRUCTIVE PULMONARY DISEASE) (HCC): Status: ACTIVE | Noted: 2025-06-15

## 2025-06-15 PROBLEM — R65.21 SEPTIC SHOCK (HCC): Status: ACTIVE | Noted: 2025-06-15

## 2025-06-15 PROBLEM — R13.12 OROPHARYNGEAL DYSPHAGIA: Status: ACTIVE | Noted: 2025-06-15

## 2025-06-15 PROBLEM — J18.9 PNEUMONIA DUE TO INFECTIOUS ORGANISM: Status: ACTIVE | Noted: 2023-08-14

## 2025-06-15 PROBLEM — I48.91 ATRIAL FIBRILLATION WITH RVR (HCC): Status: ACTIVE | Noted: 2025-06-15

## 2025-06-15 PROBLEM — J84.9 BILATERAL INTERSTITIAL PNEUMONIA (HCC): Status: RESOLVED | Noted: 2025-06-14 | Resolved: 2025-06-15

## 2025-06-15 LAB
ANION GAP SERPL CALC-SCNC: 11 MMOL/L (ref 7–16)
APTT PPP: 71.4 SEC (ref 23.3–37.4)
APTT PPP: >200 SEC (ref 23.3–37.4)
B PERT DNA SPEC QL NAA+PROBE: NOT DETECTED
BASOPHILS # BLD: 0.01 K/UL (ref 0–0.2)
BASOPHILS NFR BLD: 0.1 % (ref 0–2)
BORDETELLA PARAPERTUSSIS BY PCR: NOT DETECTED
BUN SERPL-MCNC: 23 MG/DL (ref 8–23)
C PNEUM DNA SPEC QL NAA+PROBE: NOT DETECTED
CALCIUM SERPL-MCNC: 7.5 MG/DL (ref 8.8–10.2)
CHLORIDE SERPL-SCNC: 108 MMOL/L (ref 98–107)
CO2 SERPL-SCNC: 18 MMOL/L (ref 20–29)
CREAT SERPL-MCNC: 0.87 MG/DL (ref 0.8–1.3)
D DIMER PPP FEU-MCNC: 0.29 UG/ML(FEU)
DIFFERENTIAL METHOD BLD: ABNORMAL
EKG ATRIAL RATE: 102 BPM
EKG DIAGNOSIS: NORMAL
EKG Q-T INTERVAL: 309 MS
EKG QRS DURATION: 93 MS
EKG QTC CALCULATION (BAZETT): 407 MS
EKG R AXIS: 56 DEGREES
EKG T AXIS: -21 DEGREES
EKG VENTRICULAR RATE: 104 BPM
EOSINOPHIL # BLD: 0 K/UL (ref 0–0.8)
EOSINOPHIL NFR BLD: 0 % (ref 0.5–7.8)
ERYTHROCYTE [DISTWIDTH] IN BLOOD BY AUTOMATED COUNT: 15.3 % (ref 11.9–14.6)
FLUAV SUBTYP SPEC NAA+PROBE: NOT DETECTED
FLUBV RNA SPEC QL NAA+PROBE: NOT DETECTED
GLUCOSE SERPL-MCNC: 121 MG/DL (ref 70–99)
HADV DNA SPEC QL NAA+PROBE: NOT DETECTED
HCOV 229E RNA SPEC QL NAA+PROBE: NOT DETECTED
HCOV HKU1 RNA SPEC QL NAA+PROBE: NOT DETECTED
HCOV NL63 RNA SPEC QL NAA+PROBE: NOT DETECTED
HCOV OC43 RNA SPEC QL NAA+PROBE: NOT DETECTED
HCT VFR BLD AUTO: 35.2 % (ref 41.1–50.3)
HGB BLD-MCNC: 12 G/DL (ref 13.6–17.2)
HMPV RNA SPEC QL NAA+PROBE: NOT DETECTED
HPIV1 RNA SPEC QL NAA+PROBE: NOT DETECTED
HPIV2 RNA SPEC QL NAA+PROBE: NOT DETECTED
HPIV3 RNA SPEC QL NAA+PROBE: NOT DETECTED
HPIV4 RNA SPEC QL NAA+PROBE: NOT DETECTED
IMM GRANULOCYTES # BLD AUTO: 0.05 K/UL (ref 0–0.5)
IMM GRANULOCYTES NFR BLD AUTO: 0.5 % (ref 0–5)
INR PPP: 1.5
LACTATE SERPL-SCNC: 1.2 MMOL/L (ref 0.5–2)
LYMPHOCYTES # BLD: 3.17 K/UL (ref 0.5–4.6)
LYMPHOCYTES NFR BLD: 30.6 % (ref 13–44)
M PNEUMO DNA SPEC QL NAA+PROBE: NOT DETECTED
MCH RBC QN AUTO: 30 PG (ref 26.1–32.9)
MCHC RBC AUTO-ENTMCNC: 34.1 G/DL (ref 31.4–35)
MCV RBC AUTO: 88 FL (ref 82–102)
MONOCYTES # BLD: 0.57 K/UL (ref 0.1–1.3)
MONOCYTES NFR BLD: 5.5 % (ref 4–12)
NEUTS SEG # BLD: 6.56 K/UL (ref 1.7–8.2)
NEUTS SEG NFR BLD: 63.3 % (ref 43–78)
NRBC # BLD: 0 K/UL (ref 0–0.2)
PLATELET # BLD AUTO: 130 K/UL (ref 150–450)
PMV BLD AUTO: 12.6 FL (ref 9.4–12.3)
POTASSIUM SERPL-SCNC: 3.6 MMOL/L (ref 3.5–5.1)
PROTHROMBIN TIME: 18.9 SEC (ref 11.3–14.9)
RBC # BLD AUTO: 4 M/UL (ref 4.23–5.6)
RSV RNA SPEC QL NAA+PROBE: NOT DETECTED
RV+EV RNA SPEC QL NAA+PROBE: NOT DETECTED
SARS-COV-2 RNA RESP QL NAA+PROBE: NOT DETECTED
SODIUM SERPL-SCNC: 137 MMOL/L (ref 136–145)
UFH PPP CHRO-ACNC: >1.1 IU/ML (ref 0.3–0.7)
WBC # BLD AUTO: 10.4 K/UL (ref 4.3–11.1)

## 2025-06-15 PROCEDURE — 2700000000 HC OXYGEN THERAPY PER DAY

## 2025-06-15 PROCEDURE — 94669 MECHANICAL CHEST WALL OSCILL: CPT

## 2025-06-15 PROCEDURE — 94640 AIRWAY INHALATION TREATMENT: CPT

## 2025-06-15 PROCEDURE — 2500000003 HC RX 250 WO HCPCS: Performed by: FAMILY MEDICINE

## 2025-06-15 PROCEDURE — 85730 THROMBOPLASTIN TIME PARTIAL: CPT

## 2025-06-15 PROCEDURE — 6360000002 HC RX W HCPCS: Performed by: FAMILY MEDICINE

## 2025-06-15 PROCEDURE — 2580000003 HC RX 258: Performed by: FAMILY MEDICINE

## 2025-06-15 PROCEDURE — 2140000000 HC CCU INTERMEDIATE R&B

## 2025-06-15 PROCEDURE — 83605 ASSAY OF LACTIC ACID: CPT

## 2025-06-15 PROCEDURE — 6370000000 HC RX 637 (ALT 250 FOR IP): Performed by: FAMILY MEDICINE

## 2025-06-15 PROCEDURE — 2580000003 HC RX 258: Performed by: NURSE PRACTITIONER

## 2025-06-15 PROCEDURE — 92610 EVALUATE SWALLOWING FUNCTION: CPT

## 2025-06-15 PROCEDURE — 97165 OT EVAL LOW COMPLEX 30 MIN: CPT

## 2025-06-15 PROCEDURE — 93010 ELECTROCARDIOGRAM REPORT: CPT | Performed by: INTERNAL MEDICINE

## 2025-06-15 PROCEDURE — 6370000000 HC RX 637 (ALT 250 FOR IP): Performed by: INTERNAL MEDICINE

## 2025-06-15 PROCEDURE — 80048 BASIC METABOLIC PNL TOTAL CA: CPT

## 2025-06-15 PROCEDURE — 6360000002 HC RX W HCPCS: Performed by: NURSE PRACTITIONER

## 2025-06-15 PROCEDURE — 36415 COLL VENOUS BLD VENIPUNCTURE: CPT

## 2025-06-15 PROCEDURE — 2580000003 HC RX 258: Performed by: INTERNAL MEDICINE

## 2025-06-15 PROCEDURE — 85610 PROTHROMBIN TIME: CPT

## 2025-06-15 PROCEDURE — 85520 HEPARIN ASSAY: CPT

## 2025-06-15 PROCEDURE — 6360000002 HC RX W HCPCS: Performed by: INTERNAL MEDICINE

## 2025-06-15 PROCEDURE — 99223 1ST HOSP IP/OBS HIGH 75: CPT | Performed by: INTERNAL MEDICINE

## 2025-06-15 PROCEDURE — 85025 COMPLETE CBC W/AUTO DIFF WBC: CPT

## 2025-06-15 PROCEDURE — 94761 N-INVAS EAR/PLS OXIMETRY MLT: CPT

## 2025-06-15 PROCEDURE — 97535 SELF CARE MNGMENT TRAINING: CPT

## 2025-06-15 RX ORDER — MAGNESIUM SULFATE IN WATER 40 MG/ML
2000 INJECTION, SOLUTION INTRAVENOUS PRN
Status: DISCONTINUED | OUTPATIENT
Start: 2025-06-15 | End: 2025-06-20 | Stop reason: HOSPADM

## 2025-06-15 RX ORDER — ASPIRIN 81 MG/1
81 TABLET, CHEWABLE ORAL DAILY
Status: DISCONTINUED | OUTPATIENT
Start: 2025-06-15 | End: 2025-06-20 | Stop reason: HOSPADM

## 2025-06-15 RX ORDER — PANTOPRAZOLE SODIUM 40 MG/1
40 TABLET, DELAYED RELEASE ORAL
Status: DISCONTINUED | OUTPATIENT
Start: 2025-06-15 | End: 2025-06-20 | Stop reason: HOSPADM

## 2025-06-15 RX ORDER — SODIUM CHLORIDE 9 MG/ML
INJECTION, SOLUTION INTRAVENOUS PRN
Status: DISCONTINUED | OUTPATIENT
Start: 2025-06-15 | End: 2025-06-20 | Stop reason: HOSPADM

## 2025-06-15 RX ORDER — SODIUM CHLORIDE 9 MG/ML
INJECTION, SOLUTION INTRAVENOUS CONTINUOUS
Status: ACTIVE | OUTPATIENT
Start: 2025-06-15 | End: 2025-06-16

## 2025-06-15 RX ORDER — ACETAMINOPHEN 650 MG/1
650 SUPPOSITORY RECTAL EVERY 6 HOURS PRN
Status: DISCONTINUED | OUTPATIENT
Start: 2025-06-15 | End: 2025-06-20 | Stop reason: HOSPADM

## 2025-06-15 RX ORDER — HEPARIN SODIUM 10000 [USP'U]/100ML
5-30 INJECTION, SOLUTION INTRAVENOUS CONTINUOUS
Status: DISCONTINUED | OUTPATIENT
Start: 2025-06-15 | End: 2025-06-17

## 2025-06-15 RX ORDER — ONDANSETRON 4 MG/1
4 TABLET, ORALLY DISINTEGRATING ORAL EVERY 8 HOURS PRN
Status: DISCONTINUED | OUTPATIENT
Start: 2025-06-15 | End: 2025-06-20 | Stop reason: HOSPADM

## 2025-06-15 RX ORDER — GUAIFENESIN 600 MG/1
1200 TABLET, EXTENDED RELEASE ORAL 2 TIMES DAILY
Status: DISCONTINUED | OUTPATIENT
Start: 2025-06-15 | End: 2025-06-20 | Stop reason: HOSPADM

## 2025-06-15 RX ORDER — METOPROLOL TARTRATE 1 MG/ML
5 INJECTION, SOLUTION INTRAVENOUS ONCE
Status: DISCONTINUED | OUTPATIENT
Start: 2025-06-15 | End: 2025-06-20 | Stop reason: HOSPADM

## 2025-06-15 RX ORDER — ASPIRIN 81 MG/1
81 TABLET, CHEWABLE ORAL DAILY
Status: DISCONTINUED | OUTPATIENT
Start: 2025-06-16 | End: 2025-06-15

## 2025-06-15 RX ORDER — ACETYLCYSTEINE 200 MG/ML
600 SOLUTION ORAL; RESPIRATORY (INHALATION) 2 TIMES DAILY
Status: DISPENSED | OUTPATIENT
Start: 2025-06-15 | End: 2025-06-17

## 2025-06-15 RX ORDER — BUSPIRONE HYDROCHLORIDE 10 MG/1
10 TABLET ORAL 3 TIMES DAILY
Status: DISCONTINUED | OUTPATIENT
Start: 2025-06-15 | End: 2025-06-20 | Stop reason: HOSPADM

## 2025-06-15 RX ORDER — PANTOPRAZOLE SODIUM 40 MG/1
40 TABLET, DELAYED RELEASE ORAL
Status: DISCONTINUED | OUTPATIENT
Start: 2025-06-16 | End: 2025-06-15

## 2025-06-15 RX ORDER — TAMSULOSIN HYDROCHLORIDE 0.4 MG/1
0.4 CAPSULE ORAL DAILY
Status: DISCONTINUED | OUTPATIENT
Start: 2025-06-16 | End: 2025-06-20 | Stop reason: HOSPADM

## 2025-06-15 RX ORDER — ACETAMINOPHEN 325 MG/1
650 TABLET ORAL EVERY 6 HOURS PRN
Status: DISCONTINUED | OUTPATIENT
Start: 2025-06-15 | End: 2025-06-20 | Stop reason: HOSPADM

## 2025-06-15 RX ORDER — SODIUM CHLORIDE 0.9 % (FLUSH) 0.9 %
5-40 SYRINGE (ML) INJECTION EVERY 12 HOURS SCHEDULED
Status: DISCONTINUED | OUTPATIENT
Start: 2025-06-15 | End: 2025-06-20 | Stop reason: HOSPADM

## 2025-06-15 RX ORDER — SERTRALINE HYDROCHLORIDE 100 MG/1
100 TABLET, FILM COATED ORAL DAILY
Status: DISCONTINUED | OUTPATIENT
Start: 2025-06-16 | End: 2025-06-15 | Stop reason: SDUPTHER

## 2025-06-15 RX ORDER — 0.9 % SODIUM CHLORIDE 0.9 %
500 INTRAVENOUS SOLUTION INTRAVENOUS ONCE
Status: COMPLETED | OUTPATIENT
Start: 2025-06-15 | End: 2025-06-15

## 2025-06-15 RX ORDER — TRAMADOL HYDROCHLORIDE 50 MG/1
50 TABLET ORAL DAILY PRN
Status: DISCONTINUED | OUTPATIENT
Start: 2025-06-15 | End: 2025-06-20 | Stop reason: HOSPADM

## 2025-06-15 RX ORDER — POTASSIUM CHLORIDE 1500 MG/1
40 TABLET, EXTENDED RELEASE ORAL PRN
Status: DISCONTINUED | OUTPATIENT
Start: 2025-06-15 | End: 2025-06-20 | Stop reason: HOSPADM

## 2025-06-15 RX ORDER — POLYETHYLENE GLYCOL 3350 17 G/17G
17 POWDER, FOR SOLUTION ORAL DAILY PRN
Status: DISCONTINUED | OUTPATIENT
Start: 2025-06-15 | End: 2025-06-20 | Stop reason: HOSPADM

## 2025-06-15 RX ORDER — SODIUM CHLORIDE 0.9 % (FLUSH) 0.9 %
5-40 SYRINGE (ML) INJECTION PRN
Status: DISCONTINUED | OUTPATIENT
Start: 2025-06-15 | End: 2025-06-20 | Stop reason: HOSPADM

## 2025-06-15 RX ORDER — NOREPINEPHRINE BITARTRATE 0.02 MG/ML
1-100 INJECTION, SOLUTION INTRAVENOUS CONTINUOUS
Status: DISCONTINUED | OUTPATIENT
Start: 2025-06-15 | End: 2025-06-15

## 2025-06-15 RX ORDER — HEPARIN SODIUM 1000 [USP'U]/ML
60 INJECTION, SOLUTION INTRAVENOUS; SUBCUTANEOUS PRN
Status: DISCONTINUED | OUTPATIENT
Start: 2025-06-15 | End: 2025-06-17

## 2025-06-15 RX ORDER — SODIUM CHLORIDE FOR INHALATION 3 %
4 VIAL, NEBULIZER (ML) INHALATION 2 TIMES DAILY
Status: DISCONTINUED | OUTPATIENT
Start: 2025-06-15 | End: 2025-06-20 | Stop reason: HOSPADM

## 2025-06-15 RX ORDER — HEPARIN SODIUM 1000 [USP'U]/ML
60 INJECTION, SOLUTION INTRAVENOUS; SUBCUTANEOUS ONCE
Status: COMPLETED | OUTPATIENT
Start: 2025-06-15 | End: 2025-06-15

## 2025-06-15 RX ORDER — SODIUM CHLORIDE FOR INHALATION 3 %
4 VIAL, NEBULIZER (ML) INHALATION 2 TIMES DAILY
Status: DISCONTINUED | OUTPATIENT
Start: 2025-06-15 | End: 2025-06-15 | Stop reason: SDUPTHER

## 2025-06-15 RX ORDER — POTASSIUM CHLORIDE 7.45 MG/ML
10 INJECTION INTRAVENOUS PRN
Status: DISCONTINUED | OUTPATIENT
Start: 2025-06-15 | End: 2025-06-20 | Stop reason: HOSPADM

## 2025-06-15 RX ORDER — ROSUVASTATIN CALCIUM 20 MG/1
40 TABLET, COATED ORAL NIGHTLY
Status: DISCONTINUED | OUTPATIENT
Start: 2025-06-15 | End: 2025-06-20 | Stop reason: HOSPADM

## 2025-06-15 RX ORDER — MIDODRINE HYDROCHLORIDE 5 MG/1
5 TABLET ORAL
Status: DISCONTINUED | OUTPATIENT
Start: 2025-06-15 | End: 2025-06-20 | Stop reason: HOSPADM

## 2025-06-15 RX ORDER — HEPARIN SODIUM 1000 [USP'U]/ML
30 INJECTION, SOLUTION INTRAVENOUS; SUBCUTANEOUS PRN
Status: DISCONTINUED | OUTPATIENT
Start: 2025-06-15 | End: 2025-06-17

## 2025-06-15 RX ORDER — ONDANSETRON 2 MG/ML
4 INJECTION INTRAMUSCULAR; INTRAVENOUS EVERY 6 HOURS PRN
Status: DISCONTINUED | OUTPATIENT
Start: 2025-06-15 | End: 2025-06-20 | Stop reason: HOSPADM

## 2025-06-15 RX ORDER — VENLAFAXINE 75 MG/1
75 TABLET ORAL 3 TIMES DAILY
Status: DISCONTINUED | OUTPATIENT
Start: 2025-06-15 | End: 2025-06-15 | Stop reason: SDUPTHER

## 2025-06-15 RX ORDER — METOPROLOL TARTRATE 1 MG/ML
5 INJECTION, SOLUTION INTRAVENOUS EVERY 4 HOURS PRN
Status: DISCONTINUED | OUTPATIENT
Start: 2025-06-15 | End: 2025-06-20 | Stop reason: HOSPADM

## 2025-06-15 RX ORDER — ROSUVASTATIN CALCIUM 20 MG/1
40 TABLET, COATED ORAL NIGHTLY
Status: DISCONTINUED | OUTPATIENT
Start: 2025-06-16 | End: 2025-06-15

## 2025-06-15 RX ORDER — DULOXETIN HYDROCHLORIDE 60 MG/1
60 CAPSULE, DELAYED RELEASE ORAL DAILY
Status: DISCONTINUED | OUTPATIENT
Start: 2025-06-16 | End: 2025-06-20 | Stop reason: HOSPADM

## 2025-06-15 RX ORDER — GUAIFENESIN 600 MG/1
1200 TABLET, EXTENDED RELEASE ORAL 2 TIMES DAILY
Status: DISCONTINUED | OUTPATIENT
Start: 2025-06-15 | End: 2025-06-15

## 2025-06-15 RX ORDER — ALBUTEROL SULFATE 0.83 MG/ML
2.5 SOLUTION RESPIRATORY (INHALATION) EVERY 4 HOURS PRN
Status: DISCONTINUED | OUTPATIENT
Start: 2025-06-15 | End: 2025-06-20 | Stop reason: HOSPADM

## 2025-06-15 RX ADMIN — Medication 4 ML: at 15:31

## 2025-06-15 RX ADMIN — AMIODARONE HYDROCHLORIDE 1 MG/MIN: 50 INJECTION, SOLUTION INTRAVENOUS at 10:18

## 2025-06-15 RX ADMIN — IPRATROPIUM BROMIDE 0.5 MG: 0.5 SOLUTION RESPIRATORY (INHALATION) at 09:02

## 2025-06-15 RX ADMIN — IPRATROPIUM BROMIDE 0.5 MG: 0.5 SOLUTION RESPIRATORY (INHALATION) at 19:50

## 2025-06-15 RX ADMIN — PIPERACILLIN AND TAZOBACTAM 3375 MG: 3; .375 INJECTION, POWDER, FOR SOLUTION INTRAVENOUS at 08:40

## 2025-06-15 RX ADMIN — PIPERACILLIN AND TAZOBACTAM 3375 MG: 3; .375 INJECTION, POWDER, FOR SOLUTION INTRAVENOUS at 21:26

## 2025-06-15 RX ADMIN — SODIUM CHLORIDE 500 ML: 0.9 INJECTION, SOLUTION INTRAVENOUS at 00:28

## 2025-06-15 RX ADMIN — HEPARIN SODIUM 12 UNITS/KG/HR: 10000 INJECTION, SOLUTION INTRAVENOUS at 10:15

## 2025-06-15 RX ADMIN — SODIUM CHLORIDE: 0.9 INJECTION, SOLUTION INTRAVENOUS at 17:29

## 2025-06-15 RX ADMIN — SODIUM CHLORIDE: 0.9 INJECTION, SOLUTION INTRAVENOUS at 05:06

## 2025-06-15 RX ADMIN — AMIODARONE HYDROCHLORIDE 0.5 MG/MIN: 50 INJECTION, SOLUTION INTRAVENOUS at 18:34

## 2025-06-15 RX ADMIN — PIPERACILLIN AND TAZOBACTAM 3375 MG: 3; .375 INJECTION, POWDER, FOR SOLUTION INTRAVENOUS at 15:07

## 2025-06-15 RX ADMIN — SODIUM CHLORIDE, PRESERVATIVE FREE 10 ML: 5 INJECTION INTRAVENOUS at 21:32

## 2025-06-15 RX ADMIN — ASPIRIN 81 MG: 81 TABLET, CHEWABLE ORAL at 09:05

## 2025-06-15 RX ADMIN — ARFORMOTEROL TARTRATE: 15 SOLUTION RESPIRATORY (INHALATION) at 09:02

## 2025-06-15 RX ADMIN — SODIUM CHLORIDE, PRESERVATIVE FREE 10 ML: 5 INJECTION INTRAVENOUS at 08:41

## 2025-06-15 RX ADMIN — ARFORMOTEROL TARTRATE: 15 SOLUTION RESPIRATORY (INHALATION) at 19:50

## 2025-06-15 RX ADMIN — Medication 4 ML: at 09:02

## 2025-06-15 RX ADMIN — IPRATROPIUM BROMIDE 0.5 MG: 0.5 SOLUTION RESPIRATORY (INHALATION) at 11:20

## 2025-06-15 RX ADMIN — HEPARIN SODIUM 1900 UNITS: 1000 INJECTION INTRAVENOUS; SUBCUTANEOUS at 19:03

## 2025-06-15 RX ADMIN — IPRATROPIUM BROMIDE 0.5 MG: 0.5 SOLUTION RESPIRATORY (INHALATION) at 15:31

## 2025-06-15 RX ADMIN — MIDODRINE HYDROCHLORIDE 5 MG: 5 TABLET ORAL at 09:05

## 2025-06-15 RX ADMIN — MIDODRINE HYDROCHLORIDE 5 MG: 5 TABLET ORAL at 15:59

## 2025-06-15 RX ADMIN — MIDODRINE HYDROCHLORIDE 5 MG: 5 TABLET ORAL at 12:25

## 2025-06-15 RX ADMIN — SODIUM CHLORIDE 2 MCG/MIN: 0.9 INJECTION, SOLUTION INTRAVENOUS at 01:32

## 2025-06-15 RX ADMIN — HEPARIN SODIUM 3800 UNITS: 1000 INJECTION INTRAVENOUS; SUBCUTANEOUS at 10:12

## 2025-06-15 RX ADMIN — DEXTROSE 150 MG: 50 INJECTION, SOLUTION INTRAVENOUS at 10:03

## 2025-06-15 ASSESSMENT — PAIN SCALES - GENERAL
PAINLEVEL_OUTOF10: 0
PAINLEVEL_OUTOF10: 5
PAINLEVEL_OUTOF10: 0

## 2025-06-15 NOTE — ED TRIAGE NOTES
Patient arrives via ems from home. Patient called EMS due to chest pain, dizziness, and not feeling well. Patient met SIRS criteria with EMS. Seen at his pulmonologist for bacterial infection in his lungs due to aspiration. Patient was in A-fib RVR with EMS. . 18 G Left antecubital. 500 mL of normal saline given by EMS.

## 2025-06-15 NOTE — PROGRESS NOTES
GOALS:  LTG: Patient will maintain adequate hydration/nutrition with optimum safety and efficiency of swallowing function with PO intake without overt signs or symptoms of aspiration for the highest appropriate diet level.  STG:  Patient will safely ingest diet trials during therapeutic feedings with SLP without overt signs or symptoms of respiratory compromise in efforts to advance diet.  Patient will complete a Modified Barium Swallow study to fully assess physiology and anatomy of the swallow and determine safest appropriate diet and/or rehabilitation strategies, as medically indicated.    SPEECH LANGUAGE PATHOLOGY: DYSPHAGIA Initial Assessment    Acknowledge Order  I  Therapy Time  I   Charges     I  Rehab Caseload Tracker      NAME: Jonathan Zurita  : 1945  MRN: [id number]    ADMISSION DATE: 2025  PRIMARY DIAGNOSIS: Bilateral interstitial pneumonia (HCC)    ICD-10: Treatment Diagnosis: R13.12 Dysphagia, Oropharyngeal Phase    RECOMMENDATIONS   Diet:    NPO  NPO    Medication: non-oral - CRITICAL meds only as tolerated   Compensatory Swallowing Strategies:   Remain upright for 30-45 minutes after meals  Upright as possible for all oral intake   Therapeutic Intervention:   Patient/family education  Dysphagia treatment   Patient continues to require skilled intervention:  Yes. Recommend ongoing speech therapy services during this hospitalization.     Anticipated Discharge Needs: Ongoing speech therapy is recommended at next level of care.      ASSESSMENT    Patient presents with moderately severe oropharyngeal dysphagia characterized by overt s/sx with any/all consistencies presented with coughing, increased work of breathing and decreased oxygen saturation with presentations of thin liquids, mildly thick liquids and pudding.     Recommend NPO with modified barium swallow study tomorrow. CRITICAL medication only by mouth as tolerated. MD aware.    GENERAL    Subjective: Pleasant, recalls previous

## 2025-06-15 NOTE — ASSESSMENT & PLAN NOTE
- Patient does have a h/o PAF for which he has previously seen [facility]iology for.  He has been stable, so yearly follow up was recommended  - Continue home eliquis  - PRN lopressor for sustained HR>120  - Consult to Cardiology

## 2025-06-15 NOTE — ED PROVIDER NOTES
Emergency Department Provider Note       [facility] EMERGENCY DEPT   PCP: Brian Davies DO   Age: 80 y.o.   Sex: male     DISPOSITION Decision To Admit 06/14/2025 11:44:27 PM    ICD-10-CM    1. Pneumonia due to infectious organism, unspecified laterality, unspecified part of lung  J18.9           Medical Decision Making     Pt comes to the ED for evaluation of persistent cough, shortness of breath and epigastric/chest pressure that began this evening.  Pt reports cough and SOB has been ongoing for 'years'.  On arrival, pt in afib with RVR (on Eliquis 5 mg BID).  Pt not in respiratory distress.  Pt with O2 sat of 95% on 2L NC (does not ordinarily wear oxygen).    EKG with Afib, , no STEMI.  CXR with LLL infiltrate.  Pt started on IVF and Zosyn.  CBC without leukocytosis.  Stable H/H.  CMP unremarkable.  Lactic Acid wnl.  Procalcitonin wnl.      Hospitalist messaged to evaluate for admission.      1 chronic illness with exacerbation.    Over the counter drug management performed.  Prescription drug management performed.  Shared medical decision making was utilized in creating the patients health plan today.    I independently ordered and reviewed each unique test.    I reviewed external records: provider visit note from outside specialist.     Pt seen by pulmonologist yesterday.  Office note reviewed. Hx of mucous plugging.     Echo on 6/13/23:    Left Ventricle: Normal left ventricular systolic function with a visually estimated EF of 55 - 60%. Left ventricle size is normal. Mildly increased wall thickness. Normal wall motion. Normal diastolic function.    Mitral Valve: Mild regurgitation.    Tricuspid Valve: The estimated RVSP is 26 mmHg.    Interatrial Septum: Agitated saline study was negative with and without provocation.    EKG on 1/19/25 with reported sinus rhythm.    ED cardiac monitoring rhythm strip was ordered and interpreted:  atrial fibrillation rate controlled  ST Segments:Nonspecific ST segments - NO  Type AUTOMATED      Neutrophils % 63.3 43.0 - 78.0 %    Lymphocytes % 30.8 13.0 - 44.0 %    Monocytes % 5.2 4.0 - 12.0 %    Eosinophils % 0.0 (L) 0.5 - 7.8 %    Basophils % 0.2 0.0 - 2.0 %    Immature Granulocytes % 0.5 0.0 - 5.0 %    Neutrophils Absolute 6.14 1.70 - 8.20 K/UL    Lymphocytes Absolute 2.99 0.50 - 4.60 K/UL    Monocytes Absolute 0.50 0.10 - 1.30 K/UL    Eosinophils Absolute 0.00 0.00 - 0.80 K/UL    Basophils Absolute 0.02 0.00 - 0.20 K/UL    Immature Granulocytes Absolute 0.05 0.0 - 0.5 K/UL   CMP   Result Value Ref Range    Sodium 135 (L) 136 - 145 mmol/L    Potassium 4.0 3.5 - 5.1 mmol/L    Chloride 103 98 - 107 mmol/L    CO2 20 20 - 29 mmol/L    Anion Gap 12 7 - 16 mmol/L    Glucose 104 (H) 70 - 99 mg/dL    BUN 26 (H) 8 - 23 MG/DL    Creatinine 0.86 0.80 - 1.30 MG/DL    Est, Glom Filt Rate 88 >60 ml/min/1.73m2    Calcium 8.3 (L) 8.8 - 10.2 MG/DL    Total Bilirubin 0.8 0.0 - 1.2 MG/DL    ALT 14 8 - 55 U/L    AST 23 15 - 37 U/L    Alk Phosphatase 71 40 - 129 U/L    Total Protein 5.4 (L) 6.3 - 8.2 g/dL    Albumin 3.3 3.2 - 4.6 g/dL    Globulin 2.2 (L) 2.3 - 3.5 g/dL    Albumin/Globulin Ratio 1.5 1.0 - 1.9     Procalcitonin   Result Value Ref Range    Procalcitonin 0.04 0.00 - 0.10 ng/mL   Brain Natriuretic Peptide   Result Value Ref Range    NT Pro- 0 - 450 PG/ML   Troponin   Result Value Ref Range    Troponin T 11.0 0 - 22 ng/L   EKG 12 Lead (SOB)   Result Value Ref Range    Ventricular Rate 104 BPM    Atrial Rate 102 BPM    QRS Duration 93 ms    Q-T Interval 309 ms    QTc Calculation (Bazett) 407 ms    R Axis 56 degrees    T Axis -21 degrees    Diagnosis       Atrial fibrillation  Borderline repolarization abnormality           XR CHEST PORTABLE   Final Result   Bibasilar pulmonary opacities consistent with atelectasis or   pneumonia.         Electronically signed by Som Parker MD                   No results for input(s): \"COVID19\" in the last 72 hours.     Voice dictation software

## 2025-06-15 NOTE — PROGRESS NOTES
ACUTE OCCUPATIONAL THERAPY GOALS:   (Developed with and agreed upon by patient and/or caregiver.)  1. Pt will toilet with SBA   2. Pt will complete functional mobility for ADLs with SBA using AD as needed  3. Pt will complete lower body dressing with SBA using AE as needed  4. Pt will complete grooming and hygiene at sink with SBA  5. Pt will demonstrate independence with HEP to promote increased BUE strength and functional use for ADLs  6. Pt will tolerate 23 minutes functional activity with 1-2  rest breaks to promote increased endurance for ADLs  7. Pt will independently demonstrate/ verbalize 2+ energy conservation techniques to promote increased endurance for ADLs      Timeframe: 7 visits      OCCUPATIONAL THERAPY Initial Assessment and Daily Note       OT Visit Days: 1  Acknowledge Orders  Time  OT Charge Capture  Rehab Caseload Tracker      Jonathan Zurita is a 80 y.o. male   PRIMARY DIAGNOSIS: Bilateral interstitial pneumonia (HCC)  Pneumonia due to infectious organism, unspecified laterality, unspecified part of lung [J18.9]  Bilateral interstitial pneumonia (HCC) [J84.9]       Reason for Referral: Generalized Muscle Weakness (M62.81)  Inpatient: Payor: [employer] / Plan: [employer] / Product Type: *No Product type* /     ASSESSMENT:     REHAB RECOMMENDATIONS:   Recommendation to date pending progress:  Setting:  Home Health Therapy    Equipment:    Rolling Walker     ASSESSMENT:  Mr. Zurita was admitted w/ B pna and septic shock. Pt presented with deficits in activity tolerance, mobility, balance, and overall strength impacting ADLs. Pt required CGA overall using rolling walker, endorsed fatigue with activity. SpO2 96% on room air. Pt presented below his functional baseline and would benefit from skilled OT services to address deficits. Rec HH.      [facility] AM-PAC™ “6 Clicks” Daily Activity Inpatient Short Form:    AM-PAC Daily Activity - Inpatient   How much help is needed for  interpretation of assessment, and skilled monitoring of the patient's response to treatment in order to develop a plan of care.     TREATMENT:   Self Care (10 minutes): Patient participated in grooming, functional mobility, bathroom mobility, energy conservation, and assistive device in standing with minimal verbal cueing to increase independence and increase activity tolerance. The patient and family was educated on role of occupational therapy, proper use of assistive device, recommended equipment, transfer training and safety, and energy conservation techniques during ADL/IADLS and patient and family verbalized understanding and will need reinforcement at next session.     TREATMENT GRID:  N/A    AFTER TREATMENT PRECAUTIONS: Call light within reach, Chair, Needs within reach, RN notified, and Visitors at bedside    INTERDISCIPLINARY COLLABORATION:  RN/ PCT    EDUCATION:  OT educated patient  on role of occupational therapy and plan of care, OT discharge recommendations, walker management and safety, and energy conservation techniques to use during ADL/IADL. Patient will not need continued education at next session.         TOTAL TREATMENT DURATION AND TIME:  Time In: 1305  Time Out: 1323  Minutes: 18    Miriam Lopez OT

## 2025-06-15 NOTE — PROGRESS NOTES
Hospitalist Progress Note   Admit Date:  2025 10:28 PM   Name:  Jonathan Zurita   Age:  80 y.o.  Sex:  male  :  1945   MRN:  [id number]   Room:  [facility]/    Presenting/Chief Complaint: Chest Pain     Reason(s) for Admission: Pneumonia due to infectious organism, unspecified laterality, unspecified part of lung [J18.9]  Bilateral interstitial pneumonia (HCC) [J84.9]     Hospital Course:     Jonathan Zurita is a 80 y.o. male with medical history of:    -COPD  -PAF on eliquis  -CLL  -anxiety   -CVA  -bronchiectasis   -aspiration       Admitted with aspiration pneumonia and afib RVR       .7, - meets septic shock criteria   Became hypotensive and needed levophed that is weaned in ICU after admit   Blood cx pending   Lactic ok   RVP negative   CXR bibasilar opacities   UA pending     On midodrine       Cardiology consulted for afib RVR  ECHO     He is on course of zosyn for pneumonia   He has been seen by SLP-recommends NPO with Modified barium swallow      Discharge plans pending   Lives with emmett Can     Subjective & 24hr Events:       NPO  Getting nebs   Feels weak  Short of breath  Has cough       Assessment & Plan:     Principal Problem:    Bilateral interstitial pneumonia (HCC)  Plan:   Septic shock  6-15-25  Weaned off levophed  On midodrine   D1 zosyn  Followup blood cultures   Followup UA          Stage 4 very severe COPD by GOLD classification ([facility])  Plan:   6-15-25  Pulm consult- spoke with Dr. Chapa   O2 as needed   nebs        Aspiration  Dysphagia:  6-15-25  NPO  MBS tomorrow         Chronic lymphocytic leukemia of B-cell type in remission (HCC)  Plan:   6-15-25  Follows with oncology  On treatment with ibrutinib        Paroxysmal atrial fibrillation (HCC)  Plan:   Atrial fibrillation with RVR ([facility])  Plan:   6-15-25  I discussed with Brooks Clarke NP Cardio  IV amiodarone   IV heparin   Holding eliquis       Anemia   Thrombocytopenia:  6-15-25  Trend  albuterol (PROVENTIL) (2.5 MG/3ML) 0.083% nebulizer solution 2.5 mg  2.5 mg Nebulization Q4H PRN    sodium chloride (Inhalant) 3 % nebulizer solution 4 mL  4 mL Nebulization BID    arformoterol 15 mcg-budesonide 0.5 mg neb solution   Nebulization BID RT    sodium chloride flush 0.9 % injection 5-40 mL  5-40 mL IntraVENous 2 times per day    sodium chloride flush 0.9 % injection 5-40 mL  5-40 mL IntraVENous PRN    0.9 % sodium chloride infusion   IntraVENous PRN    potassium chloride (KLOR-CON M) extended release tablet 40 mEq  40 mEq Oral PRN    Or    potassium bicarb-citric acid (EFFER-K) effervescent tablet 40 mEq  40 mEq Oral PRN    Or    potassium chloride 10 mEq/100 mL IVPB (Peripheral Line)  10 mEq IntraVENous PRN    magnesium sulfate 2000 mg in 50 mL IVPB premix  2,000 mg IntraVENous PRN    ondansetron (ZOFRAN-ODT) disintegrating tablet 4 mg  4 mg Oral Q8H PRN    Or    ondansetron (ZOFRAN) injection 4 mg  4 mg IntraVENous Q6H PRN    polyethylene glycol (GLYCOLAX) packet 17 g  17 g Oral Daily PRN    acetaminophen (TYLENOL) tablet 650 mg  650 mg Oral Q6H PRN    Or    acetaminophen (TYLENOL) suppository 650 mg  650 mg Rectal Q6H PRN    0.9 % sodium chloride infusion   IntraVENous Continuous    piperacillin-tazobactam (ZOSYN) 3,375 mg in sodium chloride 0.9 % 50 mL IVPB (addEASE)  3,375 mg IntraVENous Q8H    metoprolol (LOPRESSOR) injection 5 mg  5 mg IntraVENous Q4H PRN    ipratropium (ATROVENT) 0.02 % nebulizer solution 0.5 mg  0.5 mg Nebulization 4x Daily RT    aspirin chewable tablet 81 mg  81 mg Oral Daily    pantoprazole (PROTONIX) tablet 40 mg  40 mg Oral QAM AC    rosuvastatin (CRESTOR) tablet 40 mg  40 mg Oral Nightly    amiodarone (CORDARONE) 450 mg in dextrose 5 % 250 mL infusion ([id number])  1 mg/min IntraVENous Continuous    Followed by    amiodarone (CORDARONE) 450 mg in dextrose 5 % 250 mL infusion ([id number])  0.5 mg/min IntraVENous Continuous    heparin (porcine) injection 3,800 Units  60 Units/kg

## 2025-06-15 NOTE — PROGRESS NOTES
4 Eyes Skin Assessment     NAME:  Jonathan Zurita  YOB: 1945  MEDICAL RECORD NUMBER:  [id number]    The patient is being assessed for  Admission    I agree that at least one RN has performed a thorough Head to Toe Skin Assessment on the patient. ALL assessment sites listed below have been assessed.      Areas assessed by both nurses:    Head, Face, Ears, Shoulders, Back, Chest, Arms, Elbows, Hands, Sacrum. Buttock, Coccyx, Ischium, Legs. Feet and Heels, and Under Medical Devices         Does the Patient have a Wound? No noted wound(s)       Tutu Prevention initiated by RN: Yes  Wound Care Orders initiated by RN: No    Pressure Injury (Stage 3,4, Unstageable, DTI, NWPT, and Complex wounds) if present, place Wound referral order by RN under : No    New Ostomies, if present place, Ostomy referral order under : No     Nurse 1 eSignature: Electronically signed by DAVID LANDEROS RN on 6/15/25 at 5:02 AM EDT    **SHARE this note so that the co-signing nurse can place an eSignature**    Nurse 2 eSignature: Electronically signed by Trip Godfrey RN on 6/15/25 at 6:33 AM EDT

## 2025-06-15 NOTE — CONSULTS
PULMONARY/CRITICAL CARE CONSULT NOTE           6/15/2025    Jonathan Zurita                        Date of Admission:  6/14/2025    The patient's chart is reviewed and the patient is discussed with the staff.    Subjective:     Jonathan Zurita is an 80 y.o.  male seen and evaluated at the request of Dr. Regalado.  He has a Pmh of prior 25pkyrs tobacco abuse (quit 2019), COPD, chronic aspiration with moderately severe OP dysphagia  and was seen by Dr. Tan in office on 6/13/2025 for these concerns.  He also has CLL on ibrutinib, hypotension with chronic midodrine, BPH, atrial fib on eliquis, constipation, microhematuria.    He has had pneumonia several times:  1/2023-[facility]  4/3/2023- [facility]  8/2023- bilateral lower lobes  1/2024-COVID [facility]    He reports a more productive cough than usual.    Review of Systems: Comprehensive ROS negative except in HPI    Current Outpatient Medications   Medication Instructions    albuterol (PROVENTIL) 2.5 mg, Nebulization, EVERY 4 HOURS PRN    Albuterol Sulfate, sensor, (PROAIR DIGIHALER) 108 (90 Base) MCG/ACT AEPB 2 puffs, Inhalation, EVERY 4 HOURS PRN, Dx code j44.9    apixaban (ELIQUIS) 5 mg, Oral, 2 TIMES DAILY    aspirin 81 mg, Oral, DAILY    busPIRone (BUSPAR) 10 mg, Oral, 3 TIMES DAILY    DULoxetine (CYMBALTA) 60 mg, DAILY    fluticasone-salmeterol (WIXELA INHUB) 250-50 MCG/ACT AEPB diskus inhaler 1 puff, Inhalation, EVERY 12 HOURS    guaiFENesin (MUCINEX) 1,200 mg, 2 TIMES DAILY    Imbruvica 420 mg, Oral, DAILY    melatonin 3 mg, DAILY    midodrine (PROAMATINE) 5 mg, Oral, 3 TIMES DAILY WITH MEALS, Hold if BP > 140/80    Multiple Vitamins-Minerals (MULTIVITAMIN WITH MINERALS) tablet Once daily OTC    naproxen (NAPROSYN) 500 mg, 2 TIMES DAILY WITH MEALS    omeprazole (PRILOSEC) 20 mg, Oral, DAILY    rosuvastatin (CRESTOR) 40 mg, Oral, NIGHTLY    sertraline (ZOLOFT) 100 MG tablet 1 tablet, Every Day    sodium chloride,

## 2025-06-15 NOTE — PLAN OF CARE
Problem: Chronic Conditions and Co-morbidities  Goal: Patient's chronic conditions and co-morbidity symptoms are monitored and maintained or improved  6/15/2025 1412 by Myranda Bryant RN  Outcome: Progressing  Flowsheets  Taken 6/15/2025 1200 by Myranda Bryant RN  Care Plan - Patient's Chronic Conditions and Co-Morbidity Symptoms are Monitored and Maintained or Improved:   Monitor and assess patient's chronic conditions and comorbid symptoms for stability, deterioration, or improvement   Collaborate with multidisciplinary team to address chronic and comorbid conditions and prevent exacerbation or deterioration  Taken 6/15/2025 0730 by Lauren Bui RN  Care Plan - Patient's Chronic Conditions and Co-Morbidity Symptoms are Monitored and Maintained or Improved: Monitor and assess patient's chronic conditions and comorbid symptoms for stability, deterioration, or improvement  6/15/2025 0155 by Mimi Howard RN  Outcome: Progressing  Flowsheets (Taken 6/15/2025 0139)  Care Plan - Patient's Chronic Conditions and Co-Morbidity Symptoms are Monitored and Maintained or Improved: Monitor and assess patient's chronic conditions and comorbid symptoms for stability, deterioration, or improvement     Problem: Discharge Planning  Goal: Discharge to home or other facility with appropriate resources  6/15/2025 1412 by Myranda Bryant RN  Outcome: Progressing  Flowsheets  Taken 6/15/2025 1200 by Myranda Bryant RN  Discharge to home or other facility with appropriate resources:   Identify barriers to discharge with patient and caregiver   Arrange for needed discharge resources and transportation as appropriate  Taken 6/15/2025 0730 by Lauren Bui RN  Discharge to home or other facility with appropriate resources: Identify barriers to discharge with patient and caregiver  6/15/2025 0155 by Mimi Howard RN  Flowsheets (Taken 6/15/2025 0139)  Discharge to home or other facility with appropriate resources:

## 2025-06-15 NOTE — CONSULTS
[facility] Cardiology Initial Cardiac Evaluation                Date of  Admission: 6/14/2025 10:28 PM     PCP: Brian Davies DO  Requested by: Dr Marquez  Primary Cardiologist: Dr Walters  APC Attending: Dr Osorio    Reason for Evaluation: A Fib RVR      Jonathan Zurita is a 80 y.o. male with hx of COPD, PAF, CLL, anxiety, prior CVA, hypertension, hyperlipidemia.  Patient presents to the hospital with chest pain shortness of breath and is noted to be in atrial fibrillation RVR by EMS.  Patient heart rate improved with IV fluids was noted to have a temperature of 100.7.  Initial labs showed a Pro-Avila of 0.04 and a lactic acid of 1 with white blood cell count of 9.7.  Chest x-ray shows atelectasis with concerns for pneumonia with a history of aspiration pneumonia noted and was started on IV antibiotics admitted by hospitalist team.  Patient did have lower blood pressures with MAP below 65 was placed on IV Levophed admitted to ICU.  Patient has been weaned off of Levophed and was last noted to have normal sinus rhythm while sleeping this morning at 738 before converting back into the sinus rhythm with some controlled rates and periods of heart rate into the 130s.  Patient's been evaluated by speech and is now n.p.o. as well.  He currently has no complaints chest pain at this time.    Recent Cardiac Synopsis    Echo: 06/08/23    TRANSTHORACIC ECHOCARDIOGRAM (TTE) COMPLETE (CONTRAST/BUBBLE/3D PRN) 06/13/2023  3:37 PM (Final)    Interpretation Summary    Left Ventricle: Normal left ventricular systolic function with a visually estimated EF of 55 - 60%. Left ventricle size is normal. Mildly increased wall thickness. Normal wall motion. Normal diastolic function.    Mitral Valve: Mild regurgitation.    Tricuspid Valve: The estimated RVSP is 26 mmHg.    Interatrial Septum: Agitated saline study was negative with and without provocation.    Signed by: Caesar Espinoza MD on 6/13/2023  3:37 PM    EKG:     Holter monitor  Time: 06/14/25 10:42 PM   Result Value Ref Range    Troponin T 11.0 0 - 22 ng/L   D-Dimer, Quantitative    Collection Time: 06/14/25 10:42 PM   Result Value Ref Range    D-Dimer, Quant 0.29 <0.56 ug/ml(FEU)   Lactate, Sepsis    Collection Time: 06/15/25  1:30 AM   Result Value Ref Range    Lactic Acid, Sepsis 1.2 0.5 - 2.0 MMOL/L   Basic Metabolic Panel w/ Reflex to MG    Collection Time: 06/15/25  1:30 AM   Result Value Ref Range    Sodium 137 136 - 145 mmol/L    Potassium 3.6 3.5 - 5.1 mmol/L    Chloride 108 (H) 98 - 107 mmol/L    CO2 18 (L) 20 - 29 mmol/L    Anion Gap 11 7 - 16 mmol/L    Glucose 121 (H) 70 - 99 mg/dL    BUN 23 8 - 23 MG/DL    Creatinine 0.87 0.80 - 1.30 MG/DL    Est, Glom Filt Rate 87 >60 ml/min/1.73m2    Calcium 7.5 (L) 8.8 - 10.2 MG/DL   CBC with Auto Differential    Collection Time: 06/15/25  1:30 AM   Result Value Ref Range    WBC 10.4 4.3 - 11.1 K/uL    RBC 4.00 (L) 4.23 - 5.6 M/uL    Hemoglobin 12.0 (L) 13.6 - 17.2 g/dL    Hematocrit 35.2 (L) 41.1 - 50.3 %    MCV 88.0 82 - 102 FL    MCH 30.0 26.1 - 32.9 PG    MCHC 34.1 31.4 - 35.0 g/dL    RDW 15.3 (H) 11.9 - 14.6 %    Platelets 130 (L) 150 - 450 K/uL    MPV 12.6 (H) 9.4 - 12.3 FL    nRBC 0.00 0.0 - 0.2 K/uL    Differential Type AUTOMATED      Neutrophils % 63.3 43.0 - 78.0 %    Lymphocytes % 30.6 13.0 - 44.0 %    Monocytes % 5.5 4.0 - 12.0 %    Eosinophils % 0.0 (L) 0.5 - 7.8 %    Basophils % 0.1 0.0 - 2.0 %    Immature Granulocytes % 0.5 0.0 - 5.0 %    Neutrophils Absolute 6.56 1.70 - 8.20 K/UL    Lymphocytes Absolute 3.17 0.50 - 4.60 K/UL    Monocytes Absolute 0.57 0.10 - 1.30 K/UL    Eosinophils Absolute 0.00 0.00 - 0.80 K/UL    Basophils Absolute 0.01 0.00 - 0.20 K/UL    Immature Granulocytes Absolute 0.05 0.0 - 0.5 K/UL        XR CHEST PORTABLE  Result Date: 6/14/2025  Chest X-ray INDICATION: fever COMPARISON:  None TECHNIQUE: AP/PA view of the chest was obtained. FINDINGS: Heart size is enlarged. Bibasilar pulmonary opacities

## 2025-06-15 NOTE — ED NOTES
TRANSFER - OUT REPORT:    Verbal report given to EVELIO Le on Jonathan Zurita  being transferred to [facility] for routine progression of patient care       Report consisted of patient's Situation, Background, Assessment and   Recommendations(SBAR).     Information from the following report(s) ED SBAR was reviewed with the receiving nurse.    [place] Fall Assessment:    Presents to emergency department  because of falls (Syncope, seizure, or loss of consciousness): No  Age > 70: Yes  Altered Mental Status, Intoxication with alcohol or substance confusion (Disorientation, impaired judgment, poor safety awaremess, or inability to follow instructions): No  Impaired Mobility: Ambulates or transfers with assistive devices or assistance; Unable to ambulate or transer.: No             Lines:   Peripheral IV 06/14/25 Left Antecubital (Active)   Site Assessment Clean, dry & intact 06/15/25 0046   Line Status Blood return noted;Brisk blood return;Flushed;Normal saline locked 06/15/25 0046   Phlebitis Assessment No symptoms 06/15/25 0046   Infiltration Assessment 0 06/15/25 0046   Dressing Status Clean, dry & intact 06/15/25 0046   Dressing Type Transparent 06/15/25 0046       Peripheral IV 06/14/25 Proximal;Right;Anterior Forearm (Active)   Site Assessment Clean, dry & intact 06/15/25 0041   Line Status Infusing 06/15/25 0041   Phlebitis Assessment No symptoms 06/15/25 0041   Infiltration Assessment 0 06/15/25 0041   Dressing Status Clean, dry & intact 06/15/25 0041   Dressing Type Transparent 06/15/25 0041        Opportunity for questions and clarification was provided.      Patient transported with:  Monitor, O2 @ 2lpm, and Registered Nurse          German, Noel, RN  06/15/25 0057

## 2025-06-15 NOTE — PROGRESS NOTES
4 Eyes Skin Assessment     NAME:  Jonathan Zurita  YOB: 1945  MEDICAL RECORD NUMBER:  [id number]    The patient is being assessed for  Admission    I agree that at least one RN has performed a thorough Head to Toe Skin Assessment on the patient. ALL assessment sites listed below have been assessed.      Areas assessed by both nurses:    Head, Face, Ears, Shoulders, Back, Chest, Arms, Elbows, Hands, Sacrum. Buttock, Coccyx, Ischium, and Legs. Feet and Heels    Scattered scars and bruising; swelling noted on Left forearm from previous coband placement; allevyn in place on sacrum and assessed underneath        Does the Patient have a Wound? No noted wound(s)       Tutu Prevention initiated by RN: Yes  Wound Care Orders initiated by RN: No    Pressure Injury (Stage 3,4, Unstageable, DTI, NWPT, and Complex wounds) if present, place Wound referral order by RN under : No    New Ostomies, if present place, Ostomy referral order under : No     Nurse 1 eSignature: Electronically signed by KAREN DUMONT RN on 6/15/25 at 12:51 PM EDT    **SHARE this note so that the co-signing nurse can place an eSignature**    Nurse 2 eSignature: Electronically signed by JEFRY PRATT RN on 6/15/25 at 3:22 PM EDT

## 2025-06-15 NOTE — H&P
Hospitalist History and Physical   Admit Date:  2025 10:28 PM   Name:  Jonathan Zurita   Age:  80 y.o.  Sex:  male  :  1945   MRN:  [id number]     Presenting Complaint: chest pain, shortness of breath  Reason(s) for Admission: Pneumonia due to infectious organism, unspecified laterality, unspecified part of lung [J18.9]  Bilateral interstitial pneumonia (HCC) [J84.9]     History of Present Illness:   Jonathan Zurita is a 80 y.o. male with medical history of COPD, PAF, CLL, anxiety, prior CVA who presented to ED with chest pain and shortness of breath.  Symptoms started today.  Upon EMS evaluation, patient was found to be in afib with RVR.  HR initially improved with IVFs.  Fever with temperature of 100.7.  Labs show lactic acid of 1.0.  Procal of 0.04.  WBC of 9.7.  CXR shows:  IMPRESSION:  Bibasilar pulmonary opacities consistent with atelectasis or  pneumonia.  Patient was seen by his Pulmonologist yesterday and does have a h/o aspiration pneumonia for which a referral to SLP was placed.  Patient started on zosyn.  Hospitalist asked to admit.    While awaiting bed assignment, HR increased again to the 110s, up briefly to 130.  Then, BP started to decrease with MAP<65.  Have ordered levophed and will admit to the ICU.    Review of Systems:  10 systems reviewed and negative except as noted in HPI.  Assessment & Plan:   * Bilateral interstitial pneumonia (HCC)  Assessment & Plan  - Patient was febrile in ER, but other infectious labs appear normal  - Will continue zosyn for now  - Consult to SLP for evaluation given concern for aspiration    Hypotension  Assessment & Plan  - BP dropped in ER while awaiting telemetry bed  - Patient had not received any BP or HR reducing meds  - Started IVF bolus, but patient remained hypotensive  - Added levophed.  Admit to ICU    Paroxysmal atrial fibrillation (HCC)  Assessment & Plan  - Patient does have a h/o PAF for which he has previously seen [facility]

## 2025-06-15 NOTE — PLAN OF CARE
Problem: Respiratory - Adult  Goal: Achieves optimal ventilation and oxygenation  Outcome: Progressing  Flowsheets (Taken 6/15/2025 0214)  Achieves optimal ventilation and oxygenation:   Assess for changes in respiratory status   Assess for changes in mentation and behavior   Position to facilitate oxygenation and minimize respiratory effort   Oxygen supplementation based on oxygen saturation or arterial blood gases   Encourage broncho-pulmonary hygiene including cough, deep breathe, incentive spirometry   Assess and instruct to report shortness of breath or any respiratory difficulty   Respiratory therapy support as indicated

## 2025-06-15 NOTE — ASSESSMENT & PLAN NOTE
- BP dropped in ER while awaiting telemetry bed  - Patient had not received any BP or HR reducing meds  - Started IVF bolus, but patient remained hypotensive  - Added levophed.  Admit to ICU

## 2025-06-15 NOTE — PROGRESS NOTES
TRANSFER - OUT REPORT:    Verbal report given to EVELIO Whitmore on Jonathan Zurita  being transferred to [facility] for routine progression of patient care       Report consisted of patient's Situation, Background, Assessment and   Recommendations(SBAR).     Information from the following report(s) Nurse Handoff Report, Index, ED Encounter Summary, ED SBAR, Adult Overview, Intake/Output, MAR, Recent Results, Med Rec Status, and Cardiac Rhythm NSR was reviewed with the receiving nurse.           Lines:   Peripheral IV 06/14/25 Left Antecubital (Active)   Site Assessment Clean, dry & intact 06/15/25 0730   Line Status Capped;Flushed;Normal saline locked;Brisk blood return 06/15/25 0730   Line Care Connections checked and tightened 06/15/25 0730   Phlebitis Assessment No symptoms 06/15/25 0730   Infiltration Assessment 0 06/15/25 0730   Alcohol Cap Used Yes 06/15/25 0730   Dressing Status Clean, dry & intact 06/15/25 0730   Dressing Type Transparent 06/15/25 0730       Peripheral IV 06/14/25 Proximal;Right;Anterior Forearm (Active)   Site Assessment Clean, dry & intact 06/15/25 0730   Line Status Flushed;Infusing;Blood return noted 06/15/25 0730   Line Care Connections checked and tightened 06/15/25 0730   Phlebitis Assessment No symptoms 06/15/25 0730   Infiltration Assessment 0 06/15/25 0730   Alcohol Cap Used Yes 06/15/25 0730   Dressing Status Clean, dry & intact 06/15/25 0730   Dressing Type Transparent 06/15/25 0730        Opportunity for questions and clarification was provided.      Patient transported with:  Monitor, O2 @ 1lpm, and Registered Nurse

## 2025-06-16 ENCOUNTER — APPOINTMENT (OUTPATIENT)
Dept: GENERAL RADIOLOGY | Age: 80
End: 2025-06-16
Payer: OTHER GOVERNMENT

## 2025-06-16 ENCOUNTER — APPOINTMENT (OUTPATIENT)
Dept: NON INVASIVE DIAGNOSTICS | Age: 80
End: 2025-06-16
Attending: INTERNAL MEDICINE
Payer: OTHER GOVERNMENT

## 2025-06-16 PROBLEM — I48.0 PAROXYSMAL ATRIAL FIBRILLATION (HCC): Status: RESOLVED | Noted: 2025-06-15 | Resolved: 2025-06-16

## 2025-06-16 LAB
ANION GAP SERPL CALC-SCNC: 11 MMOL/L (ref 7–16)
APPEARANCE UR: CLEAR
APTT PPP: 62.2 SEC (ref 23.3–37.4)
APTT PPP: 65.4 SEC (ref 23.3–37.4)
APTT PPP: 78.6 SEC (ref 23.3–37.4)
BASOPHILS # BLD: 0.03 K/UL (ref 0–0.2)
BASOPHILS NFR BLD: 0.4 % (ref 0–2)
BILIRUB UR QL: NEGATIVE
BUN SERPL-MCNC: 15 MG/DL (ref 8–23)
CALCIUM SERPL-MCNC: 7.8 MG/DL (ref 8.8–10.2)
CHLORIDE SERPL-SCNC: 110 MMOL/L (ref 98–107)
CHOLEST SERPL-MCNC: 136 MG/DL (ref 0–200)
CO2 SERPL-SCNC: 19 MMOL/L (ref 20–29)
COLOR UR: ABNORMAL
CREAT SERPL-MCNC: 0.71 MG/DL (ref 0.8–1.3)
DIFFERENTIAL METHOD BLD: ABNORMAL
ECHO AO ASC DIAM: 3.3 CM
ECHO AO ASCENDING AORTA INDEX: 2.01 CM/M2
ECHO AO ROOT DIAM: 3.5 CM
ECHO AO ROOT INDEX: 2.13 CM/M2
ECHO AV AREA PEAK VELOCITY: 2.4 CM2
ECHO AV AREA VTI: 2.8 CM2
ECHO AV AREA/BSA PEAK VELOCITY: 1.5 CM2/M2
ECHO AV AREA/BSA VTI: 1.7 CM2/M2
ECHO AV MEAN GRADIENT: 4 MMHG
ECHO AV MEAN VELOCITY: 0.9 M/S
ECHO AV PEAK GRADIENT: 6 MMHG
ECHO AV PEAK GRADIENT: 6 MMHG
ECHO AV PEAK VELOCITY: 1.2 M/S
ECHO AV VELOCITY RATIO: 0.75
ECHO AV VTI: 19.5 CM
ECHO BSA: 1.67 M2
ECHO EST RA PRESSURE: 3 MMHG
ECHO IVC PROX: 1.4 CM
ECHO LA AREA 2C: 16 CM2
ECHO LA AREA 4C: 21.2 CM2
ECHO LA DIAMETER INDEX: 1.95 CM/M2
ECHO LA DIAMETER: 3.2 CM
ECHO LA MAJOR AXIS: 6.2 CM
ECHO LA MINOR AXIS: 4.8 CM
ECHO LA TO AORTIC ROOT RATIO: 0.91
ECHO LA VOL MOD A2C: 42 ML (ref 18–58)
ECHO LA VOL MOD A4C: 58 ML (ref 18–58)
ECHO LA VOLUME INDEX MOD A2C: 26 ML/M2 (ref 16–34)
ECHO LA VOLUME INDEX MOD A4C: 35 ML/M2 (ref 16–34)
ECHO LV E' LATERAL VELOCITY: 4.9 CM/S
ECHO LV E' SEPTAL VELOCITY: 5.11 CM/S
ECHO LV EDV A2C: 93 ML
ECHO LV EDV A4C: 102 ML
ECHO LV EDV INDEX A4C: 62 ML/M2
ECHO LV EDV NDEX A2C: 57 ML/M2
ECHO LV EF PHYSICIAN: 55 %
ECHO LV EJECTION FRACTION A2C: 59 %
ECHO LV EJECTION FRACTION A4C: 61 %
ECHO LV EJECTION FRACTION BIPLANE: 62 % (ref 55–100)
ECHO LV ESV A2C: 38 ML
ECHO LV ESV A4C: 40 ML
ECHO LV ESV INDEX A2C: 23 ML/M2
ECHO LV ESV INDEX A4C: 24 ML/M2
ECHO LV FRACTIONAL SHORTENING: 33 % (ref 28–44)
ECHO LV INTERNAL DIMENSION DIASTOLE INDEX: 2.38 CM/M2
ECHO LV INTERNAL DIMENSION DIASTOLIC: 3.9 CM (ref 4.2–5.9)
ECHO LV INTERNAL DIMENSION SYSTOLIC INDEX: 1.59 CM/M2
ECHO LV INTERNAL DIMENSION SYSTOLIC: 2.6 CM
ECHO LV IVSD: 0.9 CM (ref 0.6–1)
ECHO LV MASS 2D: 97.4 G (ref 88–224)
ECHO LV MASS INDEX 2D: 59.4 G/M2 (ref 49–115)
ECHO LV POSTERIOR WALL DIASTOLIC: 0.8 CM (ref 0.6–1)
ECHO LV RELATIVE WALL THICKNESS RATIO: 0.41
ECHO LVOT AREA: 3.1 CM2
ECHO LVOT AV VTI INDEX: 0.89
ECHO LVOT DIAM: 2 CM
ECHO LVOT MEAN GRADIENT: 2 MMHG
ECHO LVOT PEAK GRADIENT: 3 MMHG
ECHO LVOT PEAK VELOCITY: 0.9 M/S
ECHO LVOT STROKE VOLUME INDEX: 33.1 ML/M2
ECHO LVOT SV: 54.3 ML
ECHO LVOT VTI: 17.3 CM
ECHO MV A VELOCITY: 0.47 M/S
ECHO MV AREA VTI: 1.9 CM2
ECHO MV E DECELERATION TIME (DT): 289 MS
ECHO MV E VELOCITY: 1.11 M/S
ECHO MV E/A RATIO: 2.36
ECHO MV E/E' LATERAL: 22.65
ECHO MV E/E' RATIO (AVERAGED): 22.19
ECHO MV E/E' SEPTAL: 21.72
ECHO MV LVOT VTI INDEX: 1.65
ECHO MV MAX VELOCITY: 1 M/S
ECHO MV MEAN GRADIENT: 1 MMHG
ECHO MV MEAN VELOCITY: 0.5 M/S
ECHO MV PEAK GRADIENT: 4 MMHG
ECHO MV VTI: 28.6 CM
ECHO PULMONARY ARTERY END DIASTOLIC PRESSURE: 3 MMHG
ECHO PV ACCELERATION TIME (AT): 140 MS
ECHO PV MAX VELOCITY: 1 M/S
ECHO PV PEAK GRADIENT: 4 MMHG
ECHO PV PEAK GRADIENT: 4 MMHG
ECHO PV REGURGITANT END DIASTOLIC VELOCITY: 0.9 M/S
ECHO RIGHT VENTRICULAR SYSTOLIC PRESSURE (RVSP): 26 MMHG
ECHO RV BASAL DIMENSION: 3.4 CM
ECHO RV LONGITUDINAL DIMENSION: 5 CM
ECHO RV MID DIMENSION: 2.3 CM
ECHO RV TAPSE: 1.7 CM (ref 1.7–?)
ECHO TV REGURGITANT MAX VELOCITY: 2.39 M/S
ECHO TV REGURGITANT PEAK GRADIENT: 23 MMHG
EOSINOPHIL # BLD: 0.05 K/UL (ref 0–0.8)
EOSINOPHIL NFR BLD: 0.7 % (ref 0.5–7.8)
ERYTHROCYTE [DISTWIDTH] IN BLOOD BY AUTOMATED COUNT: 15.4 % (ref 11.9–14.6)
GLUCOSE SERPL-MCNC: 101 MG/DL (ref 70–99)
GLUCOSE UR STRIP.AUTO-MCNC: NEGATIVE MG/DL
HCT VFR BLD AUTO: 33.8 % (ref 41.1–50.3)
HDLC SERPL-MCNC: 45 MG/DL (ref 40–60)
HDLC SERPL: 3 (ref 0–5)
HGB BLD-MCNC: 11.7 G/DL (ref 13.6–17.2)
HGB UR QL STRIP: NEGATIVE
IMM GRANULOCYTES # BLD AUTO: 0.02 K/UL (ref 0–0.5)
IMM GRANULOCYTES NFR BLD AUTO: 0.3 % (ref 0–5)
KETONES UR QL STRIP.AUTO: ABNORMAL MG/DL
LDLC SERPL CALC-MCNC: 79 MG/DL (ref 0–100)
LEUKOCYTE ESTERASE UR QL STRIP.AUTO: NEGATIVE
LYMPHOCYTES # BLD: 3.09 K/UL (ref 0.5–4.6)
LYMPHOCYTES NFR BLD: 41.8 % (ref 13–44)
MAGNESIUM SERPL-MCNC: 1.8 MG/DL (ref 1.8–2.4)
MCH RBC QN AUTO: 29.9 PG (ref 26.1–32.9)
MCHC RBC AUTO-ENTMCNC: 34.6 G/DL (ref 31.4–35)
MCV RBC AUTO: 86.4 FL (ref 82–102)
MONOCYTES # BLD: 0.47 K/UL (ref 0.1–1.3)
MONOCYTES NFR BLD: 6.4 % (ref 4–12)
NEUTS SEG # BLD: 3.74 K/UL (ref 1.7–8.2)
NEUTS SEG NFR BLD: 50.4 % (ref 43–78)
NITRITE UR QL STRIP.AUTO: NEGATIVE
NRBC # BLD: 0 K/UL (ref 0–0.2)
PH UR STRIP: 6.5 (ref 5–9)
PLATELET # BLD AUTO: 129 K/UL (ref 150–450)
PMV BLD AUTO: 12 FL (ref 9.4–12.3)
POTASSIUM SERPL-SCNC: 3.3 MMOL/L (ref 3.5–5.1)
PROT UR STRIP-MCNC: NEGATIVE MG/DL
RBC # BLD AUTO: 3.91 M/UL (ref 4.23–5.6)
SODIUM SERPL-SCNC: 139 MMOL/L (ref 136–145)
SP GR UR REFRACTOMETRY: 1.01 (ref 1–1.02)
TRIGL SERPL-MCNC: 59 MG/DL (ref 0–150)
UROBILINOGEN UR QL STRIP.AUTO: 1 EU/DL (ref 0.2–1)
VLDLC SERPL CALC-MCNC: 12 MG/DL (ref 6–23)
WBC # BLD AUTO: 7.4 K/UL (ref 4.3–11.1)

## 2025-06-16 PROCEDURE — 94669 MECHANICAL CHEST WALL OSCILL: CPT

## 2025-06-16 PROCEDURE — 2500000003 HC RX 250 WO HCPCS: Performed by: FAMILY MEDICINE

## 2025-06-16 PROCEDURE — 2580000003 HC RX 258: Performed by: NURSE PRACTITIONER

## 2025-06-16 PROCEDURE — 93306 TTE W/DOPPLER COMPLETE: CPT

## 2025-06-16 PROCEDURE — 6360000002 HC RX W HCPCS: Performed by: INTERNAL MEDICINE

## 2025-06-16 PROCEDURE — 6360000002 HC RX W HCPCS: Performed by: NURSE PRACTITIONER

## 2025-06-16 PROCEDURE — 97161 PT EVAL LOW COMPLEX 20 MIN: CPT

## 2025-06-16 PROCEDURE — 6360000002 HC RX W HCPCS: Performed by: FAMILY MEDICINE

## 2025-06-16 PROCEDURE — 36415 COLL VENOUS BLD VENIPUNCTURE: CPT

## 2025-06-16 PROCEDURE — 80048 BASIC METABOLIC PNL TOTAL CA: CPT

## 2025-06-16 PROCEDURE — 92612 ENDOSCOPY SWALLOW (FEES) VID: CPT

## 2025-06-16 PROCEDURE — 6370000000 HC RX 637 (ALT 250 FOR IP): Performed by: INTERNAL MEDICINE

## 2025-06-16 PROCEDURE — 85730 THROMBOPLASTIN TIME PARTIAL: CPT

## 2025-06-16 PROCEDURE — 99232 SBSQ HOSP IP/OBS MODERATE 35: CPT | Performed by: INTERNAL MEDICINE

## 2025-06-16 PROCEDURE — 2580000003 HC RX 258: Performed by: INTERNAL MEDICINE

## 2025-06-16 PROCEDURE — 94640 AIRWAY INHALATION TREATMENT: CPT

## 2025-06-16 PROCEDURE — 2580000003 HC RX 258: Performed by: FAMILY MEDICINE

## 2025-06-16 PROCEDURE — 2140000000 HC CCU INTERMEDIATE R&B

## 2025-06-16 PROCEDURE — 85025 COMPLETE CBC W/AUTO DIFF WBC: CPT

## 2025-06-16 PROCEDURE — 6370000000 HC RX 637 (ALT 250 FOR IP): Performed by: FAMILY MEDICINE

## 2025-06-16 PROCEDURE — 80061 LIPID PANEL: CPT

## 2025-06-16 PROCEDURE — 83735 ASSAY OF MAGNESIUM: CPT

## 2025-06-16 PROCEDURE — 81003 URINALYSIS AUTO W/O SCOPE: CPT

## 2025-06-16 PROCEDURE — 94761 N-INVAS EAR/PLS OXIMETRY MLT: CPT

## 2025-06-16 PROCEDURE — 97530 THERAPEUTIC ACTIVITIES: CPT

## 2025-06-16 PROCEDURE — 92526 ORAL FUNCTION THERAPY: CPT

## 2025-06-16 RX ORDER — POTASSIUM CHLORIDE 1500 MG/1
40 TABLET, EXTENDED RELEASE ORAL ONCE
Status: DISCONTINUED | OUTPATIENT
Start: 2025-06-16 | End: 2025-06-20 | Stop reason: HOSPADM

## 2025-06-16 RX ADMIN — CEFTRIAXONE SODIUM 2000 MG: 2 INJECTION, POWDER, FOR SOLUTION INTRAMUSCULAR; INTRAVENOUS at 14:10

## 2025-06-16 RX ADMIN — IPRATROPIUM BROMIDE 0.5 MG: 0.5 SOLUTION RESPIRATORY (INHALATION) at 15:22

## 2025-06-16 RX ADMIN — POTASSIUM CHLORIDE 10 MEQ: 7.46 INJECTION, SOLUTION INTRAVENOUS at 05:55

## 2025-06-16 RX ADMIN — BUSPIRONE HYDROCHLORIDE 10 MG: 10 TABLET ORAL at 20:52

## 2025-06-16 RX ADMIN — SODIUM CHLORIDE, PRESERVATIVE FREE 10 ML: 5 INJECTION INTRAVENOUS at 20:59

## 2025-06-16 RX ADMIN — HEPARIN SODIUM 1900 UNITS: 1000 INJECTION INTRAVENOUS; SUBCUTANEOUS at 09:49

## 2025-06-16 RX ADMIN — POTASSIUM CHLORIDE 10 MEQ: 7.46 INJECTION, SOLUTION INTRAVENOUS at 04:51

## 2025-06-16 RX ADMIN — POTASSIUM CHLORIDE 10 MEQ: 7.46 INJECTION, SOLUTION INTRAVENOUS at 02:31

## 2025-06-16 RX ADMIN — ROSUVASTATIN CALCIUM 40 MG: 20 TABLET, FILM COATED ORAL at 20:52

## 2025-06-16 RX ADMIN — MIDODRINE HYDROCHLORIDE 5 MG: 5 TABLET ORAL at 11:13

## 2025-06-16 RX ADMIN — IPRATROPIUM BROMIDE 0.5 MG: 0.5 SOLUTION RESPIRATORY (INHALATION) at 07:06

## 2025-06-16 RX ADMIN — BUSPIRONE HYDROCHLORIDE 10 MG: 10 TABLET ORAL at 14:10

## 2025-06-16 RX ADMIN — POTASSIUM CHLORIDE 10 MEQ: 7.46 INJECTION, SOLUTION INTRAVENOUS at 03:53

## 2025-06-16 RX ADMIN — ARFORMOTEROL TARTRATE: 15 SOLUTION RESPIRATORY (INHALATION) at 19:26

## 2025-06-16 RX ADMIN — HEPARIN SODIUM 16 UNITS/KG/HR: 10000 INJECTION, SOLUTION INTRAVENOUS at 13:23

## 2025-06-16 RX ADMIN — IPRATROPIUM BROMIDE 0.5 MG: 0.5 SOLUTION RESPIRATORY (INHALATION) at 11:03

## 2025-06-16 RX ADMIN — Medication 4 ML: at 19:26

## 2025-06-16 RX ADMIN — SODIUM CHLORIDE, PRESERVATIVE FREE 10 ML: 5 INJECTION INTRAVENOUS at 08:18

## 2025-06-16 RX ADMIN — MIDODRINE HYDROCHLORIDE 5 MG: 5 TABLET ORAL at 17:09

## 2025-06-16 RX ADMIN — GUAIFENESIN 1200 MG: 600 TABLET, EXTENDED RELEASE ORAL at 20:52

## 2025-06-16 RX ADMIN — Medication 4 ML: at 07:06

## 2025-06-16 RX ADMIN — PIPERACILLIN AND TAZOBACTAM 3375 MG: 3; .375 INJECTION, POWDER, FOR SOLUTION INTRAVENOUS at 06:03

## 2025-06-16 RX ADMIN — ARFORMOTEROL TARTRATE: 15 SOLUTION RESPIRATORY (INHALATION) at 07:06

## 2025-06-16 RX ADMIN — ACETYLCYSTEINE 600 MG: 200 SOLUTION ORAL; RESPIRATORY (INHALATION) at 20:29

## 2025-06-16 RX ADMIN — IPRATROPIUM BROMIDE 0.5 MG: 0.5 SOLUTION RESPIRATORY (INHALATION) at 19:26

## 2025-06-16 RX ADMIN — HEPARIN SODIUM 1900 UNITS: 1000 INJECTION INTRAVENOUS; SUBCUTANEOUS at 18:04

## 2025-06-16 RX ADMIN — AMIODARONE HYDROCHLORIDE 0.5 MG/MIN: 50 INJECTION, SOLUTION INTRAVENOUS at 11:13

## 2025-06-16 RX ADMIN — ACETYLCYSTEINE 600 MG: 200 SOLUTION ORAL; RESPIRATORY (INHALATION) at 07:06

## 2025-06-16 ASSESSMENT — PAIN SCALES - GENERAL: PAINLEVEL_OUTOF10: 0

## 2025-06-16 NOTE — CARE COORDINATION
Discharge planning was discussed with the attending MD. The home health orders and rolling walker orders were confirmed with the physician.

## 2025-06-16 NOTE — ICUWATCH
RRT Clinical Rounding Nurse Progress Report      SUBJECTIVE: Patient assessed secondary to transfer from critical care.      Vitals:    06/16/25 0000 06/16/25 0007 06/16/25 0300 06/16/25 0403   BP: 132/74 121/68 (!) 143/98 119/74   Pulse: 66 72 (!) 105 72   Resp:  18  18   Temp:  98.8 °F (37.1 °C)  98.6 °F (37 °C)   TempSrc:  Oral  Oral   SpO2:  92%  93%   Weight:    63.5 kg (140 lb)   Height:            DETERIORATION INDEX SCORE: 23    ASSESSMENT:  Upon arrival to room, pt in bed awake with primary RN at bedside. Pt A&Ox4, no needs expressed at this time, states he is feeling better this evening. VSS off levo gtt. Respirations even and unlabored, bilateral crackles.    PLAN:  Will follow per RRT Clinical Rounding Program protocol.    Joan Guerin RN  [facility]: [phone]  [facility]: [phone]

## 2025-06-16 NOTE — PLAN OF CARE
Problem: Chronic Conditions and Co-morbidities  Goal: Patient's chronic conditions and co-morbidity symptoms are monitored and maintained or improved  6/15/2025 2241 by Rebeca Thomas RN  Outcome: Progressing  6/15/2025 1412 by Myranda Bryant RN  Outcome: Progressing  Flowsheets  Taken 6/15/2025 1200 by Myranda Bryant RN  Care Plan - Patient's Chronic Conditions and Co-Morbidity Symptoms are Monitored and Maintained or Improved:   Monitor and assess patient's chronic conditions and comorbid symptoms for stability, deterioration, or improvement   Collaborate with multidisciplinary team to address chronic and comorbid conditions and prevent exacerbation or deterioration  Taken 6/15/2025 0730 by Lauren Bui RN  Care Plan - Patient's Chronic Conditions and Co-Morbidity Symptoms are Monitored and Maintained or Improved: Monitor and assess patient's chronic conditions and comorbid symptoms for stability, deterioration, or improvement     Problem: Discharge Planning  Goal: Discharge to home or other facility with appropriate resources  6/15/2025 2241 by Rebeca Thomas RN  Outcome: Progressing  6/15/2025 1412 by Myranda Bryant RN  Outcome: Progressing  Flowsheets  Taken 6/15/2025 1200 by Myranda Bryant RN  Discharge to home or other facility with appropriate resources:   Identify barriers to discharge with patient and caregiver   Arrange for needed discharge resources and transportation as appropriate  Taken 6/15/2025 0730 by Lauren Bui RN  Discharge to home or other facility with appropriate resources: Identify barriers to discharge with patient and caregiver     Problem: Safety - Adult  Goal: Free from fall injury  6/15/2025 1412 by Myranda Bryant RN  Outcome: Progressing     Problem: Respiratory - Adult  Goal: Achieves optimal ventilation and oxygenation  6/15/2025 1533 by Mago Arana RCP  Outcome: Progressing  Flowsheets (Taken 6/15/2025 1533)  Achieves optimal ventilation and

## 2025-06-16 NOTE — CARE COORDINATION
06/16/25 0843   Service Assessment   Patient Orientation Alert and Oriented   Cognition Alert   History Provided By Patient   Primary Caregiver Self   Support Systems Children   PCP Verified by CM Yes   Prior Functional Level Independent in ADLs/IADLs   Current Functional Level Assistance with the following:   Can patient return to prior living arrangement Yes   Ability to make needs known: Good   Family able to assist with home care needs: Yes   Would you like for me to discuss the discharge plan with any other family members/significant others, and if so, who? Yes  (son and daughter)   Financial Resources  ([place])   Social/Functional History   Lives With Son   Type of Home Apartment  (townhouse)   Bathroom Equipment None   Home Equipment Rollator;Cane  (nebulizer)   Receives Help From Family   Prior Level of Assist for ADLs Independent   Mode of Transportation Car   Discharge Planning   Living Arrangements Children   Current Services Prior To Admission Durable Medical Equipment;Home Care   Current DME Prior to Arrival Walker   Potential Assistance Needed Home Care;Durable Medical Equipment   Potential DME Needed Walker   DME Ordered? Walker   Potential Assistance Purchasing Medications No   Patient expects to be discharged to: House   Services At/After Discharge   Transition of Care Consult (CM Consult) Home Health;Discharge Planning   Internal Home Health No   Reason Outside Agency Chosen Patient already serviced by other home care/hospice agency   Services At/After Discharge Home Health;DME   Confirm Follow Up Transport Family   Condition of Participation: Discharge Planning   The Patient and/or Patient Representative was provided with a Choice of Provider? Patient   The Patient and/Or Patient Representative agree with the Discharge Plan? Yes   Freedom of Choice list was provided with basic dialogue that supports the patient's individualized plan of care/goals, treatment preferences, and shares the

## 2025-06-16 NOTE — PROGRESS NOTES
SPIRITUAL HEALTH  Note for Med/Surg Visit                  Room # 405/01    Name: Jonathan Zurita           Age: 80 y.o.    Gender: male          MRN: [id number]  Gnosticism: Zoroastrian       Preferred Language: English    Date: 06/16/25  Visit Time: Begin Time: (P) 1020 End Time : (P) 1025 Complexity of Encounter: (P) Low      Visit Summary:  met with patient through regular rounds. He had no visitors. Patient expressed that he is Protestant and has a Scientology for support. Patient's social support includes his son and daughter.  provided active listening, discussion of illness, space for expression of feelings and spiritual support.  will follow up as necessary or at patient's request.      Referral/Consult From: (P) Rounding  Encounter Overview/Reason: (P) Spiritual/Emotional Needs  Encounter Code:     Crisis (if applicable):    Service Provided For: (P) Patient     Patient was available.    Yanni, Belief, Meaning:   Patient identifies as spiritual  is connected with a yanni tradition or spiritual practice  has beliefs or practices that help with coping during difficult times  Family/Friends No family/friends present  Rituals (if applicable)      Importance and Influence:  Patient does not have spiritual/personal beliefs that influence decisions regarding their health  Family/Friends No family/friends present    Community:  Patient   is connected with a spiritual community  Support System Includes   (P) Children   Family/Friends   No family/ friends present.    Assessment and Plan of Care:   Emotions Expressed by Patient:   Assessment: (P) Calm    Interventions by :   Intervention: (P) Active listening, Sustaining Presence/Ministry of presence, Explored/Affirmed feelings, thoughts, concerns, Discussed belief system/Spiritism practices/yanni, Explored Coping Skills/Resources     Result/ Response by Patient:   Outcome: (P) Acceptance, Comfort, Engaged in conversation, Expressed

## 2025-06-16 NOTE — PROGRESS NOTES
Flexible Endoscopic Evaluation of Swallowing (FEES) Consent Form    Explanation:  A FEES exam entails passing a flexible fiberoptic endoscope through the nose into the hypopharynx achieving a panoramic view of the pharynx and larynx. The scope remains above the level of the true vocal folds and trachea throughout the examination. A variety of foods and liquids will be given in order to observe the swallow function and address any problems. The patient's medical history has been reviewed and need for the FEES examination was judged as necessary and appropriate by the patient's physician with medical order received.     Possible adverse reactions: Epistasis, Fainting, laryngospasm, Aspiration, Sneezing, runny nose, Vasovagal episode    Benefits of FEES exam:  FEES assessment may identify a loss of airway protective reflexes as well as swallowing problems. Based on this information, recommendations will be made regarding diet modifications to prevent food and liquid from entering the airway during the swallow.     Alternatives:   Alternatives to the FEES exam are the modified barium swallow study, which entails administration of food and liquids coated with barium while undergoing x-ray. The other alternative is the bedside swallow evaluation, which involves presentation of foods and liquids while observing various symptoms suggestive of a swallowing problem and possible aspiration (food/liquid entering the trachea). A bedside swallowing evaluation is not able to fully confirm or rule out the presence of aspiration.     SLP discussed the risks, benefits, expected outcome, and alternative to the recommended procedure with the patient.  Patient was given the opportunity to ask questions about the procedure. Patient expresses understanding and wants to proceed.      Maikel Lanza M.S., CCC-SLP

## 2025-06-16 NOTE — PROGRESS NOTES
Pt potassium 3.3, NPO unable to take po potassium prn protocol. Notified Roselia Ortgea NP received orders to admin 40mEq IV.

## 2025-06-16 NOTE — PROGRESS NOTES
Physical Therapy Note:    Attempted to see patient this AM for physical therapy evaluation session. Patient working with speech therapy at the time of attempt. Will follow and re-attempt as schedule permits/patient available. Thank you,    Tushar Robles, PT     Rehab Caseload Tracker

## 2025-06-16 NOTE — PROGRESS NOTES
PULMONARY/CRITICAL CARE CONSULT NOTE           6/16/2025    Jonathan Zurita                        Date of Admission:  6/14/2025    The patient's chart is reviewed and the patient is discussed with the staff.    Subjective:     Jonathan Zurita is an 80 y.o.  male seen and evaluated at the request of Dr. Regalado.  He has a Pmh of prior 25pkyrs tobacco abuse (quit 2019), COPD, chronic aspiration with moderately severe OP dysphagia  and was seen by Dr. Tan in office on 6/13/2025 for these concerns.  He also has CLL on ibrutinib, hypotension with chronic midodrine, BPH, atrial fib on eliquis, constipation, microhematuria.    He has had pneumonia several times:  1/2023-[employer] [facility] [facility]  4/3/2023- [facility]  8/2023- bilateral lower lobes  1/2024-COVID [facility]    He reports a more productive cough than usual.    Review of Systems: Comprehensive ROS negative except in HPI    Current Outpatient Medications   Medication Instructions    albuterol (PROVENTIL) 2.5 mg, Nebulization, EVERY 4 HOURS PRN    Albuterol Sulfate, sensor, (PROAIR DIGIHALER) 108 (90 Base) MCG/ACT AEPB 2 puffs, Inhalation, EVERY 4 HOURS PRN, Dx code j44.9    apixaban (ELIQUIS) 5 mg, Oral, 2 TIMES DAILY    aspirin 81 mg, Oral, DAILY    busPIRone (BUSPAR) 10 mg, Oral, 3 TIMES DAILY    DULoxetine (CYMBALTA) 60 mg, DAILY    fluticasone-salmeterol (WIXELA INHUB) 250-50 MCG/ACT AEPB diskus inhaler 1 puff, Inhalation, EVERY 12 HOURS    guaiFENesin (MUCINEX) 1,200 mg, 2 TIMES DAILY    Imbruvica 420 mg, Oral, DAILY    melatonin 3 mg, DAILY    midodrine (PROAMATINE) 5 mg, Oral, 3 TIMES DAILY WITH MEALS, Hold if BP > 140/80    Multiple Vitamins-Minerals (MULTIVITAMIN WITH MINERALS) tablet Once daily OTC    naproxen (NAPROSYN) 500 mg, 2 TIMES DAILY WITH MEALS    omeprazole (PRILOSEC) 20 mg, Oral, DAILY    rosuvastatin (CRESTOR) 40 mg, Oral, NIGHTLY    sertraline (ZOLOFT) 100 MG tablet 1 tablet, Every Day    sodium chloride,  Inhalant, 3 % nebulizer solution 4 mLs, Nebulization, 2 times daily, J44.9    tamsulosin (FLOMAX) 0.4 mg, Oral, DAILY    tiotropium (SPIRIVA RESPIMAT) 5 mcg, Inhalation, DAILY RESP, Dx code j44.9    traMADol (ULTRAM) 50 MG tablet 1 tablet, DAILY PRN    venlafaxine (EFFEXOR) 75 mg, 3 TIMES DAILY      Past Medical History:   Diagnosis Date    Acute encephalopathy 6/8/2023    Hyperlipidemia     Hypertension      Past Surgical History:   Procedure Laterality Date    UPPER GASTROINTESTINAL ENDOSCOPY N/A 8/15/2023    EGD BIOPSY performed by Javier Moss MD at [facility] ENDOSCOPY       No family history on file.  Allergies   Allergen Reactions    Ranitidine Hcl Hives     Objective:   Blood pressure 117/66, pulse 63, temperature 98.6 °F (37 °C), temperature source Oral, resp. rate 16, height 1.575 m (5' 2\"), weight 63.5 kg (140 lb), SpO2 92%.   Intake/Output Summary (Last 24 hours) at 6/16/2025 0919  Last data filed at 6/16/2025 0816  Gross per 24 hour   Intake 2532.14 ml   Output 1550 ml   Net 982.14 ml     PHYSICAL EXAM   Constitutional:  the patient is well developed and in no acute distress. Pupils are pinpoint.  EENMT:  Sclera clear, pupils equal, oral mucosa moist  Respiratory: symmetric chest rise. Coarse with crackles posteriorly in lower lobes  Cardiovascular:  RRR without M,G,R. There is no lower extremity edema.  Gastrointestinal: soft and non-tender; with positive bowel sounds.  Musculoskeletal: warm without cyanosis. Normal muscle tone.   Skin:  no jaundice or rashes, no wounds   Neurologic: symmetric strength, fluent speech  Psychiatric:  calm, appropriate, oriented x 4    Imaging: I performed an independent interpretation of the patient's images.  CXR:        CT Chest: bibasilar posterior consolidation, suspected mucus plugging        Recent Labs     06/14/25  2242 06/15/25  0130 06/15/25  1033 06/16/25  0119   WBC 9.7 10.4  --  7.4   HGB 13.7 12.0*  --  11.7*   HCT 40.1* 35.2*  --  33.8*   * 130*  --

## 2025-06-16 NOTE — PROGRESS NOTES
ACUTE PHYSICAL THERAPY GOALS:   (Developed with and agreed upon by patient and/or caregiver.)  Pt will perform sit-to-stand/ stand-to-sit transfers Shelley in 7 therapy sessions.  Pt will ambulate 1000 ft Shelley with use of LRAD/no device and breaks as needed in 7 therapy sessions.  Pt will tolerate 25 minutes of standing activity with LRAD/no device in 7 therapy sessions.  Pt will negotiate up and down 5 steps Shelley with use of a handrail in 7 therapy sessions.  Pt will perform standing dynamic balance activities with minimal postural sway in 7 therapy sessions.  Pt will tolerate multiple sets and reps of BLE exercises in 7 therapy sessions.     PHYSICAL THERAPY Initial Assessment and AM  (Link to Caseload Tracking: PT Visit Days : 1  Acknowledge Orders  Time In/Out  PT Charge Capture  Rehab Caseload Tracker    Jonathan Zurita is a 80 y.o. male   PRIMARY DIAGNOSIS: Bilateral interstitial pneumonia (HCC)  Pneumonia due to infectious organism, unspecified laterality, unspecified part of lung [J18.9]  Bilateral interstitial pneumonia (HCC) [J84.9]       Reason for Referral: Other abnormalities of gait and mobility (R26.89)  Inpatient: Payor: [employer] / Plan: [employer] / Product Type: *No Product type* /     ASSESSMENT:     REHAB RECOMMENDATIONS:   Recommendation to date pending progress:  Setting:  Home Health Therapy    Equipment:    Rolling Walker     ASSESSMENT:  Mr. Zurita is a 80 y.o. male presenting to PT following a hospitalization due to the above diagnosis. At baseline, pt ambulates with a SPC indoors/rollator outdoors and lives with his son in a two story home (FFL) with a level entry. At time of initial evaluation, pt presents below baseline with deficits in bed mobility, strength, transfers, balance, gait and activity tolerance limiting overall functional mobility. Bed mobility not observed as pt was found seated in recliner. Prior to ambulation education was provided on RW height and use and pt

## 2025-06-16 NOTE — PROGRESS NOTES
[facility] CARDIOLOGY PROGRESS NOTE           6/16/2025 7:46 AM    Admit Date: 6/14/2025      Subjective:   Patient has converted to sinus rhythm.  Remains on intravenous amiodarone.  Appears hemodynamically stable.  Currently on IV amiodarone and heparin.    ROS:  Cardiovascular:  As noted above    Objective:      Vitals:    06/16/25 0706 06/16/25 0707 06/16/25 0708 06/16/25 0742   BP:    117/66   Pulse: (!) 112 78 71 63   Resp:   22 16   Temp:    98.6 °F (37 °C)   TempSrc:       SpO2:   90% 92%   Weight:       Height:           Physical Exam:  General-elderly white male in no acute distress  Neck- supple, no JVD  CV- regular rate and rhythm no MRG  Lung-coarse breath sounds  Abd- soft, nontender, nondistended  Ext- no edema bilaterally.  Skin- warm and dry      Data Review:   Recent Labs     06/16/25  0119 06/15/25  0130 06/14/25  2242   WBC 7.4 10.4 9.7   HGB 11.7* 12.0* 13.7   HCT 33.8* 35.2* 40.1*   MCV 86.4 88.0 87.7   * 130* 146*       Recent Labs     06/16/25  0119 06/15/25  0130 06/14/25  2242      < > 135*   K 3.3*   < > 4.0   *   < > 103   CO2 19*   < > 20   BUN 15   < > 26*   CREATININE 0.71*   < > 0.86   GLUCOSE 101*   < > 104*   CALCIUM 7.8*   < > 8.3*   BILITOT  --   --  0.8   ALKPHOS  --   --  71   AST  --   --  23   ALT  --   --  14   LABGLOM >90   < > 88   GLOB  --   --  2.2*    < > = values in this interval not displayed.       Recent Labs     06/14/25 2242   DDIMER 0.29              Assessment/Plan:     Active Hospital Problems    Chronic lymphocytic leukemia of B-cell type in remission (HCC)  Per primary team      Anxiety disorder, unspecified  Per primary team      Hypotension  Apparently on midodrine at home.  Hemodynamics appear to be stable on low-dose midodrine in the hospital.      Sepsis ([facility])  Continue antibiotic therapy for his pneumonia.      Atrial fibrillation with RVR ([facility])  Continue IV amiodarone for 24 hours.  Likely change to oral therapy

## 2025-06-16 NOTE — CARE COORDINATION
The rolling walker order and clinicals were faxed to [facility] , [phone], and are pending review.    Update 1526: EVELIO NUNES spoke with [facility] . She confirmed she received the faxed rolling walker orders and clinicals.

## 2025-06-16 NOTE — PROGRESS NOTES
63 16 117/66 92 %   06/16/25 0708 -- 71 22 -- 90 %   06/16/25 0707 -- 78 -- -- --   06/16/25 0706 -- (!) 112 -- -- --   06/16/25 0700 -- (!) 101 -- 118/76 --   06/16/25 0403 98.6 °F (37 °C) 72 18 119/74 93 %   06/16/25 0300 -- (!) 105 -- (!) 143/98 --   06/16/25 0007 98.8 °F (37.1 °C) 72 18 121/68 92 %   06/16/25 0000 -- 66 -- 132/74 --   06/15/25 2200 -- 66 -- 116/69 --   06/15/25 2023 98.4 °F (36.9 °C) 92 18 115/65 94 %   06/15/25 1950 -- 86 20 -- 93 %   06/15/25 1624 98.4 °F (36.9 °C) 63 18 (!) 111/57 94 %   06/15/25 1531 -- 55 18 -- 94 %   06/15/25 1204 98.2 °F (36.8 °C) 52 20 (!) 111/58 95 %   06/15/25 1130 -- 78 20 (!) 108/55 95 %       Oxygen Therapy  SpO2: 94 %  Pulse Oximeter Device Mode: Continuous  Pulse via Oximetry: 79 beats per minute  Pulse Oximeter Device Location: Finger  O2 Device: None (Room air)  O2 Flow Rate (L/min): (S) 1 L/min (found on 1 lpm)  Skin Assessment: Clean, dry, & intact  Skin Protection for O2 Device: No    Estimated body mass index is 25.61 kg/m² as calculated from the following:    Height as of this encounter: 1.575 m (5' 2\").    Weight as of this encounter: 63.5 kg (140 lb).    Intake/Output Summary (Last 24 hours) at 6/16/2025 1126  Last data filed at 6/16/2025 0816  Gross per 24 hour   Intake 2292.46 ml   Output 1550 ml   Net 742.46 ml         Physical Exam:     General:    Well nourished.    Head:  Normocephalic, atraumatic  Eyes:  Sclerae appear normal.  Pupils equally round.  ENT:  Nares appear normal.  Moist oral mucosa  Neck:  No restricted ROM.  Trachea midline   CV:   RRR.  No m/r/g.  No jugular venous distension.  Lungs:   Mild bibasilar crackles  Abdomen:   Soft, nontender, nondistended.  Extremities: No cyanosis or clubbing.  No edema  Skin:     No rashes.  Normal coloration.   Warm and dry.    Neuro:  CN II-XII grossly intact.    Psych:  Normal mood and affect.      I have personally reviewed labs and tests:  Recent Labs:  Recent Results (from the past 48 hours)  albuterol (PROVENTIL) (2.5 MG/3ML) 0.083% nebulizer solution 2.5 mg  2.5 mg Nebulization Q4H PRN    arformoterol 15 mcg-budesonide 0.5 mg neb solution   Nebulization BID RT    sodium chloride flush 0.9 % injection 5-40 mL  5-40 mL IntraVENous 2 times per day    sodium chloride flush 0.9 % injection 5-40 mL  5-40 mL IntraVENous PRN    0.9 % sodium chloride infusion   IntraVENous PRN    potassium chloride (KLOR-CON M) extended release tablet 40 mEq  40 mEq Oral PRN    Or    potassium bicarb-citric acid (EFFER-K) effervescent tablet 40 mEq  40 mEq Oral PRN    Or    potassium chloride 10 mEq/100 mL IVPB (Peripheral Line)  10 mEq IntraVENous PRN    magnesium sulfate 2000 mg in 50 mL IVPB premix  2,000 mg IntraVENous PRN    ondansetron (ZOFRAN-ODT) disintegrating tablet 4 mg  4 mg Oral Q8H PRN    Or    ondansetron (ZOFRAN) injection 4 mg  4 mg IntraVENous Q6H PRN    polyethylene glycol (GLYCOLAX) packet 17 g  17 g Oral Daily PRN    acetaminophen (TYLENOL) tablet 650 mg  650 mg Oral Q6H PRN    Or    acetaminophen (TYLENOL) suppository 650 mg  650 mg Rectal Q6H PRN    metoprolol (LOPRESSOR) injection 5 mg  5 mg IntraVENous Q4H PRN    ipratropium (ATROVENT) 0.02 % nebulizer solution 0.5 mg  0.5 mg Nebulization 4x Daily RT    aspirin chewable tablet 81 mg  81 mg Oral Daily    pantoprazole (PROTONIX) tablet 40 mg  40 mg Oral QAM AC    rosuvastatin (CRESTOR) tablet 40 mg  40 mg Oral Nightly    amiodarone (CORDARONE) 450 mg in dextrose 5 % 250 mL infusion (Gedq4Svy)  0.5 mg/min IntraVENous Continuous    heparin (porcine) injection 3,800 Units  60 Units/kg IntraVENous PRN    heparin (porcine) injection 1,900 Units  30 Units/kg IntraVENous PRN    heparin 25,000 units in dextrose 5% 250 mL (premix) infusion  5-30 Units/kg/hr IntraVENous Continuous    sodium chloride (Inhalant) 3 % nebulizer solution 4 mL  4 mL Nebulization BID    acetylcysteine (MUCOMYST) 20 % solution 600 mg  600 mg Inhalation BID       Signed:  PREET RUIZ,

## 2025-06-16 NOTE — ICUWATCH
RRT Clinical Rounding Nurse Progress Report      SUBJECTIVE: Patient assessed secondary to transfer from critical care.      Vitals:    06/16/25 0742 06/16/25 0800 06/16/25 0900 06/16/25 1103   BP: 117/66 119/65 121/69    Pulse: 63 63 65 61   Resp: 16   18   Temp: 98.6 °F (37 °C)      TempSrc: Oral      SpO2: 92%   95%   Weight:       Height:              ASSESSMENT:  Pt sitting up in recliner, in NAD.  Working with therapy at bedside.  Pt is A&Ox4, [place]. Follows commands.  On room air, O2 Sat 97%, RR even and unlabored.  Afib, -110s on monitor.  BP stable.  Remains on amio gtt at 0.5 mg/min and hep gtt.  Abd soft, nontender.  LBM 6/15.  SLP by and performed FEES testing.  Orders now for modified diet-- soft and bite sized, nectar thick liquids.  No other needs voiced.    PLAN:  Will discharge from RRT Clinical Rounding Program per protocol. Please call if needed.    Lauren Bui, EVELIO  [facility]: [phone]  [facility]: [phone]

## 2025-06-16 NOTE — PROGRESS NOTES
GOALS:  LTG: Patient will maintain adequate hydration/nutrition with optimum safety and efficiency of swallowing function with PO intake without overt signs or symptoms of aspiration for the highest appropriate diet level.  STG:  Patient will safely ingest diet trials during therapeutic feedings with SLP without overt signs or symptoms of respiratory compromise in efforts to advance diet. Completed 2025  Patient will complete a Modified Barium Swallow study to fully assess physiology and anatomy of the swallow and determine safest appropriate diet and/or rehabilitation strategies, as medically indicated. FEES completed 2025  Patient will consume soft and bite sized diet with mildly thick/nectar thick liquids without adverse pulmonary events. Added 2025  Patient will demonstrate use of compensatory strategies to increase safety with PO intake with min A. Added 2025       SPEECH LANGUAGE PATHOLOGY: Flexible Endoscopic Evaluation of Swallow  Initial Assessment and Dysphagia Treat #1    Acknowledge Order  I  Therapy Time  I   Charges     I  Rehab Caseload Tracker  NAME: Jonathan Zurita  : 1945  MRN: [id number]    ADMISSION DATE: 2025  PRIMARY DIAGNOSIS: Bilateral interstitial pneumonia (HCC)    ICD-10: Treatment Diagnosis: R13.12 Dysphagia, Oropharyngeal Phase    RECOMMENDATIONS   Diet:    Soft and Bite-Sized  Mildly Thick Liquids ([place])  Patient OK for ice chips for comfort.     Medication: Whole in Purees   Compensatory Swallowing Strategies:   Upright for all PO  Small bites and sips  Frequent Breaks to Support Respiratory Status    Therapeutic Intervention:   Patient/family education  Dysphagia treatment   Patient continues to require skilled intervention:  Yes. Recommend ongoing speech therapy services during this hospitalization.     Anticipated Discharge Needs: Ongoing speech therapy is recommended at next level of care.      ASSESSMENT    FEES 8:45 - 9:30: Patient does

## 2025-06-17 LAB
ANION GAP SERPL CALC-SCNC: 10 MMOL/L (ref 7–16)
APTT PPP: 66.9 SEC (ref 23.3–37.4)
APTT PPP: 77.8 SEC (ref 23.3–37.4)
APTT PPP: 80.8 SEC (ref 23.3–37.4)
APTT PPP: 96.9 SEC (ref 23.3–37.4)
APTT PPP: 97.3 SEC (ref 23.3–37.4)
BASOPHILS # BLD: 0.03 K/UL (ref 0–0.2)
BASOPHILS NFR BLD: 0.4 % (ref 0–2)
BUN SERPL-MCNC: 11 MG/DL (ref 8–23)
CALCIUM SERPL-MCNC: 8.4 MG/DL (ref 8.8–10.2)
CHLORIDE SERPL-SCNC: 110 MMOL/L (ref 98–107)
CO2 SERPL-SCNC: 21 MMOL/L (ref 20–29)
CREAT SERPL-MCNC: 0.82 MG/DL (ref 0.8–1.3)
DIFFERENTIAL METHOD BLD: ABNORMAL
EOSINOPHIL # BLD: 0.08 K/UL (ref 0–0.8)
EOSINOPHIL NFR BLD: 1.1 % (ref 0.5–7.8)
ERYTHROCYTE [DISTWIDTH] IN BLOOD BY AUTOMATED COUNT: 15.4 % (ref 11.9–14.6)
GLUCOSE SERPL-MCNC: 106 MG/DL (ref 70–99)
HCT VFR BLD AUTO: 37.5 % (ref 41.1–50.3)
HGB BLD-MCNC: 12.1 G/DL (ref 13.6–17.2)
IMM GRANULOCYTES # BLD AUTO: 0.03 K/UL (ref 0–0.5)
IMM GRANULOCYTES NFR BLD AUTO: 0.4 % (ref 0–5)
INR PPP: 1
LYMPHOCYTES # BLD: 2.78 K/UL (ref 0.5–4.6)
LYMPHOCYTES NFR BLD: 38.9 % (ref 13–44)
MCH RBC QN AUTO: 29.2 PG (ref 26.1–32.9)
MCHC RBC AUTO-ENTMCNC: 32.3 G/DL (ref 31.4–35)
MCV RBC AUTO: 90.6 FL (ref 82–102)
MONOCYTES # BLD: 0.62 K/UL (ref 0.1–1.3)
MONOCYTES NFR BLD: 8.7 % (ref 4–12)
NEUTS SEG # BLD: 3.6 K/UL (ref 1.7–8.2)
NEUTS SEG NFR BLD: 50.5 % (ref 43–78)
NRBC # BLD: 0 K/UL (ref 0–0.2)
PLATELET # BLD AUTO: 144 K/UL (ref 150–450)
PMV BLD AUTO: 11.5 FL (ref 9.4–12.3)
POTASSIUM SERPL-SCNC: 3.9 MMOL/L (ref 3.5–5.1)
PROTHROMBIN TIME: 14.1 SEC (ref 11.3–14.9)
RBC # BLD AUTO: 4.14 M/UL (ref 4.23–5.6)
SODIUM SERPL-SCNC: 141 MMOL/L (ref 136–145)
WBC # BLD AUTO: 7.1 K/UL (ref 4.3–11.1)

## 2025-06-17 PROCEDURE — 94669 MECHANICAL CHEST WALL OSCILL: CPT

## 2025-06-17 PROCEDURE — 94761 N-INVAS EAR/PLS OXIMETRY MLT: CPT

## 2025-06-17 PROCEDURE — 6360000002 HC RX W HCPCS: Performed by: INTERNAL MEDICINE

## 2025-06-17 PROCEDURE — 2580000003 HC RX 258: Performed by: FAMILY MEDICINE

## 2025-06-17 PROCEDURE — 85610 PROTHROMBIN TIME: CPT

## 2025-06-17 PROCEDURE — 6360000002 HC RX W HCPCS: Performed by: FAMILY MEDICINE

## 2025-06-17 PROCEDURE — 6360000002 HC RX W HCPCS: Performed by: NURSE PRACTITIONER

## 2025-06-17 PROCEDURE — 99232 SBSQ HOSP IP/OBS MODERATE 35: CPT | Performed by: INTERNAL MEDICINE

## 2025-06-17 PROCEDURE — 6370000000 HC RX 637 (ALT 250 FOR IP): Performed by: INTERNAL MEDICINE

## 2025-06-17 PROCEDURE — 2580000003 HC RX 258: Performed by: NURSE PRACTITIONER

## 2025-06-17 PROCEDURE — 94760 N-INVAS EAR/PLS OXIMETRY 1: CPT

## 2025-06-17 PROCEDURE — 2140000000 HC CCU INTERMEDIATE R&B

## 2025-06-17 PROCEDURE — 85025 COMPLETE CBC W/AUTO DIFF WBC: CPT

## 2025-06-17 PROCEDURE — 2580000003 HC RX 258: Performed by: INTERNAL MEDICINE

## 2025-06-17 PROCEDURE — 36415 COLL VENOUS BLD VENIPUNCTURE: CPT

## 2025-06-17 PROCEDURE — 94640 AIRWAY INHALATION TREATMENT: CPT

## 2025-06-17 PROCEDURE — 6370000000 HC RX 637 (ALT 250 FOR IP): Performed by: FAMILY MEDICINE

## 2025-06-17 PROCEDURE — 92526 ORAL FUNCTION THERAPY: CPT

## 2025-06-17 PROCEDURE — 2500000003 HC RX 250 WO HCPCS: Performed by: FAMILY MEDICINE

## 2025-06-17 PROCEDURE — 85730 THROMBOPLASTIN TIME PARTIAL: CPT

## 2025-06-17 PROCEDURE — 80048 BASIC METABOLIC PNL TOTAL CA: CPT

## 2025-06-17 RX ORDER — HEPARIN SODIUM 10000 [USP'U]/100ML
5-30 INJECTION, SOLUTION INTRAVENOUS CONTINUOUS
Status: DISCONTINUED | OUTPATIENT
Start: 2025-06-17 | End: 2025-06-17

## 2025-06-17 RX ORDER — AMIODARONE HYDROCHLORIDE 200 MG/1
200 TABLET ORAL 2 TIMES DAILY
Status: DISCONTINUED | OUTPATIENT
Start: 2025-06-17 | End: 2025-06-17

## 2025-06-17 RX ORDER — HEPARIN SODIUM 1000 [USP'U]/ML
30 INJECTION, SOLUTION INTRAVENOUS; SUBCUTANEOUS PRN
Status: DISCONTINUED | OUTPATIENT
Start: 2025-06-17 | End: 2025-06-18

## 2025-06-17 RX ORDER — HEPARIN SODIUM 1000 [USP'U]/ML
60 INJECTION, SOLUTION INTRAVENOUS; SUBCUTANEOUS PRN
Status: DISCONTINUED | OUTPATIENT
Start: 2025-06-17 | End: 2025-06-18

## 2025-06-17 RX ORDER — HEPARIN SODIUM 10000 [USP'U]/100ML
5-30 INJECTION, SOLUTION INTRAVENOUS CONTINUOUS
Status: DISCONTINUED | OUTPATIENT
Start: 2025-06-17 | End: 2025-06-18

## 2025-06-17 RX ORDER — CEFPODOXIME PROXETIL 200 MG/1
200 TABLET, FILM COATED ORAL 2 TIMES DAILY
Qty: 6 TABLET | Refills: 0 | Status: SHIPPED
Start: 2025-06-17 | End: 2025-06-20 | Stop reason: HOSPADM

## 2025-06-17 RX ORDER — AMIODARONE HYDROCHLORIDE 100 MG/1
TABLET ORAL
Qty: 116 TABLET | Refills: 0 | Status: SHIPPED
Start: 2025-06-17 | End: 2025-06-20 | Stop reason: HOSPADM

## 2025-06-17 RX ADMIN — BUSPIRONE HYDROCHLORIDE 10 MG: 10 TABLET ORAL at 08:54

## 2025-06-17 RX ADMIN — MIDODRINE HYDROCHLORIDE 5 MG: 5 TABLET ORAL at 16:07

## 2025-06-17 RX ADMIN — SODIUM CHLORIDE, PRESERVATIVE FREE 5 ML: 5 INJECTION INTRAVENOUS at 08:54

## 2025-06-17 RX ADMIN — GUAIFENESIN 1200 MG: 600 TABLET, EXTENDED RELEASE ORAL at 08:54

## 2025-06-17 RX ADMIN — HEPARIN SODIUM 1900 UNITS: 1000 INJECTION INTRAVENOUS; SUBCUTANEOUS at 07:16

## 2025-06-17 RX ADMIN — AMIODARONE HYDROCHLORIDE 0.5 MG/MIN: 50 INJECTION, SOLUTION INTRAVENOUS at 16:06

## 2025-06-17 RX ADMIN — BUSPIRONE HYDROCHLORIDE 10 MG: 10 TABLET ORAL at 13:31

## 2025-06-17 RX ADMIN — MIDODRINE HYDROCHLORIDE 5 MG: 5 TABLET ORAL at 12:24

## 2025-06-17 RX ADMIN — Medication 4 ML: at 21:01

## 2025-06-17 RX ADMIN — IPRATROPIUM BROMIDE 0.5 MG: 0.5 SOLUTION RESPIRATORY (INHALATION) at 12:07

## 2025-06-17 RX ADMIN — DULOXETINE HYDROCHLORIDE 60 MG: 60 CAPSULE, DELAYED RELEASE ORAL at 08:54

## 2025-06-17 RX ADMIN — SODIUM CHLORIDE, PRESERVATIVE FREE 10 ML: 5 INJECTION INTRAVENOUS at 20:48

## 2025-06-17 RX ADMIN — AMOXICILLIN AND CLAVULANATE POTASSIUM 1 TABLET: 875; 125 TABLET, FILM COATED ORAL at 10:52

## 2025-06-17 RX ADMIN — IPRATROPIUM BROMIDE 0.5 MG: 0.5 SOLUTION RESPIRATORY (INHALATION) at 15:36

## 2025-06-17 RX ADMIN — BUSPIRONE HYDROCHLORIDE 10 MG: 10 TABLET ORAL at 20:47

## 2025-06-17 RX ADMIN — IPRATROPIUM BROMIDE 0.5 MG: 0.5 SOLUTION RESPIRATORY (INHALATION) at 21:01

## 2025-06-17 RX ADMIN — ROSUVASTATIN CALCIUM 40 MG: 20 TABLET, FILM COATED ORAL at 20:47

## 2025-06-17 RX ADMIN — TAMSULOSIN HYDROCHLORIDE 0.4 MG: 0.4 CAPSULE ORAL at 08:54

## 2025-06-17 RX ADMIN — Medication 4 ML: at 07:40

## 2025-06-17 RX ADMIN — AMOXICILLIN AND CLAVULANATE POTASSIUM 1 TABLET: 875; 125 TABLET, FILM COATED ORAL at 20:47

## 2025-06-17 RX ADMIN — ARFORMOTEROL TARTRATE: 15 SOLUTION RESPIRATORY (INHALATION) at 07:40

## 2025-06-17 RX ADMIN — HEPARIN SODIUM 20 UNITS/KG/HR: 10000 INJECTION, SOLUTION INTRAVENOUS at 13:33

## 2025-06-17 RX ADMIN — ARFORMOTEROL TARTRATE: 15 SOLUTION RESPIRATORY (INHALATION) at 21:01

## 2025-06-17 RX ADMIN — AMIODARONE HYDROCHLORIDE 0.5 MG/MIN: 50 INJECTION, SOLUTION INTRAVENOUS at 00:27

## 2025-06-17 RX ADMIN — ALBUTEROL SULFATE 2.5 MG: 2.5 SOLUTION RESPIRATORY (INHALATION) at 07:40

## 2025-06-17 RX ADMIN — GUAIFENESIN 1200 MG: 600 TABLET, EXTENDED RELEASE ORAL at 20:47

## 2025-06-17 RX ADMIN — MIDODRINE HYDROCHLORIDE 5 MG: 5 TABLET ORAL at 08:54

## 2025-06-17 RX ADMIN — IPRATROPIUM BROMIDE 0.5 MG: 0.5 SOLUTION RESPIRATORY (INHALATION) at 07:40

## 2025-06-17 RX ADMIN — PANTOPRAZOLE SODIUM 40 MG: 40 TABLET, DELAYED RELEASE ORAL at 04:54

## 2025-06-17 RX ADMIN — ACETYLCYSTEINE 600 MG: 200 SOLUTION ORAL; RESPIRATORY (INHALATION) at 07:40

## 2025-06-17 RX ADMIN — ASPIRIN 81 MG: 81 TABLET, CHEWABLE ORAL at 08:54

## 2025-06-17 ASSESSMENT — PAIN SCALES - GENERAL
PAINLEVEL_OUTOF10: 0

## 2025-06-17 NOTE — PROGRESS NOTES
[facility] CARDIOLOGY PROGRESS NOTE           6/17/2025 7:23 AM    Admit Date: 6/14/2025      Subjective:   Patient remains in sinus rhythm.  Hemodynamically stable.  On room air currently.  Appears hemodynamically stable.  Currently on IV amiodarone and heparin.  Barium swallow showed no aspiration.    ROS:  Cardiovascular:  As noted above    Objective:      Vitals:    06/17/25 0400 06/17/25 0410 06/17/25 0500 06/17/25 0600   BP: 120/70 135/65 (!) 130/45 (!) 112/57   Pulse: 60  55 60   Resp:  18     Temp:  98.7 °F (37.1 °C)     TempSrc:  Oral     SpO2:  92%     Weight:  62.6 kg (138 lb)     Height:           Physical Exam:  General-elderly white male in no acute distress  Neck- supple, no JVD  CV- regular rate and rhythm no MRG  Lung-coarse breath sounds  Abd- soft, nontender, nondistended  Ext- no edema bilaterally.  Skin- warm and dry      Data Review:   Recent Labs     06/17/25 0628 06/16/25  0119 06/15/25  0130   WBC 7.1 7.4 10.4   HGB 12.1* 11.7* 12.0*   HCT 37.5* 33.8* 35.2*   MCV 90.6 86.4 88.0   * 129* 130*       Recent Labs     06/17/25  0628 06/15/25  0130 06/14/25 2242      < > 135*   K 3.9   < > 4.0   *   < > 103   CO2 21   < > 20   BUN 11   < > 26*   CREATININE 0.82   < > 0.86   GLUCOSE 106*   < > 104*   CALCIUM 8.4*   < > 8.3*   BILITOT  --   --  0.8   ALKPHOS  --   --  71   AST  --   --  23   ALT  --   --  14   LABGLOM 89   < > 88   GLOB  --   --  2.2*    < > = values in this interval not displayed.       Recent Labs     06/14/25 2242   DDIMER 0.29              Assessment/Plan:     Active Hospital Problems    Chronic lymphocytic leukemia of B-cell type in remission (HCC)  Per primary team      Anxiety disorder, unspecified  Per primary team      Hypotension  Apparently on midodrine at home.  Hemodynamics appear to be stable on low-dose midodrine in the hospital.      Sepsis (HCC)  Continue antibiotic therapy for his pneumonia.      Atrial fibrillation with RVR

## 2025-06-17 NOTE — PROGRESS NOTES
Jonathan Zurita  Admission Date: 6/14/2025         Daily Progress Note: 6/17/2025    The patient's chart is reviewed and the patient is discussed with the staff.    Background:   80 y.o.  male seen and evaluated at the request of Dr. Regalado.  He has a Pmh of prior 25pkyrs tobacco abuse (quit 2019), COPD, chronic aspiration with moderately severe OP dysphagia and was seen by Dr. Tan in office on 6/13/2025 for these concerns.  He also has CLL on ibrutinib, hypotension with chronic midodrine, BPH, atrial fib on eliquis, constipation, microhematuria.     He has had pneumonia several times:  1/2023-[facility]  4/3/2023- [facility]  8/2023- bilateral lower lobes  1/2024-COVID LLL    Subjective:     Sitting up in the chair. Coughing up white sputum which is chronic.  Felt weak when he came in - still feels weak but feels some better. Discussed thickener for liquids -he was not using at home.     Current Facility-Administered Medications   Medication Dose Route Frequency    amiodarone (CORDARONE) 450 mg in dextrose 5 % 250 mL infusion ([id number])  0.5 mg/min IntraVENous Continuous    heparin 25,000 units in dextrose 5% 250 mL (premix) infusion  5-30 Units/kg/hr IntraVENous Continuous    heparin (porcine) injection 1,900 Units  30 Units/kg IntraVENous PRN    heparin (porcine) injection 3,800 Units  60 Units/kg IntraVENous PRN    potassium chloride (KLOR-CON M) extended release tablet 40 mEq  40 mEq Oral Once    cefTRIAXone (ROCEPHIN) 2,000 mg in sodium chloride 0.9 % 50 mL IVPB (addEASE)  2,000 mg IntraVENous Q24H    metoprolol (LOPRESSOR) injection 5 mg  5 mg IntraVENous Once    tamsulosin (FLOMAX) capsule 0.4 mg  0.4 mg Oral Daily    melatonin tablet 3 mg  3 mg Oral Nightly PRN    DULoxetine (CYMBALTA) extended release capsule 60 mg  60 mg Oral Daily    guaiFENesin (MUCINEX) extended release tablet 1,200 mg  1,200 mg Oral BID    midodrine (PROAMATINE) tablet 5 mg  5 mg Oral  kg (138 lb), SpO2 94%.   Intake/Output Summary (Last 24 hours) at 6/17/2025 0905  Last data filed at 6/17/2025 0200  Gross per 24 hour   Intake 480 ml   Output 2050 ml   Net -1570 ml     Physical Exam:   Constitutional:  the patient is well developed and in no acute distress  EENMT:  Sclera clear, pupils equal, oral mucosa moist  Respiratory: symmetric chest rise. Clear breath sounds bilaterally. On room air  Cardiovascular:  Irregular and without M,G,R. There is no lower extremity edema.  Gastrointestinal: soft and non-tender; with positive bowel sounds.  Musculoskeletal: warm without cyanosis. Normal muscle tone.   Skin:  no jaundice or rashes, no wounds   Neurologic: symmetric strength, fluent speech  Psychiatric:  calm, appropriate, oriented x 4    Imaging: I performed an independent interpretation of the patient's images.  CXR: 6/14        LAB:  Recent Labs     06/14/25  2242 06/15/25  0130 06/15/25  1033 06/16/25  0119 06/17/25  0628   WBC 9.7 10.4  --  7.4 7.1   HGB 13.7 12.0*  --  11.7* 12.1*   HCT 40.1* 35.2*  --  33.8* 37.5*   * 130*  --  129* 144*   INR  --   --  1.5  --   --    PROCAL 0.04  --   --   --   --      Recent Labs     06/14/25  2242 06/15/25  0130 06/16/25  0119 06/17/25  0628   * 137 139 141   K 4.0 3.6 3.3* 3.9    108* 110* 110*   CO2 20 18* 19* 21   BUN 26* 23 15 11   CREATININE 0.86 0.87 0.71* 0.82   MG  --   --  1.8  --    BILITOT 0.8  --   --   --    AST 23  --   --   --    ALT 14  --   --   --    ALKPHOS 71  --   --   --      No results for input(s): \"TROPHS\", \"NTPROBNP\", \"CRP\", \"ESR\" in the last 72 hours.  Recent Labs     06/15/25  0130 06/16/25  0119 06/17/25  0628   GLUCOSE 121* 101* 106*      Microbiology:   Recent Labs     06/14/25 2242   CULTURE NO GROWTH 3 DAYS  NO GROWTH 3 DAYS     Recent Labs     06/14/25 2242   COVID19 NOT DETECTED     ECHO: 06/14/25    ECHO (TTE) COMPLETE (PRN CONTRAST/BUBBLE/STRAIN/3D) 06/16/2025  3:10 PM (Final)    Interpretation

## 2025-06-17 NOTE — PLAN OF CARE
Problem: Chronic Conditions and Co-morbidities  Goal: Patient's chronic conditions and co-morbidity symptoms are monitored and maintained or improved  Outcome: Progressing     Problem: Discharge Planning  Goal: Discharge to home or other facility with appropriate resources  Outcome: Progressing  Flowsheets (Taken 6/16/2025 4075)  Discharge to home or other facility with appropriate resources:   Identify barriers to discharge with patient and caregiver   Arrange for needed discharge resources and transportation as appropriate   Identify discharge learning needs (meds, wound care, etc)

## 2025-06-17 NOTE — PLAN OF CARE
Problem: Chronic Conditions and Co-morbidities  Goal: Patient's chronic conditions and co-morbidity symptoms are monitored and maintained or improved  Outcome: Progressing  Flowsheets (Taken 6/17/2025 0858)  Care Plan - Patient's Chronic Conditions and Co-Morbidity Symptoms are Monitored and Maintained or Improved: Monitor and assess patient's chronic conditions and comorbid symptoms for stability, deterioration, or improvement     Problem: Discharge Planning  Goal: Discharge to home or other facility with appropriate resources  Outcome: Progressing  Flowsheets  Taken 6/17/2025 1229  Discharge to home or other facility with appropriate resources: Identify barriers to discharge with patient and caregiver  Taken 6/17/2025 0858  Discharge to home or other facility with appropriate resources: Identify barriers to discharge with patient and caregiver     Problem: Safety - Adult  Goal: Free from fall injury  Outcome: Progressing  Flowsheets (Taken 6/17/2025 0858)  Free From Fall Injury: Instruct family/caregiver on patient safety     Problem: Respiratory - Adult  Goal: Achieves optimal ventilation and oxygenation  6/17/2025 1312 by Pili Peña RN  Outcome: Progressing  Flowsheets  Taken 6/17/2025 1207  Achieves optimal ventilation and oxygenation: Assess for changes in respiratory status  Taken 6/17/2025 0858  Achieves optimal ventilation and oxygenation: Assess for changes in respiratory status  6/17/2025 0757 by Rufus Alex RCP  Outcome: Progressing     Problem: ABCDS Injury Assessment  Goal: Absence of physical injury  Outcome: Progressing     Problem: Skin/Tissue Integrity  Goal: Skin integrity remains intact  Description: 1.  Monitor for areas of redness and/or skin breakdown  2.  Assess vascular access sites hourly  3.  Every 4-6 hours minimum:  Change oxygen saturation probe site  4.  Every 4-6 hours:  If on nasal continuous positive airway pressure, respiratory therapy assess nares and determine need

## 2025-06-17 NOTE — PROGRESS NOTES
dysphagia    Moderate COPD (chronic obstructive pulmonary disease) (HCC)  Resolved Problems:    Stage 4 very severe COPD by GOLD classification ([facility])    Paroxysmal atrial fibrillation ([facility])      Objective:   Patient Vitals for the past 24 hrs:   Temp Pulse Resp BP SpO2   06/17/25 0746 -- 56 -- -- --   06/17/25 0745 -- 61 -- -- --   06/17/25 0744 -- 65 -- -- --   06/17/25 0743 -- 62 -- -- --   06/17/25 0742 -- 61 -- -- --   06/17/25 0741 -- 57 -- -- --   06/17/25 0740 98.8 °F (37.1 °C) 59 16 121/65 94 %   06/17/25 0600 -- 60 -- (!) 112/57 --   06/17/25 0500 -- 55 -- (!) 130/45 --   06/17/25 0410 98.7 °F (37.1 °C) -- 18 135/65 92 %   06/17/25 0400 -- 60 -- 120/70 --   06/17/25 0300 -- 61 -- (!) 107/53 --   06/17/25 0200 -- 57 -- (!) 136/55 --   06/17/25 0100 -- 64 -- 129/65 --   06/17/25 0000 -- 64 -- 123/60 --   06/16/25 2341 98.8 °F (37.1 °C) 61 18 120/64 91 %   06/16/25 2300 -- 57 -- 126/65 --   06/16/25 2201 -- 61 -- 120/61 --   06/16/25 2100 -- 72 -- (!) 149/76 --   06/16/25 2019 98.2 °F (36.8 °C) 60 20 128/65 95 %   06/16/25 2000 -- 62 -- 125/82 --   06/16/25 1800 -- 55 -- 127/66 --   06/16/25 1634 97.9 °F (36.6 °C) 61 16 130/75 93 %   06/16/25 1522 -- 65 19 -- 93 %   06/16/25 1116 98.4 °F (36.9 °C) 62 16 (!) 119/58 94 %   06/16/25 1103 -- 61 18 -- 95 %       Oxygen Therapy  SpO2: 94 %  Pulse Oximeter Device Mode: Continuous  Pulse via Oximetry: 79 beats per minute  Pulse Oximeter Device Location: Finger  O2 Device: None (Room air)  O2 Flow Rate (L/min): (S) 1 L/min (found on 1 lpm)  Skin Assessment: Clean, dry, & intact  Skin Protection for O2 Device: No    Estimated body mass index is 25.24 kg/m² as calculated from the following:    Height as of this encounter: 1.575 m (5' 2\").    Weight as of this encounter: 62.6 kg (138 lb).    Intake/Output Summary (Last 24 hours) at 6/17/2025 1056  Last data filed at 6/17/2025 0858  Gross per 24 hour   Intake 580 ml   Output 2425 ml   Net -1845 ml         Physical Exam:  K/UL    Basophils Absolute 0.03 0.00 - 0.20 K/UL    Immature Granulocytes Absolute 0.02 0.0 - 0.5 K/UL   Basic Metabolic Panel w/ Reflex to MG    Collection Time: 06/16/25  1:19 AM   Result Value Ref Range    Sodium 139 136 - 145 mmol/L    Potassium 3.3 (L) 3.5 - 5.1 mmol/L    Chloride 110 (H) 98 - 107 mmol/L    CO2 19 (L) 20 - 29 mmol/L    Anion Gap 11 7 - 16 mmol/L    Glucose 101 (H) 70 - 99 mg/dL    BUN 15 8 - 23 MG/DL    Creatinine 0.71 (L) 0.80 - 1.30 MG/DL    Est, Glom Filt Rate >90 >60 ml/min/1.73m2    Calcium 7.8 (L) 8.8 - 10.2 MG/DL   Lipid Panel    Collection Time: 06/16/25  1:19 AM   Result Value Ref Range    Cholesterol, Total 136 0 - 200 MG/DL    Triglycerides 59 0 - 150 MG/DL    HDL 45 40 - 60 MG/DL    LDL Cholesterol 79 0 - 100 MG/DL    VLDL Cholesterol Calculated 12 6 - 23 MG/DL    Chol/HDL Ratio 3.0 0.0 - 5.0     Magnesium    Collection Time: 06/16/25  1:19 AM   Result Value Ref Range    Magnesium 1.8 1.8 - 2.4 mg/dL   Urinalysis    Collection Time: 06/16/25  3:07 AM   Result Value Ref Range    Color, UA YELLOW/STRAW      Appearance CLEAR      Specific Gravity, UA 1.008 1.001 - 1.023      pH, Urine 6.5 5.0 - 9.0      Protein, UA Negative NEG mg/dL    Glucose, Ur Negative NEG mg/dL    Ketones, Urine TRACE (A) NEG mg/dL    Bilirubin, Urine Negative NEG      Blood, Urine Negative NEG      Urobilinogen, Urine 1.0 0.2 - 1.0 EU/dL    Nitrite, Urine Negative NEG      Leukocyte Esterase, Urine Negative NEG     APTT    Collection Time: 06/16/25  8:46 AM   Result Value Ref Range    APTT 62.2 (H) 23.3 - 37.4 SEC   Echo (TTE) complete (PRN contrast/bubble/strain/3D)    Collection Time: 06/16/25  1:04 PM   Result Value Ref Range    LV EDV A2C 93 mL    LV EDV A4C 102 mL    LV ESV A2C 38 mL    LV ESV A4C 40 mL    IVSd 0.9 0.6 - 1.0 cm    LVIDd 3.9 (A) 4.2 - 5.9 cm    LVIDs 2.6 cm    LVOT Diameter 2.0 cm    LVOT Mean Gradient 2 mmHg    LVOT VTI 17.3 cm    LVOT Peak Velocity 0.9 m/s    LVOT Peak Gradient 3 mmHg

## 2025-06-17 NOTE — PROGRESS NOTES
GOALS:  LTG: Patient will maintain adequate hydration/nutrition with optimum safety and efficiency of swallowing function with PO intake without overt signs or symptoms of aspiration for the highest appropriate diet level.  STG:  Patient will safely ingest diet trials during therapeutic feedings with SLP without overt signs or symptoms of respiratory compromise in efforts to advance diet. Ongoing 2025  Patient will complete a Modified Barium Swallow study to fully assess physiology and anatomy of the swallow and determine safest appropriate diet and/or rehabilitation strategies, as medically indicated. FEES completed 2025  Patient will consume soft and bite sized diet with thin liquids without adverse pulmonary events.Updated 2205  Patient will demonstrate use of compensatory strategies to increase safety with PO intake with min A. Ongoing 2025       SPEECH LANGUAGE PATHOLOGY: Dysphagia Daily Note #2    Acknowledge Order  I  Therapy Time  I   Charges     I  Rehab Caseload Tracker  NAME: Jonathan Zurita  : 1945  MRN: [id number]    ADMISSION DATE: 2025  PRIMARY DIAGNOSIS: Bilateral interstitial pneumonia (HCC)    ICD-10: Treatment Diagnosis: R13.12 Dysphagia, Oropharyngeal Phase    RECOMMENDATIONS   Diet:    Soft and Bite-Sized  Thin Liquids    Medication: As Tolerated   Compensatory Swallowing Strategies:   Upright for all PO  Small bites and sips  Reduced Rate/Frequent Breaks to Support Respiratory Status   No Straws   Therapeutic Intervention:   Patient/family education  Dysphagia treatment   Patient continues to require skilled intervention:  Yes. Recommend ongoing speech therapy services during this hospitalization.     Anticipated Discharge Needs: Ongoing speech therapy is recommended at next level of care. Recommend Home Health Speech Therapy Services for Dysphagia.      ASSESSMENT    Dysphagia: Patient without overt signs of aspiration with PO trials of thin liquids and  do exhibit erythema which could be contributing to globus sensation as well.      Current Diet:  Regular Consistency  Thin Liquids     Respiratory Status: Nasal Cannula     Pain:  Patient does not c/o pain  Pain intervention: None- No pain observed  Pain response: Patient satisfied    OBJECTIVE    Dysphagia: Patient agreeable to PO trials consuming 8 oz of thin liquids via cup and solids/damon crackers x's 1. Able to feed self with all PO trials. Education provided for compensatory strategies to use with PO intake with patient demonstrating usage with 100% accuracy post education. Oral phase of swallow required increased time for prep/transfer of solids, otherwise was unremarkable. Pharyngeal phase of swallow was unremarkable as able to be assessed at bedside. No overt signs of aspiration were noted with all PO trials.     Discussed options for PO diet and patient states preference is for thin liquids with use of compensatory strategies over mildly thick/nectar thick liquids. Recommend soft and bite sized diet with thin liquids.     Dysphagia Outcome and Severity Scale (JIMY)  Score 5 Description   [] 7   Normal in all situations   [] 6   Within Functional Limits/ Modified independent   [x] 5   Mild Dysphagia: Distant Supervision. May need one diet consistency      restricted.   [] 4   Mild-Moderate Dysphagia: Intermittent supervision/cuing. One-two diet    consistencies restricted.   [] 3   Moderate Dysphagia: Total assistance, supervision, or strategies.       Two or more diet consistencies restricted.   [] 2   Moderate-Severe Dysphagia: Maximum assistance or maximum use     of strategies with partial po intake   [] 1   Severe dysphagia- NPO. Unable to tolerate any po safely     PLAN    Duration/Frequency: Continue to follow patient 3x/week for duration of hospitalization and/or until goals met    Rehabilitation Potential For Stated Goals: Good    Interdisciplinary Collaboration: MD/ PA/ NP  and RN/

## 2025-06-17 NOTE — PROGRESS NOTES
Occupational Therapy Note:    Attempted to see patient this AM for occupational therapy treatment  session. Patient had 30 beat run of VT. RN asked to Hold PT, getting drips. Will follow and re-attempt as schedule permits/patient available. Thank you,    BHAVANA SANCHEZ, OT    Rehab Caseload Tracker

## 2025-06-17 NOTE — PROGRESS NOTES
RN called to pt's room as pt had a 30 beats run of VT. Pt awake in bed, asymptomatic upon assessment. MD Orta notified with orders to continue amiodarone and heparin gtts and to consult EP.

## 2025-06-17 NOTE — CARE COORDINATION
Chart reviewed and discussed iDT.    Pt not ready for discharge remains on Amio and Heparin drip.  EP to evaluate   Per previous RN's note, clinicals and orders for HH with RN, PT/OT [facility] Referral and orders also sent for RW. Leonora [facility] confirmed them both received.  Will continue to follow.

## 2025-06-17 NOTE — PROGRESS NOTES
Physician Progress Note      PATIENT:               JUDY TERRAZAS  CSN #:                  [id number]  :                       1945  ADMIT DATE:       2025 10:28 PM  DISCH DATE:  RESPONDING  PROVIDER #:        Aaron Yung MD          QUERY TEXT:    81 yo male admitted with PNA noted to also have Paroxysmal Afib maintained on   Eliquis in Outpatient setting    Based on your medical judgment, please clarify these findings and document if   any of the following are being evaluated and/or treated:    The clinical indicators include:  Risk : 81 yo male with known history HTN , CLL, HLP, COPD , Hx CVA ,    Clinical Indicators Per Cardiology \"[id number] 5 (age +2, HTN, h/o CVA +2).   On apixaban as outpatient. Will start heparin gtt due to difficulty   swallowing.\"    Treatment; Fall precautions, labs, bleeding and clotting precautions,   Telemetry , medication review and management - Heparin < Eliquis    Thank You,  Alecia ASIFN, RN, CRCR  Clinical   P: [phone]  Options provided:  -- Secondary hypercoagulable state in a patient with atrial fibrillation  -- Other - I will add my own diagnosis  -- Disagree - Not applicable / Not valid  -- Disagree - Clinically unable to determine / Unknown  -- Refer to Clinical Documentation Reviewer    PROVIDER RESPONSE TEXT:    This patient has secondary hypercoagulable state in a patient with atrial   fibrillation.    Query created by: Alecia Atwood on 2025 1:03 PM      Electronically signed by:  Aaron Yung MD 2025 9:34 AM

## 2025-06-18 PROBLEM — I47.20 VENTRICULAR TACHYCARDIA (HCC): Status: ACTIVE | Noted: 2025-06-18

## 2025-06-18 LAB
ANION GAP SERPL CALC-SCNC: 10 MMOL/L (ref 7–16)
APTT PPP: 79.2 SEC (ref 23.3–37.4)
BUN SERPL-MCNC: 11 MG/DL (ref 8–23)
CALCIUM SERPL-MCNC: 8.2 MG/DL (ref 8.8–10.2)
CHLORIDE SERPL-SCNC: 109 MMOL/L (ref 98–107)
CO2 SERPL-SCNC: 20 MMOL/L (ref 20–29)
CREAT SERPL-MCNC: 0.62 MG/DL (ref 0.8–1.3)
GLUCOSE SERPL-MCNC: 122 MG/DL (ref 70–99)
MAGNESIUM SERPL-MCNC: 1.8 MG/DL (ref 1.8–2.4)
POTASSIUM SERPL-SCNC: 3.5 MMOL/L (ref 3.5–5.1)
SODIUM SERPL-SCNC: 138 MMOL/L (ref 136–145)
UFH PPP CHRO-ACNC: 0.46 IU/ML (ref 0.3–0.7)

## 2025-06-18 PROCEDURE — 6370000000 HC RX 637 (ALT 250 FOR IP): Performed by: INTERNAL MEDICINE

## 2025-06-18 PROCEDURE — 85520 HEPARIN ASSAY: CPT

## 2025-06-18 PROCEDURE — 6360000002 HC RX W HCPCS: Performed by: INTERNAL MEDICINE

## 2025-06-18 PROCEDURE — 99232 SBSQ HOSP IP/OBS MODERATE 35: CPT | Performed by: INTERNAL MEDICINE

## 2025-06-18 PROCEDURE — 6370000000 HC RX 637 (ALT 250 FOR IP): Performed by: FAMILY MEDICINE

## 2025-06-18 PROCEDURE — 2140000000 HC CCU INTERMEDIATE R&B

## 2025-06-18 PROCEDURE — 94761 N-INVAS EAR/PLS OXIMETRY MLT: CPT

## 2025-06-18 PROCEDURE — 94669 MECHANICAL CHEST WALL OSCILL: CPT

## 2025-06-18 PROCEDURE — 85730 THROMBOPLASTIN TIME PARTIAL: CPT

## 2025-06-18 PROCEDURE — 83735 ASSAY OF MAGNESIUM: CPT

## 2025-06-18 PROCEDURE — 94760 N-INVAS EAR/PLS OXIMETRY 1: CPT

## 2025-06-18 PROCEDURE — 6360000002 HC RX W HCPCS: Performed by: FAMILY MEDICINE

## 2025-06-18 PROCEDURE — 80048 BASIC METABOLIC PNL TOTAL CA: CPT

## 2025-06-18 PROCEDURE — 2500000003 HC RX 250 WO HCPCS: Performed by: FAMILY MEDICINE

## 2025-06-18 PROCEDURE — 2580000003 HC RX 258: Performed by: FAMILY MEDICINE

## 2025-06-18 PROCEDURE — 92526 ORAL FUNCTION THERAPY: CPT

## 2025-06-18 PROCEDURE — 94640 AIRWAY INHALATION TREATMENT: CPT

## 2025-06-18 PROCEDURE — 2580000003 HC RX 258: Performed by: INTERNAL MEDICINE

## 2025-06-18 PROCEDURE — 36415 COLL VENOUS BLD VENIPUNCTURE: CPT

## 2025-06-18 RX ORDER — POTASSIUM CHLORIDE 1500 MG/1
40 TABLET, EXTENDED RELEASE ORAL 2 TIMES DAILY WITH MEALS
Status: COMPLETED | OUTPATIENT
Start: 2025-06-18 | End: 2025-06-18

## 2025-06-18 RX ORDER — MAGNESIUM SULFATE IN WATER 40 MG/ML
2000 INJECTION, SOLUTION INTRAVENOUS ONCE
Status: COMPLETED | OUTPATIENT
Start: 2025-06-18 | End: 2025-06-18

## 2025-06-18 RX ADMIN — Medication 4 ML: at 08:01

## 2025-06-18 RX ADMIN — SODIUM CHLORIDE, PRESERVATIVE FREE 10 ML: 5 INJECTION INTRAVENOUS at 20:42

## 2025-06-18 RX ADMIN — GUAIFENESIN 1200 MG: 600 TABLET, EXTENDED RELEASE ORAL at 20:36

## 2025-06-18 RX ADMIN — AMIODARONE HYDROCHLORIDE 0.5 MG/MIN: 50 INJECTION, SOLUTION INTRAVENOUS at 20:35

## 2025-06-18 RX ADMIN — AMOXICILLIN AND CLAVULANATE POTASSIUM 1 TABLET: 875; 125 TABLET, FILM COATED ORAL at 20:36

## 2025-06-18 RX ADMIN — IPRATROPIUM BROMIDE 0.5 MG: 0.5 SOLUTION RESPIRATORY (INHALATION) at 08:00

## 2025-06-18 RX ADMIN — DULOXETINE HYDROCHLORIDE 60 MG: 60 CAPSULE, DELAYED RELEASE ORAL at 08:10

## 2025-06-18 RX ADMIN — ASPIRIN 81 MG: 81 TABLET, CHEWABLE ORAL at 08:10

## 2025-06-18 RX ADMIN — ALBUTEROL SULFATE 2.5 MG: 2.5 SOLUTION RESPIRATORY (INHALATION) at 08:01

## 2025-06-18 RX ADMIN — SODIUM CHLORIDE, PRESERVATIVE FREE 10 ML: 5 INJECTION INTRAVENOUS at 08:11

## 2025-06-18 RX ADMIN — IPRATROPIUM BROMIDE 0.5 MG: 0.5 SOLUTION RESPIRATORY (INHALATION) at 12:28

## 2025-06-18 RX ADMIN — MIDODRINE HYDROCHLORIDE 5 MG: 5 TABLET ORAL at 08:10

## 2025-06-18 RX ADMIN — GUAIFENESIN 1200 MG: 600 TABLET, EXTENDED RELEASE ORAL at 08:10

## 2025-06-18 RX ADMIN — POTASSIUM CHLORIDE 40 MEQ: 1500 TABLET, EXTENDED RELEASE ORAL at 17:08

## 2025-06-18 RX ADMIN — AMOXICILLIN AND CLAVULANATE POTASSIUM 1 TABLET: 875; 125 TABLET, FILM COATED ORAL at 08:09

## 2025-06-18 RX ADMIN — AMIODARONE HYDROCHLORIDE 0.5 MG/MIN: 50 INJECTION, SOLUTION INTRAVENOUS at 05:34

## 2025-06-18 RX ADMIN — BUSPIRONE HYDROCHLORIDE 10 MG: 10 TABLET ORAL at 08:10

## 2025-06-18 RX ADMIN — TAMSULOSIN HYDROCHLORIDE 0.4 MG: 0.4 CAPSULE ORAL at 08:10

## 2025-06-18 RX ADMIN — Medication 4 ML: at 19:20

## 2025-06-18 RX ADMIN — MAGNESIUM SULFATE HEPTAHYDRATE 2000 MG: 40 INJECTION, SOLUTION INTRAVENOUS at 08:17

## 2025-06-18 RX ADMIN — ARFORMOTEROL TARTRATE: 15 SOLUTION RESPIRATORY (INHALATION) at 08:00

## 2025-06-18 RX ADMIN — APIXABAN 2.5 MG: 2.5 TABLET, FILM COATED ORAL at 08:10

## 2025-06-18 RX ADMIN — ARFORMOTEROL TARTRATE: 15 SOLUTION RESPIRATORY (INHALATION) at 19:20

## 2025-06-18 RX ADMIN — IPRATROPIUM BROMIDE 0.5 MG: 0.5 SOLUTION RESPIRATORY (INHALATION) at 15:26

## 2025-06-18 RX ADMIN — IPRATROPIUM BROMIDE 0.5 MG: 0.5 SOLUTION RESPIRATORY (INHALATION) at 19:20

## 2025-06-18 RX ADMIN — MIDODRINE HYDROCHLORIDE 5 MG: 5 TABLET ORAL at 11:29

## 2025-06-18 RX ADMIN — BUSPIRONE HYDROCHLORIDE 10 MG: 10 TABLET ORAL at 14:14

## 2025-06-18 RX ADMIN — ALBUTEROL SULFATE 2.5 MG: 2.5 SOLUTION RESPIRATORY (INHALATION) at 15:26

## 2025-06-18 RX ADMIN — POTASSIUM CHLORIDE 40 MEQ: 1500 TABLET, EXTENDED RELEASE ORAL at 08:09

## 2025-06-18 RX ADMIN — BUSPIRONE HYDROCHLORIDE 10 MG: 10 TABLET ORAL at 20:36

## 2025-06-18 RX ADMIN — APIXABAN 2.5 MG: 2.5 TABLET, FILM COATED ORAL at 20:36

## 2025-06-18 RX ADMIN — PANTOPRAZOLE SODIUM 40 MG: 40 TABLET, DELAYED RELEASE ORAL at 05:36

## 2025-06-18 RX ADMIN — ROSUVASTATIN CALCIUM 40 MG: 20 TABLET, FILM COATED ORAL at 20:36

## 2025-06-18 RX ADMIN — MIDODRINE HYDROCHLORIDE 5 MG: 5 TABLET ORAL at 17:08

## 2025-06-18 NOTE — CARE COORDINATION
CM following. Pt admitted on 6/14/25 for septic shock 2/2 aspiration pneumonia complicated by AFib RVR. Therapy recommending HH, referral sent to [facility] for RN/PT/OT & SP. RW ordered. Will continue to monitor.

## 2025-06-18 NOTE — PROGRESS NOTES
Jonathan Zurita  Admission Date: 6/14/2025         Daily Progress Note: 6/18/2025    The patient's chart is reviewed and the patient is discussed with the staff.    Background:   80 y.o.  male seen and evaluated at the request of Dr. Regalado.  He has a Pmh of prior 25pkyrs tobacco abuse (quit 2019), COPD, chronic aspiration with moderately severe OP dysphagia and was seen by Dr. Tan in office on 6/13/2025 for these concerns.  He also has CLL on ibrutinib, hypotension with chronic midodrine, BPH, atrial fib on eliquis, constipation, microhematuria.     [facility]:  [facility]  4/3/2023- [place] [facility]  8/2023- bilateral lower lobes  1/2024-[facility]    Subjective:      Pt is sitting up in bed on RA. Pt denies complaints.   He did have a coughing fit last night when he moved from the chair to the bed but the cough was dry.   He remains on IV amio.       Current Facility-Administered Medications   Medication Dose Route Frequency    apixaban (ELIQUIS) tablet 2.5 mg  2.5 mg Oral BID    potassium chloride (KLOR-CON M) extended release tablet 40 mEq  40 mEq Oral BID WC    amiodarone (CORDARONE) 450 mg in dextrose 5 % 250 mL infusion ([id number])  0.5 mg/min IntraVENous Continuous    amoxicillin-clavulanate (AUGMENTIN) 875-125 MG per tablet 1 tablet  1 tablet Oral 2 times per day    potassium chloride (KLOR-CON M) extended release tablet 40 mEq  40 mEq Oral Once    metoprolol (LOPRESSOR) injection 5 mg  5 mg IntraVENous Once    tamsulosin (FLOMAX) capsule 0.4 mg  0.4 mg Oral Daily    melatonin tablet 3 mg  3 mg Oral Nightly PRN    DULoxetine (CYMBALTA) extended release capsule 60 mg  60 mg Oral Daily    guaiFENesin (MUCINEX) extended release tablet 1,200 mg  1,200 mg Oral BID    midodrine (PROAMATINE) tablet 5 mg  5 mg Oral TID WC    busPIRone (BUSPAR) tablet 10 mg  10 mg Oral TID    traMADol (ULTRAM) tablet 50 mg  50 mg Oral Daily PRN    ibrutinib  Mild regurgitation.    Tricuspid Valve: Mild regurgitation. Normal RVSP. TR Max Velocity is 2.39 m/s. RVSP is 26 mmHg.    Left Atrium: Left atrium is mildly dilated. LA Volume Index MOD A4C is 35 ml/m2.    Image quality is adequate. Procedure performed with the patient in a supine position.    Sinus bradycardia initially with intermittent bursts of paroxysmal atrial fibrillation throughout the procedure  Addendum by: Erlin Gomez MD on 6/16/2025  3:11 PM    Signed by: Erlin Gomez MD on 6/16/2025  3:10 PM    Assessment and Plan:  (Medical Decision Making)   Impression:  80 year old with history of  CLL, tobacco use and moderate COPD per PFTs in 2024. Uses Wixela, Spiriva and prn Albuterol at home. Hx multiple episodes pneumonia in 2023 and 2024. + dysphagia with suspected aspiration events.  Now admitted with pneumonia, rapid A fib. ST has seen - now on thickened liquids.     Active Problems:    Chronic lymphocytic leukemia of B-cell type in remission ([facility])  Plan: On Ibrutinib    Pneumonia due to infectious organism  Plan: No sputum for culture. WBC normal, PCT only 0.04.  Afebrile. Sputum white in color. CXR with bilateral lower lobe infiltrates. On Rocephin.  ST has seen and recommended nectar thick liquids. Mucolytics included in tx plan to help with secretion clearance.     Hypotension  Plan: SBP 120s. On Midodrine here and at home for chronic problem     Atrial fibrillation with RVR ([facility])  Plan: HR low 100s. Cardiology has seen - plans for IV amio today and consider PO amio tomorrow.     Oropharyngeal dysphagia  Plan: ST has seen. + FEES - recommended nectar thick liquids    Moderate COPD (chronic obstructive pulmonary disease) ([facility])  Plan: No wheezing noted. Uses Wixela, Spiriva and Albuterol at home - substituting here.       --now on augmentin PO  --ST has seen - nectar thick liquids which will be new for him  --scheduled nebs to include mucolytics to help with secretion clearance    We

## 2025-06-18 NOTE — PLAN OF CARE
Problem: Chronic Conditions and Co-morbidities  Goal: Patient's chronic conditions and co-morbidity symptoms are monitored and maintained or improved  6/17/2025 2241 by Rebeca Thomas RN  Outcome: Progressing  6/17/2025 1312 by Pili Peña RN  Outcome: Progressing  Flowsheets (Taken 6/17/2025 0858)  Care Plan - Patient's Chronic Conditions and Co-Morbidity Symptoms are Monitored and Maintained or Improved: Monitor and assess patient's chronic conditions and comorbid symptoms for stability, deterioration, or improvement     Problem: Discharge Planning  Goal: Discharge to home or other facility with appropriate resources  6/17/2025 2241 by Rebeca Thomas RN  Outcome: Progressing  6/17/2025 1312 by Pili Peña RN  Outcome: Progressing  Flowsheets  Taken 6/17/2025 1229  Discharge to home or other facility with appropriate resources: Identify barriers to discharge with patient and caregiver  Taken 6/17/2025 0858  Discharge to home or other facility with appropriate resources: Identify barriers to discharge with patient and caregiver     Problem: Safety - Adult  Goal: Free from fall injury  6/17/2025 1312 by Pili Peña RN  Outcome: Progressing  Flowsheets (Taken 6/17/2025 0858)  Free From Fall Injury: Instruct family/caregiver on patient safety     Problem: Respiratory - Adult  Goal: Achieves optimal ventilation and oxygenation  Recent Flowsheet Documentation  Taken 6/17/2025 1609 by Pili Peña RN  Achieves optimal ventilation and oxygenation: Assess for changes in respiratory status  6/17/2025 1312 by Pili Peña RN  Outcome: Progressing  Flowsheets  Taken 6/17/2025 1207  Achieves optimal ventilation and oxygenation: Assess for changes in respiratory status  Taken 6/17/2025 0858  Achieves optimal ventilation and oxygenation: Assess for changes in respiratory status     Problem: ABCDS Injury Assessment  Goal: Absence of physical injury  6/17/2025 1312 by Pili Peña RN  Outcome:

## 2025-06-18 NOTE — PROGRESS NOTES
Pt c/o shortness of breath and has audible wheezing. The sounds are no worse and continue to be coarse rhonchi. Resp therapy notified and states last treatment ws at noon. He stated he would come assess patient.

## 2025-06-18 NOTE — PROGRESS NOTES
[facility] PROGRESS NOTE           6/18/2025 7:19 AM    Admit Date: 6/14/2025      Subjective:   Patient with intermittent atrial fibrillation.  In atrial fibrillation this morning with a controlled ventricular response.  Did have a run of wide-complex tachycardia yesterday concerning for nonsustained ventricular tachycardia.  Remains on amiodarone and heparin drip.    ROS:  Cardiovascular:  As noted above    Objective:      Vitals:    06/18/25 0300 06/18/25 0359 06/18/25 0534 06/18/25 0600   BP: 117/83 114/81 126/79 114/84   Pulse: (!) 122 (!) 107 (!) 122 98   Resp:  18     Temp:  97.9 °F (36.6 °C)     TempSrc:  Oral     SpO2:  92%     Weight:  62.6 kg (138 lb 1.6 oz)     Height:           Physical Exam:  General-elderly white male in no acute distress  Neck- supple, no JVD  CV- regular rate and rhythm no MRG  Lung-coarse breath sounds  Abd- soft, nontender, nondistended  Ext- no edema bilaterally.  Skin- warm and dry      Data Review:   Recent Labs     06/17/25  0628 06/16/25  0119 06/15/25  0130   WBC 7.1 7.4 10.4   HGB 12.1* 11.7* 12.0*   HCT 37.5* 33.8* 35.2*   MCV 90.6 86.4 88.0   * 129* 130*       Recent Labs     06/18/25  0606 06/15/25  0130 06/14/25  2242      < > 135*   K 3.5   < > 4.0   *   < > 103   CO2 20   < > 20   BUN 11   < > 26*   CREATININE 0.62*   < > 0.86   GLUCOSE 122*   < > 104*   CALCIUM 8.2*   < > 8.3*   BILITOT  --   --  0.8   ALKPHOS  --   --  71   AST  --   --  23   ALT  --   --  14   LABGLOM >90   < > 88   GLOB  --   --  2.2*    < > = values in this interval not displayed.       Recent Labs     06/14/25  2242   DDIMER 0.29          Echo (6/16/25)    Left Ventricle: Normal left ventricular systolic function with a visually estimated EF of 55 - 60%. Left ventricle size is normal. Normal wall thickness. Normal wall motion. Abnormal diastolic function.    Right Ventricle: Right ventricle size is normal. Normal systolic function.    Aortic Valve:

## 2025-06-18 NOTE — PROGRESS NOTES
Hospitalist Progress Note   Admit Date:  2025 10:28 PM   Name:  Jonathan Zurita   Age:  80 y.o.  Sex:  male  :  1945   MRN:  [id number]   Room:  405/    Presenting/Chief Complaint: Chest Pain     Reason(s) for Admission: Pneumonia due to infectious organism, unspecified laterality, unspecified part of lung [J18.9]  Bilateral interstitial pneumonia (HCC) [J84.9]     Hospital Course:   Jonathan Zurita is a 80 y.o. male with medical history of COPD, paroxysmal A-fib, on Eliquis, CLL, anxiety, CVA, bronchiectasis, history of aspiration pneumonia who was admitted with septic shock secondary to aspiration pneumonia complicated by A-fib RVR.      On admission, he met criteria for septic shock with temp of 100.7, heart rate of 129 and requiring vasopressor support with Levophed, which was successfully weaned in the ICU.  Chest x-ray showed bibasilar opacities consistent with pneumonia.  On midodrine for blood pressure support.  Patient initially started on Zosyn but given low risk for Pseudomonas, antibiotic were de-escalated to ceftriaxone .  Speech evaluated patient and MBS  without aspiration.  Patient started on diet.    Cardiology consulted for A-fib RVR.  Patient started on amiodarone drip given low blood pressure and heparin drip.  Has been spontaneously converted to sinus rhythm.  Had a 30 beat run of VT .  Cardiology has discussed with EP, poor candidate for invasive therapies.  Continue amiodarone drip, plan to transition to p.o. amiodarone tomorrow if no further episodes.    Subjective & 24hr Events:   Patient is seen and examined at the bedside.  Reports overall feeling better.  Had coughing spell last night.  Denies nausea, vomiting, chest pain or palpitation.  Denies shortness of breath.    Had a 30 beat run of VT .  Cardiology has discussed with EP, poor candidate for invasive therapies.  Continue amiodarone drip for now, plan to transition to p.o. amiodarone

## 2025-06-18 NOTE — PROGRESS NOTES
Occupational Therapy Note:    Attempted to see patient this PM for occupational therapy treatment  session. Patient reports \"too short of breath to do anything\". RT coming to do breathing treatment. Will follow and re-attempt as schedule permits/patient available. Thank you,    BHAVANA SANCHEZ, OT    Rehab Caseload Tracker

## 2025-06-18 NOTE — PROGRESS NOTES
GOALS:  LTG: Patient will maintain adequate hydration/nutrition with optimum safety and efficiency of swallowing function with PO intake without overt signs or symptoms of aspiration for the highest appropriate diet level.  STG:  Patient will safely ingest diet trials during therapeutic feedings with SLP without overt signs or symptoms of respiratory compromise in efforts to advance diet. Ongoing 2025  Patient will complete a Modified Barium Swallow study to fully assess physiology and anatomy of the swallow and determine safest appropriate diet and/or rehabilitation strategies, as medically indicated. FEES completed 2025  Patient will consume soft and bite sized diet with thin liquids without adverse pulmonary events.Updated 2205  Patient will demonstrate use of compensatory strategies to increase safety with PO intake with min A. Ongoing 2025       SPEECH LANGUAGE PATHOLOGY: Dysphagia Daily Note #3    Acknowledge Order  I  Therapy Time  I   Charges     I  Rehab Caseload Tracker  NAME: Jonathan Zurita  : 1945  MRN: [id number]    ADMISSION DATE: 2025  PRIMARY DIAGNOSIS: Bilateral interstitial pneumonia (HCC)    ICD-10: Treatment Diagnosis: R13.12 Dysphagia, Oropharyngeal Phase    RECOMMENDATIONS   Diet:    Soft and Bite-Sized  Thin Liquids    Medication: As Tolerated   Compensatory Swallowing Strategies:   Upright for all PO  Small bites and sips  Reduced Rate/Frequent Breaks to Support Respiratory Status   No Straws   Therapeutic Intervention:   Patient/family education  Dysphagia treatment   Patient continues to require skilled intervention:  Yes. Recommend ongoing speech therapy services during this hospitalization.     Anticipated Discharge Needs: Ongoing speech therapy is recommended at next level of care. Recommend Home Health Speech Therapy Services for Dysphagia.      ASSESSMENT    Dysphagia: Patient without overt signs of aspiration with thin liquids using compensatory

## 2025-06-19 LAB
ANION GAP SERPL CALC-SCNC: 9 MMOL/L (ref 7–16)
BACTERIA SPEC CULT: NORMAL
BACTERIA SPEC CULT: NORMAL
BUN SERPL-MCNC: 15 MG/DL (ref 8–23)
CALCIUM SERPL-MCNC: 8.5 MG/DL (ref 8.8–10.2)
CHLORIDE SERPL-SCNC: 108 MMOL/L (ref 98–107)
CO2 SERPL-SCNC: 21 MMOL/L (ref 20–29)
CREAT SERPL-MCNC: 0.79 MG/DL (ref 0.8–1.3)
GLUCOSE SERPL-MCNC: 108 MG/DL (ref 70–99)
MAGNESIUM SERPL-MCNC: 2 MG/DL (ref 1.8–2.4)
POTASSIUM SERPL-SCNC: 4.5 MMOL/L (ref 3.5–5.1)
SERVICE CMNT-IMP: NORMAL
SERVICE CMNT-IMP: NORMAL
SODIUM SERPL-SCNC: 138 MMOL/L (ref 136–145)

## 2025-06-19 PROCEDURE — 94640 AIRWAY INHALATION TREATMENT: CPT

## 2025-06-19 PROCEDURE — 6370000000 HC RX 637 (ALT 250 FOR IP): Performed by: INTERNAL MEDICINE

## 2025-06-19 PROCEDURE — 6360000002 HC RX W HCPCS: Performed by: FAMILY MEDICINE

## 2025-06-19 PROCEDURE — 83735 ASSAY OF MAGNESIUM: CPT

## 2025-06-19 PROCEDURE — 2580000003 HC RX 258: Performed by: FAMILY MEDICINE

## 2025-06-19 PROCEDURE — 6370000000 HC RX 637 (ALT 250 FOR IP): Performed by: FAMILY MEDICINE

## 2025-06-19 PROCEDURE — 97530 THERAPEUTIC ACTIVITIES: CPT

## 2025-06-19 PROCEDURE — 80048 BASIC METABOLIC PNL TOTAL CA: CPT

## 2025-06-19 PROCEDURE — 6360000002 HC RX W HCPCS: Performed by: INTERNAL MEDICINE

## 2025-06-19 PROCEDURE — 94669 MECHANICAL CHEST WALL OSCILL: CPT

## 2025-06-19 PROCEDURE — 36415 COLL VENOUS BLD VENIPUNCTURE: CPT

## 2025-06-19 PROCEDURE — 2500000003 HC RX 250 WO HCPCS: Performed by: FAMILY MEDICINE

## 2025-06-19 PROCEDURE — 2580000003 HC RX 258: Performed by: INTERNAL MEDICINE

## 2025-06-19 PROCEDURE — 94760 N-INVAS EAR/PLS OXIMETRY 1: CPT

## 2025-06-19 PROCEDURE — 92526 ORAL FUNCTION THERAPY: CPT

## 2025-06-19 PROCEDURE — 99232 SBSQ HOSP IP/OBS MODERATE 35: CPT | Performed by: INTERNAL MEDICINE

## 2025-06-19 PROCEDURE — 94761 N-INVAS EAR/PLS OXIMETRY MLT: CPT

## 2025-06-19 PROCEDURE — 2140000000 HC CCU INTERMEDIATE R&B

## 2025-06-19 PROCEDURE — 97110 THERAPEUTIC EXERCISES: CPT

## 2025-06-19 RX ADMIN — ROSUVASTATIN CALCIUM 40 MG: 20 TABLET, FILM COATED ORAL at 21:34

## 2025-06-19 RX ADMIN — Medication 4 ML: at 20:19

## 2025-06-19 RX ADMIN — IPRATROPIUM BROMIDE 0.5 MG: 0.5 SOLUTION RESPIRATORY (INHALATION) at 15:07

## 2025-06-19 RX ADMIN — IPRATROPIUM BROMIDE 0.5 MG: 0.5 SOLUTION RESPIRATORY (INHALATION) at 20:19

## 2025-06-19 RX ADMIN — MIDODRINE HYDROCHLORIDE 5 MG: 5 TABLET ORAL at 12:01

## 2025-06-19 RX ADMIN — ARFORMOTEROL TARTRATE: 15 SOLUTION RESPIRATORY (INHALATION) at 07:26

## 2025-06-19 RX ADMIN — GUAIFENESIN 1200 MG: 600 TABLET, EXTENDED RELEASE ORAL at 21:34

## 2025-06-19 RX ADMIN — BUSPIRONE HYDROCHLORIDE 10 MG: 10 TABLET ORAL at 13:43

## 2025-06-19 RX ADMIN — MIDODRINE HYDROCHLORIDE 5 MG: 5 TABLET ORAL at 08:52

## 2025-06-19 RX ADMIN — APIXABAN 2.5 MG: 2.5 TABLET, FILM COATED ORAL at 21:34

## 2025-06-19 RX ADMIN — Medication 4 ML: at 07:26

## 2025-06-19 RX ADMIN — BUSPIRONE HYDROCHLORIDE 10 MG: 10 TABLET ORAL at 08:52

## 2025-06-19 RX ADMIN — PANTOPRAZOLE SODIUM 40 MG: 40 TABLET, DELAYED RELEASE ORAL at 05:04

## 2025-06-19 RX ADMIN — ACETAMINOPHEN 650 MG: 325 TABLET ORAL at 21:34

## 2025-06-19 RX ADMIN — ASPIRIN 81 MG: 81 TABLET, CHEWABLE ORAL at 08:51

## 2025-06-19 RX ADMIN — ARFORMOTEROL TARTRATE: 15 SOLUTION RESPIRATORY (INHALATION) at 20:19

## 2025-06-19 RX ADMIN — SODIUM CHLORIDE, PRESERVATIVE FREE 10 ML: 5 INJECTION INTRAVENOUS at 08:52

## 2025-06-19 RX ADMIN — MIDODRINE HYDROCHLORIDE 5 MG: 5 TABLET ORAL at 17:40

## 2025-06-19 RX ADMIN — AMOXICILLIN AND CLAVULANATE POTASSIUM 1 TABLET: 875; 125 TABLET, FILM COATED ORAL at 21:34

## 2025-06-19 RX ADMIN — AMIODARONE HYDROCHLORIDE 0.5 MG/MIN: 50 INJECTION, SOLUTION INTRAVENOUS at 13:43

## 2025-06-19 RX ADMIN — TAMSULOSIN HYDROCHLORIDE 0.4 MG: 0.4 CAPSULE ORAL at 08:51

## 2025-06-19 RX ADMIN — SODIUM CHLORIDE, PRESERVATIVE FREE 10 ML: 5 INJECTION INTRAVENOUS at 21:39

## 2025-06-19 RX ADMIN — APIXABAN 2.5 MG: 2.5 TABLET, FILM COATED ORAL at 08:51

## 2025-06-19 RX ADMIN — BUSPIRONE HYDROCHLORIDE 10 MG: 10 TABLET ORAL at 21:34

## 2025-06-19 RX ADMIN — AMOXICILLIN AND CLAVULANATE POTASSIUM 1 TABLET: 875; 125 TABLET, FILM COATED ORAL at 08:51

## 2025-06-19 RX ADMIN — DULOXETINE HYDROCHLORIDE 60 MG: 60 CAPSULE, DELAYED RELEASE ORAL at 08:52

## 2025-06-19 RX ADMIN — IPRATROPIUM BROMIDE 0.5 MG: 0.5 SOLUTION RESPIRATORY (INHALATION) at 07:26

## 2025-06-19 RX ADMIN — GUAIFENESIN 1200 MG: 600 TABLET, EXTENDED RELEASE ORAL at 08:51

## 2025-06-19 ASSESSMENT — PAIN SCALES - GENERAL: PAINLEVEL_OUTOF10: 0

## 2025-06-19 NOTE — PROGRESS NOTES
Hospitalist Progress Note   Admit Date:  2025 10:28 PM   Name:  Jonathan Zurita   Age:  80 y.o.  Sex:  male  :  1945   MRN:  [id number]   Room:  405/    Presenting/Chief Complaint: Chest Pain     Reason(s) for Admission: Pneumonia due to infectious organism, unspecified laterality, unspecified part of lung [J18.9]  Bilateral interstitial pneumonia (HCC) [J84.9]     Hospital Course:   Jonathan Zurita is a 80 y.o. male with medical history of COPD, paroxysmal A-fib, on Eliquis, CLL, anxiety, CVA, bronchiectasis, history of aspiration pneumonia who was admitted with septic shock secondary to aspiration pneumonia complicated by A-fib RVR.      On admission, he met criteria for septic shock with temp of 100.7, heart rate of 129 and requiring vasopressor support with Levophed, which was successfully weaned in the ICU.  Chest x-ray showed bibasilar opacities consistent with pneumonia.  On midodrine for blood pressure support.  Patient initially started on Zosyn but given low risk for Pseudomonas, antibiotic were de-escalated to ceftriaxone .  Speech evaluated patient and MBS  without aspiration.  Patient started on diet.    Cardiology consulted for A-fib RVR.  Patient started on amiodarone drip given low blood pressure and heparin drip.  Has been spontaneously converted to sinus rhythm.  Had a 30 beat run of VT .  Cardiology has discussed with EP, poor candidate for invasive therapies.  Continue amiodarone drip, plan to transition to p.o. amiodarone tomorrow if no further episodes.    Subjective & 24hr Events:   Patient is seen and examined at the bedside.  Reports feeling overall better.  No active complaint.  Denies nausea, vomiting, chest pain, palpitation or shortness of breath.    Had runs of A-fib RVR yesterday evening, now back to sinus rhythm.  Cardiology recommending to continue amiodarone drip for 24 more hours, then transition to p.o. amiodarone.    Assessment & Plan:

## 2025-06-19 NOTE — PROGRESS NOTES
06/19/25 0739   RT Protocol   History Pulmonary Disease 2   Respiratory pattern 2   Breath sounds 6   Cough 1   Bronchodilator Assessment Score 11

## 2025-06-19 NOTE — PROGRESS NOTES
GOALS:  LTG: Patient will maintain adequate hydration/nutrition with optimum safety and efficiency of swallowing function with PO intake without overt signs or symptoms of aspiration for the highest appropriate diet level.  STG:  Patient will safely ingest diet trials during therapeutic feedings with SLP without overt signs or symptoms of respiratory compromise in efforts to advance diet. Ongoing 2025  Patient will complete a Modified Barium Swallow study to fully assess physiology and anatomy of the swallow and determine safest appropriate diet and/or rehabilitation strategies, as medically indicated. FEES completed 2025  Patient will consume soft and bite sized diet with thin liquids without adverse pulmonary events.Updated 2205  Patient will demonstrate use of compensatory strategies to increase safety with PO intake with min A. Ongoing 2025       SPEECH LANGUAGE PATHOLOGY: Dysphagia Daily Note #4    Acknowledge Order  I  Therapy Time  I   Charges     I  Rehab Caseload Tracker  NAME: Jonathan Zurita  : 1945  MRN: [id number]    ADMISSION DATE: 2025  PRIMARY DIAGNOSIS: Bilateral interstitial pneumonia (HCC)    ICD-10: Treatment Diagnosis: R13.12 Dysphagia, Oropharyngeal Phase    RECOMMENDATIONS   Diet:    Soft and Bite-Sized  Thin Liquids    Medication: As Tolerated   Compensatory Swallowing Strategies:   Upright for all PO  Small bites and sips  Reduced Rate/Frequent Breaks to Support Respiratory Status   No Straws  Reflux precautions    Therapeutic Intervention:   Patient/family education  Dysphagia treatment   Patient continues to require skilled intervention:  Yes. Recommend ongoing speech therapy services during this hospitalization.     Anticipated Discharge Needs: Ongoing speech therapy is recommended at next level of care. Recommend Home Health Speech Therapy Services for Dysphagia.      ASSESSMENT    Dysphagia: Patient recalled recommended safe swallow strategies with

## 2025-06-19 NOTE — PROGRESS NOTES
SPIRITUAL HEALTH  Note for Med/Surg Visit                  Room # 405/01    Name: Jonathan Zurita           Age: 80 y.o.    Gender: male          MRN: [id number]  Yarsani: Rastafari       Preferred Language: English    Date: 06/19/25  Visit Time: Begin Time: (P) 0905 End Time : (P) 0920 Complexity of Encounter: (P) Low      Visit Summary:  met with patient through regular rounds. He had no visitors. Patient expressed that he is Amish and has a Anabaptist for support. Patient's social support includes his children.  provided active listening, discussion of illness, space for expression of feelings and spiritual support.  will follow up as necessary or at patient's request.      Referral/Consult From: (P) Rounding  Encounter Overview/Reason: Spiritual/Emotional Needs  Encounter Code:     Crisis (if applicable):    Service Provided For: (P) Patient     Patient was available.    Yanni, Belief, Meaning:   Patient identifies as spiritual  is connected with a yanni tradition or spiritual practice  has beliefs or practices that help with coping during difficult times  Family/Friends No family/friends present  Rituals (if applicable)      Importance and Influence:  Patient does not have spiritual/personal beliefs that influence decisions regarding their health  Family/Friends No family/friends present    Community:  Patient   is connected with a spiritual community  Support System Includes   (P) Children   Family/Friends   No family/ friends present.    Assessment and Plan of Care:   Emotions Expressed by Patient:   Assessment: (P) Sad    Interventions by :   Intervention: (P) Active listening, Sustaining Presence/Ministry of presence, Explored/Affirmed feelings, thoughts, concerns, Discussed belief system/Anabaptist practices/yanni, Explored Coping Skills/Resources     Result/ Response by Patient:   Outcome: (P) Acceptance, Comfort, Engaged in conversation, Expressed feelings, needs, and

## 2025-06-19 NOTE — PLAN OF CARE
Problem: Chronic Conditions and Co-morbidities  Goal: Patient's chronic conditions and co-morbidity symptoms are monitored and maintained or improved  Outcome: Progressing     Problem: Discharge Planning  Goal: Discharge to home or other facility with appropriate resources  Outcome: Progressing  Flowsheets (Taken 6/19/2025 0257)  Discharge to home or other facility with appropriate resources:   Identify barriers to discharge with patient and caregiver   Arrange for needed discharge resources and transportation as appropriate

## 2025-06-19 NOTE — PROGRESS NOTES
[facility] CARDIOLOGY PROGRESS NOTE           6/19/2025 8:22 AM    Admit Date: 6/14/2025         Subjective:  in SR this morning.  Runs of Afib with RVR yesterday around 5 pm.    ROS:  Cardiovascular:  As noted above    Objective:      Vitals:    06/19/25 0600 06/19/25 0700 06/19/25 0726 06/19/25 0748   BP: (!) 113/52 (!) 107/55  110/63   Pulse: 58 57 59 62   Resp:   19    Temp:    98.2 °F (36.8 °C)   TempSrc:       SpO2:   94% 96%   Weight:       Height:           On telemetry:sr      Physical Exam:  General: Well Developed, Well Nourished, No Acute Distress, Alert & Oriented x 3, Appropriate mood  Neck: supple, no JVD  Heart: S1S2 with RRR without murmurs or gallops  Lungs: Clear throughout auscultation bilaterally without adventitious sounds  Abd: soft, nontender, nondistended, with good bowel sounds  Ext: no edema bilaterally  Skin: warm and dry      Data Review:   Recent Labs     06/17/25  0628 06/17/25  1113 06/18/25  0606 06/19/25  0317     --  138 138   K 3.9  --  3.5 4.5   MG  --   --  1.8  --    BUN 11  --  11 15   WBC 7.1  --   --   --    HGB 12.1*  --   --   --    HCT 37.5*  --   --   --    *  --   --   --    INR  --  1.0  --   --        No results for input(s): \"TNIPOC\" in the last 72 hours.    Invalid input(s): \"TROIQ\"        Assessment/Plan:     Principal Problem (Resolved):    Bilateral interstitial pneumonia (HCC)  Active Problems:    Ventricular tachycardia (HCC)    Anxiety disorder, unspecified    Chronic lymphocytic leukemia of B-cell type in remission (HCC)    Pneumonia due to infectious organism    Hypotension    Sepsis (HCC)    Atrial fibrillation with RVR (HCC)    Oropharyngeal dysphagia    Moderate COPD (chronic obstructive pulmonary disease) ([facility])  Resolved Problems:    Stage 4 very severe COPD by GOLD classification ([facility])    Paroxysmal atrial fibrillation (HCC)    A/P:  1) Chronic lymphocytic leukemia of B-cell type in remission ([facility]) - Per primary

## 2025-06-19 NOTE — PROGRESS NOTES
ACUTE PHYSICAL THERAPY GOALS:   (Developed with and agreed upon by patient and/or caregiver.)  Pt will perform sit-to-stand/ stand-to-sit transfers Shelley in 7 therapy sessions.  Pt will ambulate 1000 ft Shelley with use of LRAD/no device and breaks as needed in 7 therapy sessions.  Pt will tolerate 25 minutes of standing activity with LRAD/no device in 7 therapy sessions.  Pt will negotiate up and down 5 steps Shelley with use of a handrail in 7 therapy sessions.  Pt will perform standing dynamic balance activities with minimal postural sway in 7 therapy sessions.  Pt will tolerate multiple sets and reps of BLE exercises in 7 therapy sessions.     PHYSICAL THERAPY: Daily Note PM   (Link to Caseload Tracking: PT Visit Days : 2  Time In/Out PT Charge Capture  Rehab Caseload Tracker  Orders    Jonathan Zurita is a 80 y.o. male   PRIMARY DIAGNOSIS: Bilateral interstitial pneumonia (HCC)  Pneumonia due to infectious organism, unspecified laterality, unspecified part of lung [J18.9]  Bilateral interstitial pneumonia (HCC) [J84.9]       Inpatient: Payor: [employer] / Plan: [employer] / Product Type: *No Product type* /     ASSESSMENT:     REHAB RECOMMENDATIONS:   Recommendation to date pending progress:  Setting:  Home Health Therapy    Equipment:    To Be Determined     ASSESSMENT:  Mr. Zurita was sitting edge of bed on arrival and agreeable to therapy. RN was in room checking his BP and giving meds. He ambulated 450' with RW and SBA with steady gait. Worked on BLE exercises while sitting edge of bed. Pt left with needs in reach and daughter sitting in room. Continues to do well and hopes to go home soon.     SUBJECTIVE:   Mr. Zurita states, \"I exercise all day\"     Social/Functional Lives With: Son  Type of Home: Apartment ([facility])  Bathroom Equipment: None  Home Equipment: Rollator, Cane (nebulizer)  Receives Help From: Family  Prior Level of Assist for ADLs: Independent  Mode of Transportation:

## 2025-06-19 NOTE — PROGRESS NOTES
ACUTE OCCUPATIONAL THERAPY GOALS:   (Developed with and agreed upon by patient and/or caregiver.)  1. Pt will toilet with SBA   2. Pt will complete functional mobility for ADLs with SBA using AD as needed  3. Pt will complete lower body dressing with SBA using AE as needed  4. Pt will complete grooming and hygiene at sink with SBA  5. Pt will demonstrate independence with HEP to promote increased BUE strength and functional use for ADLs  6. Pt will tolerate 23 minutes functional activity with 1-2  rest breaks to promote increased endurance for ADLs  7. Pt will independently demonstrate/ verbalize 2+ energy conservation techniques to promote increased endurance for ADLs        Timeframe: 7 visits     OCCUPATIONAL THERAPY: Daily Note PM   OT Visit Days: 2   Time In/Out  OT Charge Capture  Rehab Caseload Tracker  OT Orders    Jonathan Zurita is a 80 y.o. male   PRIMARY DIAGNOSIS: Bilateral interstitial pneumonia (HCC)  Pneumonia due to infectious organism, unspecified laterality, unspecified part of lung [J18.9]  Bilateral interstitial pneumonia (HCC) [J84.9]       Inpatient: Payor: [employer] / Plan: [employer] / Product Type: *No Product type* /     ASSESSMENT:     REHAB RECOMMENDATIONS:   Recommendation to date pending progress:  Setting:  Home Health Therapy versus no needs pending progress    Equipment:    To Be Determined     ASSESSMENT:  Mr. Zurita found up ambulating with PTA staff in [place] using RW. Still getting amnio drip. Reports getting sponge bath with CNA earlier. Reports feeling better over all and is anxious to go home. Pt sat to edge of bed and completed B UE exercises with minimal rest breaks needed. 20 (plus more) reps with good AROM and form. Reports he has a UE HEP that he does regularly and was able to demonstrate it to OT with good form. Uses 8 pound weights and resistance bands as well. Pt's daughter at bedside confirms. Pt sit to stand with SBA and no LOB. See grid below. Asked  with CNA earlier   Lower Body Bathing [] [] [] [] [] [] [] [] [] [x]     Toileting [] [] [x] [] [] [] [] [] [] [] In bathroom using urinal   Upper Body Dressing [] [] [x] [] [] [] [] [] [] [] gown   Lower Body Dressing [] [] [x] [] [] [] [] [] [] [] socks   Feeding [] [] [x] [] [] [] [] [] [] []    Grooming [] [] [x] [] [] [] [] [] [] []    Personal Device Care [] [] [] [] [] [] [] [] [] [x]    Functional Mobility [] [] [] [x] [] [] [] [] [] [] W rolling walker in hallway with PTA   I=Independent, Mod I=Modified Independent, S=Supervision/Setup, SBA=Standby Assistance, CGA=Contact Guard Assistance, Min=Minimal Assistance, Mod=Moderate Assistance, Max=Maximal Assistance, Total=Total Assistance, NT=Not Tested    BALANCE: Good Fair+ Fair Fair- Poor NT Comments   Sitting Static [x] [] [] [] [] []    Sitting Dynamic [] [x] [] [] [] []              Standing Static [] [x] [] [] [] []    Standing Dynamic [] [x] [] [] [] [] W RW       PLAN:     FREQUENCY/DURATION   OT Plan of Care: 3 times/week for duration of hospital stay or until stated goals are met, whichever comes first.    TREATMENT:     TREATMENT:   Therapeutic Exercise (25 Minutes): Therapeutic exercises noted below to improve functional activity tolerance, AROM, strength, mobility, and use of B UEs in ADLs.     TREATMENT GRID: pt sitting on edge of bed, arms together, cues to take rest breaks  N/A   Date:  6/19/2025 Date:   Date:     Activity/Exercise Parameters Parameters Parameters   Shoulder flexion/extension 20 reps     Shoulder horizontal abduction/ adduction with straight arm 20 reps     Shoulder abduction/adduction 20 reps     Shoulder internal rotation/ external rotation      Punches up to ceiling 20 reps     Punches out in front 20 reps     Elbow flexion/extension 20 reps     Supination/pronation      Wrist flexion/extension      Wrist circumduction      Finger flexion/extension      Fist pumps      Thumb circumduction      Diagonals across body 20 reps

## 2025-06-20 VITALS
DIASTOLIC BLOOD PRESSURE: 66 MMHG | TEMPERATURE: 98.1 F | HEART RATE: 60 BPM | RESPIRATION RATE: 18 BRPM | SYSTOLIC BLOOD PRESSURE: 134 MMHG | OXYGEN SATURATION: 97 % | WEIGHT: 138.8 LBS | HEIGHT: 62 IN | BODY MASS INDEX: 25.54 KG/M2

## 2025-06-20 LAB
ANION GAP SERPL CALC-SCNC: 10 MMOL/L (ref 7–16)
BUN SERPL-MCNC: 11 MG/DL (ref 8–23)
CALCIUM SERPL-MCNC: 8.7 MG/DL (ref 8.8–10.2)
CHLORIDE SERPL-SCNC: 108 MMOL/L (ref 98–107)
CO2 SERPL-SCNC: 21 MMOL/L (ref 20–29)
CREAT SERPL-MCNC: 0.7 MG/DL (ref 0.8–1.3)
ERYTHROCYTE [DISTWIDTH] IN BLOOD BY AUTOMATED COUNT: 15.6 % (ref 11.9–14.6)
GLUCOSE SERPL-MCNC: 99 MG/DL (ref 70–99)
HCT VFR BLD AUTO: 35.7 % (ref 41.1–50.3)
HGB BLD-MCNC: 11.9 G/DL (ref 13.6–17.2)
MCH RBC QN AUTO: 29.7 PG (ref 26.1–32.9)
MCHC RBC AUTO-ENTMCNC: 33.3 G/DL (ref 31.4–35)
MCV RBC AUTO: 89 FL (ref 82–102)
NRBC # BLD: 0 K/UL (ref 0–0.2)
PLATELET # BLD AUTO: 169 K/UL (ref 150–450)
PMV BLD AUTO: 11.6 FL (ref 9.4–12.3)
POTASSIUM SERPL-SCNC: 4.2 MMOL/L (ref 3.5–5.1)
RBC # BLD AUTO: 4.01 M/UL (ref 4.23–5.6)
SODIUM SERPL-SCNC: 139 MMOL/L (ref 136–145)
WBC # BLD AUTO: 6.1 K/UL (ref 4.3–11.1)

## 2025-06-20 PROCEDURE — 6370000000 HC RX 637 (ALT 250 FOR IP): Performed by: INTERNAL MEDICINE

## 2025-06-20 PROCEDURE — 36415 COLL VENOUS BLD VENIPUNCTURE: CPT

## 2025-06-20 PROCEDURE — 80048 BASIC METABOLIC PNL TOTAL CA: CPT

## 2025-06-20 PROCEDURE — 6360000002 HC RX W HCPCS: Performed by: FAMILY MEDICINE

## 2025-06-20 PROCEDURE — 85027 COMPLETE CBC AUTOMATED: CPT

## 2025-06-20 PROCEDURE — 94640 AIRWAY INHALATION TREATMENT: CPT

## 2025-06-20 PROCEDURE — 94760 N-INVAS EAR/PLS OXIMETRY 1: CPT

## 2025-06-20 PROCEDURE — 99232 SBSQ HOSP IP/OBS MODERATE 35: CPT | Performed by: INTERNAL MEDICINE

## 2025-06-20 PROCEDURE — 2500000003 HC RX 250 WO HCPCS: Performed by: FAMILY MEDICINE

## 2025-06-20 PROCEDURE — 2580000003 HC RX 258: Performed by: FAMILY MEDICINE

## 2025-06-20 PROCEDURE — 2580000003 HC RX 258: Performed by: INTERNAL MEDICINE

## 2025-06-20 PROCEDURE — 92526 ORAL FUNCTION THERAPY: CPT

## 2025-06-20 PROCEDURE — 6370000000 HC RX 637 (ALT 250 FOR IP): Performed by: FAMILY MEDICINE

## 2025-06-20 RX ORDER — AMIODARONE HYDROCHLORIDE 200 MG/1
400 TABLET ORAL 2 TIMES DAILY
Status: DISCONTINUED | OUTPATIENT
Start: 2025-06-20 | End: 2025-06-20 | Stop reason: HOSPADM

## 2025-06-20 RX ORDER — AMIODARONE HYDROCHLORIDE 400 MG/1
400 TABLET ORAL 2 TIMES DAILY
Qty: 60 TABLET | Refills: 0 | Status: SHIPPED
Start: 2025-06-20 | End: 2025-06-20

## 2025-06-20 RX ORDER — AMIODARONE HYDROCHLORIDE 400 MG/1
400 TABLET ORAL 2 TIMES DAILY
Qty: 60 TABLET | Refills: 0 | Status: SHIPPED | OUTPATIENT
Start: 2025-06-20 | End: 2025-07-20

## 2025-06-20 RX ADMIN — MIDODRINE HYDROCHLORIDE 5 MG: 5 TABLET ORAL at 12:08

## 2025-06-20 RX ADMIN — DULOXETINE HYDROCHLORIDE 60 MG: 60 CAPSULE, DELAYED RELEASE ORAL at 08:41

## 2025-06-20 RX ADMIN — Medication 4 ML: at 07:21

## 2025-06-20 RX ADMIN — ARFORMOTEROL TARTRATE: 15 SOLUTION RESPIRATORY (INHALATION) at 07:21

## 2025-06-20 RX ADMIN — ASPIRIN 81 MG: 81 TABLET, CHEWABLE ORAL at 08:41

## 2025-06-20 RX ADMIN — AMIODARONE HYDROCHLORIDE 400 MG: 200 TABLET ORAL at 08:51

## 2025-06-20 RX ADMIN — SODIUM CHLORIDE, PRESERVATIVE FREE 10 ML: 5 INJECTION INTRAVENOUS at 08:43

## 2025-06-20 RX ADMIN — AMOXICILLIN AND CLAVULANATE POTASSIUM 1 TABLET: 875; 125 TABLET, FILM COATED ORAL at 08:41

## 2025-06-20 RX ADMIN — AMIODARONE HYDROCHLORIDE 0.5 MG/MIN: 50 INJECTION, SOLUTION INTRAVENOUS at 03:59

## 2025-06-20 RX ADMIN — BUSPIRONE HYDROCHLORIDE 10 MG: 10 TABLET ORAL at 08:41

## 2025-06-20 RX ADMIN — BUSPIRONE HYDROCHLORIDE 10 MG: 10 TABLET ORAL at 13:40

## 2025-06-20 RX ADMIN — PANTOPRAZOLE SODIUM 40 MG: 40 TABLET, DELAYED RELEASE ORAL at 04:52

## 2025-06-20 RX ADMIN — TAMSULOSIN HYDROCHLORIDE 0.4 MG: 0.4 CAPSULE ORAL at 08:41

## 2025-06-20 RX ADMIN — MIDODRINE HYDROCHLORIDE 5 MG: 5 TABLET ORAL at 08:41

## 2025-06-20 RX ADMIN — APIXABAN 2.5 MG: 2.5 TABLET, FILM COATED ORAL at 08:41

## 2025-06-20 RX ADMIN — GUAIFENESIN 1200 MG: 600 TABLET, EXTENDED RELEASE ORAL at 08:41

## 2025-06-20 RX ADMIN — IPRATROPIUM BROMIDE 0.5 MG: 0.5 SOLUTION RESPIRATORY (INHALATION) at 07:21

## 2025-06-20 ASSESSMENT — PAIN SCALES - GENERAL
PAINLEVEL_OUTOF10: 0

## 2025-06-20 NOTE — PROGRESS NOTES
GOALS:  LTG: Patient will maintain adequate hydration/nutrition with optimum safety and efficiency of swallowing function with PO intake without overt signs or symptoms of aspiration for the highest appropriate diet level.  STG:  Patient will safely ingest diet trials during therapeutic feedings with SLP without overt signs or symptoms of respiratory compromise in efforts to advance diet. Ongoing 2025  Patient will complete a Modified Barium Swallow study to fully assess physiology and anatomy of the swallow and determine safest appropriate diet and/or rehabilitation strategies, as medically indicated. FEES completed 2025  Patient will consume soft and bite sized diet with thin liquids without adverse pulmonary events.Updated 2205  Patient will demonstrate use of compensatory strategies to increase safety with PO intake with min A. Ongoing 2025       SPEECH LANGUAGE PATHOLOGY: Dysphagia Daily Note #4    Acknowledge Order  I  Therapy Time  I   Charges     I  Rehab Caseload Tracker  NAME: Jonathan Zurita  : 1945  MRN: [id number]    ADMISSION DATE: 2025  PRIMARY DIAGNOSIS: Atrial fibrillation with RVR (HCC)    ICD-10: Treatment Diagnosis: R13.12 Dysphagia, Oropharyngeal Phase    RECOMMENDATIONS   Diet:    Soft and Bite-Sized  Thin Liquids    Medication: As Tolerated   Compensatory Swallowing Strategies:   Upright for all PO  Small bites and sips  Reduced Rate/Frequent Breaks to Support Respiratory Status   No Straws  Reflux precautions    Therapeutic Intervention:   Patient/family education  Dysphagia treatment   Patient continues to require skilled intervention:  Yes. Recommend ongoing speech therapy services during this hospitalization.     Anticipated Discharge Needs: Ongoing speech therapy is recommended at next level of care. Recommend Home Health Speech Therapy Services for Dysphagia.      ASSESSMENT    Dysphagia: Patient tolerating soft/bite sized diet and thin liquids without

## 2025-06-20 NOTE — PROGRESS NOTES
[facility] CARDIOLOGY PROGRESS NOTE           6/20/2025 8:38 AM    Admit Date: 6/14/2025         Subjective: No paroxysms of atrial fibrillation over the last 24 hours.  Heart rate is started to dip.    ROS:  Cardiovascular:  As noted above    Objective:      Vitals:    06/20/25 0721 06/20/25 0745 06/20/25 0747 06/20/25 0808   BP:  96/63 111/61    Pulse:   57 57   Resp: 18      Temp:   97.5 °F (36.4 °C)    TempSrc:       SpO2: 94%  98%    Weight:       Height:           On telemetry:sr      Physical Exam:  General: Well Developed, Well Nourished, No Acute Distress, Alert & Oriented x 3, Appropriate mood  Neck: supple, no JVD  Heart: S1S2 with RRR without murmurs or gallops  Lungs: Clear throughout auscultation bilaterally without adventitious sounds  Abd: soft, nontender, nondistended, with good bowel sounds  Ext: no edema bilaterally  Skin: warm and dry      Data Review:   Recent Labs     06/17/25  1113 06/18/25  0606 06/18/25  0606 06/19/25  0317 06/19/25  0827 06/20/25  0355   NA  --  138   < > 138  --  139   K  --  3.5   < > 4.5  --  4.2   MG  --  1.8  --   --  2.0  --    BUN  --  11   < > 15  --  11   WBC  --   --   --   --   --  6.1   HGB  --   --   --   --   --  11.9*   HCT  --   --   --   --   --  35.7*   PLT  --   --   --   --   --  169   INR 1.0  --   --   --   --   --     < > = values in this interval not displayed.       No results for input(s): \"TNIPOC\" in the last 72 hours.    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  Hemodynamics appear to be stable on low-dose midodrine in the hospital. Will ween if possible  3) Sepsis ([facility]) - Continue antibiotic therapy for his pneumonia.  4) Atrial fibrillation with RVR ([facility]) -stop IV amiodarone and start oral 400 mg twice daily.  Replace electrolytes for K greater than 4 mag greater than 2.  On eliquis 2.5 mg BID  5)  Moderate COPD (chronic obstructive pulmonary disease) ([facility]) - Pulmonary following.  6) Wide-complex tachycardia - no further episodes change to 400 mg PO BID  Poor candidate for invasive therapies.         William Russell MD  6/20/2025 8:38 AM

## 2025-06-20 NOTE — DISCHARGE SUMMARY
Hospitalist Discharge Summary   Admit Date:  2025 10:28 PM   DC Note date: 2025  Name:  Jonathan Zurita   Age:  80 y.o.  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 male with medical history of COPD, paroxysmal A-fib, on Eliquis, CLL, anxiety, CVA, bronchiectasis, history of aspiration pneumonia who was admitted with septic shock secondary to aspiration pneumonia complicated by A-fib RVR.       On admission, he met criteria for septic shock with temp of 100.7, heart rate of 129 and requiring vasopressor support with Levophed, which was successfully weaned in the ICU.  Chest x-ray showed bibasilar opacities consistent with pneumonia. Patient initially started on Zosyn but given low risk for Pseudomonas, antibiotic were de-escalated to ceftriaxone  and later switched to Augmentin to complete antibiotic course.  Speech evaluated patient and MBS  without aspiration.  Patient started on diet.     Cardiology consulted for A-fib RVR.  Patient started on amiodarone drip given low blood pressure and heparin

## 2025-06-20 NOTE — CARE COORDINATION
Discharge order is in. Pt is discharging home today in stable condition. Interim HH with RN/PT/OT/SP arranged. Writer updated the agency on pts discharge. No other discharge needs identified by [facility] NUNES goals met.     06/20/25 0941   Services At/After Discharge   Transition of Care Consult (CM Consult) Discharge Planning   Internal Home Health No   Services At/After Discharge Home Health;PT;OT;Nursing services;SLP  (Interim HH)   Blanding Resource Information Provided? Yes   Mode of Transport at Discharge Other (see comment)  (Family)   Confirm Follow Up Transport Family   Condition of Participation: Discharge Planning   The Patient and/or Patient Representative was provided with a Choice of Provider? Patient   The Patient and/Or Patient Representative agree with the Discharge Plan? Yes   Freedom of Choice list was provided with basic dialogue that supports the patient's individualized plan of care/goals, treatment preferences, and shares the quality data associated with the providers?  Yes

## 2025-06-20 NOTE — PROGRESS NOTES
Discharge instructions reviewed with pt. Prescriptions given for new meds and med info sheets provided for all new medications. Opportunity for questions provided. pt voiced understanding of all discharge instructions.   Ivs and monitor removed

## 2025-06-23 NOTE — PROGRESS NOTES
Physician Progress Note      PATIENT:               JUDY TERRAZAS  CSN #:                  [id number]  :                       1945  ADMIT DATE:       2025 10:28 PM  DISCH DATE:        2025 2:56 PM  RESPONDING  PROVIDER #:        Aaron Yung MD          QUERY TEXT:    Patient admitted -  H&P 2025: \"Upon EMS evaluation, patient was found to be in afib with   RVR. HR initially improved with IVFs.  Fever with temperature of 100.7.  Labs   show lactic acid of 1.0.  Procal of 0.04.  WBC of 9.7.  CXR shows: Bibasilar   pulmonary opacities consistent with atelectasis or pneumonia.  Patient was   seen by his Pulmonologist yesterday and does have a h/o aspiration pneumonia   for which a referral to SLP was placed.  Patient started on zosyn.    Hospitalist asked to admit.  Hypotension. Started IVF bolus, but patient remained hypotensive. Added   levophed.  Admit to ICU.\"  Progress note: \"Chronic hypotension: At baseline on midodrine at home.\"  D&S: 2025: \"On admission, he met criteria for septic shock with temp of   100.7, heart rate of 129 and requiring vasopressor support with Levophed,   which was successfully weaned in the ICU.  \"    Sepsis is documented in the medical record H&P through DC summary . Please   provide additional clinical indicators supportive of your documentation. Or   please document if the diagnosis of sepsis has been ruled out after study.    The clinical indicators include:  Risk : Per H&P \"Patient was seen by his Pulmonologist yesterday and does have   a h/o aspiration pneumonia for which a referral to SLP was placed.\" , COPD ,   chronic hypotension, anxiety    Clinical Indicators :  Vitals on admission Temp 100.7, Heart rate 105, 129, 132,  Resp rate 19-40,   Blood pressures  105/65   72/57,  CXR : IMPRESSION:  Bibasilar pulmonary opacities consistent with atelectasis or  pneumonia.  Cardiology (6/15) \"Hypotension: Likely related to sepsis.\"  Pulmonology

## 2025-07-06 ENCOUNTER — APPOINTMENT (OUTPATIENT)
Dept: CT IMAGING | Age: 80
DRG: 481 | End: 2025-07-06
Payer: OTHER GOVERNMENT

## 2025-07-06 ENCOUNTER — ANESTHESIA EVENT (OUTPATIENT)
Dept: SURGERY | Age: 80
DRG: 481 | End: 2025-07-06
Payer: OTHER GOVERNMENT

## 2025-07-06 ENCOUNTER — HOSPITAL ENCOUNTER (INPATIENT)
Age: 80
LOS: 5 days | Discharge: NURSING, REHAB OR LONG-TERM CARE | DRG: 481 | End: 2025-07-11
Attending: EMERGENCY MEDICINE | Admitting: STUDENT IN AN ORGANIZED HEALTH CARE EDUCATION/TRAINING PROGRAM
Payer: OTHER GOVERNMENT

## 2025-07-06 ENCOUNTER — APPOINTMENT (OUTPATIENT)
Dept: GENERAL RADIOLOGY | Age: 80
DRG: 481 | End: 2025-07-06
Payer: OTHER GOVERNMENT
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
## 2025-07-06 DIAGNOSIS — S72.141A CLOSED DISPLACED INTERTROCHANTERIC FRACTURE OF RIGHT FEMUR, INITIAL ENCOUNTER (HCC): Primary | ICD-10-CM

## 2025-07-06 DIAGNOSIS — S72.141A DISPLACED INTERTROCHANTERIC FRACTURE OF RIGHT FEMUR, INITIAL ENCOUNTER FOR CLOSED FRACTURE (HCC): ICD-10-CM

## 2025-07-06 DIAGNOSIS — S72.143A: ICD-10-CM

## 2025-07-06 PROBLEM — Z86.73 HISTORY OF CVA (CEREBROVASCULAR ACCIDENT): Status: ACTIVE | Noted: 2025-07-06

## 2025-07-06 PROBLEM — I48.91 ATRIAL FIBRILLATION (HCC): Status: ACTIVE | Noted: 2025-07-06

## 2025-07-06 LAB
ABO + RH BLD: NORMAL
ALBUMIN SERPL-MCNC: 2.9 G/DL (ref 3.2–4.6)
ALBUMIN/GLOB SERPL: 1.4 (ref 1–1.9)
ALP SERPL-CCNC: 67 U/L (ref 40–129)
ALT SERPL-CCNC: 17 U/L (ref 8–55)
ANION GAP SERPL CALC-SCNC: 10 MMOL/L (ref 7–16)
AST SERPL-CCNC: 21 U/L (ref 15–37)
BASOPHILS # BLD: 0.02 K/UL (ref 0–0.2)
BASOPHILS NFR BLD: 0.3 % (ref 0–2)
BILIRUB SERPL-MCNC: 0.5 MG/DL (ref 0–1.2)
BLOOD GROUP ANTIBODIES SERPL: NORMAL
BUN SERPL-MCNC: 21 MG/DL (ref 8–23)
CALCIUM SERPL-MCNC: 8 MG/DL (ref 8.8–10.2)
CHLORIDE SERPL-SCNC: 107 MMOL/L (ref 98–107)
CO2 SERPL-SCNC: 20 MMOL/L (ref 20–29)
CREAT SERPL-MCNC: 0.72 MG/DL (ref 0.8–1.3)
DIFFERENTIAL METHOD BLD: ABNORMAL
EOSINOPHIL # BLD: 0.02 K/UL (ref 0–0.8)
EOSINOPHIL NFR BLD: 0.3 % (ref 0.5–7.8)
ERYTHROCYTE [DISTWIDTH] IN BLOOD BY AUTOMATED COUNT: 15.4 % (ref 11.9–14.6)
GLOBULIN SER CALC-MCNC: 2 G/DL (ref 2.3–3.5)
GLUCOSE SERPL-MCNC: 98 MG/DL (ref 70–99)
HCT VFR BLD AUTO: 35.5 % (ref 41.1–50.3)
HGB BLD-MCNC: 11.5 G/DL (ref 13.6–17.2)
IMM GRANULOCYTES # BLD AUTO: 0.02 K/UL (ref 0–0.5)
IMM GRANULOCYTES NFR BLD AUTO: 0.3 % (ref 0–5)
INR PPP: 1
LYMPHOCYTES # BLD: 2.3 K/UL (ref 0.5–4.6)
LYMPHOCYTES NFR BLD: 31.4 % (ref 13–44)
MCH RBC QN AUTO: 29.9 PG (ref 26.1–32.9)
MCHC RBC AUTO-ENTMCNC: 32.4 G/DL (ref 31.4–35)
MCV RBC AUTO: 92.2 FL (ref 82–102)
MONOCYTES # BLD: 0.55 K/UL (ref 0.1–1.3)
MONOCYTES NFR BLD: 7.5 % (ref 4–12)
NEUTS SEG # BLD: 4.41 K/UL (ref 1.7–8.2)
NEUTS SEG NFR BLD: 60.2 % (ref 43–78)
NRBC # BLD: 0 K/UL (ref 0–0.2)
PLATELET # BLD AUTO: 207 K/UL (ref 150–450)
PMV BLD AUTO: 11.6 FL (ref 9.4–12.3)
POTASSIUM SERPL-SCNC: 3.9 MMOL/L (ref 3.5–5.1)
PROT SERPL-MCNC: 4.9 G/DL (ref 6.3–8.2)
PROTHROMBIN TIME: 13.9 SEC (ref 11.3–14.9)
RBC # BLD AUTO: 3.85 M/UL (ref 4.23–5.6)
SODIUM SERPL-SCNC: 137 MMOL/L (ref 136–145)
SPECIMEN EXP DATE BLD: NORMAL
WBC # BLD AUTO: 7.3 K/UL (ref 4.3–11.1)

## 2025-07-06 PROCEDURE — 85025 COMPLETE CBC W/AUTO DIFF WBC: CPT

## 2025-07-06 PROCEDURE — 2500000003 HC RX 250 WO HCPCS: Performed by: STUDENT IN AN ORGANIZED HEALTH CARE EDUCATION/TRAINING PROGRAM

## 2025-07-06 PROCEDURE — 85610 PROTHROMBIN TIME: CPT

## 2025-07-06 PROCEDURE — 86850 RBC ANTIBODY SCREEN: CPT

## 2025-07-06 PROCEDURE — 96374 THER/PROPH/DIAG INJ IV PUSH: CPT

## 2025-07-06 PROCEDURE — 86900 BLOOD TYPING SEROLOGIC ABO: CPT

## 2025-07-06 PROCEDURE — 2500000003 HC RX 250 WO HCPCS: Performed by: EMERGENCY MEDICINE

## 2025-07-06 PROCEDURE — 94760 N-INVAS EAR/PLS OXIMETRY 1: CPT

## 2025-07-06 PROCEDURE — 99285 EMERGENCY DEPT VISIT HI MDM: CPT

## 2025-07-06 PROCEDURE — 71045 X-RAY EXAM CHEST 1 VIEW: CPT

## 2025-07-06 PROCEDURE — 6370000000 HC RX 637 (ALT 250 FOR IP): Performed by: STUDENT IN AN ORGANIZED HEALTH CARE EDUCATION/TRAINING PROGRAM

## 2025-07-06 PROCEDURE — 73552 X-RAY EXAM OF FEMUR 2/>: CPT

## 2025-07-06 PROCEDURE — 96375 TX/PRO/DX INJ NEW DRUG ADDON: CPT

## 2025-07-06 PROCEDURE — 73502 X-RAY EXAM HIP UNI 2-3 VIEWS: CPT

## 2025-07-06 PROCEDURE — 80053 COMPREHEN METABOLIC PANEL: CPT

## 2025-07-06 PROCEDURE — 86901 BLOOD TYPING SEROLOGIC RH(D): CPT

## 2025-07-06 PROCEDURE — 6360000002 HC RX W HCPCS: Performed by: STUDENT IN AN ORGANIZED HEALTH CARE EDUCATION/TRAINING PROGRAM

## 2025-07-06 PROCEDURE — 6360000002 HC RX W HCPCS: Performed by: EMERGENCY MEDICINE

## 2025-07-06 PROCEDURE — 1100000000 HC RM PRIVATE

## 2025-07-06 PROCEDURE — 93005 ELECTROCARDIOGRAM TRACING: CPT | Performed by: EMERGENCY MEDICINE

## 2025-07-06 PROCEDURE — 94640 AIRWAY INHALATION TREATMENT: CPT

## 2025-07-06 RX ORDER — ACETAMINOPHEN 650 MG/1
650 SUPPOSITORY RECTAL EVERY 6 HOURS PRN
Status: DISCONTINUED | OUTPATIENT
Start: 2025-07-06 | End: 2025-07-11 | Stop reason: HOSPADM

## 2025-07-06 RX ORDER — POTASSIUM CHLORIDE 1500 MG/1
40 TABLET, EXTENDED RELEASE ORAL PRN
Status: DISCONTINUED | OUTPATIENT
Start: 2025-07-06 | End: 2025-07-11 | Stop reason: HOSPADM

## 2025-07-06 RX ORDER — SODIUM CHLORIDE 0.9 % (FLUSH) 0.9 %
5-40 SYRINGE (ML) INJECTION EVERY 12 HOURS SCHEDULED
Status: DISCONTINUED | OUTPATIENT
Start: 2025-07-06 | End: 2025-07-11 | Stop reason: HOSPADM

## 2025-07-06 RX ORDER — DULOXETIN HYDROCHLORIDE 60 MG/1
60 CAPSULE, DELAYED RELEASE ORAL DAILY
Status: DISCONTINUED | OUTPATIENT
Start: 2025-07-06 | End: 2025-07-11 | Stop reason: HOSPADM

## 2025-07-06 RX ORDER — MIDODRINE HYDROCHLORIDE 5 MG/1
5 TABLET ORAL 3 TIMES DAILY PRN
Status: DISCONTINUED | OUTPATIENT
Start: 2025-07-06 | End: 2025-07-11 | Stop reason: HOSPADM

## 2025-07-06 RX ORDER — ASPIRIN 81 MG/1
81 TABLET, CHEWABLE ORAL DAILY
Status: DISCONTINUED | OUTPATIENT
Start: 2025-07-07 | End: 2025-07-11 | Stop reason: HOSPADM

## 2025-07-06 RX ORDER — ONDANSETRON 2 MG/ML
4 INJECTION INTRAMUSCULAR; INTRAVENOUS
Status: COMPLETED | OUTPATIENT
Start: 2025-07-06 | End: 2025-07-06

## 2025-07-06 RX ORDER — ONDANSETRON 4 MG/1
4 TABLET, ORALLY DISINTEGRATING ORAL EVERY 8 HOURS PRN
Status: DISCONTINUED | OUTPATIENT
Start: 2025-07-06 | End: 2025-07-07 | Stop reason: SDUPTHER

## 2025-07-06 RX ORDER — HYDROMORPHONE HYDROCHLORIDE 1 MG/ML
1 INJECTION, SOLUTION INTRAMUSCULAR; INTRAVENOUS; SUBCUTANEOUS
Status: ACTIVE | OUTPATIENT
Start: 2025-07-06 | End: 2025-07-07

## 2025-07-06 RX ORDER — PANTOPRAZOLE SODIUM 40 MG/1
40 TABLET, DELAYED RELEASE ORAL
Status: DISCONTINUED | OUTPATIENT
Start: 2025-07-07 | End: 2025-07-11 | Stop reason: HOSPADM

## 2025-07-06 RX ORDER — AMIODARONE HYDROCHLORIDE 200 MG/1
400 TABLET ORAL 2 TIMES DAILY
Status: DISCONTINUED | OUTPATIENT
Start: 2025-07-06 | End: 2025-07-11 | Stop reason: HOSPADM

## 2025-07-06 RX ORDER — SODIUM CHLORIDE 0.9 % (FLUSH) 0.9 %
5-40 SYRINGE (ML) INJECTION PRN
Status: DISCONTINUED | OUTPATIENT
Start: 2025-07-06 | End: 2025-07-11 | Stop reason: HOSPADM

## 2025-07-06 RX ORDER — MORPHINE SULFATE 2 MG/ML
2 INJECTION, SOLUTION INTRAMUSCULAR; INTRAVENOUS
Status: DISCONTINUED | OUTPATIENT
Start: 2025-07-06 | End: 2025-07-07 | Stop reason: SDUPTHER

## 2025-07-06 RX ORDER — POLYETHYLENE GLYCOL 3350 17 G/17G
17 POWDER, FOR SOLUTION ORAL DAILY PRN
Status: DISCONTINUED | OUTPATIENT
Start: 2025-07-06 | End: 2025-07-11 | Stop reason: HOSPADM

## 2025-07-06 RX ORDER — HYDROMORPHONE HYDROCHLORIDE 1 MG/ML
1 INJECTION, SOLUTION INTRAMUSCULAR; INTRAVENOUS; SUBCUTANEOUS
Status: COMPLETED | OUTPATIENT
Start: 2025-07-06 | End: 2025-07-06

## 2025-07-06 RX ORDER — IPRATROPIUM BROMIDE AND ALBUTEROL SULFATE 2.5; .5 MG/3ML; MG/3ML
1 SOLUTION RESPIRATORY (INHALATION) EVERY 4 HOURS PRN
Status: DISCONTINUED | OUTPATIENT
Start: 2025-07-06 | End: 2025-07-08

## 2025-07-06 RX ORDER — BUSPIRONE HYDROCHLORIDE 10 MG/1
10 TABLET ORAL 3 TIMES DAILY
Status: DISCONTINUED | OUTPATIENT
Start: 2025-07-06 | End: 2025-07-11 | Stop reason: HOSPADM

## 2025-07-06 RX ORDER — TAMSULOSIN HYDROCHLORIDE 0.4 MG/1
0.4 CAPSULE ORAL DAILY
Status: DISCONTINUED | OUTPATIENT
Start: 2025-07-07 | End: 2025-07-11 | Stop reason: HOSPADM

## 2025-07-06 RX ORDER — MAGNESIUM SULFATE IN WATER 40 MG/ML
2000 INJECTION, SOLUTION INTRAVENOUS PRN
Status: DISCONTINUED | OUTPATIENT
Start: 2025-07-06 | End: 2025-07-11 | Stop reason: HOSPADM

## 2025-07-06 RX ORDER — OXYCODONE HYDROCHLORIDE 5 MG/1
5 TABLET ORAL EVERY 4 HOURS PRN
Refills: 0 | Status: DISCONTINUED | OUTPATIENT
Start: 2025-07-06 | End: 2025-07-11 | Stop reason: HOSPADM

## 2025-07-06 RX ORDER — SODIUM CHLORIDE 9 MG/ML
INJECTION, SOLUTION INTRAVENOUS PRN
Status: DISCONTINUED | OUTPATIENT
Start: 2025-07-06 | End: 2025-07-11 | Stop reason: HOSPADM

## 2025-07-06 RX ORDER — ACETAMINOPHEN 325 MG/1
650 TABLET ORAL EVERY 6 HOURS PRN
Status: DISCONTINUED | OUTPATIENT
Start: 2025-07-06 | End: 2025-07-11 | Stop reason: HOSPADM

## 2025-07-06 RX ORDER — GUAIFENESIN 600 MG/1
1200 TABLET, EXTENDED RELEASE ORAL 2 TIMES DAILY
Status: DISCONTINUED | OUTPATIENT
Start: 2025-07-06 | End: 2025-07-11 | Stop reason: HOSPADM

## 2025-07-06 RX ORDER — ONDANSETRON 2 MG/ML
4 INJECTION INTRAMUSCULAR; INTRAVENOUS EVERY 6 HOURS PRN
Status: DISCONTINUED | OUTPATIENT
Start: 2025-07-06 | End: 2025-07-07 | Stop reason: SDUPTHER

## 2025-07-06 RX ORDER — POTASSIUM CHLORIDE 7.45 MG/ML
10 INJECTION INTRAVENOUS PRN
Status: DISCONTINUED | OUTPATIENT
Start: 2025-07-06 | End: 2025-07-11 | Stop reason: HOSPADM

## 2025-07-06 RX ORDER — ENOXAPARIN SODIUM 100 MG/ML
40 INJECTION SUBCUTANEOUS DAILY
Status: DISCONTINUED | OUTPATIENT
Start: 2025-07-07 | End: 2025-07-07

## 2025-07-06 RX ORDER — ROSUVASTATIN CALCIUM 20 MG/1
40 TABLET, COATED ORAL NIGHTLY
Status: DISCONTINUED | OUTPATIENT
Start: 2025-07-06 | End: 2025-07-11 | Stop reason: HOSPADM

## 2025-07-06 RX ADMIN — ROSUVASTATIN CALCIUM 40 MG: 20 TABLET, FILM COATED ORAL at 21:56

## 2025-07-06 RX ADMIN — ARFORMOTEROL TARTRATE: 15 SOLUTION RESPIRATORY (INHALATION) at 21:38

## 2025-07-06 RX ADMIN — HYDROMORPHONE HYDROCHLORIDE 1 MG: 1 INJECTION, SOLUTION INTRAMUSCULAR; INTRAVENOUS; SUBCUTANEOUS at 15:05

## 2025-07-06 RX ADMIN — SODIUM CHLORIDE, PRESERVATIVE FREE 10 ML: 5 INJECTION INTRAVENOUS at 20:28

## 2025-07-06 RX ADMIN — IPRATROPIUM BROMIDE 0.5 MG: 0.5 SOLUTION RESPIRATORY (INHALATION) at 21:38

## 2025-07-06 RX ADMIN — OXYCODONE 5 MG: 5 TABLET ORAL at 21:59

## 2025-07-06 RX ADMIN — MORPHINE SULFATE 2 MG: 2 INJECTION, SOLUTION INTRAMUSCULAR; INTRAVENOUS at 20:27

## 2025-07-06 RX ADMIN — GUAIFENESIN 1200 MG: 600 TABLET, EXTENDED RELEASE ORAL at 21:56

## 2025-07-06 RX ADMIN — MORPHINE SULFATE 2 MG: 2 INJECTION, SOLUTION INTRAMUSCULAR; INTRAVENOUS at 18:32

## 2025-07-06 RX ADMIN — BUSPIRONE HYDROCHLORIDE 10 MG: 5 TABLET ORAL at 21:56

## 2025-07-06 RX ADMIN — AMIODARONE HYDROCHLORIDE 400 MG: 200 TABLET ORAL at 21:56

## 2025-07-06 RX ADMIN — ONDANSETRON 4 MG: 2 INJECTION, SOLUTION INTRAMUSCULAR; INTRAVENOUS at 15:04

## 2025-07-06 ASSESSMENT — PAIN - FUNCTIONAL ASSESSMENT: PAIN_FUNCTIONAL_ASSESSMENT: 0-10

## 2025-07-06 ASSESSMENT — PAIN SCALES - GENERAL
PAINLEVEL_OUTOF10: 10
PAINLEVEL_OUTOF10: 7
PAINLEVEL_OUTOF10: 10

## 2025-07-06 ASSESSMENT — LIFESTYLE VARIABLES
HOW OFTEN DO YOU HAVE A DRINK CONTAINING ALCOHOL: MONTHLY OR LESS
HOW MANY STANDARD DRINKS CONTAINING ALCOHOL DO YOU HAVE ON A TYPICAL DAY: 1 OR 2

## 2025-07-06 ASSESSMENT — PAIN DESCRIPTION - LOCATION
LOCATION: HIP;KNEE
LOCATION: HIP
LOCATION: LEG;HIP

## 2025-07-06 ASSESSMENT — PAIN DESCRIPTION - ORIENTATION
ORIENTATION: RIGHT

## 2025-07-06 ASSESSMENT — PAIN DESCRIPTION - DESCRIPTORS
DESCRIPTORS: SHARP
DESCRIPTORS: STABBING
DESCRIPTORS: SHARP

## 2025-07-06 NOTE — PLAN OF CARE
80M w/ R intertroch femur fx  On eliquis for a-fib  Plan for R femur intramedullary nailing Mon 7/7, likely 12p w/ Dr. Fox SOSA for diet today  NPO p MN  Ordered Type & Screen

## 2025-07-06 NOTE — ED PROVIDER NOTES
Emergency Department Provider Note       [facility] EMERGENCY DEPT   PCP: Brian Davies DO   Age: 80 y.o.   Sex: male     DISPOSITION Decision To Admit 07/06/2025 02:50:06 PM    ICD-10-CM    1. Closed displaced intertrochanteric fracture of right femur, initial encounter ([facility])  S72.141A       2. Fall on same level, initial encounter  W18.30XA           Medical Decision Making     DDX:    Sprain, strain, tendon injury, contusion,    Abrasion, laceration, neurovascular injury, foreign body    Fracture, open fracture, dislocation, joint separation, articular surface injury,    ED Course as of 07/06/25 1542   Sun Jul 06, 2025   1540 I updated the patient regarding the findings in the emergency department.  He was placed in the knee immobilizer and he understands that he will most likely be having surgery tomorrow.  Internal medicine came and saw the patient in the ER as well.  Orthopedic surgery was consulted. [KH]      ED Course User Index  [KH] Matthieu Vargas DO     1 acute complicated illness or injury.  Shared medical decision making was utilized in creating the patients health plan today.  I independently ordered and reviewed each unique test.    I reviewed external records: ED visit note from a different ED.   I reviewed external records: provider visit note from PCP.  I reviewed external records: provider visit note from outside specialist.  I reviewed external records: previous EKG including cardiologist interpretation.    I reviewed external records: previous lab results from outside ED.  I reviewed external records: previous imaging study including radiologist interpretation.     ED cardiac monitoring rhythm strip was ordered and interpreted:  sinus rhythm, no evidence of an arrhythmia  ST Segments:Normal ST segments - NO STEMI   Rate: 59  I interpreted the X-rays hip fracture.  ED provider's independent EKG interpretation sinus rhythm  The patient was admitted and I have discussed patient management with the

## 2025-07-06 NOTE — ED TRIAGE NOTES
Patient arrives via [facility] from home with CO mechanical fall onto right hip.shortening and rotation noted. 100 mcg fentanyl en route. Denies hitting head. LOC, compliant with eliquis

## 2025-07-06 NOTE — ACP (ADVANCE CARE PLANNING)
Advance Care Planning Note   Admit Date:  2025  1:40 PM   Name:  Jonathan Zurita   Age:  80 y.o.  Sex:  male  :  1945   MRN:  [id number]   Room:  [place] [address]    Jonathan Zurita has capacity to make his own decisions:   Yes    If pt unable to make decisions, POA/surrogate decision maker:  Ria Abdullahi, [phone]    Other people present:   Daughter and son-in-law    Patient / surrogate decision-maker directed code status:  DNR/DNI    Other ACP topics discussed, if applicable:   Discussed CODE STATUS and patient would not want intubation or chest compressions.    Patient or surrogate consented to discussion of the current conditions, workup, management plans, prognosis, and the risk for further deterioration.  Time spent: 16 minutes in direct discussion.      Signed:  Bradley Olivia DO

## 2025-07-06 NOTE — ED NOTES
TRANSFER - OUT REPORT:    Verbal report given to Susie FRASER on Jonathan Zurita  being transferred to [facility] for routine progression of patient care       Report consisted of patient's Situation, Background, Assessment and   Recommendations(SBAR).     Information from the following report(s) ED Encounter Summary was reviewed with the receiving nurse.    June Fall Assessment:    Presents to emergency department  because of falls (Syncope, seizure, or loss of consciousness): Yes  Age > 70: Yes  Altered Mental Status, Intoxication with alcohol or substance confusion (Disorientation, impaired judgment, poor safety awaremess, or inability to follow instructions): No  Impaired Mobility: Ambulates or transfers with assistive devices or assistance; Unable to ambulate or transer.: Yes             Lines:   Peripheral IV 07/06/25 Posterior;Right Hand (Active)        Opportunity for questions and clarification was provided.      Patient transported with:  Tino Stout RN  07/06/25 [id number]

## 2025-07-06 NOTE — H&P
Hospitalist History and Physical   Admit Date:  2025  1:40 PM   Name:  Jonathan Zurita   Age:  80 y.o.  Sex:  male  :  1945   MRN:  [id number]   Room:  ER/    Presenting/Chief Complaint: Hip Injury     Reason(s) for Admission: Closed displaced intertrochanteric fracture of femur, initial encounter ([facility]) [F35.416V]     History of Present Illness:   Jonathan Zurita is a 80 y.o. male with medical history of atrial fibrillation, CLL, COPD, anxiety, depression, hyperlipidemia, CVA who presented after mechanical fall tripping over a dog and landing on his right side.  He denies any presyncope or loss of consciousness with fall.  Denies head strike.  States he is having pain in his right hip that is 8 out of 10 and radiating slightly down his leg but denies any other pain.  Of note patient had recent hospitalization for septic shock and A-fib with RVR and discharged on 2025.    On arrival to the ED patient hemodynamically stable hide from bradycardia at 55.  Notable labs include hemoglobin 11.5 (at baseline).  CMP pending.  Chest x-ray unremarkable.  Right femur/hip x-ray shows intertrochanteric fracture of right femur.  Initial EKG shows atrial fibrillation.  Orthopedic surgery consulted.    Patient evaluated at bedside.  Accompanied by daughter and son-in-law who assist with history.  Patient lying comfortably in bed and only endorsing pain at his right hip and radiating slightly down his right leg.  Patient has no other acute complaints.      Assessment & Plan:     Mechanical fall:  Closed right femur fracture:  - Patient reportedly tripped over dog without head strike or loss of consciousness  - Right femur/hip x-ray: Intertrochanteric fracture of right femur  - Orthopedic surgery consulted  - Hold home Eliquis until Ortho evaluation  - Analgesics as needed    Atrial fibrillation:  - Continue home amiodarone  - Hold home Eliquis until orthopedic evaluation    Chronic hypotension:  -

## 2025-07-07 ENCOUNTER — APPOINTMENT (OUTPATIENT)
Dept: GENERAL RADIOLOGY | Age: 80
DRG: 481 | End: 2025-07-07
Payer: OTHER GOVERNMENT

## 2025-07-07 ENCOUNTER — ANESTHESIA (OUTPATIENT)
Dept: SURGERY | Age: 80
DRG: 481 | End: 2025-07-07
Payer: OTHER GOVERNMENT

## 2025-07-07 LAB
ANION GAP SERPL CALC-SCNC: 9 MMOL/L (ref 7–16)
BASOPHILS # BLD: 0.02 K/UL (ref 0–0.2)
BASOPHILS NFR BLD: 0.3 % (ref 0–2)
BUN SERPL-MCNC: 26 MG/DL (ref 8–23)
CALCIUM SERPL-MCNC: 8.4 MG/DL (ref 8.8–10.2)
CHLORIDE SERPL-SCNC: 104 MMOL/L (ref 98–107)
CO2 SERPL-SCNC: 22 MMOL/L (ref 20–29)
CREAT SERPL-MCNC: 0.8 MG/DL (ref 0.8–1.3)
DIFFERENTIAL METHOD BLD: ABNORMAL
EKG ATRIAL RATE: 146 BPM
EKG DIAGNOSIS: NORMAL
EKG Q-T INTERVAL: 394 MS
EKG QRS DURATION: 89 MS
EKG QTC CALCULATION (BAZETT): 449 MS
EKG R AXIS: 80 DEGREES
EKG T AXIS: 56 DEGREES
EKG VENTRICULAR RATE: 78 BPM
EOSINOPHIL # BLD: 0.02 K/UL (ref 0–0.8)
EOSINOPHIL NFR BLD: 0.3 % (ref 0.5–7.8)
ERYTHROCYTE [DISTWIDTH] IN BLOOD BY AUTOMATED COUNT: 15.1 % (ref 11.9–14.6)
GLUCOSE SERPL-MCNC: 101 MG/DL (ref 70–99)
HCT VFR BLD AUTO: 34.6 % (ref 41.1–50.3)
HGB BLD-MCNC: 11.1 G/DL (ref 13.6–17.2)
IMM GRANULOCYTES # BLD AUTO: 0.02 K/UL (ref 0–0.5)
IMM GRANULOCYTES NFR BLD AUTO: 0.3 % (ref 0–5)
LYMPHOCYTES # BLD: 2.78 K/UL (ref 0.5–4.6)
LYMPHOCYTES NFR BLD: 35.8 % (ref 13–44)
MCH RBC QN AUTO: 29.4 PG (ref 26.1–32.9)
MCHC RBC AUTO-ENTMCNC: 32.1 G/DL (ref 31.4–35)
MCV RBC AUTO: 91.5 FL (ref 82–102)
MONOCYTES # BLD: 0.98 K/UL (ref 0.1–1.3)
MONOCYTES NFR BLD: 12.6 % (ref 4–12)
NEUTS SEG # BLD: 3.95 K/UL (ref 1.7–8.2)
NEUTS SEG NFR BLD: 50.7 % (ref 43–78)
NRBC # BLD: 0 K/UL (ref 0–0.2)
PLATELET # BLD AUTO: 219 K/UL (ref 150–450)
PMV BLD AUTO: 11.6 FL (ref 9.4–12.3)
POTASSIUM SERPL-SCNC: 4.2 MMOL/L (ref 3.5–5.1)
RBC # BLD AUTO: 3.78 M/UL (ref 4.23–5.6)
SODIUM SERPL-SCNC: 135 MMOL/L (ref 136–145)
WBC # BLD AUTO: 7.8 K/UL (ref 4.3–11.1)

## 2025-07-07 PROCEDURE — 2500000003 HC RX 250 WO HCPCS: Performed by: ORTHOPAEDIC SURGERY

## 2025-07-07 PROCEDURE — 3700000001 HC ADD 15 MINUTES (ANESTHESIA): Performed by: ORTHOPAEDIC SURGERY

## 2025-07-07 PROCEDURE — 2720000010 HC SURG SUPPLY STERILE: Performed by: ORTHOPAEDIC SURGERY

## 2025-07-07 PROCEDURE — 3600000004 HC SURGERY LEVEL 4 BASE: Performed by: ORTHOPAEDIC SURGERY

## 2025-07-07 PROCEDURE — 73502 X-RAY EXAM HIP UNI 2-3 VIEWS: CPT

## 2025-07-07 PROCEDURE — 80048 BASIC METABOLIC PNL TOTAL CA: CPT

## 2025-07-07 PROCEDURE — 6370000000 HC RX 637 (ALT 250 FOR IP): Performed by: ANESTHESIOLOGY

## 2025-07-07 PROCEDURE — 94640 AIRWAY INHALATION TREATMENT: CPT

## 2025-07-07 PROCEDURE — 6360000002 HC RX W HCPCS: Performed by: STUDENT IN AN ORGANIZED HEALTH CARE EDUCATION/TRAINING PROGRAM

## 2025-07-07 PROCEDURE — 2500000003 HC RX 250 WO HCPCS: Performed by: STUDENT IN AN ORGANIZED HEALTH CARE EDUCATION/TRAINING PROGRAM

## 2025-07-07 PROCEDURE — 0QS606Z REPOSITION RIGHT UPPER FEMUR WITH INTRAMEDULLARY INTERNAL FIXATION DEVICE, OPEN APPROACH: ICD-10-PCS | Performed by: ORTHOPAEDIC SURGERY

## 2025-07-07 PROCEDURE — 7100000000 HC PACU RECOVERY - FIRST 15 MIN: Performed by: ORTHOPAEDIC SURGERY

## 2025-07-07 PROCEDURE — 7100000001 HC PACU RECOVERY - ADDTL 15 MIN: Performed by: ORTHOPAEDIC SURGERY

## 2025-07-07 PROCEDURE — 85025 COMPLETE CBC W/AUTO DIFF WBC: CPT

## 2025-07-07 PROCEDURE — 94760 N-INVAS EAR/PLS OXIMETRY 1: CPT

## 2025-07-07 PROCEDURE — 2580000003 HC RX 258: Performed by: ANESTHESIOLOGY

## 2025-07-07 PROCEDURE — C1713 ANCHOR/SCREW BN/BN,TIS/BN: HCPCS | Performed by: ORTHOPAEDIC SURGERY

## 2025-07-07 PROCEDURE — 2500000003 HC RX 250 WO HCPCS: Performed by: NURSE ANESTHETIST, CERTIFIED REGISTERED

## 2025-07-07 PROCEDURE — 3600000014 HC SURGERY LEVEL 4 ADDTL 15MIN: Performed by: ORTHOPAEDIC SURGERY

## 2025-07-07 PROCEDURE — 2709999900 HC NON-CHARGEABLE SUPPLY: Performed by: ORTHOPAEDIC SURGERY

## 2025-07-07 PROCEDURE — 1100000000 HC RM PRIVATE

## 2025-07-07 PROCEDURE — 36415 COLL VENOUS BLD VENIPUNCTURE: CPT

## 2025-07-07 PROCEDURE — C1769 GUIDE WIRE: HCPCS | Performed by: ORTHOPAEDIC SURGERY

## 2025-07-07 PROCEDURE — 6370000000 HC RX 637 (ALT 250 FOR IP): Performed by: STUDENT IN AN ORGANIZED HEALTH CARE EDUCATION/TRAINING PROGRAM

## 2025-07-07 PROCEDURE — 71045 X-RAY EXAM CHEST 1 VIEW: CPT

## 2025-07-07 PROCEDURE — 3700000000 HC ANESTHESIA ATTENDED CARE: Performed by: ORTHOPAEDIC SURGERY

## 2025-07-07 PROCEDURE — 6360000002 HC RX W HCPCS: Performed by: ORTHOPAEDIC SURGERY

## 2025-07-07 PROCEDURE — 6360000002 HC RX W HCPCS: Performed by: NURSE ANESTHETIST, CERTIFIED REGISTERED

## 2025-07-07 PROCEDURE — 93010 ELECTROCARDIOGRAM REPORT: CPT | Performed by: INTERNAL MEDICINE

## 2025-07-07 DEVICE — LAG SCREW
Type: IMPLANTABLE DEVICE | Site: HIP | Status: FUNCTIONAL
Brand: GAMMA

## 2025-07-07 DEVICE — LOCKING SCREW
Type: IMPLANTABLE DEVICE | Site: HIP | Status: FUNCTIONAL
Brand: T2 ALPHA

## 2025-07-07 DEVICE — LONG NAIL, RIGHT
Type: IMPLANTABLE DEVICE | Site: HIP | Status: FUNCTIONAL
Brand: GAMMA

## 2025-07-07 RX ORDER — FENTANYL CITRATE 50 UG/ML
INJECTION, SOLUTION INTRAMUSCULAR; INTRAVENOUS
Status: DISCONTINUED | OUTPATIENT
Start: 2025-07-07 | End: 2025-07-07 | Stop reason: SDUPTHER

## 2025-07-07 RX ORDER — HYDROMORPHONE HYDROCHLORIDE 1 MG/ML
0.5 INJECTION, SOLUTION INTRAMUSCULAR; INTRAVENOUS; SUBCUTANEOUS
Status: DISCONTINUED | OUTPATIENT
Start: 2025-07-07 | End: 2025-07-11 | Stop reason: HOSPADM

## 2025-07-07 RX ORDER — SODIUM CHLORIDE 9 MG/ML
INJECTION, SOLUTION INTRAVENOUS PRN
Status: DISCONTINUED | OUTPATIENT
Start: 2025-07-07 | End: 2025-07-07 | Stop reason: HOSPADM

## 2025-07-07 RX ORDER — LIDOCAINE HYDROCHLORIDE 10 MG/ML
1 INJECTION, SOLUTION INFILTRATION; PERINEURAL
Status: DISCONTINUED | OUTPATIENT
Start: 2025-07-07 | End: 2025-07-07 | Stop reason: HOSPADM

## 2025-07-07 RX ORDER — PHENYLEPHRINE HYDROCHLORIDE 10 MG/ML
INJECTION INTRAVENOUS
Status: DISCONTINUED | OUTPATIENT
Start: 2025-07-07 | End: 2025-07-07 | Stop reason: SDUPTHER

## 2025-07-07 RX ORDER — OXYCODONE HYDROCHLORIDE 5 MG/1
10 TABLET ORAL EVERY 4 HOURS PRN
Status: DISCONTINUED | OUTPATIENT
Start: 2025-07-07 | End: 2025-07-07 | Stop reason: SDUPTHER

## 2025-07-07 RX ORDER — ONDANSETRON 2 MG/ML
INJECTION INTRAMUSCULAR; INTRAVENOUS
Status: DISCONTINUED | OUTPATIENT
Start: 2025-07-07 | End: 2025-07-07 | Stop reason: SDUPTHER

## 2025-07-07 RX ORDER — SODIUM CHLORIDE 0.9 % (FLUSH) 0.9 %
5-40 SYRINGE (ML) INJECTION EVERY 12 HOURS SCHEDULED
Status: DISCONTINUED | OUTPATIENT
Start: 2025-07-07 | End: 2025-07-11 | Stop reason: HOSPADM

## 2025-07-07 RX ORDER — ONDANSETRON 2 MG/ML
4 INJECTION INTRAMUSCULAR; INTRAVENOUS EVERY 6 HOURS PRN
Status: DISCONTINUED | OUTPATIENT
Start: 2025-07-07 | End: 2025-07-11 | Stop reason: HOSPADM

## 2025-07-07 RX ORDER — PROPOFOL 10 MG/ML
INJECTION, EMULSION INTRAVENOUS
Status: DISCONTINUED | OUTPATIENT
Start: 2025-07-07 | End: 2025-07-07 | Stop reason: SDUPTHER

## 2025-07-07 RX ORDER — OXYCODONE HYDROCHLORIDE 5 MG/1
10 TABLET ORAL PRN
Status: DISCONTINUED | OUTPATIENT
Start: 2025-07-07 | End: 2025-07-07 | Stop reason: HOSPADM

## 2025-07-07 RX ORDER — ONDANSETRON 4 MG/1
4 TABLET, ORALLY DISINTEGRATING ORAL EVERY 8 HOURS PRN
Status: DISCONTINUED | OUTPATIENT
Start: 2025-07-07 | End: 2025-07-11 | Stop reason: HOSPADM

## 2025-07-07 RX ORDER — SODIUM CHLORIDE 0.9 % (FLUSH) 0.9 %
5-40 SYRINGE (ML) INJECTION PRN
Status: DISCONTINUED | OUTPATIENT
Start: 2025-07-07 | End: 2025-07-11 | Stop reason: HOSPADM

## 2025-07-07 RX ORDER — ACETAMINOPHEN 500 MG
1000 TABLET ORAL ONCE
Status: COMPLETED | OUTPATIENT
Start: 2025-07-07 | End: 2025-07-07

## 2025-07-07 RX ORDER — SODIUM CHLORIDE 9 MG/ML
INJECTION, SOLUTION INTRAVENOUS PRN
Status: DISCONTINUED | OUTPATIENT
Start: 2025-07-07 | End: 2025-07-11 | Stop reason: HOSPADM

## 2025-07-07 RX ORDER — SODIUM CHLORIDE 0.9 % (FLUSH) 0.9 %
5-40 SYRINGE (ML) INJECTION PRN
Status: DISCONTINUED | OUTPATIENT
Start: 2025-07-07 | End: 2025-07-07 | Stop reason: HOSPADM

## 2025-07-07 RX ORDER — SODIUM CHLORIDE 0.9 % (FLUSH) 0.9 %
5-40 SYRINGE (ML) INJECTION EVERY 12 HOURS SCHEDULED
Status: DISCONTINUED | OUTPATIENT
Start: 2025-07-07 | End: 2025-07-07 | Stop reason: HOSPADM

## 2025-07-07 RX ORDER — LIDOCAINE HYDROCHLORIDE 20 MG/ML
INJECTION, SOLUTION EPIDURAL; INFILTRATION; INTRACAUDAL; PERINEURAL
Status: DISCONTINUED | OUTPATIENT
Start: 2025-07-07 | End: 2025-07-07 | Stop reason: SDUPTHER

## 2025-07-07 RX ORDER — PROCHLORPERAZINE EDISYLATE 5 MG/ML
5 INJECTION INTRAMUSCULAR; INTRAVENOUS
Status: DISCONTINUED | OUTPATIENT
Start: 2025-07-07 | End: 2025-07-07 | Stop reason: HOSPADM

## 2025-07-07 RX ORDER — DIPHENHYDRAMINE HYDROCHLORIDE 50 MG/ML
12.5 INJECTION, SOLUTION INTRAMUSCULAR; INTRAVENOUS
Status: DISCONTINUED | OUTPATIENT
Start: 2025-07-07 | End: 2025-07-07 | Stop reason: HOSPADM

## 2025-07-07 RX ORDER — SODIUM CHLORIDE, SODIUM LACTATE, POTASSIUM CHLORIDE, CALCIUM CHLORIDE 600; 310; 30; 20 MG/100ML; MG/100ML; MG/100ML; MG/100ML
INJECTION, SOLUTION INTRAVENOUS CONTINUOUS
Status: DISCONTINUED | OUTPATIENT
Start: 2025-07-07 | End: 2025-07-07 | Stop reason: HOSPADM

## 2025-07-07 RX ORDER — OXYCODONE HYDROCHLORIDE 5 MG/1
5 TABLET ORAL PRN
Status: DISCONTINUED | OUTPATIENT
Start: 2025-07-07 | End: 2025-07-07 | Stop reason: HOSPADM

## 2025-07-07 RX ORDER — ROCURONIUM BROMIDE 10 MG/ML
INJECTION, SOLUTION INTRAVENOUS
Status: DISCONTINUED | OUTPATIENT
Start: 2025-07-07 | End: 2025-07-07 | Stop reason: SDUPTHER

## 2025-07-07 RX ORDER — ONDANSETRON 2 MG/ML
4 INJECTION INTRAMUSCULAR; INTRAVENOUS
Status: DISCONTINUED | OUTPATIENT
Start: 2025-07-07 | End: 2025-07-07 | Stop reason: HOSPADM

## 2025-07-07 RX ORDER — DEXAMETHASONE SODIUM PHOSPHATE 4 MG/ML
INJECTION, SOLUTION INTRA-ARTICULAR; INTRALESIONAL; INTRAMUSCULAR; INTRAVENOUS; SOFT TISSUE
Status: DISCONTINUED | OUTPATIENT
Start: 2025-07-07 | End: 2025-07-07 | Stop reason: SDUPTHER

## 2025-07-07 RX ORDER — MIDAZOLAM HYDROCHLORIDE 2 MG/2ML
2 INJECTION, SOLUTION INTRAMUSCULAR; INTRAVENOUS
Status: DISCONTINUED | OUTPATIENT
Start: 2025-07-07 | End: 2025-07-07 | Stop reason: HOSPADM

## 2025-07-07 RX ADMIN — ROSUVASTATIN CALCIUM 40 MG: 20 TABLET, FILM COATED ORAL at 20:39

## 2025-07-07 RX ADMIN — SODIUM CHLORIDE, SODIUM LACTATE, POTASSIUM CHLORIDE, AND CALCIUM CHLORIDE: 600; 310; 30; 20 INJECTION, SOLUTION INTRAVENOUS at 14:55

## 2025-07-07 RX ADMIN — SUGAMMADEX 50 MG: 100 INJECTION, SOLUTION INTRAVENOUS at 14:47

## 2025-07-07 RX ADMIN — AMIODARONE HYDROCHLORIDE 400 MG: 200 TABLET ORAL at 20:38

## 2025-07-07 RX ADMIN — SODIUM CHLORIDE, PRESERVATIVE FREE 10 ML: 5 INJECTION INTRAVENOUS at 08:53

## 2025-07-07 RX ADMIN — MORPHINE SULFATE 2 MG: 2 INJECTION, SOLUTION INTRAMUSCULAR; INTRAVENOUS at 06:59

## 2025-07-07 RX ADMIN — DULOXETINE HYDROCHLORIDE 60 MG: 30 CAPSULE, DELAYED RELEASE ORAL at 08:52

## 2025-07-07 RX ADMIN — SUGAMMADEX 50 MG: 100 INJECTION, SOLUTION INTRAVENOUS at 14:45

## 2025-07-07 RX ADMIN — PHENYLEPHRINE HYDROCHLORIDE 400 MCG: 10 INJECTION INTRAVENOUS at 14:18

## 2025-07-07 RX ADMIN — MORPHINE SULFATE 2 MG: 2 INJECTION, SOLUTION INTRAMUSCULAR; INTRAVENOUS at 09:40

## 2025-07-07 RX ADMIN — TAMSULOSIN HYDROCHLORIDE 0.4 MG: 0.4 CAPSULE ORAL at 08:52

## 2025-07-07 RX ADMIN — OXYCODONE 5 MG: 5 TABLET ORAL at 20:37

## 2025-07-07 RX ADMIN — ACETAMINOPHEN 1000 MG: 500 TABLET, FILM COATED ORAL at 10:30

## 2025-07-07 RX ADMIN — PHENYLEPHRINE HYDROCHLORIDE 200 MCG: 10 INJECTION INTRAVENOUS at 14:23

## 2025-07-07 RX ADMIN — ROCURONIUM BROMIDE 50 MG: 10 INJECTION, SOLUTION INTRAVENOUS at 14:14

## 2025-07-07 RX ADMIN — DEXAMETHASONE SODIUM PHOSPHATE 4 MG: 4 INJECTION INTRA-ARTICULAR; INTRALESIONAL; INTRAMUSCULAR; INTRAVENOUS; SOFT TISSUE at 14:14

## 2025-07-07 RX ADMIN — IPRATROPIUM BROMIDE 0.5 MG: 0.5 SOLUTION RESPIRATORY (INHALATION) at 20:04

## 2025-07-07 RX ADMIN — LIDOCAINE HYDROCHLORIDE 100 MG: 20 INJECTION, SOLUTION EPIDURAL; INFILTRATION; INTRACAUDAL; PERINEURAL at 14:14

## 2025-07-07 RX ADMIN — Medication 2000 MG: at 14:24

## 2025-07-07 RX ADMIN — PANTOPRAZOLE SODIUM 40 MG: 40 TABLET, DELAYED RELEASE ORAL at 09:35

## 2025-07-07 RX ADMIN — BUSPIRONE HYDROCHLORIDE 10 MG: 5 TABLET ORAL at 20:37

## 2025-07-07 RX ADMIN — AMIODARONE HYDROCHLORIDE 400 MG: 200 TABLET ORAL at 08:50

## 2025-07-07 RX ADMIN — FENTANYL CITRATE 50 MCG: 50 INJECTION, SOLUTION INTRAMUSCULAR; INTRAVENOUS at 14:14

## 2025-07-07 RX ADMIN — PHENYLEPHRINE HYDROCHLORIDE 200 MCG: 10 INJECTION INTRAVENOUS at 14:38

## 2025-07-07 RX ADMIN — PROPOFOL 150 MG: 10 INJECTION, EMULSION INTRAVENOUS at 14:14

## 2025-07-07 RX ADMIN — ARFORMOTEROL TARTRATE: 15 SOLUTION RESPIRATORY (INHALATION) at 07:44

## 2025-07-07 RX ADMIN — ONDANSETRON 4 MG: 2 INJECTION, SOLUTION INTRAMUSCULAR; INTRAVENOUS at 14:44

## 2025-07-07 RX ADMIN — SUGAMMADEX 50 MG: 100 INJECTION, SOLUTION INTRAVENOUS at 14:44

## 2025-07-07 RX ADMIN — HYDROMORPHONE HYDROCHLORIDE 0.5 MG: 1 INJECTION, SOLUTION INTRAMUSCULAR; INTRAVENOUS; SUBCUTANEOUS at 18:25

## 2025-07-07 RX ADMIN — GUAIFENESIN 1200 MG: 600 TABLET, EXTENDED RELEASE ORAL at 08:52

## 2025-07-07 RX ADMIN — SODIUM CHLORIDE, SODIUM LACTATE, POTASSIUM CHLORIDE, AND CALCIUM CHLORIDE: 600; 310; 30; 20 INJECTION, SOLUTION INTRAVENOUS at 10:27

## 2025-07-07 RX ADMIN — BUSPIRONE HYDROCHLORIDE 10 MG: 5 TABLET ORAL at 08:52

## 2025-07-07 RX ADMIN — SUGAMMADEX 50 MG: 100 INJECTION, SOLUTION INTRAVENOUS at 14:42

## 2025-07-07 RX ADMIN — IPRATROPIUM BROMIDE 0.5 MG: 0.5 SOLUTION RESPIRATORY (INHALATION) at 07:45

## 2025-07-07 RX ADMIN — PHENYLEPHRINE HYDROCHLORIDE 200 MCG: 10 INJECTION INTRAVENOUS at 14:46

## 2025-07-07 RX ADMIN — ARFORMOTEROL TARTRATE: 15 SOLUTION RESPIRATORY (INHALATION) at 20:04

## 2025-07-07 RX ADMIN — FENTANYL CITRATE 50 MCG: 50 INJECTION, SOLUTION INTRAMUSCULAR; INTRAVENOUS at 14:12

## 2025-07-07 RX ADMIN — GUAIFENESIN 1200 MG: 600 TABLET, EXTENDED RELEASE ORAL at 20:39

## 2025-07-07 RX ADMIN — CEFAZOLIN 2000 MG: 10 INJECTION, POWDER, FOR SOLUTION INTRAVENOUS at 22:03

## 2025-07-07 RX ADMIN — SODIUM CHLORIDE, PRESERVATIVE FREE 10 ML: 5 INJECTION INTRAVENOUS at 20:39

## 2025-07-07 RX ADMIN — PHENYLEPHRINE HYDROCHLORIDE 200 MCG: 10 INJECTION INTRAVENOUS at 14:14

## 2025-07-07 RX ADMIN — SODIUM CHLORIDE, PRESERVATIVE FREE 10 ML: 5 INJECTION INTRAVENOUS at 20:40

## 2025-07-07 ASSESSMENT — PAIN SCALES - GENERAL
PAINLEVEL_OUTOF10: 6
PAINLEVEL_OUTOF10: 9
PAINLEVEL_OUTOF10: 5
PAINLEVEL_OUTOF10: 0
PAINLEVEL_OUTOF10: 9
PAINLEVEL_OUTOF10: 0
PAINLEVEL_OUTOF10: 0

## 2025-07-07 ASSESSMENT — PAIN DESCRIPTION - ORIENTATION
ORIENTATION: RIGHT

## 2025-07-07 ASSESSMENT — PAIN DESCRIPTION - LOCATION
LOCATION: LEG
LOCATION: HIP
LOCATION: LEG

## 2025-07-07 ASSESSMENT — PAIN DESCRIPTION - FREQUENCY
FREQUENCY: CONTINUOUS

## 2025-07-07 ASSESSMENT — PAIN DESCRIPTION - PAIN TYPE
TYPE: SURGICAL PAIN;ACUTE PAIN
TYPE: ACUTE PAIN
TYPE: SURGICAL PAIN;ACUTE PAIN

## 2025-07-07 ASSESSMENT — PAIN DESCRIPTION - DESCRIPTORS
DESCRIPTORS: CRUSHING
DESCRIPTORS: ACHING
DESCRIPTORS: CRUSHING

## 2025-07-07 ASSESSMENT — PAIN - FUNCTIONAL ASSESSMENT
PAIN_FUNCTIONAL_ASSESSMENT: 0-10
PAIN_FUNCTIONAL_ASSESSMENT: PREVENTS OR INTERFERES SOME ACTIVE ACTIVITIES AND ADLS
PAIN_FUNCTIONAL_ASSESSMENT: CRITICAL CARE PAIN OBSERVATION TOOL (CPOT)
PAIN_FUNCTIONAL_ASSESSMENT: PREVENTS OR INTERFERES SOME ACTIVE ACTIVITIES AND ADLS
PAIN_FUNCTIONAL_ASSESSMENT: ACTIVITIES ARE NOT PREVENTED

## 2025-07-07 ASSESSMENT — PAIN DESCRIPTION - ONSET
ONSET: ON-GOING

## 2025-07-07 NOTE — PERIOP NOTE
TRANSFER - OUT REPORT:    Verbal report given to EVELIO Rashid on Jonathan Zurita  being transferred to xray then [facility] for routine progression of patient care       Report consisted of patient's Situation, Background, Assessment and   Recommendations(SBAR).     Information from the following report(s) Nurse Handoff Report, Adult Overview, and Surgery Report was reviewed with the receiving nurse.           Lines:   Peripheral IV 07/06/25 Posterior;Right Hand (Active)   Site Assessment Clean, dry & intact 07/07/25 1502   Line Status Flushed;Infusing 07/07/25 1502   Line Care Connections checked and tightened 07/07/25 1502   Phlebitis Assessment No symptoms 07/07/25 1502   Infiltration Assessment 0 07/07/25 1502   Alcohol Cap Used No 07/07/25 1502   Dressing Status Clean, dry & intact 07/07/25 1502   Dressing Type Transparent 07/07/25 1502   Dressing Intervention New 07/07/25 1032        Opportunity for questions and clarification was provided.      Patient transported with:  O2 @ 3lpm  Patent to [facility] room 710

## 2025-07-07 NOTE — PROGRESS NOTES
Patient may be weightbearing as tolerated on the right lower extremity.  They can be full active and passive range of motion of the right hip.  They can have dry dressing change starting on postoperative day 2 and then after that daily and as needed.  They will need aspirin 325 mg 1 p.o. daily for 4 weeks as DVT prophylaxis as well as SCDs while hospitalized unless they are on a preoperative anticoagulant chronically and in this case they can restart this on postoperative day 2 and use this instead of the aspirin.  So in this case he can restart his Eliquis on postoperative day #2 they will need to have orthopedic follow-up approximately 2 weeks after discharge.

## 2025-07-07 NOTE — PROGRESS NOTES
TRANSFER - IN REPORT:    Verbal report received from Page on Jonathan Zurita  being received from PACU for routine progression of patient care      Report consisted of patient's Situation, Background, Assessment and   Recommendations(SBAR).     Information from the following report(s) Nurse Handoff Report was reviewed with the receiving nurse.    Opportunity for questions and clarification was provided.      Assessment completed upon patient's arrival to unit and care assumed.

## 2025-07-07 NOTE — NURSE NAVIGATOR
This RN Navigator attempted to see patient, in patient's room. Patient off the floor at this time. Will attempt to see patient again at another time.

## 2025-07-07 NOTE — PROGRESS NOTES
Physical Therapy Note:    Physical therapy evaluation orders received and chart reviewed. Patient admitted with right femur fracture with plan for surgical repair today. Will evaluate post operatively per orthopedic surgery. Thank you,    Kathy Diaz, PT, DPT

## 2025-07-07 NOTE — ANESTHESIA POSTPROCEDURE EVALUATION
Department of Anesthesiology  Postprocedure Note    Patient: Jonathan Zurita  MRN: [id number]  YOB: 1945  Date of evaluation: 7/7/2025    Procedure Summary       Date: 07/07/25 Room / Location: [facility] MAIN OR 08 / [facility] MAIN OR    Anesthesia Start: 1402 Anesthesia Stop: 1503    Procedure: FEMUR INTRAMEDULLARY NAIL (Right: Hip) Diagnosis:       Displaced intertrochanteric fracture of right femur, initial encounter for closed fracture (HCC)      (Displaced intertrochanteric fracture of right femur, initial encounter for closed fracture (HCC) [S72.141A])    Providers: Lanre Braxton MD Responsible Provider: Cecelia Blanc MD    Anesthesia Type: general ASA Status: 4            Anesthesia Type: No value filed.    Yamil Phase I: Yamil Score: 9    Yamil Phase II:      Anesthesia Post Evaluation    Patient location during evaluation: PACU  Patient participation: complete - patient participated  Level of consciousness: awake and alert  Airway patency: patent  Nausea & Vomiting: no nausea and no vomiting  Cardiovascular status: hemodynamically stable  Respiratory status: acceptable, nonlabored ventilation and spontaneous ventilation  Hydration status: euvolemic  Comments: BP (!) 125/58   Pulse 89   Temp 99.5 °F (37.5 °C) (Oral)   Resp 16   Ht 1.575 m (5' 2\")   Wt 63.5 kg (140 lb)   SpO2 92%   BMI 25.61 kg/m²     Multimodal analgesia pain management approach  Pain management: adequate and satisfactory to patient    No notable events documented.

## 2025-07-07 NOTE — PROGRESS NOTES
TRANSFER - OUT REPORT:    Verbal report given to Page on Jonathan Zurita  being transferred to Preop for ordered procedure       Report consisted of patient's Situation, Background, Assessment and   Recommendations(SBAR).     Information from the following report(s) Nurse Handoff Report was reviewed with the receiving nurse.           Lines:   Peripheral IV 07/06/25 Posterior;Right Hand (Active)   Site Assessment Clean, dry & intact 07/07/25 1502   Line Status Flushed;Infusing 07/07/25 1502   Line Care Connections checked and tightened 07/07/25 1502   Phlebitis Assessment No symptoms 07/07/25 1502   Infiltration Assessment 0 07/07/25 1502   Alcohol Cap Used No 07/07/25 1502   Dressing Status Clean, dry & intact 07/07/25 1502   Dressing Type Transparent 07/07/25 1502   Dressing Intervention New 07/07/25 1032        Opportunity for questions and clarification was provided.      Patient transported with:  Tech

## 2025-07-07 NOTE — PROGRESS NOTES
Hospitalist Progress Note   Admit Date:  2025  1:40 PM   Name:  Jonathan Zurita   Age:  80 y.o.  Sex:  male  :  1945   MRN:  [id number]   Room:  [facility]/    Presenting/Chief Complaint: Hip Injury     Reason(s) for Admission: Fall on same level, initial encounter [W18.30XA]  Closed displaced intertrochanteric fracture of right femur, initial encounter ([facility]) [S72.141A]  Displaced intertrochanteric fracture of right femur, initial encounter for closed fracture ([facility]) [S72.141A]  Closed displaced intertrochanteric fracture of femur, initial encounter ([facility]) [S72.143A]     Hospital Course:   Jonathan Zurita is a 80 y.o. male with medical history of atrial fibrillation, CLL, COPD, anxiety, depression, hyperlipidemia, CVA who presented after mechanical fall tripping over a dog and landing on his right side.  He denies any presyncope or loss of consciousness with fall.  Denies head strike.  States he is having pain in his right hip that is 8 out of 10 and radiating slightly down his leg but denies any other pain.  Of note patient had recent hospitalization for septic shock and A-fib with RVR and discharged on 2025.     On arrival to the ED patient hemodynamically stable hide from bradycardia at 55.  Notable labs include hemoglobin 11.5 (at baseline).   Chest x-ray unremarkable.  Right femur/hip x-ray shows intertrochanteric fracture of right femur.  Initial EKG shows atrial fibrillation.  Orthopedic surgery consulted who plans to perform intramedullary nail fixation for right femur fracture    Subjective & 24hr Events:   Seen in pre-op  Last eliquis dose was 25, I alerted OR staff who assured me that orthopedist and/or anesthesiologist was aware  He states his leg is painful  Daughters are at bedside     Assessment & Plan:     Mechanical fall:  Closed right femur fracture:  - Patient reportedly tripped over dog without head strike or loss of consciousness  - Right femur/hip x-ray:  PRN    midazolam PF (VERSED) IntraVENous 2 mg  2 mg IntraVENous Once PRN    sodium chloride flush 0.9 % injection 5-40 mL  5-40 mL IntraVENous 2 times per day    sodium chloride flush 0.9 % injection 5-40 mL  5-40 mL IntraVENous PRN    0.9 % sodium chloride infusion   IntraVENous PRN    potassium chloride (KLOR-CON M) extended release tablet 40 mEq  40 mEq Oral PRN    Or    potassium bicarb-citric acid (EFFER-K) effervescent tablet 40 mEq  40 mEq Oral PRN    Or    potassium chloride 10 mEq/100 mL IVPB (Peripheral Line)  10 mEq IntraVENous PRN    magnesium sulfate 2000 mg in 50 mL IVPB premix  2,000 mg IntraVENous PRN    ondansetron (ZOFRAN-ODT) disintegrating tablet 4 mg  4 mg Oral Q8H PRN    Or    ondansetron (ZOFRAN) injection 4 mg  4 mg IntraVENous Q6H PRN    polyethylene glycol (GLYCOLAX) packet 17 g  17 g Oral Daily PRN    acetaminophen (TYLENOL) tablet 650 mg  650 mg Oral Q6H PRN    Or    acetaminophen (TYLENOL) suppository 650 mg  650 mg Rectal Q6H PRN    ipratropium 0.5 mg-albuterol 2.5 mg (DUONEB) nebulizer solution 1 Dose  1 Dose Inhalation Q4H PRN    oxyCODONE (ROXICODONE) immediate release tablet 5 mg  5 mg Oral Q4H PRN    morphine (PF) injection 2 mg  2 mg IntraVENous Q2H PRN    amiodarone (CORDARONE) tablet 400 mg  400 mg Oral BID    [Held by provider] aspirin chewable tablet 81 mg  81 mg Oral Daily    busPIRone (BUSPAR) tablet 10 mg  10 mg Oral TID    DULoxetine (CYMBALTA) extended release capsule 60 mg  60 mg Oral Daily    arformoterol 15 mcg-budesonide 0.5 mg neb solution   Nebulization BID RT    guaiFENesin (MUCINEX) extended release tablet 1,200 mg  1,200 mg Oral BID    ibrutinib (IMBRUVICA) chemo tablet 420 mg (Patient Supplied)  420 mg Oral Daily    melatonin tablet 3 mg  3 mg Oral Nightly PRN    midodrine (PROAMATINE) tablet 5 mg  5 mg Oral TID PRN    pantoprazole (PROTONIX) tablet 40 mg  40 mg Oral QAM AC    rosuvastatin (CRESTOR) tablet 40 mg  40 mg Oral Nightly    tamsulosin (FLOMAX) capsule

## 2025-07-07 NOTE — OP NOTE
Operative Report    Patient: Jonathan Zurita MRN: [id number]  SSN: [id number]    YOB: 1945  Age: 80 y.o.  Sex: male       Date of Surgery: July 7, 2025     History:  Jonathan Zurita is a 80 y.o. male who fell and injured his right hip.  He was seen in the emergency room and found to have a right intertrochanteric/peritrochanteric proximal femur fracture.  He actually had a similar injury on the left side in 2020 so he had a cephalomedullary nail for that as well done at another hospital and did pretty well says that he was having some hip pain with it recently.  I did explain to the patient and family that this would be the exact same procedure on the right side he had on the left so he knew exactly what this would involve and he seemed understand and wished to proceed.      I talked to the patient and/or their representative and explained the exact nature the procedure.  I also went through a detailed list of the material risks associated with  the procedure which included risk of bleeding, infection, injury to nearby structures, worsening the situation, as well as the risks associate with anesthesia and finally death.  Also talked with him regarding the benefits and alternatives to the procedure.    Preoperative Diagnosis: Right close displaced intertrochanteric/peritrochanteric proximal femur fracture    Postoperative Diagnosis:   Right closed displaced intertrochanteric/peritrochanteric proximal femur fracture      Surgeons and Role:     * Lanre Braxton MD - Primary    Anesthesia: General     Procedure: Open treatment of right peritrochanteric/enteroenteric proximal femur fracture with intramedullary nail fixation    Procedure in Detail: After successful  induction of general anesthetic the right lower extremity was placed in gentle boot traction on a fracture table and we made sure we could adequately visualize the  proximal femur and that an adequate closed reduction could be             Specimens: * No specimens in log *        Drains: None                Complications: None    Counts: Sponge and needle counts were correct times two.    Signed By:  Lanre Braxton MD     July 7, 2025

## 2025-07-07 NOTE — PROGRESS NOTES
OT orders received. Patient admitted for R femur fx with surgery scheduled for today with Dr. Braxton. HOLD OT and await further orders from ortho prior to mobilizing post-op.   Thomas Stevens, OT

## 2025-07-07 NOTE — INTERVAL H&P NOTE
Update History & Physical    The Patient's History and Physical of 7/6/2025 was reviewed with the patient and I examined the patient.  There was no change.  The surgical site was confirmed by the patient and me.    Plan:  The risk, benefits, expected outcome, and alternative to the recommended procedure have been discussed with the patient.  Patient understands and wants to proceed with open treatment of right proximal femur fracture intramedullary nail fixation.    Electronically signed by Lanre Braxton MD on 7/7/2025 at 6:27 AM

## 2025-07-07 NOTE — ANESTHESIA PRE PROCEDURE
Vitals:    07/07/25 0746 07/07/25 0826 07/07/25 0850 07/07/25 1003   BP:  116/68 116/68 (!) 103/58   Pulse: 83 90 90 71   Resp: 17 18  12   Temp:  99 °F (37.2 °C)  98.1 °F (36.7 °C)   TempSrc:  Oral  Oral   SpO2: 90% (!) 89%  94%   Weight:    63.5 kg (140 lb)   Height:    1.575 m (5' 2\")                                              BP Readings from Last 3 Encounters:   07/07/25 (!) 103/58   06/20/25 134/66   06/13/25 98/62       NPO Status: Time of last liquid consumption: 0000                        Time of last solid consumption: 0000                        Date of last liquid consumption: 07/06/25                        Date of last solid food consumption: 07/06/25    BMI:   Wt Readings from Last 3 Encounters:   07/07/25 63.5 kg (140 lb)   06/20/25 63 kg (138 lb 12.8 oz)   06/13/25 63.5 kg (140 lb)     Body mass index is 25.61 kg/m².    CBC:   Lab Results   Component Value Date/Time    WBC 7.8 07/07/2025 03:54 AM    RBC 3.78 07/07/2025 03:54 AM    HGB 11.1 07/07/2025 03:54 AM    HCT 34.6 07/07/2025 03:54 AM    MCV 91.5 07/07/2025 03:54 AM    RDW 15.1 07/07/2025 03:54 AM     07/07/2025 03:54 AM       CMP:   Lab Results   Component Value Date/Time     07/07/2025 03:54 AM    K 4.2 07/07/2025 03:54 AM     07/07/2025 03:54 AM    CO2 22 07/07/2025 03:54 AM    BUN 26 07/07/2025 03:54 AM    CREATININE 0.80 07/07/2025 03:54 AM    LABGLOM 89 07/07/2025 03:54 AM    LABGLOM >60 03/11/2024 09:50 AM    GLUCOSE 101 07/07/2025 03:54 AM    CALCIUM 8.4 07/07/2025 03:54 AM    BILITOT 0.5 07/06/2025 02:24 PM    ALKPHOS 67 07/06/2025 02:24 PM    AST 21 07/06/2025 02:24 PM    ALT 17 07/06/2025 02:24 PM       POC Tests: No results for input(s): \"POCGLU\", \"POCNA\", \"POCK\", \"POCCL\", \"POCBUN\", \"POCHEMO\", \"POCHCT\" in the last 72 hours.    Coags:   Lab Results   Component Value Date/Time    PROTIME 13.9 07/06/2025 02:24 PM    INR 1.0 07/06/2025 02:24 PM    APTT 79.2 06/18/2025 06:10 AM       HCG (If Applicable): No

## 2025-07-07 NOTE — CARE COORDINATION
Chart reviewed by  for potential transition of care (VINNY) needs or concerns.  Pt admitted for surgical repair of a hip fracture he sustained in a fall at home.  Surgery today.  Readmission assessment completed.  Pt was current with [facility] prior to admission.  CM anticipates pt will need STR at [facility].  Will await therapy evals and final recommendations.  CM notified the [facility] of the pt's admission.  Notification number [id number].  CM will follow up with pt/family to discuss choice of SNF for rehab (pt' does not have any acute inpatient rehab benefits through the [facility]). Please notify/consult  if other VINNY needs arise.       07/07/25 1548   Service Assessment   Patient Orientation Alert and Oriented   Cognition Alert   History Provided By Medical Record   Primary Caregiver Self   Support Systems Children;Family Members   Patient's Healthcare Decision Maker is: Named in Scanned ACP Document   PCP Verified by CM Yes   Prior Functional Level Independent in ADLs/IADLs   Current Functional Level Assistance with the following:;Dressing;Bathing;Mobility   Can patient return to prior living arrangement Unknown at present   Ability to make needs known: Good   Family able to assist with home care needs: Yes   Would you like for me to discuss the discharge plan with any other family members/significant others, and if so, who? Yes  (son & dtr)   Financial Resources [place] ([facility])   Community Resources ECF/Home Care  ([facility])   Social/Functional History   Lives With Son   Type of Home Apartment  (townhouse)   Bathroom Equipment None   Home Equipment Rollator;Cane  (nebulizer)   Receives Help From Family   Prior Level of Assist for ADLs Independent   Prior Level of Assist for Homemaking Independent   Ambulation Assistance Independent   Prior Level of Assist for Transfers Independent   Mode of Transportation Car   Occupation Retired   Discharge Planning   Type of Residence   (TBD)

## 2025-07-07 NOTE — PROGRESS NOTES
4 Eyes Skin Assessment     NAME:  Jonathan Zurita  YOB: 1945  MEDICAL RECORD NUMBER:  [id number]    The patient is being assessed for  Post-Op Surgical    I agree that at least one RN has performed a thorough Head to Toe Skin Assessment on the patient. ALL assessment sites listed below have been assessed.      Areas assessed by both nurses:    Head, Face, Ears, Shoulders, Back, Chest, Arms, Elbows, Hands, Sacrum. Buttock, Coccyx, Ischium, Legs. Feet and Heels, and Under Medical Devices         Does the Patient have a Wound? No noted wound(s)       Tutu Prevention initiated by RN: Yes  Wound Care Orders initiated by RN: Yes    Pressure Injury (Stage 1,2,3,4, Unstageable, DTI, NWPT, and Complex wounds) if present, place Wound referral order by RN under : No    New Ostomies, if present place, Ostomy referral order under : No     Nurse 1 eSignature: Electronically signed by Kylie Cruz RN on 7/7/25 at 6:29 PM EDT    **SHARE this note so that the co-signing nurse can place an eSignature**    Nurse 2 eSignature: Electronically signed by KRISTEN TRIANA RN on 7/7/25 at 6:40 PM EDT

## 2025-07-08 LAB
ANION GAP SERPL CALC-SCNC: 10 MMOL/L (ref 7–16)
BUN SERPL-MCNC: 33 MG/DL (ref 8–23)
CALCIUM SERPL-MCNC: 8.3 MG/DL (ref 8.8–10.2)
CHLORIDE SERPL-SCNC: 103 MMOL/L (ref 98–107)
CO2 SERPL-SCNC: 22 MMOL/L (ref 20–29)
CREAT SERPL-MCNC: 1.1 MG/DL (ref 0.8–1.3)
GLUCOSE SERPL-MCNC: 224 MG/DL (ref 70–99)
HCT VFR BLD AUTO: 32.1 % (ref 41.1–50.3)
HGB BLD-MCNC: 10.2 G/DL (ref 13.6–17.2)
POTASSIUM SERPL-SCNC: 4.7 MMOL/L (ref 3.5–5.1)
SODIUM SERPL-SCNC: 134 MMOL/L (ref 136–145)

## 2025-07-08 PROCEDURE — 94761 N-INVAS EAR/PLS OXIMETRY MLT: CPT

## 2025-07-08 PROCEDURE — 2500000003 HC RX 250 WO HCPCS: Performed by: STUDENT IN AN ORGANIZED HEALTH CARE EDUCATION/TRAINING PROGRAM

## 2025-07-08 PROCEDURE — 51798 US URINE CAPACITY MEASURE: CPT

## 2025-07-08 PROCEDURE — 97116 GAIT TRAINING THERAPY: CPT

## 2025-07-08 PROCEDURE — 97535 SELF CARE MNGMENT TRAINING: CPT

## 2025-07-08 PROCEDURE — 6360000002 HC RX W HCPCS: Performed by: ORTHOPAEDIC SURGERY

## 2025-07-08 PROCEDURE — 6360000002 HC RX W HCPCS: Performed by: STUDENT IN AN ORGANIZED HEALTH CARE EDUCATION/TRAINING PROGRAM

## 2025-07-08 PROCEDURE — 94760 N-INVAS EAR/PLS OXIMETRY 1: CPT

## 2025-07-08 PROCEDURE — 2500000003 HC RX 250 WO HCPCS: Performed by: ORTHOPAEDIC SURGERY

## 2025-07-08 PROCEDURE — 2700000000 HC OXYGEN THERAPY PER DAY

## 2025-07-08 PROCEDURE — 1100000000 HC RM PRIVATE

## 2025-07-08 PROCEDURE — 97530 THERAPEUTIC ACTIVITIES: CPT

## 2025-07-08 PROCEDURE — 6370000000 HC RX 637 (ALT 250 FOR IP): Performed by: STUDENT IN AN ORGANIZED HEALTH CARE EDUCATION/TRAINING PROGRAM

## 2025-07-08 PROCEDURE — 94640 AIRWAY INHALATION TREATMENT: CPT

## 2025-07-08 PROCEDURE — 36415 COLL VENOUS BLD VENIPUNCTURE: CPT

## 2025-07-08 PROCEDURE — 85018 HEMOGLOBIN: CPT

## 2025-07-08 PROCEDURE — 6370000000 HC RX 637 (ALT 250 FOR IP): Performed by: NURSE PRACTITIONER

## 2025-07-08 PROCEDURE — 85014 HEMATOCRIT: CPT

## 2025-07-08 PROCEDURE — 97165 OT EVAL LOW COMPLEX 30 MIN: CPT

## 2025-07-08 PROCEDURE — 97161 PT EVAL LOW COMPLEX 20 MIN: CPT

## 2025-07-08 PROCEDURE — 80048 BASIC METABOLIC PNL TOTAL CA: CPT

## 2025-07-08 RX ORDER — IPRATROPIUM BROMIDE AND ALBUTEROL SULFATE 2.5; .5 MG/3ML; MG/3ML
1 SOLUTION RESPIRATORY (INHALATION)
Status: DISCONTINUED | OUTPATIENT
Start: 2025-07-08 | End: 2025-07-08

## 2025-07-08 RX ORDER — IPRATROPIUM BROMIDE AND ALBUTEROL SULFATE 2.5; .5 MG/3ML; MG/3ML
1 SOLUTION RESPIRATORY (INHALATION)
Status: DISCONTINUED | OUTPATIENT
Start: 2025-07-08 | End: 2025-07-10

## 2025-07-08 RX ADMIN — ROSUVASTATIN CALCIUM 40 MG: 20 TABLET, FILM COATED ORAL at 21:27

## 2025-07-08 RX ADMIN — OXYCODONE 5 MG: 5 TABLET ORAL at 09:10

## 2025-07-08 RX ADMIN — BUSPIRONE HYDROCHLORIDE 10 MG: 5 TABLET ORAL at 09:10

## 2025-07-08 RX ADMIN — BUSPIRONE HYDROCHLORIDE 10 MG: 5 TABLET ORAL at 21:27

## 2025-07-08 RX ADMIN — TAMSULOSIN HYDROCHLORIDE 0.4 MG: 0.4 CAPSULE ORAL at 09:10

## 2025-07-08 RX ADMIN — IPRATROPIUM BROMIDE AND ALBUTEROL SULFATE 1 DOSE: 2.5; .5 SOLUTION RESPIRATORY (INHALATION) at 19:54

## 2025-07-08 RX ADMIN — BUSPIRONE HYDROCHLORIDE 10 MG: 5 TABLET ORAL at 13:55

## 2025-07-08 RX ADMIN — CEFAZOLIN 2000 MG: 10 INJECTION, POWDER, FOR SOLUTION INTRAVENOUS at 05:52

## 2025-07-08 RX ADMIN — ARFORMOTEROL TARTRATE: 15 SOLUTION RESPIRATORY (INHALATION) at 19:54

## 2025-07-08 RX ADMIN — IPRATROPIUM BROMIDE AND ALBUTEROL SULFATE 1 DOSE: 2.5; .5 SOLUTION RESPIRATORY (INHALATION) at 15:01

## 2025-07-08 RX ADMIN — GUAIFENESIN 1200 MG: 600 TABLET, EXTENDED RELEASE ORAL at 21:27

## 2025-07-08 RX ADMIN — SODIUM CHLORIDE, PRESERVATIVE FREE 10 ML: 5 INJECTION INTRAVENOUS at 09:11

## 2025-07-08 RX ADMIN — ARFORMOTEROL TARTRATE: 15 SOLUTION RESPIRATORY (INHALATION) at 07:59

## 2025-07-08 RX ADMIN — AMIODARONE HYDROCHLORIDE 400 MG: 200 TABLET ORAL at 21:27

## 2025-07-08 RX ADMIN — GUAIFENESIN 1200 MG: 600 TABLET, EXTENDED RELEASE ORAL at 09:10

## 2025-07-08 RX ADMIN — OXYCODONE 5 MG: 5 TABLET ORAL at 13:57

## 2025-07-08 RX ADMIN — AMIODARONE HYDROCHLORIDE 400 MG: 200 TABLET ORAL at 09:08

## 2025-07-08 RX ADMIN — SODIUM CHLORIDE, PRESERVATIVE FREE 10 ML: 5 INJECTION INTRAVENOUS at 21:28

## 2025-07-08 RX ADMIN — DULOXETINE HYDROCHLORIDE 60 MG: 30 CAPSULE, DELAYED RELEASE ORAL at 09:10

## 2025-07-08 RX ADMIN — PHENOL 1 SPRAY: 1.5 LIQUID ORAL at 00:11

## 2025-07-08 RX ADMIN — PANTOPRAZOLE SODIUM 40 MG: 40 TABLET, DELAYED RELEASE ORAL at 05:51

## 2025-07-08 RX ADMIN — IPRATROPIUM BROMIDE 0.5 MG: 0.5 SOLUTION RESPIRATORY (INHALATION) at 11:51

## 2025-07-08 RX ADMIN — IPRATROPIUM BROMIDE AND ALBUTEROL SULFATE 1 DOSE: 2.5; .5 SOLUTION RESPIRATORY (INHALATION) at 00:42

## 2025-07-08 RX ADMIN — IPRATROPIUM BROMIDE 0.5 MG: 0.5 SOLUTION RESPIRATORY (INHALATION) at 07:59

## 2025-07-08 ASSESSMENT — PAIN DESCRIPTION - PAIN TYPE
TYPE: SURGICAL PAIN
TYPE: SURGICAL PAIN

## 2025-07-08 ASSESSMENT — PAIN DESCRIPTION - ORIENTATION
ORIENTATION: RIGHT

## 2025-07-08 ASSESSMENT — PAIN DESCRIPTION - FREQUENCY
FREQUENCY: INTERMITTENT
FREQUENCY: INTERMITTENT

## 2025-07-08 ASSESSMENT — PAIN DESCRIPTION - ONSET
ONSET: GRADUAL
ONSET: GRADUAL

## 2025-07-08 ASSESSMENT — PAIN DESCRIPTION - LOCATION
LOCATION: HIP

## 2025-07-08 ASSESSMENT — PAIN SCALES - GENERAL
PAINLEVEL_OUTOF10: 2
PAINLEVEL_OUTOF10: 8
PAINLEVEL_OUTOF10: 0
PAINLEVEL_OUTOF10: 5
PAINLEVEL_OUTOF10: 0

## 2025-07-08 ASSESSMENT — PAIN DESCRIPTION - DESCRIPTORS
DESCRIPTORS: ACHING
DESCRIPTORS: ACHING
DESCRIPTORS: STABBING

## 2025-07-08 NOTE — CARE COORDINATION
Therapy evals complete with the recommendation for STR at dc.  CM met with pt to discuss this recommendation.  Pt is agreeable with STR at dc.  List of [facility] approved facilities provided for he and his family to review and provide choices.  CM will follow up with pt/family later today.     1530:  DES met with pt/family members with Ria ortega, on speaker phone to discuss choice of facility.  They requested referrals to [facility] and [facility].  Referral submitted.  DES spoke with [facility] SW, to initiate the SNF approval process.  Clinical information faxed.  CM following.

## 2025-07-08 NOTE — PROGRESS NOTES
SPIRITUAL HEALTH   Note for Initial Spiritual Assessment                   Room # 710/01    Name: Jonathan Zurita           Age: 80 y.o.    Gender: male          MRN: [id number]  [employer]ist: Latter day       Preferred Language: English    Date: 07/08/25  Visit Time: Begin Time: 1201 End Time : 1224 Complexity of Encounter: Moderate      Visit Summary: Spiritual Consult for Prayer.  responded to referral. Patient was in chair with Daughter and Family at bedside. Patient was engaged in the visit, but is hard of hearing. Patient expressed importance of and comfort in prayer. Patient is Latter day.  provided calming presence, active listening, spiritual and emotional support, and prayer.  offered support and gave Daughter the on-call  number.  is available by referral for emergent needs.    Referral/Consult From: Patient, Nurse  Encounter Overview/Reason: Spiritual/Emotional Needs  Service Provided For: Patient and family together     Patient was available.    Yanni, Belief, Meaning:   Patient identifies as spiritual  is connected with a yanni tradition or spiritual practice  has beliefs or practices that help with coping during difficult times  yanni/ spirituality is a source of strength  Family/Friends identifies as spiritual  are connected with a yanni tradition or spiritual practice  have beliefs or practices that help with coping during difficult times  Rituals (if applicable)      Importance and Influence:  Patient has spiritual/personal beliefs that influence decisions regarding their health. Patient requested prayer.  Family/Friends Other: unable to access    Community:  Patient   indicated that they feel well-supported  Support System Includes   Children, Family members   Family/Friends   Support System Includes   Parent/s  Extended family    Assessment and Plan of Care:   Emotions Expressed by Patient:   Assessment: Calm, Coping, Complicated grieving, Hopeful, Peaceful,

## 2025-07-08 NOTE — PROGRESS NOTES
ACUTE PHYSICAL THERAPY GOALS:   (Developed with and agreed upon by patient and/or caregiver.)  Pt will perform bed mobility Min (A) c inc time, cueing and use of rails as needed in 7 therapy sessions.  Pt will perform sit-to-stand/ stand-to-sit transfers Min (A) c use of LRAD/external supports as needed and no LOB or miss-steps in 7 therapy sessions.  Pt will ambulate 125 ft CG(A) with use of LRAD, no LOB or miss-steps and breaks as needed in 7 therapy sessions.  Pt will perform standing dynamic balance activities with minimal postural sway in 7 therapy sessions.  Pt will tolerate multiple sets and reps of BLE exercises in 7 therapy sessions.    PHYSICAL THERAPY Initial Assessment, Daily Note, and AM  (Link to Caseload Tracking: PT Visit Days : 1  Acknowledge Orders  Time In/Out  PT Charge Capture  Rehab Caseload Tracker    WBAT RLE   FALL RISK    Jonathan Zurita is a 80 y.o. male   PRIMARY DIAGNOSIS: Closed displaced intertrochanteric fracture of femur, initial encounter ([facility])  Fall on same level, initial encounter [W18.30XA]  Closed displaced intertrochanteric fracture of right femur, initial encounter ([facility]) [S72.141A]  Displaced intertrochanteric fracture of right femur, initial encounter for closed fracture ([facility]) [S72.141A]  Closed displaced intertrochanteric fracture of femur, initial encounter ([facility]) [S72.143A]  Procedure(s) (LRB):  FEMUR INTRAMEDULLARY NAIL (Right)  1 Day Post-Op  Reason for Referral: Pain in Right Hip (M25.551)  Stiffness of Right Hip, Not elsewhere classified (M25.651)  Difficulty in walking, Not elsewhere classified (R26.2)  Other abnormalities of gait and mobility (R26.89)  Inpatient: Payor: [employer] / Plan: [employer] / Product Type: *No Product type* /     ASSESSMENT:     REHAB RECOMMENDATIONS:   Recommendation to date pending progress:  Setting:  Short-term Rehab    Equipment:    To Be Determined     ASSESSMENT:  Mr. Zurita Is a 80 y.o. male presenting to PT POD 1 s/p R

## 2025-07-08 NOTE — PROGRESS NOTES
Physician Progress Note      PATIENT:               JUDY TERRAZAS  CSN #:                  [id number]  :                       1945  ADMIT DATE:       2025 1:40 PM  DISCH DATE:  RESPONDING  PROVIDER #:        Cheli Hardwick MD          QUERY TEXT:    BPH is documented in the medical record H&P . Please specify the associated   urinary conditions:    The clinical indicators include:  -- Age 80 male  --  H&P  BPH Continue home Flomax  -- IM PN  BPH: Continue home Flomax,  urena perioperatively, hope to remove   POD 1 or POD 2.  -- tamsulosin (FLOMAX) capsule 0.4 mg    Thanks  Rebecca Zaman [facility] CDS  Options provided:  -- BPH with partial/complete urinary obstruction  -- Other - I will add my own diagnosis  -- Disagree - Not applicable / Not valid  -- Disagree - Clinically unable to determine / Unknown  -- Refer to Clinical Documentation Reviewer    PROVIDER RESPONSE TEXT:    This patient has BPH with partial/complete urinary obstruction.    Query created by: Rebecca Salazar on 2025 3:17 AM      Electronically signed by:  Cheli Hardwick MD 2025 6:48 AM

## 2025-07-08 NOTE — WOUND CARE
Consulted for LFA tears. Pt s/p fall landing on side; doesn't remember hitting LFA but says possible he scraped it when trying to get up. Pt s/p 7/7 ORIF R hip by Ortho MD Braxton; recommending to start dry dressing post op day 2 (7/9) and change daily&PRN.    LFA x2 & L elbow x1 skin tears; largest 3.5x2x0.2cm w/ viable skin flap steri-stripped into place, pink/red, bleeding, ecchymosis, small sanguinous, no odor. Recommend wound cleanser, leave steri-strips in place and allow to self-remove, xeroform to open areas, ABD, rolled gauze every MWF&PRN.

## 2025-07-08 NOTE — PROGRESS NOTES
ACUTE OCCUPATIONAL THERAPY GOALS:   (Developed with and agreed upon by patient and/or caregiver.)  1. Pt will complete LB ADL Min A with AE as needed.   2. Pt will complete toileting Min A with AE as needed.   3. Pt will complete UB ADL supervision.  4. Pt will tolerate 25 minutes of OT treatment requiring 1-2 breaks as needed.   5. Pt will complete grooming tasks while standing at sink supervision.  6. Pt will complete functional mobility and/or transfers via RW supervision.   7. Pt will tolerate BUE exercises to increase strength/endurance for safe, functional transfers and/or functional mobility, and ADL participation.   8. Pt will verbalize and/or demonstrate understanding of RLE WBAT precautions during functional activity 100% of the time.      Timeframe: 7 visits     OCCUPATIONAL THERAPY Initial Assessment, Daily Note, and AM       OT Visit Days: 1  Acknowledge Orders  Time  OT Charge Capture  Rehab Caseload Tracker      Jonathan Zurita is a 80 y.o. male   PRIMARY DIAGNOSIS: Closed displaced intertrochanteric fracture of femur, initial encounter ([facility])  Fall on same level, initial encounter [W18.30XA]  Closed displaced intertrochanteric fracture of right femur, initial encounter ([facility]) [S72.141A]  Displaced intertrochanteric fracture of right femur, initial encounter for closed fracture ([facility]) [S72.141A]  Closed displaced intertrochanteric fracture of femur, initial encounter ([facility]) [S72.143A]  Procedure(s) (LRB):  FEMUR INTRAMEDULLARY NAIL (Right)  1 Day Post-Op  Reason for Referral: Generalized Muscle Weakness (M62.81)  Inpatient: Payor: [employer] / Plan: [employer] / Product Type: *No Product type* /     ASSESSMENT:     REHAB RECOMMENDATIONS:   Recommendation to date pending progress:  Setting:  Rehab    Equipment:    To Be Determined     ASSESSMENT:  Mr. Zurita has a significant hx of afib, COPD, anxiety, depression, CVA. Pt was admitted for injury/procedure above. At functional baseline, pt is

## 2025-07-08 NOTE — PROGRESS NOTES
ACUTE PHYSICAL THERAPY GOALS:   (Developed with and agreed upon by patient and/or caregiver.)  Pt will perform bed mobility Min (A) c inc time, cueing and use of rails as needed in 7 therapy sessions.  Pt will perform sit-to-stand/ stand-to-sit transfers Min (A) c use of LRAD/external supports as needed and no LOB or miss-steps in 7 therapy sessions.  Pt will ambulate 125 ft CG(A) with use of LRAD, no LOB or miss-steps and breaks as needed in 7 therapy sessions.  Pt will perform standing dynamic balance activities with minimal postural sway in 7 therapy sessions.  Pt will tolerate multiple sets and reps of BLE exercises in 7 therapy sessions.    PHYSICAL THERAPY: Daily Note PM   (Link to Caseload Tracking: PT Visit Days : 1  Time In/Out PT Charge Capture  Rehab Caseload Tracker  Orders    WBAT RLE   FALL RISK    Jonathan Zurita is a 80 y.o. male   PRIMARY DIAGNOSIS: Closed displaced intertrochanteric fracture of femur, initial encounter ([facility])  Fall on same level, initial encounter [W18.30XA]  Closed displaced intertrochanteric fracture of right femur, initial encounter ([facility]) [S72.141A]  Displaced intertrochanteric fracture of right femur, initial encounter for closed fracture ([facility]) [S72.141A]  Closed displaced intertrochanteric fracture of femur, initial encounter ([facility]) [S72.143A]  Procedure(s) (LRB):  FEMUR INTRAMEDULLARY NAIL (Right)  1 Day Post-Op  Inpatient: Payor: [employer] / Plan: [employer] / Product Type: *No Product type* /     ASSESSMENT:     REHAB RECOMMENDATIONS:   Recommendation to date pending progress:  Setting:  Short-term Rehab    Equipment:    To Be Determined     ASSESSMENT:  Mr. Zurita was seated in recliner on 2L NC upon entering the room and agreeable to therapy. This PM, pt demonstrates slow but appreciable progress since AM initial evaluation. This session, pt required CG/Min (A) for majority of mobility aside from Mod (A) to perform sit-to-stand from low toilet height. RW/  gait belt used for all ambulation. Pt's gait continues to be antalgic, slow and shuffling in nature with increased sway and reliance on AD although he ultimately did well to avoid any LOB/ miss-steps. Shows much improved step clearance of RLE through sing. Pt performed all activity on 2lL NC and denied any dizziness, lightheadedness or SOB while moving about on their feet. That being said, pt does endorse post op pain, weakness and fatigue as performance limiting factor(s) while participating. At this time, pt remains a good candidate for skilled PT and will continue to benefit from advancing POC. At end of session, pt was positioned to comfort in bed with all needs in reach. RN was made aware of pt performance.      SUBJECTIVE:   Mr. Zurita states, \"It's about a 10 now\"     Social/Functional Lives With: Son  [facility]  Bathroom Equipment: None  Home Equipment: Rollator, Cane (nebulizer)  Receives Help From: Family  Prior Level of Assist for ADLs: Independent  Prior Level of Assist for Homemaking: Independent  Prior Level of Assist for Transfers: Independent  Mode of Transportation: Car  Occupation: Retired  OBJECTIVE:     PAIN: VITALS / O2: PRECAUTION / LINES / DRAINS:   Pre Treatment: 7/10 at rest         Post Treatment: 10/10 with ambulation Vitals        Oxygen   2L NC for all IV    RESTRICTIONS/PRECAUTIONS:  Restrictions/Precautions  Restrictions/Precautions: Weight Bearing, Fall Risk  Lower Extremity Weight Bearing Restrictions  Right Lower Extremity Weight Bearing: Weight Bearing As Tolerated  Restrictions/Precautions: Weight Bearing, Fall Risk     MOBILITY: I Mod I S SBA CGA Min Mod Max Total  NT x2 Comments:   Bed Mobility    Rolling [] [] [] [] [] [] [] [] [] [x] []    Supine to Sit [] [] [] [] [] [] [] [] [] [x] []    Scooting [] [] [] [] [] [x] [] [] [] [] []    Sit to Supine [] [] [] [] [] [] [x] [] [] [] []    Transfers    Sit to Stand [] [] [] [] [] [x] [x] [] [] [] []

## 2025-07-08 NOTE — PLAN OF CARE
Problem: Respiratory - Adult  Goal: Achieves optimal ventilation and oxygenation  Outcome: Progressing  Flowsheets (Taken 7/8/2025 0803)  Achieves optimal ventilation and oxygenation:   Assess for changes in respiratory status   Position to facilitate oxygenation and minimize respiratory effort   Respiratory therapy support as indicated   Assess for changes in mentation and behavior   Oxygen supplementation based on oxygen saturation or arterial blood gases   Encourage broncho-pulmonary hygiene including cough, deep breathe, incentive spirometry   Assess and instruct to report shortness of breath or any respiratory difficulty

## 2025-07-08 NOTE — PROGRESS NOTES
Hospitalist Progress Note   Admit Date:  2025  1:40 PM   Name:  Jonathan Zurita   Age:  80 y.o.  Sex:  male  :  1945   MRN:  [id number]   Room:  [facility]/    Presenting/Chief Complaint: Hip Injury     Reason(s) for Admission: Fall on same level, initial encounter [W18.30XA]  Closed displaced intertrochanteric fracture of right femur, initial encounter ([facility]) [S72.141A]  Displaced intertrochanteric fracture of right femur, initial encounter for closed fracture ([facility]) [S72.141A]  Closed displaced intertrochanteric fracture of femur, initial encounter ([facility]) [S72.143A]     Hospital Course:   Jonathan Zurita is a 80 y.o. male with medical history of atrial fibrillation, CLL, COPD, anxiety, depression, hyperlipidemia, CVA who presented after mechanical fall tripping over a dog and landing on his right side.  He denies any presyncope or loss of consciousness with fall.  Denies head strike.  States he is having pain in his right hip that is 8 out of 10 and radiating slightly down his leg but denies any other pain.  Of note patient had recent hospitalization for septic shock and A-fib with RVR and discharged on 2025.     On arrival to the ED patient hemodynamically stable hide from bradycardia at 55.  Notable labs include hemoglobin 11.5 (at baseline).   Chest x-ray unremarkable.  Right femur/hip x-ray shows intertrochanteric fracture of right femur.  Initial EKG shows atrial fibrillation.  Orthopedic surgery consulted and performed intramedullary nail fixation of his right femur fracture on 25    Subjective & 24hr Events:   Seen prior to working with PT  He is \"doing pretty well\"  VSS and labs stable  Still on low dose O2, lungs with ronchi that clear with cough. He denies sputum production or SOB. IS ordered  Pain is overall controlled with analgesia     Assessment & Plan:     Mechanical fall:  Closed right femur fracture:  - Patient reportedly tripped over dog without head strike or loss of  Oral PRN    Or    potassium bicarb-citric acid (EFFER-K) effervescent tablet 40 mEq  40 mEq Oral PRN    Or    potassium chloride 10 mEq/100 mL IVPB (Peripheral Line)  10 mEq IntraVENous PRN    magnesium sulfate 2000 mg in 50 mL IVPB premix  2,000 mg IntraVENous PRN    polyethylene glycol (GLYCOLAX) packet 17 g  17 g Oral Daily PRN    acetaminophen (TYLENOL) tablet 650 mg  650 mg Oral Q6H PRN    Or    acetaminophen (TYLENOL) suppository 650 mg  650 mg Rectal Q6H PRN    ipratropium 0.5 mg-albuterol 2.5 mg (DUONEB) nebulizer solution 1 Dose  1 Dose Inhalation Q4H PRN    oxyCODONE (ROXICODONE) immediate release tablet 5 mg  5 mg Oral Q4H PRN    amiodarone (CORDARONE) tablet 400 mg  400 mg Oral BID    [Held by provider] aspirin chewable tablet 81 mg  81 mg Oral Daily    busPIRone (BUSPAR) tablet 10 mg  10 mg Oral TID    DULoxetine (CYMBALTA) extended release capsule 60 mg  60 mg Oral Daily    arformoterol 15 mcg-budesonide 0.5 mg neb solution   Nebulization BID RT    guaiFENesin (MUCINEX) extended release tablet 1,200 mg  1,200 mg Oral BID    ibrutinib (IMBRUVICA) chemo tablet 420 mg (Patient Supplied)  420 mg Oral Daily    melatonin tablet 3 mg  3 mg Oral Nightly PRN    midodrine (PROAMATINE) tablet 5 mg  5 mg Oral TID PRN    pantoprazole (PROTONIX) tablet 40 mg  40 mg Oral QAM AC    rosuvastatin (CRESTOR) tablet 40 mg  40 mg Oral Nightly    tamsulosin (FLOMAX) capsule 0.4 mg  0.4 mg Oral Daily    ipratropium (ATROVENT) 0.02 % nebulizer solution 0.5 mg  0.5 mg Nebulization 4x Daily RT       Signed:  ASHLY Rangel    Part of this note may have been written by using a voice dictation software.  The note has been proof read but may still contain some grammatical/other typographical errors.

## 2025-07-08 NOTE — PROGRESS NOTES
Progress Note    Patient: Jonathan Zurita MRN: [id number]  SSN: [id number]    YOB: 1945  Age: 80 y.o.  Sex: male      Admit Date: 7/6/2025    LOS: 2 days     Subjective:     Patient reports his pain is doing pretty well.  He thinks the pain medication is helping.  He says his hips not bother him too much this morning    Objective:     Vitals:    07/08/25 0042 07/08/25 0359 07/08/25 0711 07/08/25 0759   BP:  127/79 137/67    Pulse: 84 78 73 78   Resp: 18  15 18   Temp:  98.2 °F (36.8 °C) 98.6 °F (37 °C)    TempSrc:  Oral Oral    SpO2: 95% 94% 90% 90%   Weight:       Height:            Intake and Output:  Current Shift: No intake/output data recorded.  Last three shifts: 07/06 1901 - 07/08 0700  In: 1000 [I.V.:1000]  Out: 1750 [Urine:1700]    alert and oriented to person place time and situation  Skin - dressing clean dry and intact  Motor and sensory function intact in RIGHT LOWER extremity  Pulses palpable in RIGHT LOWER extremity       Lab/Data Review:  Recent Labs     07/08/25  0345   HGB 10.2*   HCT 32.1*          Assessment:     Acute postop blood loss anemia with hemoglobin stable at 10.2, POD #1 from fixation of an osteoporotic right proximal femur fracture which was a peritrochanteric fracture    Plan:     Patient may be weightbearing as tolerated on the right lower extremity.  They can be full active and passive range of motion of the right hip.  They can have dry dressing change starting on postoperative day 2 and then after that daily and as needed.  They will need aspirin 325 mg 1 p.o. daily for 4 weeks as DVT prophylaxis as well as SCDs while hospitalized unless they are on a preoperative anticoagulant chronically and in this case they can restart this on postoperative day 2 and use this instead of the aspirin.  In this case I think he would restart his Eliquis on postoperative day 2.  They will need to have orthopedic follow-up approximately 2 weeks after discharge.    Signed By: Lanre

## 2025-07-09 LAB
ANION GAP SERPL CALC-SCNC: 9 MMOL/L (ref 7–16)
BUN SERPL-MCNC: 37 MG/DL (ref 8–23)
CALCIUM SERPL-MCNC: 8 MG/DL (ref 8.8–10.2)
CHLORIDE SERPL-SCNC: 103 MMOL/L (ref 98–107)
CO2 SERPL-SCNC: 24 MMOL/L (ref 20–29)
CREAT SERPL-MCNC: 0.9 MG/DL (ref 0.8–1.3)
ERYTHROCYTE [DISTWIDTH] IN BLOOD BY AUTOMATED COUNT: 14.9 % (ref 11.9–14.6)
GLUCOSE SERPL-MCNC: 118 MG/DL (ref 70–99)
HCT VFR BLD AUTO: 26.7 % (ref 41.1–50.3)
HGB BLD-MCNC: 8.6 G/DL (ref 13.6–17.2)
MCH RBC QN AUTO: 29.6 PG (ref 26.1–32.9)
MCHC RBC AUTO-ENTMCNC: 32.2 G/DL (ref 31.4–35)
MCV RBC AUTO: 91.8 FL (ref 82–102)
NRBC # BLD: 0 K/UL (ref 0–0.2)
PLATELET # BLD AUTO: 157 K/UL (ref 150–450)
PMV BLD AUTO: 11.8 FL (ref 9.4–12.3)
POTASSIUM SERPL-SCNC: 4.2 MMOL/L (ref 3.5–5.1)
RBC # BLD AUTO: 2.91 M/UL (ref 4.23–5.6)
SODIUM SERPL-SCNC: 136 MMOL/L (ref 136–145)
WBC # BLD AUTO: 10.1 K/UL (ref 4.3–11.1)

## 2025-07-09 PROCEDURE — 6370000000 HC RX 637 (ALT 250 FOR IP): Performed by: PHYSICIAN ASSISTANT

## 2025-07-09 PROCEDURE — 94640 AIRWAY INHALATION TREATMENT: CPT

## 2025-07-09 PROCEDURE — 51798 US URINE CAPACITY MEASURE: CPT

## 2025-07-09 PROCEDURE — 1100000000 HC RM PRIVATE

## 2025-07-09 PROCEDURE — 2700000000 HC OXYGEN THERAPY PER DAY

## 2025-07-09 PROCEDURE — 97530 THERAPEUTIC ACTIVITIES: CPT

## 2025-07-09 PROCEDURE — 2500000003 HC RX 250 WO HCPCS: Performed by: ORTHOPAEDIC SURGERY

## 2025-07-09 PROCEDURE — 6370000000 HC RX 637 (ALT 250 FOR IP): Performed by: STUDENT IN AN ORGANIZED HEALTH CARE EDUCATION/TRAINING PROGRAM

## 2025-07-09 PROCEDURE — 94760 N-INVAS EAR/PLS OXIMETRY 1: CPT

## 2025-07-09 PROCEDURE — 36415 COLL VENOUS BLD VENIPUNCTURE: CPT

## 2025-07-09 PROCEDURE — 94761 N-INVAS EAR/PLS OXIMETRY MLT: CPT

## 2025-07-09 PROCEDURE — 99024 POSTOP FOLLOW-UP VISIT: CPT | Performed by: PHYSICIAN ASSISTANT

## 2025-07-09 PROCEDURE — 6360000002 HC RX W HCPCS: Performed by: STUDENT IN AN ORGANIZED HEALTH CARE EDUCATION/TRAINING PROGRAM

## 2025-07-09 PROCEDURE — 85027 COMPLETE CBC AUTOMATED: CPT

## 2025-07-09 PROCEDURE — 80048 BASIC METABOLIC PNL TOTAL CA: CPT

## 2025-07-09 PROCEDURE — 97110 THERAPEUTIC EXERCISES: CPT

## 2025-07-09 RX ORDER — SODIUM CHLORIDE FOR INHALATION 3 %
4 VIAL, NEBULIZER (ML) INHALATION PRN
Status: DISCONTINUED | OUTPATIENT
Start: 2025-07-09 | End: 2025-07-11 | Stop reason: HOSPADM

## 2025-07-09 RX ADMIN — SODIUM CHLORIDE, PRESERVATIVE FREE 10 ML: 5 INJECTION INTRAVENOUS at 08:00

## 2025-07-09 RX ADMIN — IPRATROPIUM BROMIDE AND ALBUTEROL SULFATE 1 DOSE: 2.5; .5 SOLUTION RESPIRATORY (INHALATION) at 20:34

## 2025-07-09 RX ADMIN — GUAIFENESIN 1200 MG: 600 TABLET, EXTENDED RELEASE ORAL at 08:00

## 2025-07-09 RX ADMIN — GUAIFENESIN 1200 MG: 600 TABLET, EXTENDED RELEASE ORAL at 20:58

## 2025-07-09 RX ADMIN — IPRATROPIUM BROMIDE AND ALBUTEROL SULFATE 1 DOSE: 2.5; .5 SOLUTION RESPIRATORY (INHALATION) at 11:44

## 2025-07-09 RX ADMIN — AMIODARONE HYDROCHLORIDE 400 MG: 200 TABLET ORAL at 20:59

## 2025-07-09 RX ADMIN — IPRATROPIUM BROMIDE AND ALBUTEROL SULFATE 1 DOSE: 2.5; .5 SOLUTION RESPIRATORY (INHALATION) at 15:29

## 2025-07-09 RX ADMIN — ROSUVASTATIN CALCIUM 40 MG: 20 TABLET, FILM COATED ORAL at 20:58

## 2025-07-09 RX ADMIN — DULOXETINE HYDROCHLORIDE 60 MG: 30 CAPSULE, DELAYED RELEASE ORAL at 08:00

## 2025-07-09 RX ADMIN — BUSPIRONE HYDROCHLORIDE 10 MG: 5 TABLET ORAL at 13:30

## 2025-07-09 RX ADMIN — BUSPIRONE HYDROCHLORIDE 10 MG: 5 TABLET ORAL at 08:01

## 2025-07-09 RX ADMIN — SODIUM CHLORIDE, PRESERVATIVE FREE 10 ML: 5 INJECTION INTRAVENOUS at 20:59

## 2025-07-09 RX ADMIN — BUSPIRONE HYDROCHLORIDE 10 MG: 5 TABLET ORAL at 20:58

## 2025-07-09 RX ADMIN — IPRATROPIUM BROMIDE AND ALBUTEROL SULFATE 1 DOSE: 2.5; .5 SOLUTION RESPIRATORY (INHALATION) at 09:15

## 2025-07-09 RX ADMIN — PANTOPRAZOLE SODIUM 40 MG: 40 TABLET, DELAYED RELEASE ORAL at 05:07

## 2025-07-09 RX ADMIN — APIXABAN 5 MG: 5 TABLET, FILM COATED ORAL at 20:58

## 2025-07-09 RX ADMIN — ARFORMOTEROL TARTRATE: 15 SOLUTION RESPIRATORY (INHALATION) at 09:15

## 2025-07-09 RX ADMIN — AMIODARONE HYDROCHLORIDE 400 MG: 200 TABLET ORAL at 08:00

## 2025-07-09 RX ADMIN — TAMSULOSIN HYDROCHLORIDE 0.4 MG: 0.4 CAPSULE ORAL at 08:01

## 2025-07-09 RX ADMIN — APIXABAN 5 MG: 5 TABLET, FILM COATED ORAL at 08:01

## 2025-07-09 ASSESSMENT — PAIN SCALES - GENERAL: PAINLEVEL_OUTOF10: 0

## 2025-07-09 NOTE — PROGRESS NOTES
ACUTE OCCUPATIONAL THERAPY GOALS:   (Developed with and agreed upon by patient and/or caregiver.)    1. Pt will complete LB ADL Min A with AE as needed.   2. Pt will complete toileting Min A with AE as needed.   3. Pt will complete UB ADL supervision.  4. Pt will tolerate 25 minutes of OT treatment requiring 1-2 breaks as needed.   5. Pt will complete grooming tasks while standing at sink supervision.  6. Pt will complete functional mobility and/or transfers via RW supervision.   7. Pt will tolerate BUE exercises to increase strength/endurance for safe, functional transfers and/or functional mobility, and ADL participation.   8. Pt will verbalize and/or demonstrate understanding of RLE WBAT precautions during functional activity 100% of the time.       Timeframe: 7 visits   OCCUPATIONAL THERAPY: Daily Note AM   OT Visit Days: 2   Time In/Out  OT Charge Capture  Rehab Caseload Tracker  OT Orders    Jonathan Zurita is a 80 y.o. male   PRIMARY DIAGNOSIS: Closed displaced intertrochanteric fracture of femur, initial encounter ([facility])  Fall on same level, initial encounter [W18.30XA]  Closed displaced intertrochanteric fracture of right femur, initial encounter ([facility]) [S72.141A]  Displaced intertrochanteric fracture of right femur, initial encounter for closed fracture ([facility]) [S72.141A]  Closed displaced intertrochanteric fracture of femur, initial encounter ([facility]) [S72.143A]  Procedure(s) (LRB):  FEMUR INTRAMEDULLARY NAIL (Right)  2 Days Post-Op  Inpatient: Payor: [employer] / Plan: [employer] / Product Type: *No Product type* /     ASSESSMENT:     REHAB RECOMMENDATIONS:   Recommendation to date pending progress:  Setting:  Continued occupational therapy recommended at discharge    Equipment:    To Be Determined     ASSESSMENT:  Mr. Zurita presents up in the chair upon arrival. PCT's were getting ready to transfer pt to the 10th floor and needed help with mobility. Pt completed sit to stand with mod a and a

## 2025-07-09 NOTE — PLAN OF CARE
Problem: Chronic Conditions and Co-morbidities  Goal: Patient's chronic conditions and co-morbidity symptoms are monitored and maintained or improved  7/9/2025 0930 by Leanna Villavicencio RN  Outcome: Progressing  Flowsheets (Taken 7/9/2025 0800)  Care Plan - Patient's Chronic Conditions and Co-Morbidity Symptoms are Monitored and Maintained or Improved: Monitor and assess patient's chronic conditions and comorbid symptoms for stability, deterioration, or improvement  7/8/2025 2330 by Aggie Pollock RN  Outcome: Progressing     Problem: Discharge Planning  Goal: Discharge to home or other facility with appropriate resources  7/9/2025 0930 by Leanna Villavicencio RN  Outcome: Progressing  Flowsheets (Taken 7/9/2025 0800)  Discharge to home or other facility with appropriate resources: Identify barriers to discharge with patient and caregiver  7/8/2025 2330 by Aggie Pollock RN  Outcome: Progressing     Problem: Pain  Goal: Verbalizes/displays adequate comfort level or baseline comfort level  7/9/2025 0930 by Leanna Villavicencio RN  Outcome: Progressing  Flowsheets (Taken 7/9/2025 0800)  Verbalizes/displays adequate comfort level or baseline comfort level: Encourage patient to monitor pain and request assistance  7/8/2025 2330 by Aggie Pollock RN  Outcome: Progressing     Problem: Safety - Adult  Goal: Free from fall injury  7/9/2025 0930 by Leanna Villavicencio RN  Outcome: Progressing  Flowsheets (Taken 7/9/2025 0800)  Free From Fall Injury: Instruct family/caregiver on patient safety  7/8/2025 2330 by Aggie Pollock RN  Outcome: Progressing     Problem: ABCDS Injury Assessment  Goal: Absence of physical injury  7/9/2025 0930 by Leanna Villavicencio RN  Outcome: Progressing  Flowsheets (Taken 7/9/2025 0800)  Absence of Physical Injury: Implement safety measures based on patient assessment  7/8/2025 2330 by Aggie Pollock RN  Outcome: Progressing     Problem: Skin/Tissue Integrity  Goal: Skin integrity remains  intact  Description: 1.  Monitor for areas of redness and/or skin breakdown  2.  Assess vascular access sites hourly  3.  Every 4-6 hours minimum:  Change oxygen saturation probe site  4.  Every 4-6 hours:  If on nasal continuous positive airway pressure, respiratory therapy assess nares and determine need for appliance change or resting period  7/9/2025 0930 by Leanna Villavicencio RN  Outcome: Progressing  Flowsheets (Taken 7/9/2025 0800)  Skin Integrity Remains Intact: Monitor for areas of redness and/or skin breakdown  7/8/2025 2330 by Aggie Pollock RN  Outcome: Progressing     Problem: Respiratory - Adult  Goal: Achieves optimal ventilation and oxygenation  7/9/2025 0930 by Leanna Villavicencio RN  Outcome: Progressing  7/9/2025 0917 by Danelle Ramírez RCP  Outcome: Progressing  Flowsheets (Taken 7/9/2025 0800 by Leanna Villavicencio RN)  Achieves optimal ventilation and oxygenation: Assess for changes in respiratory status  7/8/2025 2330 by Aggie Pollock RN  Outcome: Progressing     Problem: Neurosensory - Adult  Goal: Achieves stable or improved neurological status  Outcome: Progressing  Flowsheets (Taken 7/9/2025 0800)  Achieves stable or improved neurological status: Assess for and report changes in neurological status     Problem: Skin/Tissue Integrity - Adult  Goal: Skin integrity remains intact  Description: 1.  Monitor for areas of redness and/or skin breakdown  2.  Assess vascular access sites hourly  3.  Every 4-6 hours minimum:  Change oxygen saturation probe site  4.  Every 4-6 hours:  If on nasal continuous positive airway pressure, respiratory therapy assess nares and determine need for appliance change or resting period  7/9/2025 0930 by Leanna Villavicencio RN  Outcome: Progressing  Flowsheets (Taken 7/9/2025 0800)  Skin Integrity Remains Intact: Monitor for areas of redness and/or skin breakdown  7/8/2025 2330 by Aggie Pollock RN  Outcome: Progressing  Goal: Incisions, wounds, or drain sites

## 2025-07-09 NOTE — PROGRESS NOTES
ACUTE PHYSICAL THERAPY GOALS:   (Developed with and agreed upon by patient and/or caregiver.)  Pt will perform bed mobility Min (A) c inc time, cueing and use of rails as needed in 7 therapy sessions.  Pt will perform sit-to-stand/ stand-to-sit transfers Min (A) c use of LRAD/external supports as needed and no LOB or miss-steps in 7 therapy sessions.  Pt will ambulate 125 ft CG(A) with use of LRAD, no LOB or miss-steps and breaks as needed in 7 therapy sessions.  Pt will perform standing dynamic balance activities with minimal postural sway in 7 therapy sessions.  Pt will tolerate multiple sets and reps of BLE exercises in 7 therapy sessions.    PHYSICAL THERAPY: Daily Note AM   (Link to Caseload Tracking: PT Visit Days : 2  Time In/Out PT Charge Capture  Rehab Caseload Tracker  Orders    WBAT RLE   FALL RISK    Jonathan Zurita is a 80 y.o. male   PRIMARY DIAGNOSIS: Closed displaced intertrochanteric fracture of femur, initial encounter ([facility])  Fall on same level, initial encounter [W18.30XA]  Closed displaced intertrochanteric fracture of right femur, initial encounter ([facility]) [S72.141A]  Displaced intertrochanteric fracture of right femur, initial encounter for closed fracture ([facility]) [S72.141A]  Closed displaced intertrochanteric fracture of femur, initial encounter ([facility]) [S72.143A]  Procedure(s) (LRB):  FEMUR INTRAMEDULLARY NAIL (Right)  2 Days Post-Op  Inpatient: Payor: [employer] / Plan: [employer] / Product Type: *No Product type* /     ASSESSMENT:     REHAB RECOMMENDATIONS:   Recommendation to date pending progress:  Setting:  Short-term Rehab    Equipment:    To Be Determined     ASSESSMENT:  Mr. Zurita presents supine on contact, agreeable to therapy.  He sat to side of bed with min/mod assist and additional time.  He stood and wanted to walk into the bathroom.  He was able to ambulate about 6' using rolling walker and CGA/min assist and then needed to sit down, chair brought up behind him.  He

## 2025-07-09 NOTE — PLAN OF CARE
Problem: Respiratory - Adult  Goal: Achieves optimal ventilation and oxygenation  7/9/2025 0917 by Danelle Ramírez RCP  Outcome: Progressing  7/8/2025 2330 by Aggie Pollock RN  Outcome: Progressing

## 2025-07-09 NOTE — PROGRESS NOTES
SPIRITUAL HEALTH  Note for Med/Surg Visit                  Room # 1006/01    Name: Jonathan Zurita           Age: 80 y.o.    Gender: male          MRN: [id number]  Mormon: Mosque       Preferred Language: English    Date: 07/09/25  Visit Time: Begin Time: (P) 0820 End Time : (P) 0825 Complexity of Encounter: (P) Low      Visit Summary: Volunteer Zain Nazario visited with patient and offered spiritual care. Will follow-up as necessary and possible.      Referral/Consult From: (P) Volunteer  Encounter Overview/Reason: (P) Volunteer Encounter  Encounter Code:     Crisis (if applicable):    Service Provided For: (P) Patient     Patient was available.    Yanni, Belief, Meaning:   Patient unable to assess at this time  Family/Friends No family/friends present  Rituals (if applicable)      Importance and Influence:  Patient unable to assess at this time  Family/Friends No family/friends present    Community:  Patient   unable to assess at this time   Family/Friends   No family/ friends present.    Assessment and Plan of Care:   Emotions Expressed by Patient:   Assessment: (P) Peaceful    Interventions by :   Intervention: Active listening, Discussed belief system/Yazidi practices/yanni, Discussed relationship with God, Discussed illness injury and it’s impact, Discussed meaning/purpose, Explored/Affirmed feelings, thoughts, concerns, Explored Coping Skills/Resources, Sustaining Presence/Ministry of presence, Read/Provided Scripture, Prayer (assurance of)/Jerome, Nurtured Hope     Result/ Response by Patient:   Outcome: Comfort, Coping, Encouraged, Engaged in conversation, Expressed feelings, needs, and concerns, Expressed Gratitude, Expressed feelings of Kassandra, Peace and/or Love, Grieving, Receptive, Peace    Patient Plan of Care:   Plan and Referrals  Plan/Referrals: (P) Continue Support (comment)     Electronically signed by DOREEN Carmona, on 7/9/2025 at 2:33 PM.  [facility]

## 2025-07-09 NOTE — PROGRESS NOTES
Hospitalist Progress Note   Admit Date:  2025  1:40 PM   Name:  Jonathan Zurita   Age:  80 y.o.  Sex:  male  :  1945   MRN:  [id number]   Room:  [facility]/    Presenting/Chief Complaint: Hip Injury     Reason(s) for Admission: Fall on same level, initial encounter [W18.30XA]  Closed displaced intertrochanteric fracture of right femur, initial encounter ([facility]) [S72.141A]  Displaced intertrochanteric fracture of right femur, initial encounter for closed fracture ([facility]) [S72.141A]  Closed displaced intertrochanteric fracture of femur, initial encounter ([facility]) [S72.143A]     Hospital Course:   Jonathan Zurita is a 80 y.o. male with medical history of atrial fibrillation, CLL, COPD, anxiety, depression, hyperlipidemia, CVA who presented after mechanical fall tripping over a dog and landing on his right side.  He denied any presyncope or loss of consciousness with fall.  Denied head strike.       On arrival to the ED patient hemodynamically stable hide from bradycardia at 55.  Notable labs include hemoglobin 11.5 (at baseline).   Chest x-ray unremarkable.  Right femur/hip x-ray shows intertrochanteric fracture of right femur.  Initial EKG shows atrial fibrillation.  Orthopedic surgery consulted and performed intramedullary nail fixation of his right femur fracture on 25.     Of note patient had recent hospitalization for septic shock and A-fib with RVR and discharged on 2025.    Subjective & 24hr Events:   Seen this morning  He has more sputum production today   Weaned back to 2L  He states he feels fine but therapy has been \"rough\" (he elaborated to say it was exhausting)  Hgb down today from 11.5 preop to 8.6    Assessment & Plan:     Mechanical fall:  Closed right femur fracture:  - Patient reportedly tripped over dog without head strike or loss of consciousness  - Right femur/hip x-ray: Intertrochanteric fracture of right femur  - Orthopedic surgery consulted, he is POD2 Right closed  contain some grammatical/other typographical errors.

## 2025-07-09 NOTE — PROGRESS NOTES
Progress Note    Patient: Jonathan Zurita MRN: [id number]  SSN: [id number]    YOB: 1945  Age: 80 y.o.  Sex: male      Admit Date: 7/6/2025    LOS: 3 days     Subjective:     Doing well. Sitting up in chair.  Nursing notes need to change middle dressing a couple of times.     Objective:     Vitals:    07/08/25 1954 07/09/25 0740 07/09/25 0915 07/09/25 1144   BP:  105/64     Pulse: 93 94 (!) 104 96   Resp: 18 18 18 18   Temp:  97.9 °F (36.6 °C)     TempSrc:  Oral     SpO2: 95% 90% 96% 93%   Weight:       Height:            Intake and Output:  Current Shift: No intake/output data recorded.  Last three shifts: 07/07 1901 - 07/09 0700  In: -   Out: 1175 [Urine:1175]    alert and oriented to person place time and situation  Skin - dressing clean dry and intact  Motor and sensory function intact in RIGHT LOWER extremity  Pulses palpable in RIGHT LOWER extremity       Lab/Data Review:  Recent Labs     07/09/25  0429   HGB 8.6*   HCT 26.7*          Assessment:     Acute postop blood loss anemia with hemoglobin at 8.6, POD #2 from fixation of an osteoporotic right proximal femur fracture which was a peritrochanteric fracture    Plan:     Patient may be weightbearing as tolerated on the right lower extremity.  They can be full active and passive range of motion of the right hip.  They can have dry dressing change starting on postoperative day 2 and then after that daily and as needed.  Eliquis restarted. They will need to have orthopedic follow-up approximately 2 weeks after discharge.    Signed By: ASHLY Carroll     July 9, 2025

## 2025-07-09 NOTE — CARE COORDINATION
Pt is accepted for STR admission to [facility] pending [facility] approval and when he is medically ready for dc.  Pt discussed in IDR and provider does not anticipate he will be ready until Friday.  CM confirmed that [facility] will have a bed available at that time.  DES notified the [facility] SW of the bed offer and plan.  He stated the pt has been approved for 25 days of STR and he is awaiting the official LOC.  He anticipates it will be available by Friday.  DES spoke with pt's dtr, Ria, via phone to provide update.  CM following to facilitate pt's transfer to rehab at [facility].

## 2025-07-09 NOTE — PROGRESS NOTES
ACUTE PHYSICAL THERAPY GOALS:   (Developed with and agreed upon by patient and/or caregiver.)  Pt will perform bed mobility Min (A) c inc time, cueing and use of rails as needed in 7 therapy sessions.  Pt will perform sit-to-stand/ stand-to-sit transfers Min (A) c use of LRAD/external supports as needed and no LOB or miss-steps in 7 therapy sessions.  Pt will ambulate 125 ft CG(A) with use of LRAD, no LOB or miss-steps and breaks as needed in 7 therapy sessions.  Pt will perform standing dynamic balance activities with minimal postural sway in 7 therapy sessions.  Pt will tolerate multiple sets and reps of BLE exercises in 7 therapy sessions.    PHYSICAL THERAPY: Daily Note PM   (Link to Caseload Tracking: PT Visit Days : 2  Time In/Out PT Charge Capture  Rehab Caseload Tracker  Orders    WBAT RLE   FALL RISK    Jonathan Zurita is a 80 y.o. male   PRIMARY DIAGNOSIS: Closed displaced intertrochanteric fracture of femur, initial encounter ([facility])  Fall on same level, initial encounter [W18.30XA]  Closed displaced intertrochanteric fracture of right femur, initial encounter ([facility]) [S72.141A]  Displaced intertrochanteric fracture of right femur, initial encounter for closed fracture ([facility]) [S72.141A]  Closed displaced intertrochanteric fracture of femur, initial encounter ([facility]) [S72.143A]  Procedure(s) (LRB):  FEMUR INTRAMEDULLARY NAIL (Right)  2 Days Post-Op  Inpatient: Payor: [employer] / Plan: [employer] / Product Type: *No Product type* /     ASSESSMENT:     REHAB RECOMMENDATIONS:   Recommendation to date pending progress:  Setting:  Short-term Rehab    Equipment:    To Be Determined     ASSESSMENT:  Mr. Zurita presents sitting up in chair and is ready to get back to bed.  He stood and was able to ambulate about 6' using rolling walker and min/CGA.  He sat to rest, then stood and ambulated an additional 10' with same assist.  He does fatigue quickly.  He ambulates with antalgic gait, slow and shuffling in

## 2025-07-09 NOTE — PROGRESS NOTES
4 Eyes Skin Assessment     NAME:  Jonathan Zurita  YOB: 1945  MEDICAL RECORD NUMBER:  [id number]    The patient is being assessed for  Transfer to New Unit    I agree that at least one RN has performed a thorough Head to Toe Skin Assessment on the patient. ALL assessment sites listed below have been assessed.      Areas assessed by both nurses:    Sacrum. Buttock, Coccyx, Ischium        Does the Patient have a Wound? No noted wound(s)       Tutu Prevention initiated by RN: Yes  Wound Care Orders initiated by RN: No    Pressure Injury (Stage 1,2,3,4, Unstageable, DTI, NWPT, and Complex wounds) if present, place Wound referral order by RN under : No    New Ostomies, if present place, Ostomy referral order under : No     Nurse 1 eSignature: Electronically signed by ANN BAUER RN on 7/9/25 at 4:11 PM EDT    **SHARE this note so that the co-signing nurse can place an eSignature**    Nurse 2 eSignature: Electronically signed by Clarissa Giordano RN on 7/9/25 at 4:20 PM EDT

## 2025-07-10 LAB
HCT VFR BLD AUTO: 24.1 % (ref 41.1–50.3)
HCT VFR BLD AUTO: 25.7 % (ref 41.1–50.3)
HGB BLD-MCNC: 7.8 G/DL (ref 13.6–17.2)
HGB BLD-MCNC: 8.7 G/DL (ref 13.6–17.2)
HISTORY CHECK: NORMAL

## 2025-07-10 PROCEDURE — 94640 AIRWAY INHALATION TREATMENT: CPT

## 2025-07-10 PROCEDURE — 86900 BLOOD TYPING SEROLOGIC ABO: CPT

## 2025-07-10 PROCEDURE — 86901 BLOOD TYPING SEROLOGIC RH(D): CPT

## 2025-07-10 PROCEDURE — 36415 COLL VENOUS BLD VENIPUNCTURE: CPT

## 2025-07-10 PROCEDURE — 85018 HEMOGLOBIN: CPT

## 2025-07-10 PROCEDURE — 85014 HEMATOCRIT: CPT

## 2025-07-10 PROCEDURE — 97112 NEUROMUSCULAR REEDUCATION: CPT

## 2025-07-10 PROCEDURE — 6370000000 HC RX 637 (ALT 250 FOR IP): Performed by: STUDENT IN AN ORGANIZED HEALTH CARE EDUCATION/TRAINING PROGRAM

## 2025-07-10 PROCEDURE — 94760 N-INVAS EAR/PLS OXIMETRY 1: CPT

## 2025-07-10 PROCEDURE — 6370000000 HC RX 637 (ALT 250 FOR IP): Performed by: PHYSICIAN ASSISTANT

## 2025-07-10 PROCEDURE — 36430 TRANSFUSION BLD/BLD COMPNT: CPT

## 2025-07-10 PROCEDURE — 51798 US URINE CAPACITY MEASURE: CPT

## 2025-07-10 PROCEDURE — P9016 RBC LEUKOCYTES REDUCED: HCPCS

## 2025-07-10 PROCEDURE — 94761 N-INVAS EAR/PLS OXIMETRY MLT: CPT

## 2025-07-10 PROCEDURE — 97530 THERAPEUTIC ACTIVITIES: CPT

## 2025-07-10 PROCEDURE — 97535 SELF CARE MNGMENT TRAINING: CPT

## 2025-07-10 PROCEDURE — 51701 INSERT BLADDER CATHETER: CPT

## 2025-07-10 PROCEDURE — 30233N1 TRANSFUSION OF NONAUTOLOGOUS RED BLOOD CELLS INTO PERIPHERAL VEIN, PERCUTANEOUS APPROACH: ICD-10-PCS | Performed by: STUDENT IN AN ORGANIZED HEALTH CARE EDUCATION/TRAINING PROGRAM

## 2025-07-10 PROCEDURE — 2500000003 HC RX 250 WO HCPCS: Performed by: ORTHOPAEDIC SURGERY

## 2025-07-10 PROCEDURE — 1100000000 HC RM PRIVATE

## 2025-07-10 PROCEDURE — 86850 RBC ANTIBODY SCREEN: CPT

## 2025-07-10 PROCEDURE — 86923 COMPATIBILITY TEST ELECTRIC: CPT

## 2025-07-10 PROCEDURE — 99024 POSTOP FOLLOW-UP VISIT: CPT | Performed by: PHYSICIAN ASSISTANT

## 2025-07-10 RX ORDER — IPRATROPIUM BROMIDE AND ALBUTEROL SULFATE 2.5; .5 MG/3ML; MG/3ML
1 SOLUTION RESPIRATORY (INHALATION)
Status: DISCONTINUED | OUTPATIENT
Start: 2025-07-10 | End: 2025-07-11 | Stop reason: HOSPADM

## 2025-07-10 RX ORDER — SODIUM CHLORIDE 9 MG/ML
INJECTION, SOLUTION INTRAVENOUS PRN
Status: DISCONTINUED | OUTPATIENT
Start: 2025-07-10 | End: 2025-07-11 | Stop reason: HOSPADM

## 2025-07-10 RX ORDER — ACETAMINOPHEN 500 MG
1000 TABLET ORAL EVERY 8 HOURS SCHEDULED
Status: DISCONTINUED | OUTPATIENT
Start: 2025-07-10 | End: 2025-07-11 | Stop reason: HOSPADM

## 2025-07-10 RX ADMIN — APIXABAN 5 MG: 5 TABLET, FILM COATED ORAL at 09:11

## 2025-07-10 RX ADMIN — GUAIFENESIN 1200 MG: 600 TABLET, EXTENDED RELEASE ORAL at 20:14

## 2025-07-10 RX ADMIN — SODIUM CHLORIDE, PRESERVATIVE FREE 5 ML: 5 INJECTION INTRAVENOUS at 20:14

## 2025-07-10 RX ADMIN — ACETAMINOPHEN 1000 MG: 500 TABLET, FILM COATED ORAL at 16:29

## 2025-07-10 RX ADMIN — PANTOPRAZOLE SODIUM 40 MG: 40 TABLET, DELAYED RELEASE ORAL at 06:40

## 2025-07-10 RX ADMIN — BUSPIRONE HYDROCHLORIDE 10 MG: 5 TABLET ORAL at 20:14

## 2025-07-10 RX ADMIN — SODIUM CHLORIDE, PRESERVATIVE FREE 10 ML: 5 INJECTION INTRAVENOUS at 09:09

## 2025-07-10 RX ADMIN — DULOXETINE HYDROCHLORIDE 60 MG: 30 CAPSULE, DELAYED RELEASE ORAL at 09:11

## 2025-07-10 RX ADMIN — BUSPIRONE HYDROCHLORIDE 10 MG: 5 TABLET ORAL at 09:09

## 2025-07-10 RX ADMIN — BUSPIRONE HYDROCHLORIDE 10 MG: 5 TABLET ORAL at 13:12

## 2025-07-10 RX ADMIN — ROSUVASTATIN CALCIUM 40 MG: 20 TABLET, FILM COATED ORAL at 20:14

## 2025-07-10 RX ADMIN — IPRATROPIUM BROMIDE AND ALBUTEROL SULFATE 1 DOSE: 2.5; .5 SOLUTION RESPIRATORY (INHALATION) at 09:35

## 2025-07-10 RX ADMIN — HYDROMORPHONE HYDROCHLORIDE 0.5 MG: 1 INJECTION, SOLUTION INTRAMUSCULAR; INTRAVENOUS; SUBCUTANEOUS at 13:12

## 2025-07-10 RX ADMIN — AMIODARONE HYDROCHLORIDE 400 MG: 200 TABLET ORAL at 09:11

## 2025-07-10 RX ADMIN — IPRATROPIUM BROMIDE AND ALBUTEROL SULFATE 1 DOSE: 2.5; .5 SOLUTION RESPIRATORY (INHALATION) at 20:04

## 2025-07-10 RX ADMIN — GUAIFENESIN 1200 MG: 600 TABLET, EXTENDED RELEASE ORAL at 09:11

## 2025-07-10 RX ADMIN — TAMSULOSIN HYDROCHLORIDE 0.4 MG: 0.4 CAPSULE ORAL at 09:11

## 2025-07-10 RX ADMIN — POLYETHYLENE GLYCOL 3350 17 G: 17 POWDER, FOR SOLUTION ORAL at 13:02

## 2025-07-10 ASSESSMENT — PAIN DESCRIPTION - DESCRIPTORS: DESCRIPTORS: CRUSHING

## 2025-07-10 ASSESSMENT — PAIN SCALES - GENERAL
PAINLEVEL_OUTOF10: 2
PAINLEVEL_OUTOF10: 0
PAINLEVEL_OUTOF10: 8

## 2025-07-10 ASSESSMENT — PAIN DESCRIPTION - ONSET: ONSET: ON-GOING

## 2025-07-10 ASSESSMENT — PAIN DESCRIPTION - PAIN TYPE: TYPE: SURGICAL PAIN

## 2025-07-10 ASSESSMENT — PAIN DESCRIPTION - LOCATION: LOCATION: HIP

## 2025-07-10 ASSESSMENT — PAIN - FUNCTIONAL ASSESSMENT: PAIN_FUNCTIONAL_ASSESSMENT: PREVENTS OR INTERFERES SOME ACTIVE ACTIVITIES AND ADLS

## 2025-07-10 ASSESSMENT — PAIN DESCRIPTION - ORIENTATION: ORIENTATION: RIGHT

## 2025-07-10 ASSESSMENT — PAIN DESCRIPTION - FREQUENCY: FREQUENCY: INTERMITTENT

## 2025-07-10 NOTE — PROGRESS NOTES
ACUTE PHYSICAL THERAPY GOALS:   (Developed with and agreed upon by patient and/or caregiver.)  Pt will perform bed mobility Min (A) c inc time, cueing and use of rails as needed in 7 therapy sessions.  Pt will perform sit-to-stand/ stand-to-sit transfers Min (A) c use of LRAD/external supports as needed and no LOB or miss-steps in 7 therapy sessions.  Pt will ambulate 125 ft CG(A) with use of LRAD, no LOB or miss-steps and breaks as needed in 7 therapy sessions.  Pt will perform standing dynamic balance activities with minimal postural sway in 7 therapy sessions.  Pt will tolerate multiple sets and reps of BLE exercises in 7 therapy sessions.    PHYSICAL THERAPY: Daily Note AM   (Link to Caseload Tracking: PT Visit Days : 3  Time In/Out PT Charge Capture  Rehab Caseload Tracker  Orders    WBAT RLE   FALL RISK    Jonathan Zurita is a 80 y.o. male   PRIMARY DIAGNOSIS: Closed displaced intertrochanteric fracture of femur, initial encounter ([facility])  Fall on same level, initial encounter [W18.30XA]  Closed displaced intertrochanteric fracture of right femur, initial encounter ([facility]) [S72.141A]  Displaced intertrochanteric fracture of right femur, initial encounter for closed fracture ([facility]) [S72.141A]  Closed displaced intertrochanteric fracture of femur, initial encounter ([facility]) [S72.143A]  Procedure(s) (LRB):  FEMUR INTRAMEDULLARY NAIL (Right)  3 Days Post-Op  Inpatient: Payor: [employer] / Plan: [employer] / Product Type: *No Product type* /     ASSESSMENT:     REHAB RECOMMENDATIONS:   Recommendation to date pending progress:  Setting:  Short-term Rehab    Equipment:    To Be Determined     ASSESSMENT:  Mr. Zurita is supine upon contact and agreeable to mobility.  He continues to be limited by pain, but participated well.  The patient performed bed mobility with additional time and min Ax2.  He worked on static/dynamic sitting balance at the EOB, then later unsupported in the recliner.  The patient required  Dynamic [] [] [x] [x] [] []      GAIT: I Mod I S SBA CGA Min Mod Max Total  NT x2 Comments:   Level of Assistance [] [] [] [] [x] [x] [] [] [] [] []    Distance 2x10'    DME Rolling Walker    Gait Quality Antalgic, Decreased evita , Decreased step clearance, Decreased step length, Decreased stance, Narrow base of support, Shuffling , and Trunk sway increased    Weightbearing Status      Stairs      I=Independent, Mod I=Modified Independent, S=Supervision, SBA=Standby Assistance, CGA=Contact Guard Assistance,   Min=Minimal Assistance, Mod=Moderate Assistance, Max=Maximal Assistance, Total=Total Assistance, NT=Not Tested    PLAN:   FREQUENCY AND DURATION: BID for duration of hospital stay or until stated goals are met, whichever comes first.    TREATMENT:   TREATMENT:   Co-Treatment PT/OT necessary due to patient's decreased overall endurance/tolerance levels, as well as need for high level skilled assistance to complete functional transfers/mobility and functional tasks  Therapeutic Activity (38 Minutes): Therapeutic activity included Supine to Sit, Scooting, Transfer Training, Ambulation on level ground, Sitting balance , and Standing balance to improve functional Activity tolerance, Balance, Coordination, Mobility, and Strength.    TREATMENT GRID:   Date:  7/9/25 Date:   Date:     Activity/Exercise Parameters Parameters Parameters   Ankle pumps 10 B     LAQ 10 R     Hip flexion 10 R AA     Hip abd 10 R AA                           AFTER TREATMENT PRECAUTIONS: Alarm Activated, Bed/Chair Locked, Call light within reach, Chair, Needs within reach, and RN notified    INTERDISCIPLINARY COLLABORATION:  RN/ PCT, PT/ PTA, and OT/ [place]    EDUCATION:    Educated patient and/or family/caregiver on the following: Assistive Device Indications/Utilization/Safety, Fall Prevention Strategies, and Weightbearing status    TIME IN/OUT:  Time In: 1039  Time Out: 1117  Minutes: 38    Leola Beckman PTA     show

## 2025-07-10 NOTE — PROGRESS NOTES
07/10/25 1017   RT Protocol   History Pulmonary Disease 1   Respiratory pattern 0   Breath sounds 2   Cough 1   Indications for Bronchodilator Therapy On home bronchodilators;Decreased or absent breath sounds   Bronchodilator Assessment Score 4

## 2025-07-10 NOTE — PROGRESS NOTES
ACUTE PHYSICAL THERAPY GOALS:   (Developed with and agreed upon by patient and/or caregiver.)  Pt will perform bed mobility Min (A) c inc time, cueing and use of rails as needed in 7 therapy sessions.  Pt will perform sit-to-stand/ stand-to-sit transfers Min (A) c use of LRAD/external supports as needed and no LOB or miss-steps in 7 therapy sessions.  Pt will ambulate 125 ft CG(A) with use of LRAD, no LOB or miss-steps and breaks as needed in 7 therapy sessions.  Pt will perform standing dynamic balance activities with minimal postural sway in 7 therapy sessions.  Pt will tolerate multiple sets and reps of BLE exercises in 7 therapy sessions.    PHYSICAL THERAPY: Daily Note PM   (Link to Caseload Tracking: PT Visit Days : 3  Time In/Out PT Charge Capture  Rehab Caseload Tracker  Orders    WBAT RLE   FALL RISK    Jonathan Zurita is a 80 y.o. male   PRIMARY DIAGNOSIS: Closed displaced intertrochanteric fracture of femur, initial encounter ([facility])  Fall on same level, initial encounter [W18.30XA]  Closed displaced intertrochanteric fracture of right femur, initial encounter ([facility]) [S72.141A]  Displaced intertrochanteric fracture of right femur, initial encounter for closed fracture ([facility]) [S72.141A]  Closed displaced intertrochanteric fracture of femur, initial encounter ([facility]) [S72.143A]  Procedure(s) (LRB):  FEMUR INTRAMEDULLARY NAIL (Right)  3 Days Post-Op  Inpatient: Payor: [employer] / Plan: [employer] / Product Type: *No Product type* /     ASSESSMENT:     REHAB RECOMMENDATIONS:   Recommendation to date pending progress:  Setting:  Short-term Rehab    Equipment:    To Be Determined     ASSESSMENT:  Mr. Zurita is supine upon contact and agreeable to mobility.  He continues to be limited by pain, but participated well.  The patient performed bed mobility with additional time and min Ax2.  He worked on static/dynamic sitting balance at the EOB, then later unsupported in the recliner.  The patient required

## 2025-07-10 NOTE — PROGRESS NOTES
ACUTE OCCUPATIONAL THERAPY GOALS:   (Developed with and agreed upon by patient and/or caregiver.)    1. Pt will complete LB ADL Min A with AE as needed.   2. Pt will complete toileting Min A with AE as needed.   3. Pt will complete UB ADL supervision.  4. Pt will tolerate 25 minutes of OT treatment requiring 1-2 breaks as needed.   5. Pt will complete grooming tasks while standing at sink supervision.  6. Pt will complete functional mobility and/or transfers via RW supervision.   7. Pt will tolerate BUE exercises to increase strength/endurance for safe, functional transfers and/or functional mobility, and ADL participation.   8. Pt will verbalize and/or demonstrate understanding of RLE WBAT precautions during functional activity 100% of the time.       Timeframe: 7 visits   OCCUPATIONAL THERAPY: Daily Note AM   OT Visit Days: 3   Time In/Out  OT Charge Capture  Rehab Caseload Tracker  OT Orders    Jonathan Zurita is a 80 y.o. male   PRIMARY DIAGNOSIS: Closed displaced intertrochanteric fracture of femur, initial encounter ([facility])  Fall on same level, initial encounter [W18.30XA]  Closed displaced intertrochanteric fracture of right femur, initial encounter ([facility]) [S72.141A]  Displaced intertrochanteric fracture of right femur, initial encounter for closed fracture ([facility]) [S72.141A]  Closed displaced intertrochanteric fracture of femur, initial encounter ([facility]) [S72.143A]  Procedure(s) (LRB):  FEMUR INTRAMEDULLARY NAIL (Right)  3 Days Post-Op  Inpatient: Payor: [employer] / Plan: [employer] / Product Type: *No Product type* /     ASSESSMENT:     REHAB RECOMMENDATIONS:   Recommendation to date pending progress:  Setting:  Continued occupational therapy recommended at discharge    Equipment:    To Be Determined     ASSESSMENT:  Mr. Zurita presents in supine upon arrival. Pt agreeable to therapy and transferred from supine to sit with min a and additional time. Pt required assistance scooting out toward the

## 2025-07-10 NOTE — PROGRESS NOTES
Hospitalist Progress Note   Admit Date:  2025  1:40 PM   Name:  Jonathan Zurita   Age:  80 y.o.  Sex:  male  :  1945   MRN:  [id number]   Room:  [address] [place] [address]    Presenting/Chief Complaint: Hip Injury     Reason(s) for Admission: Fall on same level, initial encounter [W18.30XA]  Closed displaced intertrochanteric fracture of right femur, initial encounter ([facility]) [S72.141A]  Displaced intertrochanteric fracture of right femur, initial encounter for closed fracture ([facility]) [S72.141A]  Closed displaced intertrochanteric fracture of femur, initial encounter ([facility]) [S72.143A]     Hospital Course:   Jonathan Zurita is a 80 y.o. male with medical history of atrial fibrillation, CLL, COPD, anxiety, depression, hyperlipidemia, CVA who presented after mechanical fall tripping over a dog and landing on his right side.  He denied any presyncope or loss of consciousness with fall.  Denied head strike.       On arrival to the ED patient hemodynamically stable hide from bradycardia at 55.  Notable labs include hemoglobin 11.5 (at baseline).   Chest x-ray unremarkable.  Right femur/hip x-ray shows intertrochanteric fracture of right femur.  Initial EKG shows atrial fibrillation.  Orthopedic surgery consulted and performed intramedullary nail fixation of his right femur fracture on 25. Post op he had some anemia. Eliquis resumed POD2.     Of note patient had recent hospitalization for septic shock and A-fib with RVR and discharged on 2025.    Subjective & 24hr Events:   Seen this morning working hard with PT and OT  He is in pain but has not taken analgesia past 24 hours. Will start APAP  Hgb dropped another point to 7.8 and he is dizzy and hypotensive this AM. Will give  a unit of blood. He provided consent  PVR with > 400 cc will straight cath and closely monitor     Assessment & Plan:     Mechanical fall:  Closed right femur fracture:  - Patient reportedly tripped over dog without head strike or loss of  infusion   IntraVENous PRN    ondansetron (ZOFRAN-ODT) disintegrating tablet 4 mg  4 mg Oral Q8H PRN    Or    ondansetron (ZOFRAN) injection 4 mg  4 mg IntraVENous Q6H PRN    HYDROmorphone HCl PF (DILAUDID) injection 0.5 mg  0.5 mg IntraVENous Q3H PRN    phenol 1.4 % mouth spray 1 spray  1 spray Mouth/Throat Q2H PRN    sodium chloride flush 0.9 % injection 5-40 mL  5-40 mL IntraVENous 2 times per day    sodium chloride flush 0.9 % injection 5-40 mL  5-40 mL IntraVENous PRN    0.9 % sodium chloride infusion   IntraVENous PRN    potassium chloride (KLOR-CON M) extended release tablet 40 mEq  40 mEq Oral PRN    Or    potassium bicarb-citric acid (EFFER-K) effervescent tablet 40 mEq  40 mEq Oral PRN    Or    potassium chloride 10 mEq/100 mL IVPB (Peripheral Line)  10 mEq IntraVENous PRN    magnesium sulfate 2000 mg in 50 mL IVPB premix  2,000 mg IntraVENous PRN    polyethylene glycol (GLYCOLAX) packet 17 g  17 g Oral Daily PRN    acetaminophen (TYLENOL) tablet 650 mg  650 mg Oral Q6H PRN    Or    acetaminophen (TYLENOL) suppository 650 mg  650 mg Rectal Q6H PRN    oxyCODONE (ROXICODONE) immediate release tablet 5 mg  5 mg Oral Q4H PRN    amiodarone (CORDARONE) tablet 400 mg  400 mg Oral BID    [Held by provider] aspirin chewable tablet 81 mg  81 mg Oral Daily    busPIRone (BUSPAR) tablet 10 mg  10 mg Oral TID    DULoxetine (CYMBALTA) extended release capsule 60 mg  60 mg Oral Daily    arformoterol 15 mcg-budesonide 0.5 mg neb solution   Nebulization BID RT    guaiFENesin (MUCINEX) extended release tablet 1,200 mg  1,200 mg Oral BID    ibrutinib (IMBRUVICA) chemo tablet 420 mg (Patient Supplied)  420 mg Oral Daily    melatonin tablet 3 mg  3 mg Oral Nightly PRN    midodrine (PROAMATINE) tablet 5 mg  5 mg Oral TID PRN    pantoprazole (PROTONIX) tablet 40 mg  40 mg Oral QAM AC    rosuvastatin (CRESTOR) tablet 40 mg  40 mg Oral Nightly    tamsulosin (FLOMAX) capsule 0.4 mg  0.4 mg Oral Daily       Signed:  Dennise Duncan

## 2025-07-10 NOTE — PLAN OF CARE
Problem: Chronic Conditions and Co-morbidities  Goal: Patient's chronic conditions and co-morbidity symptoms are monitored and maintained or improved  7/10/2025 1101 by Leanna Villavicencio RN  Outcome: Progressing  Flowsheets (Taken 7/10/2025 0800)  Care Plan - Patient's Chronic Conditions and Co-Morbidity Symptoms are Monitored and Maintained or Improved: Monitor and assess patient's chronic conditions and comorbid symptoms for stability, deterioration, or improvement  7/9/2025 2255 by Aggie Pollock RN  Outcome: Progressing     Problem: Discharge Planning  Goal: Discharge to home or other facility with appropriate resources  7/10/2025 1101 by Leanna Villavicencio RN  Outcome: Progressing  Flowsheets (Taken 7/10/2025 0800)  Discharge to home or other facility with appropriate resources: Identify barriers to discharge with patient and caregiver  7/9/2025 2255 by Aggie Pollock RN  Outcome: Progressing     Problem: Pain  Goal: Verbalizes/displays adequate comfort level or baseline comfort level  7/10/2025 1101 by Leanna Villavicencio RN  Outcome: Progressing  7/9/2025 2255 by Aggie Pollock RN  Outcome: Progressing     Problem: Safety - Adult  Goal: Free from fall injury  7/10/2025 1101 by Leanna Villavicencio RN  Outcome: Progressing  7/9/2025 2255 by Aggie Pollock RN  Outcome: Progressing     Problem: ABCDS Injury Assessment  Goal: Absence of physical injury  7/10/2025 1101 by Leanna Villavicencio RN  Outcome: Progressing  7/9/2025 2255 by Aggie Pollock RN  Outcome: Progressing     Problem: Skin/Tissue Integrity  Goal: Skin integrity remains intact  Description: 1.  Monitor for areas of redness and/or skin breakdown  2.  Assess vascular access sites hourly  3.  Every 4-6 hours minimum:  Change oxygen saturation probe site  4.  Every 4-6 hours:  If on nasal continuous positive airway pressure, respiratory therapy assess nares and determine need for appliance change or resting period  7/10/2025 1101 by Saud

## 2025-07-10 NOTE — CONSENT
Informed Consent for Blood Component Transfusion Note    I have discussed with the patient the rationale for blood component transfusion; its benefits in treating or preventing fatigue, organ damage, or death; and its risk which includes mild transfusion reactions, rare risk of blood borne infection, or more serious but rare reactions. I have discussed the alternatives to transfusion, including the risk and consequences of not receiving transfusion. The patient had an opportunity to ask questions and had agreed to proceed with transfusion of blood components.    Electronically signed by ASHLY Rangel on 7/10/25 at 2:10 PM EDT

## 2025-07-10 NOTE — PROGRESS NOTES
Progress Note    Patient: Jonathan Zurita MRN: [id number]  SSN: [id number]    YOB: 1945  Age: 80 y.o.  Sex: male      Admit Date: 7/6/2025    LOS: 4 days     Subjective:     Doing well. Sitting up in chair.  Nursing notes need to change middle dressing a couple of times.     Objective:     Vitals:    07/10/25 0348 07/10/25 0522 07/10/25 0751 07/10/25 0935   BP: (!) 93/54 104/66 97/66    Pulse: 82 89 88 63   Resp: 18  16 16   Temp: 97.9 °F (36.6 °C)  98.4 °F (36.9 °C)    TempSrc: Oral  Oral    SpO2: 92%  96% 98%   Weight:       Height:            Intake and Output:  Current Shift: No intake/output data recorded.  Last three shifts: 07/08 1901 - 07/10 0700  In: -   Out: 425 [Urine:425]    alert and oriented to person place time and situation  Skin - dressing with strikethrough to middle dressing. All other dressings c/d/i  Motor and sensory function intact in RIGHT LOWER extremity  Pulses palpable in RIGHT LOWER extremity       Lab/Data Review:  Recent Labs     07/10/25  0456   HGB 7.8*   HCT 24.1*          Assessment:     Acute postop blood loss anemia with hemoglobin at 7.7, POD #3 from fixation of an osteoporotic right proximal femur fracture which was a peritrochanteric fracture    Plan:     Patient may be weightbearing as tolerated on the right lower extremity.  They can be full active and passive range of motion of the right hip.  They can have dry dressing change starting on postoperative day 2 and then after that daily and as needed.  Still with drainage to middle incision.   Eliquis restarted.   They will need to have orthopedic follow-up approximately 2 weeks after discharge.    Signed By: ASHLY Carroll     July 10, 2025

## 2025-07-10 NOTE — PLAN OF CARE
Problem: Respiratory - Adult  Goal: Achieves optimal ventilation and oxygenation  7/10/2025 0936 by Payton Luna RCP  Outcome: Progressing  Flowsheets (Taken 7/10/2025 0800 by Leanna Villavicencio, RN)  Achieves optimal ventilation and oxygenation: Assess for changes in respiratory status  7/9/2025 2255 by Aggie Pollock, RN  Outcome: Progressing

## 2025-07-11 VITALS
HEIGHT: 62 IN | SYSTOLIC BLOOD PRESSURE: 117 MMHG | BODY MASS INDEX: 25.76 KG/M2 | RESPIRATION RATE: 18 BRPM | OXYGEN SATURATION: 94 % | TEMPERATURE: 98.2 F | WEIGHT: 140 LBS | DIASTOLIC BLOOD PRESSURE: 57 MMHG | HEART RATE: 69 BPM

## 2025-07-11 LAB
ABO + RH BLD: NORMAL
ANION GAP SERPL CALC-SCNC: 10 MMOL/L (ref 7–16)
APPEARANCE UR: CLEAR
BACTERIA URNS QL MICRO: 0 /HPF
BILIRUB UR QL: NEGATIVE
BLD PROD TYP BPU: NORMAL
BLOOD BANK BLOOD PRODUCT EXPIRATION DATE: NORMAL
BLOOD BANK DISPENSE STATUS: NORMAL
BLOOD BANK ISBT PRODUCT BLOOD TYPE: 5100
BLOOD BANK PRODUCT CODE: NORMAL
BLOOD BANK UNIT TYPE AND RH: NORMAL
BLOOD GROUP ANTIBODIES SERPL: NORMAL
BPU ID: NORMAL
BUN SERPL-MCNC: 26 MG/DL (ref 8–23)
CALCIUM SERPL-MCNC: 8 MG/DL (ref 8.8–10.2)
CASTS URNS QL MICRO: 0 /LPF
CHLORIDE SERPL-SCNC: 100 MMOL/L (ref 98–107)
CO2 SERPL-SCNC: 23 MMOL/L (ref 20–29)
COLOR UR: ABNORMAL
CREAT SERPL-MCNC: 0.72 MG/DL (ref 0.8–1.3)
CROSSMATCH RESULT: NORMAL
CRYSTALS URNS QL MICRO: 0 /LPF
EPI CELLS #/AREA URNS HPF: ABNORMAL /HPF
ERYTHROCYTE [DISTWIDTH] IN BLOOD BY AUTOMATED COUNT: 15.4 % (ref 11.9–14.6)
GLUCOSE BLD STRIP.AUTO-MCNC: 97 MG/DL (ref 65–100)
GLUCOSE SERPL-MCNC: 94 MG/DL (ref 70–99)
GLUCOSE UR STRIP.AUTO-MCNC: NEGATIVE MG/DL
HCT VFR BLD AUTO: 26.1 % (ref 41.1–50.3)
HGB BLD-MCNC: 8.6 G/DL (ref 13.6–17.2)
HGB UR QL STRIP: ABNORMAL
KETONES UR QL STRIP.AUTO: NEGATIVE MG/DL
LEUKOCYTE ESTERASE UR QL STRIP.AUTO: NEGATIVE
MCH RBC QN AUTO: 28.7 PG (ref 26.1–32.9)
MCHC RBC AUTO-ENTMCNC: 33 G/DL (ref 31.4–35)
MCV RBC AUTO: 87 FL (ref 82–102)
MUCOUS THREADS URNS QL MICRO: 0 /LPF
NITRITE UR QL STRIP.AUTO: NEGATIVE
NRBC # BLD: 0.02 K/UL (ref 0–0.2)
OTHER OBSERVATIONS: ABNORMAL
PH UR STRIP: 7 (ref 5–9)
PLATELET # BLD AUTO: 143 K/UL (ref 150–450)
PMV BLD AUTO: 12.3 FL (ref 9.4–12.3)
POTASSIUM SERPL-SCNC: 4 MMOL/L (ref 3.5–5.1)
PROT UR STRIP-MCNC: ABNORMAL MG/DL
RBC # BLD AUTO: 3 M/UL (ref 4.23–5.6)
RBC #/AREA URNS HPF: ABNORMAL /HPF
SERVICE CMNT-IMP: NORMAL
SODIUM SERPL-SCNC: 132 MMOL/L (ref 136–145)
SP GR UR REFRACTOMETRY: 1.01 (ref 1–1.02)
SPECIMEN EXP DATE BLD: NORMAL
UNIT DIVISION: 0
UNIT ISSUE DATE/TIME: NORMAL
URINE CULTURE IF INDICATED: ABNORMAL
UROBILINOGEN UR QL STRIP.AUTO: 1 EU/DL (ref 0.2–1)
WBC # BLD AUTO: 8.3 K/UL (ref 4.3–11.1)
WBC URNS QL MICRO: ABNORMAL /HPF

## 2025-07-11 PROCEDURE — 2500000003 HC RX 250 WO HCPCS: Performed by: ORTHOPAEDIC SURGERY

## 2025-07-11 PROCEDURE — 97530 THERAPEUTIC ACTIVITIES: CPT

## 2025-07-11 PROCEDURE — 36415 COLL VENOUS BLD VENIPUNCTURE: CPT

## 2025-07-11 PROCEDURE — 94761 N-INVAS EAR/PLS OXIMETRY MLT: CPT

## 2025-07-11 PROCEDURE — 99024 POSTOP FOLLOW-UP VISIT: CPT | Performed by: PHYSICIAN ASSISTANT

## 2025-07-11 PROCEDURE — 80048 BASIC METABOLIC PNL TOTAL CA: CPT

## 2025-07-11 PROCEDURE — 6370000000 HC RX 637 (ALT 250 FOR IP): Performed by: PHYSICIAN ASSISTANT

## 2025-07-11 PROCEDURE — 51702 INSERT TEMP BLADDER CATH: CPT

## 2025-07-11 PROCEDURE — 97535 SELF CARE MNGMENT TRAINING: CPT

## 2025-07-11 PROCEDURE — 6370000000 HC RX 637 (ALT 250 FOR IP): Performed by: FAMILY MEDICINE

## 2025-07-11 PROCEDURE — 85027 COMPLETE CBC AUTOMATED: CPT

## 2025-07-11 PROCEDURE — 94760 N-INVAS EAR/PLS OXIMETRY 1: CPT

## 2025-07-11 PROCEDURE — 81001 URINALYSIS AUTO W/SCOPE: CPT

## 2025-07-11 PROCEDURE — 94640 AIRWAY INHALATION TREATMENT: CPT

## 2025-07-11 PROCEDURE — 82962 GLUCOSE BLOOD TEST: CPT

## 2025-07-11 PROCEDURE — 6370000000 HC RX 637 (ALT 250 FOR IP): Performed by: STUDENT IN AN ORGANIZED HEALTH CARE EDUCATION/TRAINING PROGRAM

## 2025-07-11 PROCEDURE — 6360000002 HC RX W HCPCS: Performed by: STUDENT IN AN ORGANIZED HEALTH CARE EDUCATION/TRAINING PROGRAM

## 2025-07-11 PROCEDURE — 97112 NEUROMUSCULAR REEDUCATION: CPT

## 2025-07-11 RX ORDER — OXYCODONE HYDROCHLORIDE 5 MG/1
5 TABLET ORAL
Qty: 21 TABLET | Refills: 0 | Status: SHIPPED | OUTPATIENT
Start: 2025-07-11 | End: 2025-07-11

## 2025-07-11 RX ORDER — OXYCODONE HYDROCHLORIDE 5 MG/1
5 TABLET ORAL
Qty: 21 TABLET | Refills: 0 | Status: SHIPPED | OUTPATIENT
Start: 2025-07-11 | End: 2025-07-16

## 2025-07-11 RX ORDER — BISACODYL 10 MG
10 SUPPOSITORY, RECTAL RECTAL ONCE
Status: DISCONTINUED | OUTPATIENT
Start: 2025-07-11 | End: 2025-07-11 | Stop reason: HOSPADM

## 2025-07-11 RX ORDER — IPRATROPIUM BROMIDE AND ALBUTEROL SULFATE 2.5; .5 MG/3ML; MG/3ML
1 SOLUTION RESPIRATORY (INHALATION) EVERY 4 HOURS PRN
Status: DISCONTINUED | OUTPATIENT
Start: 2025-07-11 | End: 2025-07-11 | Stop reason: HOSPADM

## 2025-07-11 RX ADMIN — PANTOPRAZOLE SODIUM 40 MG: 40 TABLET, DELAYED RELEASE ORAL at 06:10

## 2025-07-11 RX ADMIN — POLYETHYLENE GLYCOL 3350 17 G: 17 POWDER, FOR SOLUTION ORAL at 10:29

## 2025-07-11 RX ADMIN — AMIODARONE HYDROCHLORIDE 400 MG: 200 TABLET ORAL at 08:19

## 2025-07-11 RX ADMIN — GUAIFENESIN 1200 MG: 600 TABLET, EXTENDED RELEASE ORAL at 08:19

## 2025-07-11 RX ADMIN — BUSPIRONE HYDROCHLORIDE 10 MG: 5 TABLET ORAL at 08:31

## 2025-07-11 RX ADMIN — IPRATROPIUM BROMIDE AND ALBUTEROL SULFATE 1 DOSE: 2.5; .5 SOLUTION RESPIRATORY (INHALATION) at 15:24

## 2025-07-11 RX ADMIN — BUSPIRONE HYDROCHLORIDE 10 MG: 5 TABLET ORAL at 14:21

## 2025-07-11 RX ADMIN — DULOXETINE HYDROCHLORIDE 60 MG: 30 CAPSULE, DELAYED RELEASE ORAL at 08:31

## 2025-07-11 RX ADMIN — ACETAMINOPHEN 1000 MG: 500 TABLET, FILM COATED ORAL at 08:19

## 2025-07-11 RX ADMIN — APIXABAN 5 MG: 5 TABLET, FILM COATED ORAL at 08:19

## 2025-07-11 RX ADMIN — TAMSULOSIN HYDROCHLORIDE 0.4 MG: 0.4 CAPSULE ORAL at 08:19

## 2025-07-11 RX ADMIN — ACETAMINOPHEN 1000 MG: 500 TABLET, FILM COATED ORAL at 14:21

## 2025-07-11 RX ADMIN — IPRATROPIUM BROMIDE AND ALBUTEROL SULFATE 1 DOSE: 2.5; .5 SOLUTION RESPIRATORY (INHALATION) at 08:39

## 2025-07-11 RX ADMIN — SODIUM CHLORIDE, PRESERVATIVE FREE 10 ML: 5 INJECTION INTRAVENOUS at 08:18

## 2025-07-11 RX ADMIN — ARFORMOTEROL TARTRATE: 15 SOLUTION RESPIRATORY (INHALATION) at 08:39

## 2025-07-11 NOTE — DISCHARGE SUMMARY
([facility])      fluticasone-salmeterol (WIXELA INHUB) 250-50 MCG/ACT AEPB diskus inhaler Inhale 1 puff into the lungs in the morning and 1 puff in the evening.  Qty: 3 each, Refills: 3      sodium chloride, Inhalant, 3 % nebulizer solution Take 4 mLs by nebulization in the morning and at bedtime J44.9  Qty: 240 mL, Refills: 11      tiotropium (SPIRIVA RESPIMAT) 2.5 MCG/ACT AERS inhaler Inhale 2 puffs into the lungs daily Dx code j44.9  Qty: 3 each, Refills: 3    Associated Diagnoses: Chronic obstructive pulmonary disease, unspecified COPD type ([facility])      ibrutinib (IMBRUVICA) 420 MG tablet Take 1 tablet by mouth daily  Qty: 30 tablet, Refills: 5    Associated Diagnoses: CLL (chronic lymphocytic leukemia) ([facility])      DULoxetine (CYMBALTA) 60 MG extended release capsule Take 1 capsule by mouth daily           STOP taking these medications       venlafaxine (EFFEXOR) 75 MG tablet Comments:   Reason for Stopping:         traMADol (ULTRAM) 50 MG tablet Comments:   Reason for Stopping:               Some of the medications may be marked as \"stop taking\" by the system; but in reality patient or family reported already being off these meds; defer to outpatient/prescribing providers.      Procedures done this admission:  Procedure(s):  FEMUR INTRAMEDULLARY NAIL    Consults this admission:  IP CONSULT TO ORTHOPEDIC SURGERY  IP CONSULT TO SPIRITUAL SERVICES    Consultants will arrange their own follow ups, if applicable/necessary.    Echocardiogram results:  06/14/25    ECHO (TTE) COMPLETE (PRN CONTRAST/BUBBLE/STRAIN/3D) 06/16/2025  3:10 PM (Final)    Interpretation Summary    Left Ventricle: Normal left ventricular systolic function with a visually estimated EF of 55 - 60%. Left ventricle size is normal. Normal wall thickness. Normal wall motion. Abnormal diastolic function.    Right Ventricle: Right ventricle size is normal. Normal systolic function.    Aortic Valve: Mild sclerosis of the aortic valve cusps.    Mitral Valve:  Data:  Patient Vitals for the past 24 hrs:   Temp Pulse Resp BP SpO2   07/11/25 0841 -- 86 18 -- 95 %   07/11/25 0748 99.7 °F (37.6 °C) 94 20 102/60 91 %   07/11/25 0608 -- 87 -- (!) 104/57 90 %   07/10/25 2004 -- 80 18 -- 95 %   07/10/25 1901 -- -- 18 -- --   07/10/25 1852 98.1 °F (36.7 °C) 87 18 (!) 102/58 93 %   07/10/25 1721 98.1 °F (36.7 °C) 67 18 (!) 100/48 94 %   07/10/25 1706 98.2 °F (36.8 °C) 64 18 (!) 92/54 94 %   07/10/25 1526 97.9 °F (36.6 °C) 80 20 129/75 93 %   07/10/25 1312 -- -- -- (!) 156/72 --       Oxygen Therapy  SpO2: 95 %  Pulse Oximeter Device Mode: Intermittent  Pulse via Oximetry: 82 beats per minute  Pulse Oximeter Device Location: Finger  O2 Device: None (Room air)  O2 Flow Rate (L/min): 0 L/min  FiO2 : 21 %    Estimated body mass index is 25.61 kg/m² as calculated from the following:    Height as of this encounter: 1.575 m (5' 2\").    Weight as of this encounter: 63.5 kg (140 lb).    Intake/Output Summary (Last 24 hours) at 7/11/2025 1022  Last data filed at 7/11/2025 1011  Gross per 24 hour   Intake 115 ml   Output 1367 ml   Net -1252 ml         Physical Exam:    General:    Well nourished.  No overt distress  Head:  Normocephalic, atraumatic  Eyes:  Sclerae appear normal.  Pupils equally round.    HENT:  Nares appear normal, no drainage.  Moist mucous membranes  Neck:  No restricted ROM.  Trachea midline  CV:   RRR.  No m/r/g.  No JVD  Lungs:   CTAB.  No wheezing, rhonchi, or rales.  Respirations even, unlabored  Abdomen:   Soft, nontender, nondistended.    Extremities: Moving all extremities spontaneously. Mild drainage to middle incision.   Skin:     No rashes.  Normal coloration  Neuro:  CN II-XII grossly intact.  Psych:  Normal mood and affect.        Time spent in patient discharge and coordination 38 minutes.      Signed:  PREET RUIZ MD    Part of this note may have been written by using a voice dictation software.  The note has been proof read but may still contain some

## 2025-07-11 NOTE — CARE COORDINATION
Pt is medically cleared for dc today and will transfer to room 7 in [facility] for STR services.  RN to call report to #[phone].  Transport scheduled for 1730 through [email or web address].  CM provided pt with a copy of his AVS.  CM spoke with pt's dtr, Ria, via phone, to notify her of pt's dc and transport time.  CM remains available to assist as needed.       07/11/25 1902   Service Assessment   Patient Orientation Alert and Oriented   Cognition Alert   History Provided By Patient;Medical Record;Child/Family   Primary Caregiver Self   Support Systems Children;Family Members   Patient's Healthcare Decision Maker is: Named in Scanned ACP Document   PCP Verified by CM Yes   Prior Functional Level Independent in ADLs/IADLs   Current Functional Level Assistance with the following:;Bathing;Dressing;Mobility   Can patient return to prior living arrangement No   Ability to make needs known: Good   Family able to assist with home care needs: Yes   Would you like for me to discuss the discharge plan with any other family members/significant others, and if so, who? Yes  (dtr-Ria)   Financial Resources [place] [facility])   Community Resources ECF/Home Care  ([facility])   Social/Functional History   Lives With Son   Type of Home Apartment  (townhouse)   Bathroom Equipment None   Home Equipment Rollator;Cane  (nebulizer)   Receives Help From Family   Prior Level of Assist for ADLs Independent   Prior Level of Assist for Homemaking Independent   Ambulation Assistance Independent   Prior Level of Assist for Transfers Independent   Mode of Transportation Car   Occupation Retired   Discharge Planning   Type of Residence Skilled Nursing Facility   Living Arrangements Children   Current Services Prior To Admission Durable Medical Equipment;Home Care   Current DME Prior to Arrival Walker   Potential Assistance Needed Skilled Nursing Facility;Other (Comment)  (IR[facility])   Potential DME Needed [facility]   DME Ordered?

## 2025-07-11 NOTE — PROGRESS NOTES
Progress Note    Patient: oJnathan Zurita MRN: [id number]  SSN: [id number]    YOB: 1945  Age: 80 y.o.  Sex: male      Admit Date: 7/6/2025    LOS: 5 days     Subjective:     Doing well. Sitting up in chair.  Ready to go.    Objective:     Vitals:    07/10/25 2004 07/11/25 0608 07/11/25 0748 07/11/25 0841   BP:  (!) 104/57 102/60    Pulse: 80 87 94 86   Resp: 18 20 18   Temp:   99.7 °F (37.6 °C)    TempSrc:   Oral    SpO2: 95% 90% 91% 95%   Weight:       Height:            Intake and Output:  Current Shift: 07/11 0701 - 07/11 1900  In: -   Out: 600 [Urine:600]  Last three shifts: 07/09 1901 - 07/11 0700  In: 115 [P.O.:100]  Out: 967 [Urine:967]    alert and oriented to person place time and situation  Skin - dressing saturated to middle dressing. All other dressings c/d/i  Motor and sensory function intact in RIGHT LOWER extremity  Pulses palpable in RIGHT LOWER extremity       Lab/Data Review:  Recent Labs     07/11/25  0408   HGB 8.6*   HCT 26.1*          Assessment:     Acute postop blood loss anemia with hemoglobin at 8.6, POD #4 from fixation of an osteoporotic right proximal femur fracture which was a peritrochanteric fracture    Plan:     Patient may be weightbearing as tolerated on the right lower extremity.  They can be full active and passive range of motion of the right hip.  They can have dry dressing change starting on postoperative day 2 and then after that daily and as needed.  Still with drainage to middle incision.   Eliquis restarted.   They will need to have orthopedic follow-up approximately 2 weeks after discharge.    Signed By: ASHLY Carroll     July 11, 2025

## 2025-07-11 NOTE — PLAN OF CARE
Problem: Chronic Conditions and Co-morbidities  Goal: Patient's chronic conditions and co-morbidity symptoms are monitored and maintained or improved  7/11/2025 1010 by Leanna Villavicencio RN  Outcome: Progressing  Flowsheets (Taken 7/11/2025 0845)  Care Plan - Patient's Chronic Conditions and Co-Morbidity Symptoms are Monitored and Maintained or Improved: Monitor and assess patient's chronic conditions and comorbid symptoms for stability, deterioration, or improvement  7/10/2025 2334 by Aggie Pollock RN  Outcome: Progressing     Problem: Discharge Planning  Goal: Discharge to home or other facility with appropriate resources  7/11/2025 1010 by Leanna Villavicencio RN  Outcome: Progressing  Flowsheets (Taken 7/11/2025 0845)  Discharge to home or other facility with appropriate resources: Identify barriers to discharge with patient and caregiver  7/10/2025 2334 by Aggie Pollock RN  Outcome: Progressing     Problem: Pain  Goal: Verbalizes/displays adequate comfort level or baseline comfort level  7/11/2025 1010 by Leanna Villavicencio RN  Outcome: Progressing  Flowsheets (Taken 7/11/2025 0745)  Verbalizes/displays adequate comfort level or baseline comfort level: Encourage patient to monitor pain and request assistance  7/10/2025 2334 by Aggie Pollock RN  Outcome: Progressing     Problem: Safety - Adult  Goal: Free from fall injury  7/11/2025 1010 by Leanna Villavicencio RN  Outcome: Progressing  7/10/2025 2334 by Aggie Pollock RN  Outcome: Progressing     Problem: ABCDS Injury Assessment  Goal: Absence of physical injury  7/11/2025 1010 by Leanna Villavicencio RN  Outcome: Progressing  7/10/2025 2334 by Aggie Pollock RN  Outcome: Progressing     Problem: Skin/Tissue Integrity  Goal: Skin integrity remains intact  Description: 1.  Monitor for areas of redness and/or skin breakdown  2.  Assess vascular access sites hourly  3.  Every 4-6 hours minimum:  Change oxygen saturation probe site  4.  Every 4-6 hours:  If on

## 2025-07-11 NOTE — PROGRESS NOTES
ACUTE OCCUPATIONAL THERAPY GOALS:   (Developed with and agreed upon by patient and/or caregiver.)    1. Pt will complete LB ADL Min A with AE as needed.   2. Pt will complete toileting Min A with AE as needed.   3. Pt will complete UB ADL supervision.  4. Pt will tolerate 25 minutes of OT treatment requiring 1-2 breaks as needed.   5. Pt will complete grooming tasks while standing at sink supervision.  6. Pt will complete functional mobility and/or transfers via RW supervision.   7. Pt will tolerate BUE exercises to increase strength/endurance for safe, functional transfers and/or functional mobility, and ADL participation.   8. Pt will verbalize and/or demonstrate understanding of RLE WBAT precautions during functional activity 100% of the time.       Timeframe: 7 visits   OCCUPATIONAL THERAPY: Daily Note AM   OT Visit Days: 4   Time In/Out  OT Charge Capture  Rehab Caseload Tracker  OT Orders    Jonathan Zurita is a 80 y.o. male   PRIMARY DIAGNOSIS: Closed displaced intertrochanteric fracture of femur, initial encounter ([facility])  Fall on same level, initial encounter [W18.30XA]  Closed displaced intertrochanteric fracture of right femur, initial encounter ([facility]) [S72.141A]  Displaced intertrochanteric fracture of right femur, initial encounter for closed fracture ([facility]) [S72.141A]  Closed displaced intertrochanteric fracture of femur, initial encounter ([facility]) [S72.143A]  Procedure(s) (LRB):  FEMUR INTRAMEDULLARY NAIL (Right)  4 Days Post-Op  Inpatient: Payor: [employer] / Plan: [employer] / Product Type: *No Product type* /     ASSESSMENT:     REHAB RECOMMENDATIONS:   Recommendation to date pending progress:  Setting:  Continued occupational therapy recommended at discharge    Equipment:    To Be Determined     ASSESSMENT:  Mr. Zurita presents in supine upon arrival. Pt agreeable to therapy and transferred from supine to sit with min a and additional time. Pt completed sit to stand from bedside with mod a

## 2025-07-11 NOTE — PROGRESS NOTES
ACUTE PHYSICAL THERAPY GOALS:   (Developed with and agreed upon by patient and/or caregiver.)  Pt will perform bed mobility Min (A) c inc time, cueing and use of rails as needed in 7 therapy sessions.  Pt will perform sit-to-stand/ stand-to-sit transfers Min (A) c use of LRAD/external supports as needed and no LOB or miss-steps in 7 therapy sessions.  Pt will ambulate 125 ft CG(A) with use of LRAD, no LOB or miss-steps and breaks as needed in 7 therapy sessions.  Pt will perform standing dynamic balance activities with minimal postural sway in 7 therapy sessions.  Pt will tolerate multiple sets and reps of BLE exercises in 7 therapy sessions.    PHYSICAL THERAPY: Daily Note PM   (Link to Caseload Tracking: PT Visit Days : 4  Time In/Out PT Charge Capture  Rehab Caseload Tracker  Orders    WBAT RLE   FALL RISK    Jonathan Zurita is a 80 y.o. male   PRIMARY DIAGNOSIS: Closed displaced intertrochanteric fracture of femur, initial encounter ([facility])  Fall on same level, initial encounter [W18.30XA]  Closed displaced intertrochanteric fracture of right femur, initial encounter ([facility]) [S72.141A]  Displaced intertrochanteric fracture of right femur, initial encounter for closed fracture ([facility]) [S72.141A]  Closed displaced intertrochanteric fracture of femur, initial encounter ([facility]) [S72.143A]  Procedure(s) (LRB):  FEMUR INTRAMEDULLARY NAIL (Right)  4 Days Post-Op  Inpatient: Payor: [employer] / Plan: [employer] / Product Type: *No Product type* /     ASSESSMENT:     REHAB RECOMMENDATIONS:   Recommendation to date pending progress:  Setting:  Short-term Rehab    Equipment:    To Be Determined     ASSESSMENT:  Mr. Zurita is making steady progress toward goals. He increased gait distances with the RW and min A/CGA.  The patient performed STS transfers and ambulation with an antalgic gait and decreased pace.  He was able to tolerate increased weight bearing and ambulate with CGA overall, min A for occasional knee

## 2025-07-11 NOTE — PROGRESS NOTES
ACUTE PHYSICAL THERAPY GOALS:   (Developed with and agreed upon by patient and/or caregiver.)  Pt will perform bed mobility Min (A) c inc time, cueing and use of rails as needed in 7 therapy sessions.  Pt will perform sit-to-stand/ stand-to-sit transfers Min (A) c use of LRAD/external supports as needed and no LOB or miss-steps in 7 therapy sessions.  Pt will ambulate 125 ft CG(A) with use of LRAD, no LOB or miss-steps and breaks as needed in 7 therapy sessions.  Pt will perform standing dynamic balance activities with minimal postural sway in 7 therapy sessions.  Pt will tolerate multiple sets and reps of BLE exercises in 7 therapy sessions.    PHYSICAL THERAPY: Daily Note AM   (Link to Caseload Tracking: PT Visit Days : 4  Time In/Out PT Charge Capture  Rehab Caseload Tracker  Orders    WBAT RLE   FALL RISK    Jonathan Zurita is a 80 y.o. male   PRIMARY DIAGNOSIS: Closed displaced intertrochanteric fracture of femur, initial encounter ([facility])  Fall on same level, initial encounter [W18.30XA]  Closed displaced intertrochanteric fracture of right femur, initial encounter ([facility]) [S72.141A]  Displaced intertrochanteric fracture of right femur, initial encounter for closed fracture ([facility]) [S72.141A]  Closed displaced intertrochanteric fracture of femur, initial encounter ([facility]) [S72.143A]  Procedure(s) (LRB):  FEMUR INTRAMEDULLARY NAIL (Right)  4 Days Post-Op  Inpatient: Payor: [employer] / Plan: [employer] / Product Type: *No Product type* /     ASSESSMENT:     REHAB RECOMMENDATIONS:   Recommendation to date pending progress:  Setting:  Short-term Rehab    Equipment:    To Be Determined     ASSESSMENT:  Mr. Zurita continues to make slow progress toward goals with increased mobility and gait distances.  He demonstrated improved bed mobility needing min A to get seated EOB.  He did require more assistance with initial STS transfer, mod Ax2 before working on ambulation.  The patient ambulated with the RW and  antalgic gait pattern.  He was able to increase gait distances, but is noted to have occasional knee buckling during gait needing min A to correct.  The patient returned to the recliner and participated in dynamic sitting balance and scooting durinig ADLS to improve tolerance of knee flexion and weight bearing through the hip.       SUBJECTIVE:   Mr. Zurita states, \"Thank you\"     Social/Functional Lives With: Son  Type of Home: Apartment (townhouse)  Bathroom Equipment: None  Home Equipment: Rollator, Cane (nebulizer)  Receives Help From: Family  Prior Level of Assist for ADLs: Independent  Prior Level of Assist for Homemaking: Independent  Prior Level of Assist for Transfers: Independent  Mode of Transportation: Car  Occupation: Retired  OBJECTIVE:     PAIN: VITALS / O2: PRECAUTION / LINES / DRAINS:   Pre Treatment: not rated         Post Treatment: not rated Vitals        Oxygen    None    RESTRICTIONS/PRECAUTIONS:  Restrictions/Precautions  Restrictions/Precautions: Weight Bearing, Fall Risk  Lower Extremity Weight Bearing Restrictions  Right Lower Extremity Weight Bearing: Weight Bearing As Tolerated  Restrictions/Precautions: Weight Bearing, Fall Risk     MOBILITY: I Mod I S SBA CGA Min Mod Max Total  NT x2 Comments:   Bed Mobility    Rolling [] [] [] [] [] [] [] [] [] [x] []    Supine to Sit [] [] [] [] [] [x] [] [] [] [] []    Scooting [] [] [] [] [] [x] [] [] [] [] []    Sit to Supine [] [] [] [] [] [] [] [] [] [x] []    Transfers    Sit to Stand [] [] [] [] [] [x] [x] [] [] [] [x]    Bed to Chair [] [] [] [] [x] [x] [] [] [] [] []    Stand to Sit [] [] [] [] [] [x] [] [] [] [] []     [] [] [] [] [] [] [] [] [] [] []    I=Independent, Mod I=Modified Independent, S=Supervision, SBA=Standby Assistance, CGA=Contact Guard Assistance,   Min=Minimal Assistance, Mod=Moderate Assistance, Max=Maximal Assistance, Total=Total Assistance, NT=Not Tested    BALANCE: Good Fair+ Fair Fair- Poor NT Comments   Sitting

## 2025-07-11 NOTE — PLAN OF CARE
Problem: Chronic Conditions and Co-morbidities  Goal: Patient's chronic conditions and co-morbidity symptoms are monitored and maintained or improved  7/11/2025 1653 by Leanna Villavicencio RN  Outcome: Completed  7/11/2025 1010 by Leanna Villavicencio RN  Outcome: Progressing  Flowsheets (Taken 7/11/2025 0845)  Care Plan - Patient's Chronic Conditions and Co-Morbidity Symptoms are Monitored and Maintained or Improved: Monitor and assess patient's chronic conditions and comorbid symptoms for stability, deterioration, or improvement     Problem: Discharge Planning  Goal: Discharge to home or other facility with appropriate resources  7/11/2025 1653 by Leanna Villavicencio RN  Outcome: Completed  7/11/2025 1010 by Leanna Villavicencio RN  Outcome: Progressing  Flowsheets (Taken 7/11/2025 0845)  Discharge to home or other facility with appropriate resources: Identify barriers to discharge with patient and caregiver     Problem: Pain  Goal: Verbalizes/displays adequate comfort level or baseline comfort level  7/11/2025 1653 by Leanna Villavicencio RN  Outcome: Completed  7/11/2025 1010 by Leanna Villavicencio RN  Outcome: Progressing  Flowsheets (Taken 7/11/2025 0745)  Verbalizes/displays adequate comfort level or baseline comfort level: Encourage patient to monitor pain and request assistance     Problem: Safety - Adult  Goal: Free from fall injury  7/11/2025 1653 by Leanna Villavicencio RN  Outcome: Completed  7/11/2025 1010 by Leanna Villavicencio RN  Outcome: Progressing     Problem: ABCDS Injury Assessment  Goal: Absence of physical injury  7/11/2025 1653 by Leanna Villavicencio RN  Outcome: Completed  7/11/2025 1010 by Leanna Villavicencio RN  Outcome: Progressing     Problem: Skin/Tissue Integrity  Goal: Skin integrity remains intact  Description: 1.  Monitor for areas of redness and/or skin breakdown  2.  Assess vascular access sites hourly  3.  Every 4-6 hours minimum:  Change oxygen saturation probe site  4.  Every 4-6 hours:

## 2025-07-15 NOTE — PROGRESS NOTES
Physician Progress Note      PATIENT:               JUDY TERRAZAS  CSN #:                  [id number]  :                       1945  ADMIT DATE:       2025 1:40 PM  DISCH DATE:        2025 5:55 PM  RESPONDING  PROVIDER #:        Aaron Yung MD          QUERY TEXT:    Based on your medical judgment, please clarify these findings and document if   any of the following are being evaluated and/or treated:    The clinical indicators include:  --HTN, Age 80 male  -- H&P  Atrial fibrillation, Continue home amiodarone, Hold home Eliquis   until orthopedic evaluation.  --IM PN 7/10  Atrial fibrillation, resumed home Eliquis POD 2  --DS   Atrial fibrillation  --Apixaban (ELIQUIS) tablet 5 mg    Thank you  Rebecca Zaman [facility] CDS  Options provided:  -- Secondary hypercoagulable state in a patient with atrial fibrillation  -- Other - I will add my own diagnosis  -- Disagree - Not applicable / Not valid  -- Disagree - Clinically unable to determine / Unknown  -- Refer to Clinical Documentation Reviewer    PROVIDER RESPONSE TEXT:    This patient has secondary hypercoagulable state in a patient with atrial   fibrillation.    Query created by: Rebecca Salazar on 7/15/2025 2:22 AM      Electronically signed by:  Aaron Yung MD 7/15/2025 2:55 PM

## 2025-07-18 ENCOUNTER — APPOINTMENT (OUTPATIENT)
Dept: GENERAL RADIOLOGY | Age: 80
DRG: 190 | End: 2025-07-18
Payer: OTHER GOVERNMENT

## 2025-07-18 ENCOUNTER — APPOINTMENT (OUTPATIENT)
Dept: ULTRASOUND IMAGING | Age: 80
DRG: 190 | End: 2025-07-18
Payer: OTHER GOVERNMENT

## 2025-07-18 ENCOUNTER — APPOINTMENT (OUTPATIENT)
Dept: CT IMAGING | Age: 80
DRG: 190 | End: 2025-07-18
Payer: OTHER GOVERNMENT

## 2025-07-18 ENCOUNTER — HOSPITAL ENCOUNTER (INPATIENT)
Age: 80
LOS: 11 days | Discharge: HOSPICE | DRG: 190 | End: 2025-07-30
Attending: EMERGENCY MEDICINE | Admitting: STUDENT IN AN ORGANIZED HEALTH CARE EDUCATION/TRAINING PROGRAM
Payer: OTHER GOVERNMENT

## 2025-07-18 DIAGNOSIS — J44.1 COPD EXACERBATION (HCC): ICD-10-CM

## 2025-07-18 DIAGNOSIS — J96.01 ACUTE RESPIRATORY FAILURE WITH HYPOXIA (HCC): ICD-10-CM

## 2025-07-18 DIAGNOSIS — R06.02 SHORTNESS OF BREATH: ICD-10-CM

## 2025-07-18 DIAGNOSIS — R60.0 EDEMA OF RIGHT LOWER EXTREMITY: Primary | ICD-10-CM

## 2025-07-18 DIAGNOSIS — D72.829 LEUKOCYTOSIS, UNSPECIFIED TYPE: ICD-10-CM

## 2025-07-18 PROBLEM — R74.01 TRANSAMINITIS: Status: ACTIVE | Noted: 2025-07-18

## 2025-07-18 LAB
ALBUMIN SERPL-MCNC: 2.8 G/DL (ref 3.2–4.6)
ALBUMIN/GLOB SERPL: 0.9 (ref 1–1.9)
ALP SERPL-CCNC: 187 U/L (ref 40–129)
ALT SERPL-CCNC: 84 U/L (ref 8–55)
ANION GAP SERPL CALC-SCNC: 13 MMOL/L (ref 7–16)
APPEARANCE UR: ABNORMAL
APTT PPP: 31.8 SEC (ref 23.3–37.4)
ARTERIAL PATENCY WRIST A: POSITIVE
AST SERPL-CCNC: 72 U/L (ref 15–37)
BACTERIA URNS QL MICRO: ABNORMAL /HPF
BASE DEFICIT BLD-SCNC: 1.4 MMOL/L
BASOPHILS # BLD: 0.02 K/UL (ref 0–0.2)
BASOPHILS NFR BLD: 0.1 % (ref 0–2)
BDY SITE: ABNORMAL
BILIRUB SERPL-MCNC: 1.3 MG/DL (ref 0–1.2)
BILIRUB UR QL: ABNORMAL
BUN SERPL-MCNC: 25 MG/DL (ref 8–23)
CALCIUM SERPL-MCNC: 9 MG/DL (ref 8.8–10.2)
CASTS URNS QL MICRO: ABNORMAL /LPF
CHLORIDE SERPL-SCNC: 102 MMOL/L (ref 98–107)
CO2 SERPL-SCNC: 19 MMOL/L (ref 20–29)
COLOR UR: ABNORMAL
CREAT SERPL-MCNC: 0.86 MG/DL (ref 0.8–1.3)
CRYSTALS URNS QL MICRO: ABNORMAL /LPF
DIFFERENTIAL METHOD BLD: ABNORMAL
EKG ATRIAL RATE: 71 BPM
EKG DIAGNOSIS: NORMAL
EKG P AXIS: 69 DEGREES
EKG P-R INTERVAL: 169 MS
EKG Q-T INTERVAL: 381 MS
EKG QRS DURATION: 91 MS
EKG QTC CALCULATION (BAZETT): 414 MS
EKG R AXIS: 74 DEGREES
EKG T AXIS: -84 DEGREES
EKG VENTRICULAR RATE: 71 BPM
EOSINOPHIL # BLD: 0 K/UL (ref 0–0.8)
EOSINOPHIL NFR BLD: 0 % (ref 0.5–7.8)
EPI CELLS #/AREA URNS HPF: ABNORMAL /HPF
ERYTHROCYTE [DISTWIDTH] IN BLOOD BY AUTOMATED COUNT: 16.3 % (ref 11.9–14.6)
ERYTHROCYTE [DISTWIDTH] IN BLOOD BY AUTOMATED COUNT: 16.5 % (ref 11.9–14.6)
GAS FLOW.O2 O2 DELIVERY SYS: ABNORMAL
GLOBULIN SER CALC-MCNC: 3.1 G/DL (ref 2.3–3.5)
GLUCOSE BLD STRIP.AUTO-MCNC: 179 MG/DL (ref 65–100)
GLUCOSE SERPL-MCNC: 126 MG/DL (ref 70–99)
GLUCOSE UR STRIP.AUTO-MCNC: NEGATIVE MG/DL
HCO3 BLD-SCNC: 22.2 MMOL/L (ref 21–28)
HCT VFR BLD AUTO: 26.8 % (ref 41.1–50.3)
HCT VFR BLD AUTO: 28 % (ref 41.1–50.3)
HGB BLD-MCNC: 8.5 G/DL (ref 13.6–17.2)
HGB BLD-MCNC: 8.8 G/DL (ref 13.6–17.2)
HGB UR QL STRIP: ABNORMAL
IMM GRANULOCYTES # BLD AUTO: 0.21 K/UL (ref 0–0.5)
IMM GRANULOCYTES NFR BLD AUTO: 1.2 % (ref 0–5)
INR PPP: 1.2
KETONES UR QL STRIP.AUTO: ABNORMAL MG/DL
LACTATE SERPL-SCNC: 1 MMOL/L (ref 0.5–2)
LEUKOCYTE ESTERASE UR QL STRIP.AUTO: ABNORMAL
LYMPHOCYTES # BLD: 2.25 K/UL (ref 0.5–4.6)
LYMPHOCYTES NFR BLD: 13.1 % (ref 13–44)
MCH RBC QN AUTO: 27.7 PG (ref 26.1–32.9)
MCH RBC QN AUTO: 28.1 PG (ref 26.1–32.9)
MCHC RBC AUTO-ENTMCNC: 31.4 G/DL (ref 31.4–35)
MCHC RBC AUTO-ENTMCNC: 31.7 G/DL (ref 31.4–35)
MCV RBC AUTO: 88.1 FL (ref 82–102)
MCV RBC AUTO: 88.7 FL (ref 82–102)
MONOCYTES # BLD: 0.72 K/UL (ref 0.1–1.3)
MONOCYTES NFR BLD: 4.2 % (ref 4–12)
NEUTS SEG # BLD: 13.93 K/UL (ref 1.7–8.2)
NEUTS SEG NFR BLD: 81.4 % (ref 43–78)
NITRITE UR QL STRIP.AUTO: NEGATIVE
NRBC # BLD: 0.04 K/UL (ref 0–0.2)
NRBC # BLD: 0.04 K/UL (ref 0–0.2)
NT PRO BNP: 150 PG/ML (ref 0–450)
O2/TOTAL GAS SETTING VFR VENT: 36 %
OTHER OBSERVATIONS: ABNORMAL
PCO2 BLD: 32 MMHG (ref 35–45)
PH BLD: 7.45 (ref 7.35–7.45)
PH UR STRIP: 5.5 (ref 5–9)
PLATELET # BLD AUTO: 422 K/UL (ref 150–450)
PLATELET # BLD AUTO: 436 K/UL (ref 150–450)
PMV BLD AUTO: 10.2 FL (ref 9.4–12.3)
PMV BLD AUTO: 10.2 FL (ref 9.4–12.3)
PO2 BLD: 68 MMHG (ref 75–100)
POTASSIUM SERPL-SCNC: 4.1 MMOL/L (ref 3.5–5.1)
PROCALCITONIN SERPL-MCNC: 0.07 NG/ML (ref 0–0.1)
PROT SERPL-MCNC: 5.9 G/DL (ref 6.3–8.2)
PROT UR STRIP-MCNC: 100 MG/DL
PROTHROMBIN TIME: 15.6 SEC (ref 11.3–14.9)
RBC # BLD AUTO: 3.02 M/UL (ref 4.23–5.6)
RBC # BLD AUTO: 3.18 M/UL (ref 4.23–5.6)
RBC #/AREA URNS HPF: ABNORMAL /HPF
SAO2 % BLD: 94.3 % (ref 94–98)
SARS-COV-2 RDRP RESP QL NAA+PROBE: NOT DETECTED
SERVICE CMNT-IMP: ABNORMAL
SERVICE CMNT-IMP: ABNORMAL
SODIUM SERPL-SCNC: 134 MMOL/L (ref 136–145)
SOURCE: NORMAL
SP GR UR REFRACTOMETRY: >1.035 (ref 1–1.02)
SPECIMEN TYPE: ABNORMAL
UFH PPP CHRO-ACNC: 0.58 IU/ML (ref 0.3–0.7)
UROBILINOGEN UR QL STRIP.AUTO: 1 EU/DL (ref 0.2–1)
WBC # BLD AUTO: 14.6 K/UL (ref 4.3–11.1)
WBC # BLD AUTO: 17.1 K/UL (ref 4.3–11.1)
WBC URNS QL MICRO: ABNORMAL /HPF

## 2025-07-18 PROCEDURE — 6360000002 HC RX W HCPCS: Performed by: STUDENT IN AN ORGANIZED HEALTH CARE EDUCATION/TRAINING PROGRAM

## 2025-07-18 PROCEDURE — 2580000003 HC RX 258: Performed by: STUDENT IN AN ORGANIZED HEALTH CARE EDUCATION/TRAINING PROGRAM

## 2025-07-18 PROCEDURE — 82803 BLOOD GASES ANY COMBINATION: CPT

## 2025-07-18 PROCEDURE — 96365 THER/PROPH/DIAG IV INF INIT: CPT

## 2025-07-18 PROCEDURE — 85520 HEPARIN ASSAY: CPT

## 2025-07-18 PROCEDURE — 83605 ASSAY OF LACTIC ACID: CPT

## 2025-07-18 PROCEDURE — 2700000000 HC OXYGEN THERAPY PER DAY

## 2025-07-18 PROCEDURE — 96375 TX/PRO/DX INJ NEW DRUG ADDON: CPT

## 2025-07-18 PROCEDURE — 6370000000 HC RX 637 (ALT 250 FOR IP)

## 2025-07-18 PROCEDURE — 71046 X-RAY EXAM CHEST 2 VIEWS: CPT

## 2025-07-18 PROCEDURE — 82962 GLUCOSE BLOOD TEST: CPT

## 2025-07-18 PROCEDURE — 85027 COMPLETE CBC AUTOMATED: CPT

## 2025-07-18 PROCEDURE — 94760 N-INVAS EAR/PLS OXIMETRY 1: CPT

## 2025-07-18 PROCEDURE — 84145 PROCALCITONIN (PCT): CPT

## 2025-07-18 PROCEDURE — 99285 EMERGENCY DEPT VISIT HI MDM: CPT

## 2025-07-18 PROCEDURE — 93010 ELECTROCARDIOGRAM REPORT: CPT | Performed by: INTERNAL MEDICINE

## 2025-07-18 PROCEDURE — 2500000003 HC RX 250 WO HCPCS: Performed by: EMERGENCY MEDICINE

## 2025-07-18 PROCEDURE — 6370000000 HC RX 637 (ALT 250 FOR IP): Performed by: STUDENT IN AN ORGANIZED HEALTH CARE EDUCATION/TRAINING PROGRAM

## 2025-07-18 PROCEDURE — 93005 ELECTROCARDIOGRAM TRACING: CPT | Performed by: EMERGENCY MEDICINE

## 2025-07-18 PROCEDURE — 83880 ASSAY OF NATRIURETIC PEPTIDE: CPT

## 2025-07-18 PROCEDURE — G0378 HOSPITAL OBSERVATION PER HR: HCPCS

## 2025-07-18 PROCEDURE — 36415 COLL VENOUS BLD VENIPUNCTURE: CPT

## 2025-07-18 PROCEDURE — 87040 BLOOD CULTURE FOR BACTERIA: CPT

## 2025-07-18 PROCEDURE — 93971 EXTREMITY STUDY: CPT

## 2025-07-18 PROCEDURE — 94640 AIRWAY INHALATION TREATMENT: CPT

## 2025-07-18 PROCEDURE — 81001 URINALYSIS AUTO W/SCOPE: CPT

## 2025-07-18 PROCEDURE — 87635 SARS-COV-2 COVID-19 AMP PRB: CPT

## 2025-07-18 PROCEDURE — 6360000004 HC RX CONTRAST MEDICATION: Performed by: EMERGENCY MEDICINE

## 2025-07-18 PROCEDURE — 85025 COMPLETE CBC W/AUTO DIFF WBC: CPT

## 2025-07-18 PROCEDURE — 96374 THER/PROPH/DIAG INJ IV PUSH: CPT

## 2025-07-18 PROCEDURE — 71260 CT THORAX DX C+: CPT | Performed by: RADIOLOGY

## 2025-07-18 PROCEDURE — 2500000003 HC RX 250 WO HCPCS: Performed by: STUDENT IN AN ORGANIZED HEALTH CARE EDUCATION/TRAINING PROGRAM

## 2025-07-18 PROCEDURE — 96376 TX/PRO/DX INJ SAME DRUG ADON: CPT

## 2025-07-18 PROCEDURE — 71260 CT THORAX DX C+: CPT

## 2025-07-18 PROCEDURE — 36600 WITHDRAWAL OF ARTERIAL BLOOD: CPT

## 2025-07-18 PROCEDURE — 6360000002 HC RX W HCPCS: Performed by: EMERGENCY MEDICINE

## 2025-07-18 PROCEDURE — 80053 COMPREHEN METABOLIC PANEL: CPT

## 2025-07-18 PROCEDURE — 94761 N-INVAS EAR/PLS OXIMETRY MLT: CPT

## 2025-07-18 PROCEDURE — 73560 X-RAY EXAM OF KNEE 1 OR 2: CPT

## 2025-07-18 PROCEDURE — 85730 THROMBOPLASTIN TIME PARTIAL: CPT

## 2025-07-18 PROCEDURE — 85610 PROTHROMBIN TIME: CPT

## 2025-07-18 RX ORDER — IOPAMIDOL 755 MG/ML
75 INJECTION, SOLUTION INTRAVASCULAR
Status: COMPLETED | OUTPATIENT
Start: 2025-07-18 | End: 2025-07-18

## 2025-07-18 RX ORDER — SODIUM CHLORIDE 0.9 % (FLUSH) 0.9 %
5-40 SYRINGE (ML) INJECTION PRN
Status: DISCONTINUED | OUTPATIENT
Start: 2025-07-18 | End: 2025-07-29

## 2025-07-18 RX ORDER — ONDANSETRON 4 MG/1
4 TABLET, ORALLY DISINTEGRATING ORAL EVERY 8 HOURS PRN
Status: DISCONTINUED | OUTPATIENT
Start: 2025-07-18 | End: 2025-07-30 | Stop reason: HOSPADM

## 2025-07-18 RX ORDER — PANTOPRAZOLE SODIUM 40 MG/1
40 TABLET, DELAYED RELEASE ORAL
Status: DISCONTINUED | OUTPATIENT
Start: 2025-07-19 | End: 2025-07-23

## 2025-07-18 RX ORDER — SODIUM CHLORIDE 0.9 % (FLUSH) 0.9 %
5-40 SYRINGE (ML) INJECTION EVERY 12 HOURS SCHEDULED
Status: DISCONTINUED | OUTPATIENT
Start: 2025-07-18 | End: 2025-07-29

## 2025-07-18 RX ORDER — IPRATROPIUM BROMIDE AND ALBUTEROL SULFATE 2.5; .5 MG/3ML; MG/3ML
SOLUTION RESPIRATORY (INHALATION)
Status: COMPLETED
Start: 2025-07-18 | End: 2025-07-18

## 2025-07-18 RX ORDER — DULOXETIN HYDROCHLORIDE 20 MG/1
60 CAPSULE, DELAYED RELEASE ORAL DAILY
Status: DISCONTINUED | OUTPATIENT
Start: 2025-07-19 | End: 2025-07-29

## 2025-07-18 RX ORDER — MIDODRINE HYDROCHLORIDE 5 MG/1
5 TABLET ORAL 2 TIMES DAILY
Status: DISCONTINUED | OUTPATIENT
Start: 2025-07-19 | End: 2025-07-29

## 2025-07-18 RX ORDER — ACETAMINOPHEN 325 MG/1
650 TABLET ORAL EVERY 6 HOURS PRN
Status: DISCONTINUED | OUTPATIENT
Start: 2025-07-18 | End: 2025-07-29

## 2025-07-18 RX ORDER — HEPARIN SODIUM 10000 [USP'U]/100ML
5-30 INJECTION, SOLUTION INTRAVENOUS CONTINUOUS
Status: DISCONTINUED | OUTPATIENT
Start: 2025-07-18 | End: 2025-07-29

## 2025-07-18 RX ORDER — HEPARIN SODIUM 1000 [USP'U]/ML
80 INJECTION, SOLUTION INTRAVENOUS; SUBCUTANEOUS ONCE
Status: COMPLETED | OUTPATIENT
Start: 2025-07-18 | End: 2025-07-18

## 2025-07-18 RX ORDER — IPRATROPIUM BROMIDE AND ALBUTEROL SULFATE 2.5; .5 MG/3ML; MG/3ML
1 SOLUTION RESPIRATORY (INHALATION)
Status: DISCONTINUED | OUTPATIENT
Start: 2025-07-18 | End: 2025-07-29

## 2025-07-18 RX ORDER — POLYETHYLENE GLYCOL 3350 17 G/17G
17 POWDER, FOR SOLUTION ORAL DAILY PRN
Status: DISCONTINUED | OUTPATIENT
Start: 2025-07-18 | End: 2025-07-30 | Stop reason: HOSPADM

## 2025-07-18 RX ORDER — GUAIFENESIN 600 MG/1
1200 TABLET, EXTENDED RELEASE ORAL 2 TIMES DAILY
Status: DISCONTINUED | OUTPATIENT
Start: 2025-07-18 | End: 2025-07-24

## 2025-07-18 RX ORDER — HEPARIN SODIUM 1000 [USP'U]/ML
80 INJECTION, SOLUTION INTRAVENOUS; SUBCUTANEOUS PRN
Status: DISCONTINUED | OUTPATIENT
Start: 2025-07-18 | End: 2025-07-29

## 2025-07-18 RX ORDER — MAGNESIUM SULFATE IN WATER 40 MG/ML
2000 INJECTION, SOLUTION INTRAVENOUS PRN
Status: DISCONTINUED | OUTPATIENT
Start: 2025-07-18 | End: 2025-07-29

## 2025-07-18 RX ORDER — IPRATROPIUM BROMIDE AND ALBUTEROL SULFATE 2.5; .5 MG/3ML; MG/3ML
1 SOLUTION RESPIRATORY (INHALATION)
Status: COMPLETED | OUTPATIENT
Start: 2025-07-18 | End: 2025-07-18

## 2025-07-18 RX ORDER — ACETAMINOPHEN 650 MG/1
650 SUPPOSITORY RECTAL EVERY 6 HOURS PRN
Status: DISCONTINUED | OUTPATIENT
Start: 2025-07-18 | End: 2025-07-29

## 2025-07-18 RX ORDER — ASPIRIN 81 MG/1
81 TABLET, CHEWABLE ORAL DAILY
Status: DISCONTINUED | OUTPATIENT
Start: 2025-07-18 | End: 2025-07-18

## 2025-07-18 RX ORDER — AMIODARONE HYDROCHLORIDE 200 MG/1
400 TABLET ORAL 2 TIMES DAILY
Status: DISCONTINUED | OUTPATIENT
Start: 2025-07-18 | End: 2025-07-24

## 2025-07-18 RX ORDER — IPRATROPIUM BROMIDE AND ALBUTEROL SULFATE 2.5; .5 MG/3ML; MG/3ML
1 SOLUTION RESPIRATORY (INHALATION)
Status: DISCONTINUED | OUTPATIENT
Start: 2025-07-18 | End: 2025-07-18

## 2025-07-18 RX ORDER — ONDANSETRON 2 MG/ML
4 INJECTION INTRAMUSCULAR; INTRAVENOUS EVERY 6 HOURS PRN
Status: DISCONTINUED | OUTPATIENT
Start: 2025-07-18 | End: 2025-07-30 | Stop reason: HOSPADM

## 2025-07-18 RX ORDER — ROSUVASTATIN CALCIUM 20 MG/1
40 TABLET, COATED ORAL NIGHTLY
Status: DISCONTINUED | OUTPATIENT
Start: 2025-07-19 | End: 2025-07-28

## 2025-07-18 RX ORDER — TAMSULOSIN HYDROCHLORIDE 0.4 MG/1
0.4 CAPSULE ORAL DAILY
Status: DISCONTINUED | OUTPATIENT
Start: 2025-07-19 | End: 2025-07-29

## 2025-07-18 RX ORDER — POTASSIUM CHLORIDE 1500 MG/1
40 TABLET, EXTENDED RELEASE ORAL PRN
Status: DISCONTINUED | OUTPATIENT
Start: 2025-07-18 | End: 2025-07-24

## 2025-07-18 RX ORDER — POTASSIUM CHLORIDE 7.45 MG/ML
10 INJECTION INTRAVENOUS PRN
Status: DISCONTINUED | OUTPATIENT
Start: 2025-07-18 | End: 2025-07-24

## 2025-07-18 RX ORDER — ALBUTEROL SULFATE 0.83 MG/ML
5 SOLUTION RESPIRATORY (INHALATION)
Status: COMPLETED | OUTPATIENT
Start: 2025-07-18 | End: 2025-07-18

## 2025-07-18 RX ORDER — SODIUM CHLORIDE 9 MG/ML
INJECTION, SOLUTION INTRAVENOUS PRN
Status: DISCONTINUED | OUTPATIENT
Start: 2025-07-18 | End: 2025-07-29

## 2025-07-18 RX ORDER — HEPARIN SODIUM 1000 [USP'U]/ML
40 INJECTION, SOLUTION INTRAVENOUS; SUBCUTANEOUS PRN
Status: DISCONTINUED | OUTPATIENT
Start: 2025-07-18 | End: 2025-07-29

## 2025-07-18 RX ORDER — BUSPIRONE HYDROCHLORIDE 5 MG/1
10 TABLET ORAL NIGHTLY
Status: DISCONTINUED | OUTPATIENT
Start: 2025-07-18 | End: 2025-07-29

## 2025-07-18 RX ADMIN — ALBUTEROL SULFATE 5 MG: 2.5 SOLUTION RESPIRATORY (INHALATION) at 14:23

## 2025-07-18 RX ADMIN — AMIODARONE HYDROCHLORIDE 400 MG: 200 TABLET ORAL at 21:07

## 2025-07-18 RX ADMIN — HEPARIN SODIUM 18 UNITS/KG/HR: 10000 INJECTION, SOLUTION INTRAVENOUS at 21:28

## 2025-07-18 RX ADMIN — IPRATROPIUM BROMIDE AND ALBUTEROL SULFATE 1 DOSE: 2.5; .5 SOLUTION RESPIRATORY (INHALATION) at 21:00

## 2025-07-18 RX ADMIN — ACETAMINOPHEN 650 MG: 325 TABLET ORAL at 21:06

## 2025-07-18 RX ADMIN — WATER 125 MG: 1 INJECTION INTRAMUSCULAR; INTRAVENOUS; SUBCUTANEOUS at 15:33

## 2025-07-18 RX ADMIN — IOPAMIDOL 75 ML: 755 INJECTION, SOLUTION INTRAVENOUS at 13:52

## 2025-07-18 RX ADMIN — BUSPIRONE HYDROCHLORIDE 10 MG: 5 TABLET ORAL at 21:06

## 2025-07-18 RX ADMIN — METHYLPREDNISOLONE SODIUM SUCCINATE 40 MG: 40 INJECTION, POWDER, LYOPHILIZED, FOR SOLUTION INTRAMUSCULAR; INTRAVENOUS at 21:07

## 2025-07-18 RX ADMIN — IPRATROPIUM BROMIDE AND ALBUTEROL SULFATE 1 DOSE: 2.5; .5 SOLUTION RESPIRATORY (INHALATION) at 12:09

## 2025-07-18 RX ADMIN — SODIUM CHLORIDE, PRESERVATIVE FREE 10 ML: 5 INJECTION INTRAVENOUS at 21:07

## 2025-07-18 RX ADMIN — HEPARIN SODIUM 5100 UNITS: 1000 INJECTION, SOLUTION INTRAVENOUS; SUBCUTANEOUS at 21:23

## 2025-07-18 RX ADMIN — WATER 1000 MG: 1 INJECTION INTRAMUSCULAR; INTRAVENOUS; SUBCUTANEOUS at 13:38

## 2025-07-18 RX ADMIN — GUAIFENESIN 1200 MG: 600 TABLET ORAL at 21:06

## 2025-07-18 RX ADMIN — AZITHROMYCIN MONOHYDRATE 500 MG: 500 INJECTION, POWDER, LYOPHILIZED, FOR SOLUTION INTRAVENOUS at 21:04

## 2025-07-18 ASSESSMENT — PAIN SCALES - GENERAL
PAINLEVEL_OUTOF10: 0
PAINLEVEL_OUTOF10: 3

## 2025-07-18 ASSESSMENT — PAIN DESCRIPTION - ORIENTATION: ORIENTATION: RIGHT

## 2025-07-18 ASSESSMENT — PAIN DESCRIPTION - DESCRIPTORS: DESCRIPTORS: DISCOMFORT

## 2025-07-18 ASSESSMENT — PAIN - FUNCTIONAL ASSESSMENT: PAIN_FUNCTIONAL_ASSESSMENT: 0-10

## 2025-07-18 ASSESSMENT — PAIN DESCRIPTION - LOCATION: LOCATION: LEG;HIP

## 2025-07-18 NOTE — PROGRESS NOTES
4 Eyes Skin Assessment     NAME:  Jonathan Zurita  YOB: 1945  MEDICAL RECORD NUMBER:  [id number]    The patient is being assessed for  Admission    I agree that at least one RN has performed a thorough Head to Toe Skin Assessment on the patient. ALL assessment sites listed below have been assessed.      Areas assessed by both nurses:    Head, Face, Ears, Shoulders, Back, Chest, Arms, Elbows, Hands, Sacrum. Buttock, Coccyx, Ischium, Legs. Feet and Heels, and Under Medical Devices         Does the Patient have a Wound? Yes wound(s) were present on assessment. LDA wound assessment was Initiated and completed by RN       Tutu Prevention initiated by RN: No  Wound Care Orders initiated by RN: No    For hospital-acquired stage 1 & 2 and ALL Stage 3,4, Unstageable, DTI, NWPT, and Complex wounds: place order “IP Wound Care/Ostomy Nurse Eval and Treat” by RN under : Yes    New Ostomies, if present place, Ostomy referral order under : No     Nurse 1 eSignature: Electronically signed by Trip Dhaliwal RN on 7/18/25 at 7:19 PM EDT    **SHARE this note so that the co-signing nurse can place an eSignature**    Nurse 2 eSignature: Electronically signed by Maureen Hernandez RN on 7/18/25 at 7:23 PM EDT

## 2025-07-18 NOTE — ED NOTES
TRANSFER - OUT REPORT:    Verbal report given to Xiomara FRASER on Jonathan Zurita  being transferred to [facility] for routine progression of patient care       Report consisted of patient's Situation, Background, Assessment and   Recommendations(SBAR).     Information from the following report(s) Nurse Handoff Report and ED Encounter Summary was reviewed with the receiving nurse.    Clarence Fall Assessment:    Presents to emergency department  because of falls (Syncope, seizure, or loss of consciousness): No  Age > 70: Yes  Altered Mental Status, Intoxication with alcohol or substance confusion (Disorientation, impaired judgment, poor safety awaremess, or inability to follow instructions): No  Impaired Mobility: Ambulates or transfers with assistive devices or assistance; Unable to ambulate or transer.: No  Nursing Judgement: No          Lines:   Peripheral IV 07/18/25 Right Antecubital (Active)   Site Assessment Clean, dry & intact 07/18/25 1315   Line Status Blood return noted 07/18/25 1315       Peripheral IV 07/18/25 Left Forearm (Active)   Site Assessment Clean, dry & intact 07/18/25 1454   Line Status Blood return noted 07/18/25 1454        Opportunity for questions and clarification was provided.      Patient transported with:  O2 @ 6lpm and Registered Nurse           Maikel Espinoza RN  07/18/25 [id number]

## 2025-07-18 NOTE — H&P
Hospitalist History and Physical   Admit Date:  2025 11:53 AM   Name:  Jonathan Zurita   Age:  80 y.o.  Sex:  male  :  1945   MRN:  [id number]   Room:  ER03/03    Presenting/Chief Complaint: Shortness of Breath     Reason(s) for Admission: COPD exacerbation (HCC) [J44.1]     History of Present Illness:   Jonathan Zurita is a 80 y.o. male with medical history of atrial fibrillation, CLL, COPD, heart failure with preserved ejection fraction, anxiety, depression, hyperlipidemia, CVA, recent hospitalization status post fall and right femur fracture status post repair who presented with worsening shortness of breath and chest tightness that has been progressively worsening over the past week.  Patient endorses cough but denies increased sputum production.  He was requiring nasal cannula oxygen at rehab facility.  He also endorses right knee pain and right lower extremity swelling that has persisted since his right femur fracture surgery.    On arrival to the ED patient hypoxic with [id number] that improved with high flow nasal cannula.  Notable labs include bicarb 19, albumin 2.8, alk phos 187, ALT 84, AST 72, WBC 17.1, hemoglobin 8.8 (stable from recent hospital discharge).  ABG shows pH 7.45, PCO2 32, bicarb 22, PO2 68.  Blood cultures obtained in ED.  UA nitrite negative, leukocyte esterase small, 10-20 WBCs, trace bacteria.  Chest x-ray shows opacity in left lung base.  CT chest PE protocol shows no PE and left lower lobe atelectasis.  COVID-19 negative.  Patient given Solu-Medrol, DuoNeb, Rocephin in ED.    Patient evaluated bedside.  Family at bedside helps assist with history.  Patient lying in bed on 6 L oxygen via nasal cannula.  States that he continues to have shortness of breath and central chest tightness with taking deep breaths that is mildly improved after breathing treatment.  Patient endorsing right knee pain and right lower extremity swelling.        Assessment & Plan:     Acute    [facility]        NAME: Crispin Fam is a 65 y.o. male  : 1959    MRN: [id number]  DATE: 2024  TIME: 11:37 AM      Assessment and Plan     COVID-19 [U07.1]  1. COVID-19  benzonatate (TESSALON) 200 MG capsule      2. Acute cough  XR chest pa and lateral    benzonatate (TESSALON) 200 MG capsule      3. Asthma with acute exacerbation, unspecified asthma severity, unspecified whether persistent            POC Testing Results        Note:       Patient Instructions     Patient Instructions    You may  take OTC Tylenol for pain. You may take no more than 1000 mg tabs every 6 hours (no more than 4 grams in 24 hours!).  Please increase fluid intake  You may take OTC cough medication such as Mucinex DM for cough/congestion.  Please take up to four puffs of your albuterol inhaler as needed for shortness of breath and/or wheezing. If your shortness of breath and/or wheezing worsen please return to our clinic or go to the ER.  We will call you if radiologist read changes plan of care            COVID-19 Home Care Guidelines    Your healthcare provider and/or public health staff have evaluated you and have determined that you do not need to remain in the hospital at this time.  At this time you can be isolated at home where you will be monitored by staff from your local or state health department. You should carefully follow the prevention and isolation steps below until a healthcare provider or local or state health department says that you can return to your normal activities.      Stay home except to get medical care    People who are mildly ill with COVID-19 are able to isolate at home during their illness. You should restrict activities outside your home, except for getting medical care. Do not go to work, school, or public areas. Avoid using public transportation, ride-sharing, or taxis.    Separate yourself from other people and animals in your home    People: As much as possible,  you should stay in a specific room and away from other people in your home. Also, you should use a separate bathroom, if available.  Animals: You should restrict contact with pets and other animals while you are sick with COVID-19, just like you would around other people. Although there have not been reports of pets or other animals becoming sick with COVID-19, it is still recommended that people sick with COVID-19 limit contact with animals until more information is known about the virus. When possible, have another member of your household care for your animals while you are sick. If you are sick with COVID-19, avoid contact with your pet, including petting, snuggling, being kissed or licked, and sharing food. If you must care for your pet or be around animals while you are sick, wash your hands before and after you interact with pets and wear a facemask. See COVID-19 and Animals for more information.    Call ahead before visiting your doctor    If you have a medical appointment, call the healthcare provider and tell them that you have or may have COVID-19. This will help the healthcare provider’s office take steps to keep other people from getting infected or exposed.    Wear a facemask    You should wear a facemask when you are around other people (e.g., sharing a room or vehicle) or pets and before you enter a healthcare provider’s office. If you are not able to wear a facemask (for example, because it causes trouble breathing), then people who live with you should not stay in the same room with you, or they should wear a facemask if they enter your room.    Cover your coughs and sneezes    Cover your mouth and nose with a tissue when you cough or sneeze. Throw used tissues in a lined trash can. Immediately wash your hands with soap and water for at least 20 seconds or, if soap and water are not available, clean your hands with an alcohol-based hand  that contains at least 60% alcohol.    Clean your  hands often    Wash your hands often with soap and water for at least 20 seconds, especially after blowing your nose, coughing, or sneezing; going to the bathroom; and before eating or preparing food. If soap and water are not readily available, use an alcohol-based hand  with at least 60% alcohol, covering all surfaces of your hands and rubbing them together until they feel dry.  Soap and water are the best option if hands are visibly dirty. Avoid touching your eyes, nose, and mouth with unwashed hands.    Avoid sharing personal household items    You should not share dishes, drinking glasses, cups, eating utensils, towels, or bedding with other people or pets in your home. After using these items, they should be washed thoroughly with soap and water.    Clean all “high-touch” surfaces everyday    High touch surfaces include counters, tabletops, doorknobs, bathroom fixtures, toilets, phones, keyboards, tablets, and bedside tables. Also, clean any surfaces that may have blood, stool, or body fluids on them. Use a household cleaning spray or wipe, according to the label instructions. Labels contain instructions for safe and effective use of the cleaning product including precautions you should take when applying the product, such as wearing gloves and making sure you have good ventilation during use of the product.    Monitor your symptoms    Seek prompt medical attention if your illness is worsening (e.g., difficulty breathing). Before seeking care, call your healthcare provider and tell them that you have, or are being evaluated for, COVID-19. Put on a facemask before you enter the facility. These steps will help the healthcare provider’s office to keep other people in the office or waiting room from getting infected or exposed. Ask your healthcare provider to call the local or state health department. Persons who are placed under active monitoring or facilitated self-monitoring should follow instructions  provided by their local health department or occupational health professionals, as appropriate.  If you have a medical emergency and need to call 911, notify the dispatch personnel that you have, or are being evaluated for COVID-19. If possible, put on a facemask before emergency medical services arrive.    Discontinuing home isolation    Patients with confirmed COVID-19 should remain under home isolation precautions until the following conditions are met:   They have had no fever for at least 24 hours (that is one full day of no fever without the use medicine that reduces fevers)  AND  other symptoms have improved (for example, when their cough or shortness of breath have improved)  AND  If had mild or moderate illness, at least 5 days have passed since their symptoms first appeared or if severe illness (needed oxygen) or immunosuppressed,   Patients with confirmed COVID-19 should also notify close contacts (including their workplace) and ask that they self-quarantine. Currently, close contact is defined as being within 6 feet for 15 minutes or more from the period 24 hours starting 48 hours before symptom onset to the time at which the patient went into isolation.    Source: https://www.cdc.gov/coronavirus/2019-ncov/hcp/guidance-prevent-spread.html       Follow up with primary care provider.   Go to ER if symptoms worsen.    Chief Complaint     Chief Complaint   Patient presents with    COVID-19     Pt states symptoms of cold and lethargy began two weeks ago and developed worsening symptoms last night. Body aches and headache severe. Home COVID test was positive this morning.          History of Present Illness     Pt reports for the past 2 weeks he has had mild cough and fatigue with decreased appetite. He reports yesterday he developed severe hacking cough with shortness of breath and chest tightness. He reports history of asthma/bronchitis and reports he just refilled albuterol inhaler which he sued with relief  of symptoms. He denies chest pain, v/d.         Review of Systems     Review of Systems   Constitutional:  Positive for chills. Negative for fever.   HENT:  Positive for congestion. Negative for ear pain, postnasal drip, rhinorrhea, sinus pressure, sinus pain, sore throat and voice change.    Eyes:  Negative for redness.   Respiratory:  Positive for cough, chest tightness and shortness of breath.    Cardiovascular:  Negative for chest pain.   Gastrointestinal:  Negative for abdominal pain, diarrhea, nausea and vomiting.   Skin:  Negative for color change and rash.   Neurological:  Negative for seizures and syncope.   All other systems reviewed and are negative.        Current Medications       Current Outpatient Medications:     albuterol (PROVENTIL HFA,VENTOLIN HFA) 90 mcg/act inhaler, Inhale 2 puffs every 6 (six) hours as needed for wheezing Pt requests 2 inhalers, Disp: 18 g, Rfl: 1    ALPRAZolam (XANAX) 0.5 mg tablet, Take 1 tablet 2 hours prior to procedure. OK to repeat 30 minutes prior, Disp: 2 tablet, Rfl: 0    amLODIPine (NORVASC) 5 mg tablet, TAKE ONE TABLET BY MOUTH EVERY DAY, Disp: 90 tablet, Rfl: 3    aspirin (ECOTRIN LOW STRENGTH) 81 mg EC tablet, Take 81 mg by mouth daily, Disp: , Rfl:     benzonatate (TESSALON) 200 MG capsule, Take 1 capsule (200 mg total) by mouth 3 (three) times a day as needed for cough, Disp: 20 capsule, Rfl: 0    ketoconazole (NIZORAL) 2 % cream, Apply topically daily as needed for rash, Disp: 15 g, Rfl: 5    methylphenidate (RITALIN) 10 mg tablet, TAKE TWO TABLETS IN THE MORNING, ONE TABLET AROUND NOON AND ONE TABLET IN AFTERNOON IF NEEDED, Disp: 120 tablet, Rfl: 0    rosuvastatin (CRESTOR) 40 MG tablet, Take 1 tablet (40 mg total) by mouth daily, Disp: 90 tablet, Rfl: 1    clobetasol (TEMOVATE) 0.05 % cream, Apply 0.25 g (0.05 Applications total) topically 2 (two) times a day as needed (rash), Disp: 60 g, Rfl: 5    clotrimazole-betamethasone (LOTRISONE) 1-0.05 % cream, Apply  topically 2 (two) times a day, Disp: 30 g, Rfl: 5    EPINEPHrine (EPIPEN) 0.3 mg/0.3 mL SOAJ, Inject 0.3 mL (0.3 mg total) into a muscle once for 1 dose, Disp: 1 each, Rfl: 0    zaleplon (SONATA) 10 MG capsule, Take 1 capsule (10 mg total) by mouth daily at bedtime, Disp: 30 capsule, Rfl: 2    Current Allergies     Allergies as of 12/22/2024    (No Known Allergies)              The following portions of the patient's history were reviewed and updated as appropriate: allergies, current medications, past family history, past medical history, past social history, past surgical history, and problem list.     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    HL (hearing loss) 2019    mild    Pneumonia 2019    Psoriatic arthritis (HCC)     Septicemia (HCC)     Stomach problems        Past Surgical History:   Procedure Laterality Date    COLONOSCOPY      DENTAL IMPLANT      HERNIA REPAIR      INCISION AND DRAINAGE ABSCESS / HEMATOMA OF BURSA / KNEE / THIGH Left     thigh    IL LAPAROSCOPY SURG PARTIAL NEPHRECTOMY Left 11/28/2022    Procedure: ROBOTIC LAPAROSCOPIC PARTIAL NEPHRECTOMY  WITH INTRAOPERATIVE ULTRASOUND INTERPRETATION;  Surgeon: Shane Barbosa MD;  Location: [facility];  Service: Urology    IL RPR 1ST INGUN HRNA AGE 5 YRS/> REDUCIBLE Right 06/22/2021    Procedure: Inguinal hernia repair with mesh;  Surgeon: Robert Bloch, MD;  Location: [facility];  Service: General       Family History   Problem Relation Age of Onset    Coronary artery disease Father     Heart disease Father     Arthritis Mother     Hearing loss Mother     Heart disease Maternal Grandfather     Dementia Maternal Grandmother     Heart disease Paternal Grandfather     Dementia Maternal Aunt          Medications have been verified.        Objective     /84   Pulse 97   Temp 98.3 °F (36.8 °C)   Resp 18    Wt 76.7 kg (169 lb)   SpO2 98%   BMI 23.57 kg/m²   No LMP for male patient.         Physical Exam     Physical Exam  Vitals and nursing note reviewed.   Constitutional:       General: He is not in acute distress.     Appearance: Normal appearance. He is not ill-appearing, toxic-appearing or diaphoretic.   HENT:      Head: Normocephalic and atraumatic.      Right Ear: Tympanic membrane, ear canal and external ear normal. There is no impacted cerumen.      Left Ear: Tympanic membrane, ear canal and external ear normal. There is no impacted cerumen.      Nose: Nose normal.      Mouth/Throat:      Lips: Pink. No lesions.      Mouth: Mucous membranes are moist.      Tongue: No lesions. Tongue does not deviate from midline.      Palate: No mass and lesions.      Pharynx: Oropharynx is clear. Uvula midline. No pharyngeal swelling, oropharyngeal exudate, posterior oropharyngeal erythema or uvula swelling.      Tonsils: No tonsillar exudate or tonsillar abscesses.   Eyes:      General: No scleral icterus.        Right eye: No discharge.         Left eye: No discharge.      Extraocular Movements: Extraocular movements intact.      Conjunctiva/sclera: Conjunctivae normal.      Pupils: Pupils are equal, round, and reactive to light.   Cardiovascular:      Rate and Rhythm: Normal rate and regular rhythm.      Heart sounds: Normal heart sounds. No murmur heard.     No friction rub. No gallop.   Pulmonary:      Effort: Pulmonary effort is normal. No respiratory distress.      Breath sounds: Normal breath sounds. Decreased air movement present. No stridor. No wheezing, rhonchi or rales.      Comments: Decreased air movement to the upper lobes bilaterally  Musculoskeletal:      Cervical back: Normal range of motion and neck supple. No rigidity.   Lymphadenopathy:      Cervical: No cervical adenopathy.   Skin:     General: Skin is warm and dry.      Coloration: Skin is not jaundiced or pale.      Findings: No bruising, erythema,  lesion or rash.   Neurological:      General: No focal deficit present.      Mental Status: He is alert and oriented to person, place, and time. Mental status is at baseline.   Psychiatric:         Mood and Affect: Mood normal.         Behavior: Behavior normal.         Thought Content: Thought content normal.         Judgment: Judgment normal.

## 2025-07-18 NOTE — PROGRESS NOTES
TRANSFER - IN REPORT:    Verbal report received from EVELIO Ko on Jonathan Zurita  being received from ER for routine progression of patient care      Report consisted of patient's Situation, Background, Assessment and   Recommendations(SBAR).     Information from the following report(s) ED Encounter Summary and ED SBAR was reviewed with the receiving nurse.    Opportunity for questions and clarification was provided.      Report given to EVELIO Dominique who will assume care of patient upon arrival to unit

## 2025-07-18 NOTE — ED PROVIDER NOTES
Emergency Department Provider Note       [facility] EMERGENCY DEPT   PCP: Brian Davies DO   Age: 80 y.o.   Sex: male     DISPOSITION      ICD-10-CM    1. COPD exacerbation (HCC)  J44.1       2. Leukocytosis, unspecified type  D72.829       3. Acute respiratory failure with hypoxia (HCC)  J96.01           Medical Decision Making     Differential diagnosis of shortness of breath this patient is broad.  Could related to his COPD.  However infectious etiologies is in the differential as well.  Will obtain ABG.  Stat portable chest x-ray.  Continue to give patient supplemental oxygen    12:59 PM EDT  ABG shows no significant hypercapnia.  Hypoxia noted    Chest x-ray shows what appears to be may be right middle lobe infiltrate versus atelectasis.  I will send patient for CT scan of chest to rule out pulmonary embolism    3:16 PM EDT  CT shows no pulmonary embolism.  There is some mild lateral atelectasis that appears to be stable.  Hiatal hernia noted as well    Still with wheezing requiring supplemental oxygen.  Plan to discuss case with hospitalist for admission     1 or more acute illnesses that pose a threat to life or bodily function.   1 or more chronic illnesses with a severe exacerbation or progression.  Drug therapy given requiring intensive monitoring for toxicity.  Chronic medical problems impacting care include COPD, remote hip surgery.  Shared medical decision making was utilized in creating the patients health plan today.  I independently ordered and reviewed each unique test.    I reviewed external records: ED visit note from a different ED.   I reviewed external records: provider visit note from PCP.  I reviewed external records: provider visit note from outside specialist.  I reviewed external records: previous EKG including cardiologist interpretation.    I reviewed external records: previous lab results from outside ED.  I reviewed external records: previous imaging study including radiologist

## 2025-07-19 PROBLEM — E44.0 MODERATE PROTEIN-CALORIE MALNUTRITION: Status: ACTIVE | Noted: 2025-07-19

## 2025-07-19 LAB
ALBUMIN SERPL-MCNC: 2.4 G/DL (ref 3.2–4.6)
ALBUMIN/GLOB SERPL: 0.9 (ref 1–1.9)
ALP SERPL-CCNC: 148 U/L (ref 40–129)
ALT SERPL-CCNC: 61 U/L (ref 8–55)
ANION GAP SERPL CALC-SCNC: 11 MMOL/L (ref 7–16)
AST SERPL-CCNC: 56 U/L (ref 15–37)
BASOPHILS # BLD: 0.01 K/UL (ref 0–0.2)
BASOPHILS NFR BLD: 0.1 % (ref 0–2)
BILIRUB SERPL-MCNC: 0.9 MG/DL (ref 0–1.2)
BUN SERPL-MCNC: 24 MG/DL (ref 8–23)
CALCIUM SERPL-MCNC: 8.3 MG/DL (ref 8.8–10.2)
CHLORIDE SERPL-SCNC: 101 MMOL/L (ref 98–107)
CO2 SERPL-SCNC: 20 MMOL/L (ref 20–29)
CREAT SERPL-MCNC: 0.8 MG/DL (ref 0.8–1.3)
DIFFERENTIAL METHOD BLD: ABNORMAL
EOSINOPHIL # BLD: 0 K/UL (ref 0–0.8)
EOSINOPHIL NFR BLD: 0 % (ref 0.5–7.8)
ERYTHROCYTE [DISTWIDTH] IN BLOOD BY AUTOMATED COUNT: 16.4 % (ref 11.9–14.6)
GLOBULIN SER CALC-MCNC: 2.6 G/DL (ref 2.3–3.5)
GLUCOSE SERPL-MCNC: 168 MG/DL (ref 70–99)
HCT VFR BLD AUTO: 23.4 % (ref 41.1–50.3)
HGB BLD-MCNC: 7.6 G/DL (ref 13.6–17.2)
IMM GRANULOCYTES # BLD AUTO: 0.12 K/UL (ref 0–0.5)
IMM GRANULOCYTES NFR BLD AUTO: 1 % (ref 0–5)
INR PPP: 1.3
LYMPHOCYTES # BLD: 3.14 K/UL (ref 0.5–4.6)
LYMPHOCYTES NFR BLD: 26 % (ref 13–44)
MCH RBC QN AUTO: 28.4 PG (ref 26.1–32.9)
MCHC RBC AUTO-ENTMCNC: 32.5 G/DL (ref 31.4–35)
MCV RBC AUTO: 87.3 FL (ref 82–102)
MONOCYTES # BLD: 0.23 K/UL (ref 0.1–1.3)
MONOCYTES NFR BLD: 1.9 % (ref 4–12)
NEUTS SEG # BLD: 8.59 K/UL (ref 1.7–8.2)
NEUTS SEG NFR BLD: 71 % (ref 43–78)
NRBC # BLD: 0.03 K/UL (ref 0–0.2)
PLATELET # BLD AUTO: 362 K/UL (ref 150–450)
PMV BLD AUTO: 10.5 FL (ref 9.4–12.3)
POTASSIUM SERPL-SCNC: 4.4 MMOL/L (ref 3.5–5.1)
PROT SERPL-MCNC: 5 G/DL (ref 6.3–8.2)
PROTHROMBIN TIME: 16.7 SEC (ref 11.3–14.9)
RBC # BLD AUTO: 2.68 M/UL (ref 4.23–5.6)
SODIUM SERPL-SCNC: 132 MMOL/L (ref 136–145)
UFH PPP CHRO-ACNC: 0.51 IU/ML (ref 0.3–0.7)
UFH PPP CHRO-ACNC: 0.55 IU/ML (ref 0.3–0.7)
UFH PPP CHRO-ACNC: 0.72 IU/ML (ref 0.3–0.7)
UFH PPP CHRO-ACNC: 0.97 IU/ML (ref 0.3–0.7)
WBC # BLD AUTO: 12.1 K/UL (ref 4.3–11.1)

## 2025-07-19 PROCEDURE — 36415 COLL VENOUS BLD VENIPUNCTURE: CPT

## 2025-07-19 PROCEDURE — 6370000000 HC RX 637 (ALT 250 FOR IP): Performed by: HOSPITALIST

## 2025-07-19 PROCEDURE — 80053 COMPREHEN METABOLIC PANEL: CPT

## 2025-07-19 PROCEDURE — 85025 COMPLETE CBC W/AUTO DIFF WBC: CPT

## 2025-07-19 PROCEDURE — 94760 N-INVAS EAR/PLS OXIMETRY 1: CPT

## 2025-07-19 PROCEDURE — 97535 SELF CARE MNGMENT TRAINING: CPT

## 2025-07-19 PROCEDURE — 94640 AIRWAY INHALATION TREATMENT: CPT

## 2025-07-19 PROCEDURE — 6370000000 HC RX 637 (ALT 250 FOR IP): Performed by: STUDENT IN AN ORGANIZED HEALTH CARE EDUCATION/TRAINING PROGRAM

## 2025-07-19 PROCEDURE — 1100000000 HC RM PRIVATE

## 2025-07-19 PROCEDURE — 97530 THERAPEUTIC ACTIVITIES: CPT

## 2025-07-19 PROCEDURE — 2580000003 HC RX 258: Performed by: STUDENT IN AN ORGANIZED HEALTH CARE EDUCATION/TRAINING PROGRAM

## 2025-07-19 PROCEDURE — 2700000000 HC OXYGEN THERAPY PER DAY

## 2025-07-19 PROCEDURE — 97165 OT EVAL LOW COMPLEX 30 MIN: CPT

## 2025-07-19 PROCEDURE — 2500000003 HC RX 250 WO HCPCS: Performed by: STUDENT IN AN ORGANIZED HEALTH CARE EDUCATION/TRAINING PROGRAM

## 2025-07-19 PROCEDURE — 94761 N-INVAS EAR/PLS OXIMETRY MLT: CPT

## 2025-07-19 PROCEDURE — 85520 HEPARIN ASSAY: CPT

## 2025-07-19 PROCEDURE — 97161 PT EVAL LOW COMPLEX 20 MIN: CPT

## 2025-07-19 PROCEDURE — 85610 PROTHROMBIN TIME: CPT

## 2025-07-19 PROCEDURE — 6360000002 HC RX W HCPCS: Performed by: STUDENT IN AN ORGANIZED HEALTH CARE EDUCATION/TRAINING PROGRAM

## 2025-07-19 PROCEDURE — 92610 EVALUATE SWALLOWING FUNCTION: CPT

## 2025-07-19 PROCEDURE — 6370000000 HC RX 637 (ALT 250 FOR IP): Performed by: NURSE PRACTITIONER

## 2025-07-19 PROCEDURE — 96376 TX/PRO/DX INJ SAME DRUG ADON: CPT

## 2025-07-19 PROCEDURE — 1100000003 HC PRIVATE W/ TELEMETRY

## 2025-07-19 RX ORDER — OXYCODONE HYDROCHLORIDE 5 MG/1
5 TABLET ORAL EVERY 6 HOURS PRN
Refills: 0 | Status: DISCONTINUED | OUTPATIENT
Start: 2025-07-19 | End: 2025-07-28

## 2025-07-19 RX ORDER — HYDROCODONE BITARTRATE AND HOMATROPINE METHYLBROMIDE ORAL SOLUTION 5; 1.5 MG/5ML; MG/5ML
5 LIQUID ORAL EVERY 4 HOURS PRN
Refills: 0 | Status: DISCONTINUED | OUTPATIENT
Start: 2025-07-19 | End: 2025-07-29

## 2025-07-19 RX ADMIN — IPRATROPIUM BROMIDE AND ALBUTEROL SULFATE 1 DOSE: 2.5; .5 SOLUTION RESPIRATORY (INHALATION) at 19:54

## 2025-07-19 RX ADMIN — OXYCODONE 5 MG: 5 TABLET ORAL at 20:14

## 2025-07-19 RX ADMIN — OXYCODONE 5 MG: 5 TABLET ORAL at 11:19

## 2025-07-19 RX ADMIN — ROSUVASTATIN CALCIUM 40 MG: 20 TABLET, FILM COATED ORAL at 21:45

## 2025-07-19 RX ADMIN — TAMSULOSIN HYDROCHLORIDE 0.4 MG: 0.4 CAPSULE ORAL at 08:29

## 2025-07-19 RX ADMIN — MIDODRINE HYDROCHLORIDE 5 MG: 5 TABLET ORAL at 16:22

## 2025-07-19 RX ADMIN — BUSPIRONE HYDROCHLORIDE 10 MG: 5 TABLET ORAL at 21:45

## 2025-07-19 RX ADMIN — AMIODARONE HYDROCHLORIDE 400 MG: 200 TABLET ORAL at 08:29

## 2025-07-19 RX ADMIN — WATER 1000 MG: 1 INJECTION INTRAMUSCULAR; INTRAVENOUS; SUBCUTANEOUS at 14:30

## 2025-07-19 RX ADMIN — IPRATROPIUM BROMIDE AND ALBUTEROL SULFATE 1 DOSE: 2.5; .5 SOLUTION RESPIRATORY (INHALATION) at 07:14

## 2025-07-19 RX ADMIN — METHYLPREDNISOLONE SODIUM SUCCINATE 40 MG: 40 INJECTION, POWDER, LYOPHILIZED, FOR SOLUTION INTRAMUSCULAR; INTRAVENOUS at 14:14

## 2025-07-19 RX ADMIN — HYDROCODONE BITARTRATE AND HOMATROPINE METHYLBROMIDE 5 ML: 1.5; 5 SOLUTION ORAL at 21:46

## 2025-07-19 RX ADMIN — IPRATROPIUM BROMIDE AND ALBUTEROL SULFATE 1 DOSE: 2.5; .5 SOLUTION RESPIRATORY (INHALATION) at 15:11

## 2025-07-19 RX ADMIN — METHYLPREDNISOLONE SODIUM SUCCINATE 40 MG: 40 INJECTION, POWDER, LYOPHILIZED, FOR SOLUTION INTRAMUSCULAR; INTRAVENOUS at 21:45

## 2025-07-19 RX ADMIN — HEPARIN SODIUM 15 UNITS/KG/HR: 10000 INJECTION, SOLUTION INTRAVENOUS at 21:51

## 2025-07-19 RX ADMIN — PANTOPRAZOLE SODIUM 40 MG: 40 TABLET, DELAYED RELEASE ORAL at 05:21

## 2025-07-19 RX ADMIN — AMIODARONE HYDROCHLORIDE 400 MG: 200 TABLET ORAL at 21:45

## 2025-07-19 RX ADMIN — DULOXETINE 60 MG: 20 CAPSULE, DELAYED RELEASE ORAL at 08:29

## 2025-07-19 RX ADMIN — IPRATROPIUM BROMIDE AND ALBUTEROL SULFATE 1 DOSE: 2.5; .5 SOLUTION RESPIRATORY (INHALATION) at 11:45

## 2025-07-19 RX ADMIN — METHYLPREDNISOLONE SODIUM SUCCINATE 40 MG: 40 INJECTION, POWDER, LYOPHILIZED, FOR SOLUTION INTRAMUSCULAR; INTRAVENOUS at 05:21

## 2025-07-19 RX ADMIN — GUAIFENESIN 1200 MG: 600 TABLET ORAL at 08:29

## 2025-07-19 RX ADMIN — SODIUM CHLORIDE, PRESERVATIVE FREE 10 ML: 5 INJECTION INTRAVENOUS at 08:30

## 2025-07-19 RX ADMIN — GUAIFENESIN 1200 MG: 600 TABLET ORAL at 21:45

## 2025-07-19 RX ADMIN — MIDODRINE HYDROCHLORIDE 5 MG: 5 TABLET ORAL at 08:29

## 2025-07-19 RX ADMIN — AZITHROMYCIN MONOHYDRATE 500 MG: 500 INJECTION, POWDER, LYOPHILIZED, FOR SOLUTION INTRAVENOUS at 16:20

## 2025-07-19 ASSESSMENT — PAIN DESCRIPTION - LOCATION
LOCATION: HIP
LOCATION: BUTTOCKS;HIP
LOCATION: HIP

## 2025-07-19 ASSESSMENT — PAIN DESCRIPTION - ORIENTATION
ORIENTATION: RIGHT
ORIENTATION: MID;RIGHT

## 2025-07-19 ASSESSMENT — PAIN SCALES - GENERAL
PAINLEVEL_OUTOF10: 7
PAINLEVEL_OUTOF10: 4
PAINLEVEL_OUTOF10: 7
PAINLEVEL_OUTOF10: 7

## 2025-07-19 ASSESSMENT — PAIN DESCRIPTION - DESCRIPTORS: DESCRIPTORS: ACHING;SORE

## 2025-07-19 ASSESSMENT — PAIN DESCRIPTION - PAIN TYPE: TYPE: ACUTE PAIN

## 2025-07-19 ASSESSMENT — PAIN DESCRIPTION - FREQUENCY: FREQUENCY: CONTINUOUS

## 2025-07-19 ASSESSMENT — PAIN DESCRIPTION - ONSET: ONSET: ON-GOING

## 2025-07-19 NOTE — PROGRESS NOTES
Patient resting in bed with no complaints at this time.  Patient is alert and orientated with no distress noted.  IV intact and patent with no s/s of infection noted.  Respirations even and unlabored with heart rate regular.  Patient unable to ambulate independently without assistance; requires walker with x2 assist.  Bed in low locked position with call light within reach.  Patient instructed to call if assistance is needed.  Will continue to monitor.

## 2025-07-19 NOTE — PROGRESS NOTES
ACUTE PHYSICAL THERAPY GOALS:   (Developed with and agreed upon by patient and/or caregiver.)    (1.) Jonathan Zurita  will move from supine to sit and sit to supine  with MINIMAL ASSIST within 7 treatment day(s).    (2.) Jonathan Zurita will transfer from bed to chair and chair to bed with MINIMAL ASSIST using the least restrictive device within 7 treatment day(s).    (3.) Jonathan Zurita will ambulate with MINIMAL ASSIST for 50 feet with the least restrictive device within 7 treatment day(s).   (4.) Jonathan Zurita will perform standing static and dynamic balance activities x 10 minutes with MINIMAL ASSIST to improve safety within 7 treatment day(s).  (5.) Jonathan Zurita will perform therapeutic exercises x 20 min for HEP with INDEPENDENCE to improve strength, endurance, and functional mobility within 7 treatment day(s).       PHYSICAL THERAPY Initial Assessment, Daily Note, and AM  (Link to Caseload Tracking: PT Visit Days : 1  Acknowledge Orders  Time In/Out  PT Charge Capture  Rehab Caseload Tracker    Jonathan Zurita is a 80 y.o. male   PRIMARY DIAGNOSIS: COPD exacerbation (HCC)  COPD exacerbation (HCC) [J44.1]  Acute respiratory failure with hypoxia (HCC) [J96.01]  Edema of right lower extremity [R60.0]  Leukocytosis, unspecified type [D72.829]       Reason for Referral: Generalized Muscle Weakness (M62.81)  Difficulty in walking, Not elsewhere classified (R26.2)  Observation: Payor: [employer] / Plan: [employer] / Product Type: *No Product type* /     ASSESSMENT:     REHAB RECOMMENDATIONS:   Recommendation to date pending progress:  Setting:  Short-term Rehab    Equipment:    To Be Determined     ASSESSMENT:  Mr. Zurita  is a 80 year old M who presents from rehab with a COPD exacerbation. Recent admission for R femur fracture earlier this month, has been at rehab since. At baseline, pt uses a rollator for mobility and is independent with ADL's. This date pt performs

## 2025-07-19 NOTE — PROGRESS NOTES
Patient relaxing in recliner.  Family visited this AM.  Patient appears comfortable at this time.  Heparin gtt changed to 15=9.5 with duel sign off.  Patient instructed to call if assistance is needed.  Will continue to follow.

## 2025-07-19 NOTE — PROGRESS NOTES
ACUTE OCCUPATIONAL THERAPY GOALS:   (Developed with and agreed upon by patient and/or caregiver.)  1. Patient will complete lower body bathing and dressing with MODERATE ASSIST and adaptive equipment as needed.   2. Patient will complete toileting with MINIMUM ASSIST.  3. Patient will tolerate 30 minutes of OT treatment with 1-2 rest breaks to increase activity tolerance for ADLs.   4. Patient will complete functional transfers with MINIMUM ASSIST and adaptive equipment as needed.   5. Patient will complete functional activity while seated edge of bed with SBA and adaptive equipment as needed.  6. Patient will maintain edge of bed unsupported sitting balance with SBA in preparation for OOB activity.  7. Patient will tolerate 15 minutes BUE therapeutic activities to increase use of BUE during ADL performance.     Timeframe: 7 visits       OCCUPATIONAL THERAPY Initial Assessment, Daily Note, and AM       OT Visit Days: 1   Acknowledge Orders  Time  OT Charge Capture  Rehab Caseload Tracker      WBAT RLE  Fall Risk    Jonathan Zurita is a 80 y.o. male   PRIMARY DIAGNOSIS: COPD exacerbation ([facility])  COPD exacerbation ([facility]) [J44.1]  Acute respiratory failure with hypoxia ([facility]) [J96.01]  Edema of right lower extremity [R60.0]  Leukocytosis, unspecified type [D72.829]       Reason for Referral: Generalized Muscle Weakness (M62.81)  Observation: Payor: [employer] / Plan: [employer] / Product Type: *No Product type* /     ASSESSMENT:     REHAB RECOMMENDATIONS:   Recommendation to date pending progress:  Setting:  Short-term Rehab    Equipment:    To Be Determined     ASSESSMENT:  Mr. Zurita presents with deficits in overall strength, activity tolerance, activities of daily living performance, and functional mobility. Presents for COPD exacerbation after onset of SOB at STR; recent R femur fracture s/p IM nailing and subsequently went to STR. At baseline, lives alone; uses rollator for fx mobility and MOD I for  [] []    Upper Body Dressing [] [] [] [] [] [] [] [] [] []    Lower Body Dressing [] [] [] [] [] [] [] [] [x] []    Feeding [] [] [] [] [] [] [] [] [] []    Grooming [] [] [] [] [x] [] [] [] [] []    Personal Device Care [] [] [] [] [] [] [] [] [] []    Functional Mobility [] [] [] [] [] [] [x] [x] [] []  X 2 xRW   I=Independent, Mod I=Modified Independent, S=Supervision/Setup, SBA=Standby Assistance, CGA=Contact Guard Assistance, Min=Minimal Assistance, Mod=Moderate Assistance, Max=Maximal Assistance, Total=Total Assistance, NT=Not Tested    PLAN:   FREQUENCY/DURATION   OT Plan of Care: 3 times/week for duration of hospital stay or until stated goals are met, whichever comes first.    PROBLEM LIST:   (Skilled intervention is medically necessary to address:)  Decreased ADL/Functional Activities  Decreased Activity Tolerance  Decreased AROM/PROM  Decreased Balance  Decreased Coordination  Decreased Gait Ability  Decreased Safety Awareness  Decreased Strength  Decreased Transfer Abilities  Increased Pain   INTERVENTIONS PLANNED:  (Benefits and precautions of occupational therapy have been discussed with the patient.)  Self Care Training  Therapeutic Activity  Therapeutic Exercise/HEP  Neuromuscular Re-education  Manual Therapy  Education         TREATMENT:     EVALUATION: LOW COMPLEXITY: (Untimed Charge)  The initial evaluation charge encompasses clinical chart review, objective assessment, interpretation of assessment, and skilled monitoring of the patient's response to treatment in order to develop a plan of care.     TREATMENT:   Co-Treatment between OT & PT necessary due to patient's decreased overall endurance/tolerance levels, as well as need for high level skilled assistance to complete functional transfers/mobility and functional tasks  Self Care (23 minutes): Patient participated in lower body dressing, grooming, functional mobility, bed mobility, functional transfer, and assistive device in unsupported

## 2025-07-19 NOTE — PROGRESS NOTES
Hourly rounds performed this shift. Bed lowered and locked. Call light within reach. Bed alarm on. Pt on 7L NC. Heparin gtt infusing at 16 units/kg/hr. Next anti xa lab timed for 1130.

## 2025-07-19 NOTE — PROGRESS NOTES
Patient stable relaxing in bed.  Patient with no complaints at this time.  Patient denies any pain and appears comfortable at this time.  Call light within reach and patient instructed to call if assistance is needed.  Report to be given to oncoming RN 7p-7a

## 2025-07-19 NOTE — PROGRESS NOTES
Patient coughing while taking meds and did not eat much breakfast r/t patient states food getting stuck.  SLP consulted. Patient also became very SOB with labored breathing while taking meds.

## 2025-07-19 NOTE — PROGRESS NOTES
Nutrition Assessment  Assessment Type: Initial, Positive nutrition screen  Reason for visit:  Best Practice Alert: Malnutrition Screening Tool   Malnutrition Screening Tool Score: 2  Unintentional weight loss PTA: 2 to 13 pounds (1 point)  Eating poorly due to decreased appetite: Yes (1 point)    Nutrition Intervention:   Food and/or Nutrient Delivery:   Meals and Snacks:  Diet: Continue current diet per SLP evaluation  Medical Food Supplements:   Medical food supplement therapy:  Initiate Ensure Plus High Protein (high calorie high protein) 350 calories, 20 grams protein per 8 ounce serving - chocolate      Malnutrition Assessment:  [facility] Clinical Malnutrition Criteria  Malnutrition Status: Moderate malnutrition  Context: Acute Illness  Findings of clinical characteristics of malnutrition:   Energy Intake:  Mild decrease in energy intake  Weight Loss:  Mild weight loss     Body Fat Loss:  Mild body fat loss Triceps, Buccal region, Orbital   Muscle Mass Loss:  Mild muscle mass loss Temples (temporalis), Clavicles (pectoralis & deltoids), Hand (interosseous)    Nutrition Assessment:  Food/Nutrition Related History: Pt reports decreased intake for the past week or so due to poor appetite. Daughter stated pt has not eaten well since his previous hospitalization earlier this month. She stated he went to a rehab facility where he was still not eating well. She stated [facility] to [facility]ure to have. She stated they had not received any so she purchased him the high calorie high protein Ensure. She stated she was providing these for pt at the facility. He reports he was drinking 1-2 servings per day.     Do You Have Any Cultural, Sikh, or Ethnic Food Preferences?: No   Weight History: Pt reports wt loss of ~10lbs in the past few weeks. Daughter stated he has had some swelling in his leg. She stated she had to size up in his shorts due to this. Per EMR wt

## 2025-07-19 NOTE — PROGRESS NOTES
GOALS:  LTG: Patient will maintain adequate hydration/nutrition with optimum safety and efficiency of swallowing function with PO intake without overt signs or symptoms of aspiration for the highest appropriate diet level.  STG:  Patient will consume soft and bite-sized textures and thin liquids without overt signs or symptoms of airway compromise.  Patient will safely ingest diet trials during therapeutic feedings with SLP without overt signs or symptoms of respiratory compromise in efforts to advance diet.  Patient will complete a diagnostic instrumental assessment of dysphagia to fully assess physiology and anatomy of the swallow and determine safest appropriate diet and/or rehabilitation strategies, as medically indicated.    SPEECH LANGUAGE PATHOLOGY: DYSPHAGIA Initial Assessment    Acknowledge Order  I  Therapy Time  I   Charges     I  Rehab Caseload Tracker      NAME: Jonathan Zurita  : 1945  MRN: [id number]    ADMISSION DATE: 2025  PRIMARY DIAGNOSIS: COPD exacerbation (HCC)    ICD-10: Treatment Diagnosis: R13.12 Dysphagia, Oropharyngeal Phase    RECOMMENDATIONS   Diet:    Soft and Bite-Sized  Thin Liquids    Medication: crushed in puree or applesauce, as permitted by pharmacy   Compensatory Swallowing Strategies:   Slow rate of intake  No straws  Remain upright for 30-45 minutes after meals  Small bites/sips  Upright as possible for all oral intake   Therapeutic Intervention:   Patient/family education  Instrumental swallow assessment  Dysphagia treatment  Consider referral to GI   Patient continues to require skilled intervention:  Yes. Recommend ongoing speech therapy services during this hospitalization.     Anticipated Discharge Needs: Ongoing speech therapy is recommended at next level of care.      ASSESSMENT    Patient presents with weak cough in response to thin liquids by straw with increased work of breathing. No overt signs of airway compromise with thin liquids via cup or nectar

## 2025-07-19 NOTE — PROGRESS NOTES
Hospitalist Progress Note   Admit Date:  2025 11:53 AM   Name:  Jonathan Zurita   Age:  80 y.o.  Sex:  male  :  1945   MRN:  [id number]   Room:  [facility]/    Presenting/Chief Complaint: Shortness of Breath     Reason(s) for Admission: COPD exacerbation (HCC) [J44.1]  Acute respiratory failure with hypoxia (HCC) [J96.01]  Edema of right lower extremity [R60.0]  Leukocytosis, unspecified type [D72.829]     Hospital Course:   Jonathan Zurita is a 80 y.o. male with medical history of  atrial fibrillation, CLL, COPD, heart failure with preserved ejection fraction, anxiety, depression, hyperlipidemia, CVA, recent hospitalization status post fall and right femur fracture status post repair who presented with worsening shortness of breath and chest tightness that has been progressively worsening over the past week.  Patient endorses cough but denies increased sputum production.  He was requiring nasal cannula oxygen at rehab facility.  He also endorses right knee pain and right lower extremity swelling that has persisted since his right femur fracture surgery.     On arrival to the ED patient hypoxic with [id number] that improved with high flow nasal cannula.  Notable labs include bicarb 19, albumin 2.8, alk phos 187, ALT 84, AST 72, WBC 17.1, hemoglobin 8.8 (stable from recent hospital discharge).  ABG shows pH 7.45, PCO2 32, bicarb 22, PO2 68.  Blood cultures obtained in ED.  UA nitrite negative, leukocyte esterase small, 10-20 WBCs, trace bacteria.  Chest x-ray shows opacity in left lung base.  CT chest PE protocol shows no PE and left lower lobe atelectasis.  COVID-19 negative.  Patient given Solu-Medrol, DuoNeb, Rocephin in ED. x-ray of the right knee shows soft tissue swelling likely secondary to trauma.  Ultrasound of the right lower extremity shows DVT.  Patient started on heparin drip.      Subjective & 24hr Events:   Patient is resting in bed.  No fever no chills.  No chest pain.  No nausea    Result Value Ref Range    NT Pro- 0 - 450 PG/ML   Arterial Blood Gas, POC    Collection Time: 07/18/25 12:23 PM   Result Value Ref Range    DEVICE NASAL CANNULA      FIO2 36 %    POC pH 7.45 7.35 - 7.45      POC pCO2 32.0 (L) 35 - 45 MMHG    POC PO2 68 (L) 75 - 100 MMHG    POC HCO3 22.2 21 - 28 MMOL/L    POC O2 SAT 94.3 94 - 98 %    Base Deficit (POC) 1.4 mmol/L    POC Obi's Test Positive      Site RIGHT RADIAL      Specimen type: ARTERIAL      Performed by: Hilaria    CBC    Collection Time: 07/18/25  7:59 PM   Result Value Ref Range    WBC 14.6 (H) 4.3 - 11.1 K/uL    RBC 3.02 (L) 4.23 - 5.6 M/uL    Hemoglobin 8.5 (L) 13.6 - 17.2 g/dL    Hematocrit 26.8 (L) 41.1 - 50.3 %    MCV 88.7 82 - 102 FL    MCH 28.1 26.1 - 32.9 PG    MCHC 31.7 31.4 - 35.0 g/dL    RDW 16.5 (H) 11.9 - 14.6 %    Platelets 422 150 - 450 K/uL    MPV 10.2 9.4 - 12.3 FL    nRBC 0.04 0.0 - 0.2 K/uL   APTT    Collection Time: 07/18/25  7:59 PM   Result Value Ref Range    APTT 31.8 23.3 - 37.4 SEC   Protime-INR    Collection Time: 07/18/25  7:59 PM   Result Value Ref Range    Protime 15.6 (H) 11.3 - 14.9 sec    INR 1.2     Anti-XA, Heparin    Collection Time: 07/18/25  7:59 PM   Result Value Ref Range    Anti-XA Unfrac Heparin 0.58 0.3 - 0.7 IU/mL   POCT Glucose    Collection Time: 07/18/25  8:13 PM   Result Value Ref Range    POC Glucose 179 (H) 65 - 100 mg/dL    Performed by: PierrissaintSentiaPCT    Anti-XA, Heparin    Collection Time: 07/19/25  4:04 AM   Result Value Ref Range    Anti-XA Unfrac Heparin 0.97 (H) 0.3 - 0.7 IU/mL   Protime-INR    Collection Time: 07/19/25  4:04 AM   Result Value Ref Range    Protime 16.7 (H) 11.3 - 14.9 sec    INR 1.3     CBC with Auto Differential    Collection Time: 07/19/25  4:04 AM   Result Value Ref Range    WBC 12.1 (H) 4.3 - 11.1 K/uL    RBC 2.68 (L) 4.23 - 5.6 M/uL    Hemoglobin 7.6 (L) 13.6 - 17.2 g/dL    Hematocrit 23.4 (L) 41.1 - 50.3 %    MCV 87.3 82 - 102 FL    MCH 28.4 26.1 - 32.9 PG

## 2025-07-20 ENCOUNTER — APPOINTMENT (OUTPATIENT)
Dept: GENERAL RADIOLOGY | Age: 80
DRG: 190 | End: 2025-07-20
Payer: OTHER GOVERNMENT

## 2025-07-20 ENCOUNTER — APPOINTMENT (OUTPATIENT)
Dept: CT IMAGING | Age: 80
DRG: 190 | End: 2025-07-20
Payer: OTHER GOVERNMENT

## 2025-07-20 PROBLEM — R60.0 EDEMA OF RIGHT LOWER EXTREMITY: Status: ACTIVE | Noted: 2025-07-20

## 2025-07-20 PROBLEM — J96.01 ACUTE RESPIRATORY FAILURE WITH HYPOXIA (HCC): Status: ACTIVE | Noted: 2025-07-20

## 2025-07-20 LAB
ALBUMIN SERPL-MCNC: 2.4 G/DL (ref 3.2–4.6)
ALBUMIN/GLOB SERPL: 1.2 (ref 1–1.9)
ALP SERPL-CCNC: 145 U/L (ref 40–129)
ALT SERPL-CCNC: 57 U/L (ref 8–55)
ANION GAP SERPL CALC-SCNC: 10 MMOL/L (ref 7–16)
AST SERPL-CCNC: 53 U/L (ref 15–37)
BILIRUB SERPL-MCNC: 0.8 MG/DL (ref 0–1.2)
BUN SERPL-MCNC: 31 MG/DL (ref 8–23)
CALCIUM SERPL-MCNC: 8.4 MG/DL (ref 8.8–10.2)
CHLORIDE SERPL-SCNC: 101 MMOL/L (ref 98–107)
CO2 SERPL-SCNC: 22 MMOL/L (ref 20–29)
CREAT SERPL-MCNC: 0.72 MG/DL (ref 0.8–1.3)
ERYTHROCYTE [DISTWIDTH] IN BLOOD BY AUTOMATED COUNT: 16.4 % (ref 11.9–14.6)
GLOBULIN SER CALC-MCNC: 2.1 G/DL (ref 2.3–3.5)
GLUCOSE SERPL-MCNC: 166 MG/DL (ref 70–99)
HCT VFR BLD AUTO: 23.6 % (ref 41.1–50.3)
HGB BLD-MCNC: 7.7 G/DL (ref 13.6–17.2)
MCH RBC QN AUTO: 28.5 PG (ref 26.1–32.9)
MCHC RBC AUTO-ENTMCNC: 32.6 G/DL (ref 31.4–35)
MCV RBC AUTO: 87.4 FL (ref 82–102)
NRBC # BLD: 0.03 K/UL (ref 0–0.2)
PLATELET # BLD AUTO: 382 K/UL (ref 150–450)
PMV BLD AUTO: 10.4 FL (ref 9.4–12.3)
POTASSIUM SERPL-SCNC: 4.7 MMOL/L (ref 3.5–5.1)
PROT SERPL-MCNC: 4.5 G/DL (ref 6.3–8.2)
RBC # BLD AUTO: 2.7 M/UL (ref 4.23–5.6)
SODIUM SERPL-SCNC: 132 MMOL/L (ref 136–145)
UFH PPP CHRO-ACNC: 0.6 IU/ML (ref 0.3–0.7)
WBC # BLD AUTO: 20 K/UL (ref 4.3–11.1)

## 2025-07-20 PROCEDURE — 2060000000 HC ICU INTERMEDIATE R&B

## 2025-07-20 PROCEDURE — 99222 1ST HOSP IP/OBS MODERATE 55: CPT | Performed by: INTERNAL MEDICINE

## 2025-07-20 PROCEDURE — 85520 HEPARIN ASSAY: CPT

## 2025-07-20 PROCEDURE — 36415 COLL VENOUS BLD VENIPUNCTURE: CPT

## 2025-07-20 PROCEDURE — 2580000003 HC RX 258: Performed by: STUDENT IN AN ORGANIZED HEALTH CARE EDUCATION/TRAINING PROGRAM

## 2025-07-20 PROCEDURE — 6370000000 HC RX 637 (ALT 250 FOR IP): Performed by: NURSE PRACTITIONER

## 2025-07-20 PROCEDURE — 94761 N-INVAS EAR/PLS OXIMETRY MLT: CPT

## 2025-07-20 PROCEDURE — 71045 X-RAY EXAM CHEST 1 VIEW: CPT

## 2025-07-20 PROCEDURE — 2500000003 HC RX 250 WO HCPCS: Performed by: STUDENT IN AN ORGANIZED HEALTH CARE EDUCATION/TRAINING PROGRAM

## 2025-07-20 PROCEDURE — 85027 COMPLETE CBC AUTOMATED: CPT

## 2025-07-20 PROCEDURE — 6370000000 HC RX 637 (ALT 250 FOR IP): Performed by: HOSPITALIST

## 2025-07-20 PROCEDURE — 6360000002 HC RX W HCPCS: Performed by: STUDENT IN AN ORGANIZED HEALTH CARE EDUCATION/TRAINING PROGRAM

## 2025-07-20 PROCEDURE — 94640 AIRWAY INHALATION TREATMENT: CPT

## 2025-07-20 PROCEDURE — 6370000000 HC RX 637 (ALT 250 FOR IP): Performed by: STUDENT IN AN ORGANIZED HEALTH CARE EDUCATION/TRAINING PROGRAM

## 2025-07-20 PROCEDURE — 80053 COMPREHEN METABOLIC PANEL: CPT

## 2025-07-20 PROCEDURE — 2700000000 HC OXYGEN THERAPY PER DAY

## 2025-07-20 RX ORDER — SODIUM CHLORIDE FOR INHALATION 3 %
4 VIAL, NEBULIZER (ML) INHALATION 2 TIMES DAILY
Status: DISCONTINUED | OUTPATIENT
Start: 2025-07-20 | End: 2025-07-29

## 2025-07-20 RX ORDER — LORAZEPAM 0.5 MG/1
0.5 TABLET ORAL EVERY 6 HOURS PRN
Status: DISCONTINUED | OUTPATIENT
Start: 2025-07-20 | End: 2025-07-29

## 2025-07-20 RX ORDER — IPRATROPIUM BROMIDE AND ALBUTEROL SULFATE 2.5; .5 MG/3ML; MG/3ML
1 SOLUTION RESPIRATORY (INHALATION) EVERY 4 HOURS PRN
Status: DISCONTINUED | OUTPATIENT
Start: 2025-07-20 | End: 2025-07-29

## 2025-07-20 RX ORDER — ARFORMOTEROL TARTRATE 15 UG/2ML
15 SOLUTION RESPIRATORY (INHALATION)
Status: DISCONTINUED | OUTPATIENT
Start: 2025-07-20 | End: 2025-07-29

## 2025-07-20 RX ORDER — BUDESONIDE 0.5 MG/2ML
0.5 INHALANT ORAL
Status: DISCONTINUED | OUTPATIENT
Start: 2025-07-20 | End: 2025-07-29

## 2025-07-20 RX ADMIN — SODIUM CHLORIDE, PRESERVATIVE FREE 10 ML: 5 INJECTION INTRAVENOUS at 20:29

## 2025-07-20 RX ADMIN — AMIODARONE HYDROCHLORIDE 400 MG: 200 TABLET ORAL at 20:28

## 2025-07-20 RX ADMIN — METHYLPREDNISOLONE SODIUM SUCCINATE 40 MG: 40 INJECTION, POWDER, LYOPHILIZED, FOR SOLUTION INTRAMUSCULAR; INTRAVENOUS at 06:01

## 2025-07-20 RX ADMIN — PANTOPRAZOLE SODIUM 40 MG: 40 TABLET, DELAYED RELEASE ORAL at 06:00

## 2025-07-20 RX ADMIN — MIDODRINE HYDROCHLORIDE 5 MG: 5 TABLET ORAL at 17:07

## 2025-07-20 RX ADMIN — HYDROCODONE BITARTRATE AND HOMATROPINE METHYLBROMIDE 5 ML: 1.5; 5 SOLUTION ORAL at 23:07

## 2025-07-20 RX ADMIN — IPRATROPIUM BROMIDE AND ALBUTEROL SULFATE 1 DOSE: 2.5; .5 SOLUTION RESPIRATORY (INHALATION) at 07:46

## 2025-07-20 RX ADMIN — BUDESONIDE INHALATION 500 MCG: 0.5 SUSPENSION RESPIRATORY (INHALATION) at 19:37

## 2025-07-20 RX ADMIN — METHYLPREDNISOLONE SODIUM SUCCINATE 40 MG: 40 INJECTION, POWDER, LYOPHILIZED, FOR SOLUTION INTRAMUSCULAR; INTRAVENOUS at 20:28

## 2025-07-20 RX ADMIN — ARFORMOTEROL TARTRATE 15 MCG: 15 SOLUTION RESPIRATORY (INHALATION) at 19:37

## 2025-07-20 RX ADMIN — GUAIFENESIN 1200 MG: 600 TABLET ORAL at 09:24

## 2025-07-20 RX ADMIN — ROSUVASTATIN CALCIUM 40 MG: 20 TABLET, FILM COATED ORAL at 20:28

## 2025-07-20 RX ADMIN — METHYLPREDNISOLONE SODIUM SUCCINATE 40 MG: 40 INJECTION, POWDER, LYOPHILIZED, FOR SOLUTION INTRAMUSCULAR; INTRAVENOUS at 13:50

## 2025-07-20 RX ADMIN — DULOXETINE 60 MG: 20 CAPSULE, DELAYED RELEASE ORAL at 09:23

## 2025-07-20 RX ADMIN — IPRATROPIUM BROMIDE AND ALBUTEROL SULFATE 1 DOSE: 2.5; .5 SOLUTION RESPIRATORY (INHALATION) at 19:37

## 2025-07-20 RX ADMIN — TAMSULOSIN HYDROCHLORIDE 0.4 MG: 0.4 CAPSULE ORAL at 09:24

## 2025-07-20 RX ADMIN — Medication 4 ML: at 19:37

## 2025-07-20 RX ADMIN — Medication 4 ML: at 08:30

## 2025-07-20 RX ADMIN — BUSPIRONE HYDROCHLORIDE 10 MG: 5 TABLET ORAL at 20:28

## 2025-07-20 RX ADMIN — IPRATROPIUM BROMIDE AND ALBUTEROL SULFATE 1 DOSE: 2.5; .5 SOLUTION RESPIRATORY (INHALATION) at 16:51

## 2025-07-20 RX ADMIN — IPRATROPIUM BROMIDE AND ALBUTEROL SULFATE 1 DOSE: 2.5; .5 SOLUTION RESPIRATORY (INHALATION) at 12:48

## 2025-07-20 RX ADMIN — BUDESONIDE INHALATION 500 MCG: 0.5 SUSPENSION RESPIRATORY (INHALATION) at 08:26

## 2025-07-20 RX ADMIN — OXYCODONE 5 MG: 5 TABLET ORAL at 17:07

## 2025-07-20 RX ADMIN — HYDROCODONE BITARTRATE AND HOMATROPINE METHYLBROMIDE 5 ML: 1.5; 5 SOLUTION ORAL at 02:27

## 2025-07-20 RX ADMIN — WATER 1000 MG: 1 INJECTION INTRAMUSCULAR; INTRAVENOUS; SUBCUTANEOUS at 13:48

## 2025-07-20 RX ADMIN — LORAZEPAM 0.5 MG: 0.5 TABLET ORAL at 13:52

## 2025-07-20 RX ADMIN — GUAIFENESIN 1200 MG: 600 TABLET ORAL at 20:28

## 2025-07-20 RX ADMIN — MIDODRINE HYDROCHLORIDE 5 MG: 5 TABLET ORAL at 09:23

## 2025-07-20 RX ADMIN — AZITHROMYCIN MONOHYDRATE 500 MG: 500 INJECTION, POWDER, LYOPHILIZED, FOR SOLUTION INTRAVENOUS at 17:18

## 2025-07-20 RX ADMIN — ARFORMOTEROL TARTRATE 15 MCG: 15 SOLUTION RESPIRATORY (INHALATION) at 08:26

## 2025-07-20 RX ADMIN — SODIUM CHLORIDE, PRESERVATIVE FREE 10 ML: 5 INJECTION INTRAVENOUS at 09:46

## 2025-07-20 RX ADMIN — AMIODARONE HYDROCHLORIDE 400 MG: 200 TABLET ORAL at 09:24

## 2025-07-20 ASSESSMENT — PAIN DESCRIPTION - LOCATION: LOCATION: LEG

## 2025-07-20 ASSESSMENT — PAIN DESCRIPTION - ORIENTATION: ORIENTATION: RIGHT;UPPER

## 2025-07-20 ASSESSMENT — PAIN SCALES - GENERAL: PAINLEVEL_OUTOF10: 8

## 2025-07-20 ASSESSMENT — PAIN DESCRIPTION - DESCRIPTORS: DESCRIPTORS: SHARP

## 2025-07-20 NOTE — PROGRESS NOTES
Still having difficulty with oxygenation. Will add CPT and flutter to see if mucus plugging clearance may help. His PFTs last year were not very impressive for significant COPD and primarily the concern was aspiration. He has chronic LLL atelectasis presumably, whether it opens up intermittently is possible, but looked this was in 2023 as well on CT. If declines further would consider adding BIPAP at least QHS though he was not hypercapnic the positive pressure may help. He is DNR multiple chronic and acute issues.     Tacos Nguyen MD

## 2025-07-20 NOTE — FLOWSHEET NOTE
Patient maintaining oxygen saturation of 100% on heated high flow oxygen at 60Lt and 100% oxygen. RN weaned Airvo to 60 Lt/ 80%, patient maintained his oxygenation to 100%. Patient care ongoing, patient resting in bed respirations even and unlabored.    07/20/25 1015   Oxygen Therapy   SpO2 100 %   O2 Device Heated high flow cannula   O2 Flow Rate (L/min) 60 L/min   FiO2  80 %

## 2025-07-20 NOTE — ICUWATCH
RRT Clinical Rounding Nurse Update    Vitals:    07/20/25 1230 07/20/25 1234 07/20/25 1251 07/20/25 1503   BP:    (!) 108/54   Pulse:   65 62   Resp:   20 16   Temp:    98.2 °F (36.8 °C)   TempSrc:    Oral   SpO2: (!) 85% (!) 88% 95% 95%   Weight:       Height:            DETERIORATION INDEX SCORE: 40    ASSESSMENT:  Previous outreach assessment was reviewed. Patient was initially able to tolerate weaning FiO2, but hours later had refractory hypoxia. Currently 60L/100% FiO2. High risk for decompensation requiring NIV in ICU. At the moment, he appears comfortable with RR in low 20's, unlabored, SpO2 96-97%. Recently received nebulizer treatment and anxiolytic.    PLAN:  Will follow per RRT Clinical Rounding Program protocol.    Armand Grace RN  [facility]: [phone]  [facility]: [phone]

## 2025-07-20 NOTE — PLAN OF CARE
A/Ox4; has needed more oxygen overnight, up to 10L high flow; IV abx; heparin gtt; hycodan added for cough PRN  Problem: Pain  Goal: Verbalizes/displays adequate comfort level or baseline comfort level  Outcome: Progressing     Problem: Skin/Tissue Integrity  Goal: Skin integrity remains intact  Description: 1.  Monitor for areas of redness and/or skin breakdown  2.  Assess vascular access sites hourly  3.  Every 4-6 hours minimum:  Change oxygen saturation probe site  4.  Every 4-6 hours:  If on nasal continuous positive airway pressure, respiratory therapy assess nares and determine need for appliance change or resting period  Outcome: Progressing

## 2025-07-20 NOTE — CONSULTS
History and Physical Initial Visit NOTE           7/20/2025    Jonathan Zurita                        Date of Admission:  7/18/2025    The patient's chart is reviewed and the patient is discussed with the staff.    Subjective:     Patient is a 80 y.o.  male seen and evaluated at the request of Dr. Gould for increasing O2 needs. History of  Afib on eliquis, CLL on ibrutinib, tobacco use and moderate COPD per PFTs in 2024. Uses Wixela, Spiriva and prn Albuterol at home. Hx multiple episodes pneumonia in 2023 and 2024. + dysphagia with suspected aspiration events.  Has seen Dr. Tan in office 6/13/25. Admitted 6/14- 6/20 for pneumonia.    Recently admitted 7/6-7/11 for fall with R femur fracture and repair. Presented to ER on 7/18 with complaints of worsening SOB, chest tightness and cough.He also endorsed R leg pain and increased swelling. CT Chest negative for PE, doppler + for DVT of RLE. He states he has been taking his eliquis, inhalers. Denies any fevers or sick contacts. Having cough but not able to clear secretions. Has been eating a normal diet as OP. He was started on heparin gtt.WBC 17.1, elevated LFTs. Started on ceftriaxone and azithromycin. Overnight went from 7lpm to airvo 60L/100%.    Review of Systems: Comprehensive ROS negative except in HPI    Current Outpatient Medications   Medication Instructions    albuterol (PROVENTIL) 2.5 mg, Nebulization, EVERY 4 HOURS PRN    Albuterol Sulfate, sensor, (PROAIR DIGIHALER) 108 (90 Base) MCG/ACT AEPB 2 puffs, Inhalation, EVERY 4 HOURS PRN, Dx code j44.9    amiodarone (PACERONE) 400 mg, Oral, 2 TIMES DAILY    apixaban (ELIQUIS) 2.5 mg, Oral, 2 TIMES DAILY    aspirin 81 mg, Oral, DAILY    busPIRone (BUSPAR) 10 mg, Oral, 3 TIMES DAILY    DULoxetine (CYMBALTA) 60 mg, DAILY    fluticasone-salmeterol (WIXELA INHUB) 250-50 MCG/ACT AEPB diskus inhaler 1 puff, Inhalation, EVERY 12 HOURS    guaiFENesin (MUCINEX) 1,200 mg, 2 TIMES DAILY     Imbruvica 420 mg, Oral, DAILY    melatonin 3 mg, DAILY    midodrine (PROAMATINE) 5 mg, Oral, 3 TIMES DAILY WITH MEALS, Hold if BP > 140/80    Multiple Vitamins-Minerals (MULTIVITAMIN WITH MINERALS) tablet Once daily OTC    omeprazole (PRILOSEC) 20 mg, Oral, DAILY    rosuvastatin (CRESTOR) 40 mg, Oral, NIGHTLY    sertraline (ZOLOFT) 100 MG tablet 1 tablet, Every Day    sodium chloride, Inhalant, 3 % nebulizer solution 4 mLs, Nebulization, 2 times daily, J44.9    tamsulosin (FLOMAX) 0.4 mg, Oral, DAILY    tiotropium (SPIRIVA RESPIMAT) 5 mcg, Inhalation, DAILY RESP, Dx code j44.9      Past Medical History:   Diagnosis Date    Acute encephalopathy 2023    Hyperlipidemia     Hypertension      Past Surgical History:   Procedure Laterality Date    FEMUR SURGERY Right 2025    FEMUR INTRAMEDULLARY NAIL performed by Lanre Braxton MD at  [facility] OR    UPPER GASTROINTESTINAL ENDOSCOPY N/A 08/15/2023    EGD BIOPSY performed by Javier Moss MD at  ENDOSCOPY     Social History     Socioeconomic History    Marital status: Single     Spouse name: Not on file    Number of children: Not on file    Years of education: Not on file    Highest education level: Not on file   Occupational History    Not on file   Tobacco Use    Smoking status: Former     Current packs/day: 0.00     Average packs/day: 1 pack/day for 26.5 years (26.5 ttl pk-yrs)     Types: Cigarettes     Start date:      Quit date: 2019     Years since quittin.0    Smokeless tobacco: Never   Substance and Sexual Activity    Alcohol use: Yes     Comment: OCC    Drug use: Never    Sexual activity: Not on file   Other Topics Concern    Not on file   Social History Narrative    Not on file     Social Drivers of Health     Financial Resource Strain: Low Risk  (2023)    Overall Financial Resource Strain (CARDIA)     Difficulty of Paying Living Expenses: Not hard at all   Food Insecurity: No Food Insecurity (2025)    Hunger Vital Sign

## 2025-07-20 NOTE — PROGRESS NOTES
At 0230 patient O2 dropped to 80s and would not come up, O2 increased to 10L high flow NC. At 5 am O2 dropped again into 80s and O2 was increased to 15L high flow NC. Respiratory called both times. Airvo started this morning. Dr. Cruz notified, I asked for a consult to [facility], PRN nebs, CXR, and change in level of care. Still waiting on a response at this time, only order placed has been for a nebulizer. Charge nurse notified as well. Patient asked that his daughter is called and updated, call has been made this AM, no answer, voicemail left.    Dee Crisostomo RN

## 2025-07-20 NOTE — PROGRESS NOTES
admission     Hiatal hernia:  Bilateral atelectasis:  - Noted on CT imaging, hernia may be contributing to atelectasis  - Incentive spirometer     Chronic atrial fibrillation/secondary hypercoagulable state:  - Continue home amiodarone  - Reportedly was not taking Eliquis.  Currently on heparin drip.     Chronic hypotension:  -Continue home midodrine     Chronic CLL of B-cell type:  - In remission  - Continue home ibrutinib     Mood disorder:  - Continue home Cymbalta and BuSpar     Hyperlipidemia:  - Continue home Crestor     BPH:  - Continue home Flomax     GERD:  - Continue home PPI    Patient is critically ill.  Without these interventions, there is a high probability of imminent acute organ impairment or life-threatening deterioration.  Total critical care time spent: 37 minutes.      Critical care time includes time spent at bedside performing history and exam, performing chart review, discussing findings and treatment plan with patient and/or family, discussing patient with consultants and colleagues, ordering and reviewing pertinent laboratory and radiographic evaluations, and discussing patient with nursing staff. Time excludes procedures.     Anticipated Discharge Arrangements:   Skilled Nursing Facility        PT/OT evals ordered?  Therapy evals ordered  Diet:  ADULT DIET; Dysphagia - Soft and Bite Sized; No Drinking Straws  ADULT ORAL NUTRITION SUPPLEMENT; Breakfast, Lunch, Dinner; Standard High Calorie/High Protein Oral Supplement  VTE prophylaxis: Heparin  Code status: DNR      Non-peripheral Lines and Tubes (if present):      Urinary Catheter 07/18/25 (Active)        Telemetry (if present):  Cardiac/Telemetry Monitor On: Portable telemetry pack applied        Hospital Problems:  Principal Problem:    COPD exacerbation (HCC)  Active Problems:    Anxiety disorder, unspecified    Chronic lymphocytic leukemia of B-cell type in remission (HCC)    Depression    Hyperlipemia    Coronary artery disease due to  tablet 10 mg  10 mg Oral Nightly    DULoxetine (CYMBALTA) extended release capsule 60 mg  60 mg Oral Daily    guaiFENesin (MUCINEX) extended release tablet 1,200 mg  1,200 mg Oral BID    ibrutinib (IMBRUVICA) chemo tablet 420 mg- patient supplied chemo (Patient Supplied)  420 mg Oral Daily    midodrine (PROAMATINE) tablet 5 mg  5 mg Oral BID    pantoprazole (PROTONIX) tablet 40 mg  40 mg Oral QAM AC    rosuvastatin (CRESTOR) tablet 40 mg  40 mg Oral Nightly    tamsulosin (FLOMAX) capsule 0.4 mg  0.4 mg Oral Daily    ipratropium 0.5 mg-albuterol 2.5 mg (DUONEB) nebulizer solution 1 Dose  1 Dose Inhalation 4x Daily RT    heparin (porcine) injection 5,100 Units  80 Units/kg IntraVENous PRN    heparin (porcine) injection 2,500 Units  40 Units/kg IntraVENous PRN    heparin 25,000 units in dextrose 5% 250 mL (premix) infusion  5-30 Units/kg/hr IntraVENous Continuous       Signed:  Nikia Gould MD    Part of this note may have been written by using a voice dictation software.  The note has been proof read but may still contain some grammatical/other typographical errors.

## 2025-07-20 NOTE — ICUWATCH
RRT Clinical Rounding Nurse Progress Report      SUBJECTIVE: Patient assessed secondary to RN/provider concern - elevated O2 requirements.      Vitals:    07/20/25 0549 07/20/25 0730 07/20/25 0738 07/20/25 0748   BP:   (!) 118/59    Pulse: 62  63 66   Resp: 19 18 20   Temp:   98.2 °F (36.8 °C)    TempSrc:   Oral    SpO2: 91% (!) 88% 92% 92%   Weight:       Height:            DETERIORATION INDEX SCORE: 39    ASSESSMENT:  Patient is resting in bed, alert and oriented, following commands and answering questions appropriately. Unlabored, regular breathing on Airvo 60L/100%. Previously SpO2 borderline 88-90%, however patient recently had multiple nebulized treatments and SpO2 currently %. Breathing appears comfortable, primary RN endorses the same. Patient explains still having mild chest tightness, but improving. Lung sounds have rhonchi, no wheezing at this time. VSS. Wean FiO2 as tolerated. Recommend bedrest with maximal O2 requirements, possibly could get to the chair later today if O2 can be weaned.    PLAN:  Will follow per RRT Clinical Rounding Program protocol.    Armand Grace RN  [facility]: [phone]  [facility]: [phone]

## 2025-07-21 LAB
ALBUMIN SERPL-MCNC: 2.2 G/DL (ref 3.2–4.6)
ALBUMIN/GLOB SERPL: 0.9 (ref 1–1.9)
ALP SERPL-CCNC: 146 U/L (ref 40–129)
ALT SERPL-CCNC: 50 U/L (ref 8–55)
ANION GAP SERPL CALC-SCNC: 9 MMOL/L (ref 7–16)
AST SERPL-CCNC: 62 U/L (ref 15–37)
BILIRUB SERPL-MCNC: 0.8 MG/DL (ref 0–1.2)
BUN SERPL-MCNC: 31 MG/DL (ref 8–23)
CALCIUM SERPL-MCNC: 8.4 MG/DL (ref 8.8–10.2)
CHLORIDE SERPL-SCNC: 102 MMOL/L (ref 98–107)
CO2 SERPL-SCNC: 22 MMOL/L (ref 20–29)
CREAT SERPL-MCNC: 0.64 MG/DL (ref 0.8–1.3)
ERYTHROCYTE [DISTWIDTH] IN BLOOD BY AUTOMATED COUNT: 16.5 % (ref 11.9–14.6)
GLOBULIN SER CALC-MCNC: 2.5 G/DL (ref 2.3–3.5)
GLUCOSE SERPL-MCNC: 133 MG/DL (ref 70–99)
HCT VFR BLD AUTO: 25.4 % (ref 41.1–50.3)
HGB BLD-MCNC: 7.8 G/DL (ref 13.6–17.2)
MCH RBC QN AUTO: 27.4 PG (ref 26.1–32.9)
MCHC RBC AUTO-ENTMCNC: 30.7 G/DL (ref 31.4–35)
MCV RBC AUTO: 89.1 FL (ref 82–102)
NRBC # BLD: 0.03 K/UL (ref 0–0.2)
PLATELET # BLD AUTO: 394 K/UL (ref 150–450)
PMV BLD AUTO: 10 FL (ref 9.4–12.3)
POTASSIUM SERPL-SCNC: 4.6 MMOL/L (ref 3.5–5.1)
PROT SERPL-MCNC: 4.7 G/DL (ref 6.3–8.2)
RBC # BLD AUTO: 2.85 M/UL (ref 4.23–5.6)
SODIUM SERPL-SCNC: 133 MMOL/L (ref 136–145)
UFH PPP CHRO-ACNC: 0.46 IU/ML (ref 0.3–0.7)
WBC # BLD AUTO: 22.8 K/UL (ref 4.3–11.1)

## 2025-07-21 PROCEDURE — 6360000002 HC RX W HCPCS: Performed by: STUDENT IN AN ORGANIZED HEALTH CARE EDUCATION/TRAINING PROGRAM

## 2025-07-21 PROCEDURE — 51701 INSERT BLADDER CATHETER: CPT

## 2025-07-21 PROCEDURE — 2700000000 HC OXYGEN THERAPY PER DAY

## 2025-07-21 PROCEDURE — 6360000002 HC RX W HCPCS: Performed by: HOSPITALIST

## 2025-07-21 PROCEDURE — 6370000000 HC RX 637 (ALT 250 FOR IP): Performed by: NURSE PRACTITIONER

## 2025-07-21 PROCEDURE — 6370000000 HC RX 637 (ALT 250 FOR IP): Performed by: STUDENT IN AN ORGANIZED HEALTH CARE EDUCATION/TRAINING PROGRAM

## 2025-07-21 PROCEDURE — 2500000003 HC RX 250 WO HCPCS: Performed by: STUDENT IN AN ORGANIZED HEALTH CARE EDUCATION/TRAINING PROGRAM

## 2025-07-21 PROCEDURE — 2580000003 HC RX 258: Performed by: STUDENT IN AN ORGANIZED HEALTH CARE EDUCATION/TRAINING PROGRAM

## 2025-07-21 PROCEDURE — 85027 COMPLETE CBC AUTOMATED: CPT

## 2025-07-21 PROCEDURE — 92526 ORAL FUNCTION THERAPY: CPT

## 2025-07-21 PROCEDURE — 94640 AIRWAY INHALATION TREATMENT: CPT

## 2025-07-21 PROCEDURE — 92610 EVALUATE SWALLOWING FUNCTION: CPT

## 2025-07-21 PROCEDURE — 36415 COLL VENOUS BLD VENIPUNCTURE: CPT

## 2025-07-21 PROCEDURE — 94667 MNPJ CHEST WALL 1ST: CPT

## 2025-07-21 PROCEDURE — 99233 SBSQ HOSP IP/OBS HIGH 50: CPT | Performed by: INTERNAL MEDICINE

## 2025-07-21 PROCEDURE — 51798 US URINE CAPACITY MEASURE: CPT

## 2025-07-21 PROCEDURE — 94761 N-INVAS EAR/PLS OXIMETRY MLT: CPT

## 2025-07-21 PROCEDURE — 2580000003 HC RX 258: Performed by: HOSPITALIST

## 2025-07-21 PROCEDURE — 85520 HEPARIN ASSAY: CPT

## 2025-07-21 PROCEDURE — 6360000002 HC RX W HCPCS: Performed by: INTERNAL MEDICINE

## 2025-07-21 PROCEDURE — 80053 COMPREHEN METABOLIC PANEL: CPT

## 2025-07-21 PROCEDURE — 2060000000 HC ICU INTERMEDIATE R&B

## 2025-07-21 PROCEDURE — 6370000000 HC RX 637 (ALT 250 FOR IP): Performed by: INTERNAL MEDICINE

## 2025-07-21 RX ORDER — MENTHOL/CAMPHOR/ALLANTOIN/PHE 0.6-0.5-1%
OINTMENT(EA) TOPICAL PRN
Status: DISCONTINUED | OUTPATIENT
Start: 2025-07-21 | End: 2025-07-30 | Stop reason: HOSPADM

## 2025-07-21 RX ORDER — METOCLOPRAMIDE HYDROCHLORIDE 5 MG/ML
5 INJECTION INTRAMUSCULAR; INTRAVENOUS 3 TIMES DAILY
Status: DISCONTINUED | OUTPATIENT
Start: 2025-07-21 | End: 2025-07-28

## 2025-07-21 RX ORDER — SODIUM CHLORIDE 9 MG/ML
INJECTION, SOLUTION INTRAVENOUS CONTINUOUS
Status: ACTIVE | OUTPATIENT
Start: 2025-07-21 | End: 2025-07-22

## 2025-07-21 RX ADMIN — PIPERACILLIN AND TAZOBACTAM 4500 MG: 4; .5 INJECTION, POWDER, LYOPHILIZED, FOR SOLUTION INTRAVENOUS at 10:50

## 2025-07-21 RX ADMIN — PIPERACILLIN AND TAZOBACTAM 3375 MG: 3; .375 INJECTION, POWDER, FOR SOLUTION INTRAVENOUS at 23:57

## 2025-07-21 RX ADMIN — BUDESONIDE INHALATION 500 MCG: 0.5 SUSPENSION RESPIRATORY (INHALATION) at 07:36

## 2025-07-21 RX ADMIN — Medication 7 G: at 11:02

## 2025-07-21 RX ADMIN — HYDROCODONE BITARTRATE AND HOMATROPINE METHYLBROMIDE 5 ML: 1.5; 5 SOLUTION ORAL at 18:14

## 2025-07-21 RX ADMIN — METOCLOPRAMIDE 5 MG: 5 INJECTION, SOLUTION INTRAMUSCULAR; INTRAVENOUS at 20:18

## 2025-07-21 RX ADMIN — PIPERACILLIN AND TAZOBACTAM 3375 MG: 3; .375 INJECTION, POWDER, FOR SOLUTION INTRAVENOUS at 15:38

## 2025-07-21 RX ADMIN — SODIUM CHLORIDE: 0.9 INJECTION, SOLUTION INTRAVENOUS at 14:47

## 2025-07-21 RX ADMIN — HYDROCODONE BITARTRATE AND HOMATROPINE METHYLBROMIDE 5 ML: 1.5; 5 SOLUTION ORAL at 09:44

## 2025-07-21 RX ADMIN — HYDROCODONE BITARTRATE AND HOMATROPINE METHYLBROMIDE 5 ML: 1.5; 5 SOLUTION ORAL at 22:50

## 2025-07-21 RX ADMIN — SODIUM CHLORIDE, PRESERVATIVE FREE 10 ML: 5 INJECTION INTRAVENOUS at 20:25

## 2025-07-21 RX ADMIN — Medication 4 ML: at 20:23

## 2025-07-21 RX ADMIN — IPRATROPIUM BROMIDE AND ALBUTEROL SULFATE 1 DOSE: 2.5; .5 SOLUTION RESPIRATORY (INHALATION) at 07:36

## 2025-07-21 RX ADMIN — ARFORMOTEROL TARTRATE 15 MCG: 15 SOLUTION RESPIRATORY (INHALATION) at 07:36

## 2025-07-21 RX ADMIN — IPRATROPIUM BROMIDE AND ALBUTEROL SULFATE 1 DOSE: 2.5; .5 SOLUTION RESPIRATORY (INHALATION) at 11:34

## 2025-07-21 RX ADMIN — Medication 4 ML: at 07:36

## 2025-07-21 RX ADMIN — METHYLPREDNISOLONE SODIUM SUCCINATE 40 MG: 40 INJECTION, POWDER, LYOPHILIZED, FOR SOLUTION INTRAMUSCULAR; INTRAVENOUS at 14:41

## 2025-07-21 RX ADMIN — IPRATROPIUM BROMIDE AND ALBUTEROL SULFATE 1 DOSE: 2.5; .5 SOLUTION RESPIRATORY (INHALATION) at 20:23

## 2025-07-21 RX ADMIN — METHYLPREDNISOLONE SODIUM SUCCINATE 40 MG: 40 INJECTION, POWDER, LYOPHILIZED, FOR SOLUTION INTRAMUSCULAR; INTRAVENOUS at 05:23

## 2025-07-21 RX ADMIN — METOCLOPRAMIDE 5 MG: 5 INJECTION, SOLUTION INTRAMUSCULAR; INTRAVENOUS at 15:36

## 2025-07-21 RX ADMIN — METOCLOPRAMIDE 5 MG: 5 INJECTION, SOLUTION INTRAMUSCULAR; INTRAVENOUS at 11:42

## 2025-07-21 RX ADMIN — ARFORMOTEROL TARTRATE 15 MCG: 15 SOLUTION RESPIRATORY (INHALATION) at 20:23

## 2025-07-21 RX ADMIN — PANTOPRAZOLE SODIUM 40 MG: 40 TABLET, DELAYED RELEASE ORAL at 05:23

## 2025-07-21 RX ADMIN — BUDESONIDE INHALATION 500 MCG: 0.5 SUSPENSION RESPIRATORY (INHALATION) at 20:23

## 2025-07-21 RX ADMIN — METHYLPREDNISOLONE SODIUM SUCCINATE 40 MG: 40 INJECTION, POWDER, LYOPHILIZED, FOR SOLUTION INTRAMUSCULAR; INTRAVENOUS at 20:18

## 2025-07-21 RX ADMIN — HYDROCODONE BITARTRATE AND HOMATROPINE METHYLBROMIDE 5 ML: 1.5; 5 SOLUTION ORAL at 05:23

## 2025-07-21 RX ADMIN — SODIUM CHLORIDE, PRESERVATIVE FREE 10 ML: 5 INJECTION INTRAVENOUS at 11:08

## 2025-07-21 RX ADMIN — HEPARIN SODIUM 15 UNITS/KG/HR: 10000 INJECTION, SOLUTION INTRAVENOUS at 00:21

## 2025-07-21 RX ADMIN — IPRATROPIUM BROMIDE AND ALBUTEROL SULFATE 1 DOSE: 2.5; .5 SOLUTION RESPIRATORY (INHALATION) at 15:22

## 2025-07-21 NOTE — ICUWATCH
RRT Clinical Rounding Nurse Update    Vitals:    07/21/25 1135 07/21/25 1215 07/21/25 1507 07/21/25 1523   BP:   110/62    Pulse: 60 61 54 62   Resp: 18  18 18   Temp:   98.2 °F (36.8 °C)    TempSrc:   Oral    SpO2: 94%  100% 97%   Weight:       Height:            DETERIORATION INDEX SCORE: 41    ASSESSMENT:  Previous outreach assessment was reviewed. There have been no significant changes since previous assessment.    PLAN:  Will follow per RRT Clinical Rounding Program protocol.    Rajan Grigsby RN  [facility]: [phone]  [facility]: [phone]

## 2025-07-21 NOTE — WOUND CARE
Patient seen by wound care for LUE skin tear and right hip incisions.    Patient lying supine, HOB up greater than 45 degrees, alert and awake.     LUE skin tears superficial, minimal sanguinous drainage, 90% flap approximation. I recommend covering with Vaseline gauze & dry sterile securing with conforming gauze roll and tape changing 3 x weekly and prn.         Right hip / leg incisions: There was some drainage, sanguinous, scant noted to the right proximal hip incision. Caregiver states worse at rehab and when first admitted. Right femur and right knee incisions with no drainage. Despite this the incision are 100% approximated, staples intact, minimal edema, no erythema or warmth or tenderness. I recommend covering with silicone foam borders and changing 3 x weekly.                 All wound care performed using clean technique according to recommendations - patient tolerated well.

## 2025-07-21 NOTE — ICUWATCH
RRT Clinical Rounding Nurse Progress Report      SUBJECTIVE: Patient assessed secondary to RN/provider concern - Increased O2 demands.      Vitals:    07/21/25 0236 07/21/25 0315 07/21/25 0520 07/21/25 0530   BP: 102/61      Pulse: 63 57     Resp: 20 20     Temp: 97.9 °F (36.6 °C)      TempSrc: Oral      SpO2: 92% 95% (!) 76% 93%   Weight:       Height:            DETERIORATION INDEX SCORE: 47    ASSESSMENT:  Upon arrival to room, pt in bed awake with family at bedside. Pt A&Ox4 and following commands. Respirations even and shallow, expresses dyspnea with exertion, bilateral inspiratory wheezing present. Frequent, nonproductive cough.     PLAN:  Will follow per RRT Clinical Rounding Program protocol.    Joan Guerin RN  [facility]: [phone]  [facility]: [phone]

## 2025-07-21 NOTE — PROGRESS NOTES
Patient's spO2 76% on Airvo 60L 85% after repositioning in bed and persistent coughing episode. Pt states \"it feels like it is stuck and can't get it up\". Airvo increased to 60L, 100%. Hycodan PO given. Patient states that it helped earlier. spO2 recovered to 93% 10 minutes after increasing O2.

## 2025-07-21 NOTE — PROGRESS NOTES
Hospitalist Progress Note   Admit Date:  2025 11:53 AM   Name:  Jonathan Zurita   Age:  80 y.o.  Sex:  male  :  1945   MRN:  [id number]   Room:  [facility]/    Presenting/Chief Complaint: Shortness of Breath     Reason(s) for Admission: COPD exacerbation (HCC) [J44.1]  Acute respiratory failure with hypoxia (HCC) [J96.01]  Edema of right lower extremity [R60.0]  Leukocytosis, unspecified type [D72.829]     Hospital Course:   Jonathan Zurita is a 80 y.o. male with medical history of  atrial fibrillation, CLL, COPD, heart failure with preserved ejection fraction, anxiety, depression, hyperlipidemia, CVA, recent hospitalization status post fall and right femur fracture status post repair who presented with worsening shortness of breath and chest tightness that has been progressively worsening over the past week.  Patient endorses cough but denies increased sputum production.  He was requiring nasal cannula oxygen at rehab facility.  He also endorses right knee pain and right lower extremity swelling that has persisted since his right femur fracture surgery.     On arrival to the ED patient hypoxic with ORN510 that improved with high flow nasal cannula.  Notable labs include bicarb 19, albumin 2.8, alk phos 187, ALT 84, AST 72, WBC 17.1, hemoglobin 8.8 (stable from recent hospital discharge).  ABG shows pH 7.45, PCO2 32, bicarb 22, PO2 68.  Blood cultures obtained in ED.  UA nitrite negative, leukocyte esterase small, 10-20 WBCs, trace bacteria.  Chest x-ray shows opacity in left lung base.  CT chest PE protocol shows no PE and left lower lobe atelectasis.  COVID-19 negative.  Patient given Solu-Medrol, DuoNeb, Rocephin in ED. x-ray of the right knee shows soft tissue swelling likely secondary to trauma.  Ultrasound of the right lower extremity shows DVT.  Patient started on heparin drip.      Subjective & 24hr Events:   Patient is still having shortness of breath still needing Airvo 60 L 90%  José Miguel King(Attending)

## 2025-07-21 NOTE — ICUWATCH
RRT Clinical Rounding Nurse Update    Vitals:    07/21/25 0530 07/21/25 0712 07/21/25 0724 07/21/25 0735   BP:  (!) 109/54     Pulse:  61 63 91   Resp:  16  19   Temp:  98.1 °F (36.7 °C)     TempSrc:       SpO2: 93% 97%  96%   Weight:       Height:            DETERIORATION INDEX SCORE: 43    ASSESSMENT:  Previous outreach assessment was reviewed. patient with several events of desaturation last night. During bed change. Patient with poor oral intake. Patient remains on AIRVO 60 L 90%. Patient with coarse crackles bilaterally. Family at bedside states patient does not tolerate much movement. Follow up made with primary RN. Patient may benefit from PRN BiPAP with acute events of desaturation. However, patient has not tolerated BiPAP therapy in the past. No acute needs during assessment. Chart reviewed.     PLAN:  Will follow per RRT Clinical Rounding Program protocol.    Rajan Grigsby RN  [facility]: [phone]  [facility]: [phone]

## 2025-07-21 NOTE — PROGRESS NOTES
GOALS:  LTG: Patient will maintain adequate hydration/nutrition with optimum safety and efficiency of swallowing function with PO intake without overt signs or symptoms of aspiration for the highest appropriate diet level.  STG: NEW 25  Patient will consume sips of water via cup and ice chips via spoon without overt signs or symptoms of airway compromise.  Patient will perform chin tuck and small sips to improve swallow safety with minimal cues 90% of the time.  Patient will safely ingest diet trials during therapeutic feedings with SLP without overt signs or symptoms of respiratory compromise in efforts to advance diet.  Patient will complete a Fiberoptic Endoscopic Evaluation of the Swallow to fully assess physiology and anatomy of the swallow and determine safest appropriate diet and/or rehabilitation strategies, as medically indicated.    SPEECH LANGUAGE PATHOLOGY: DYSPHAGIA Daily Note #1 and Re-evaluation    Acknowledge Order  I  Therapy Time  I   Charges     I  Rehab Caseload Tracker      NAME: Jonathan Zurita  : 1945  MRN: [id number]    ADMISSION DATE: 2025  PRIMARY DIAGNOSIS: COPD exacerbation (HCC)    ICD-10: Treatment Diagnosis: R13.12 Dysphagia, Oropharyngeal Phase    RECOMMENDATIONS   Diet:    NPO for solids  Thin Liquids via cup  Ice chips via spoon     Medication: non-oral   Compensatory Swallowing Strategies:   Slow rate of intake  No straws  Remain upright for 30-45 minutes after meals  Small bites/sips  Upright as possible for all oral intake  Chin tuck    Therapeutic Intervention:   Patient/family education  Instrumental swallow assessment  Dysphagia treatment  Consider referral to GI   Patient continues to require skilled intervention:  Yes. Recommend ongoing speech therapy services during this hospitalization.     Anticipated Discharge Needs: Ongoing speech therapy is recommended at next level of care.      ASSESSMENT    Patient presents with weak, delayed cough on 2/4 trials

## 2025-07-21 NOTE — PROGRESS NOTES
Daily Progress Note: 7/21/2025    Jonathan Zurita  Admission Date: 7/18/2025     Length of Stay: 2 days      Background:  Patient is a 80 y.o.  male seen and evaluated at the request of Dr. Gould for increasing O2 needs. History of  Afib on eliquis, CLL on ibrutinib, tobacco use and mild COPD per PFTs in 2024. Uses Wixela, Spiriva and prn Albuterol at home. Hx multiple episodes pneumonia in 2023 and 2024. + dysphagia with suspected aspiration events.  Has seen Dr. Tan in office 6/13/25. Admitted 6/14- 6/20 for pneumonia.  Recently admitted 7/6-7/11 for fall with R femur fracture and repair. Presented to ER on 7/18 with complaints of worsening SOB, chest tightness and cough.He also endorsed R leg pain and increased swelling. CT Chest this admission was negative for PE, doppler + for DVT of RLE. He states he has been taking his eliquis, inhalers. Having cough but not able to clear secretions. Has been eating a normal diet as OP. He was started on heparin gtt. WBC 17.1, elevated LFTs. Started on ceftriaxone and azithromycin. Overnight went from 7lpm to airvo 60L/100%.  Frequent ED visits. Previous CT scan showing B patulous esophagus with large amount of debris within the esophageal lumenconsolidation concerning for aspiration. sST saw him and recommended soft, bite sized, thin. EGD was done in 2023    Added CPT and flutter to see if mucus plugging clearance may help. His PFTs last year were not very impressive for significant COPD and primarily the concern was aspiration. He has chronic LLL atelectasis presumably, whether it opens up intermittently is possible, but looked this was in 2023 as well on CT.   Subjective:     On 90% FiO2/60 lpm. Sat 100%, was walking short distances at rehab with walker,  Family is at bedside.     EGD in 2023 with: findings long segments barretts, hital hernia, nonerosive gastritis. Recommended that he follow up in 1 year (2024 not done). ST consulted again this

## 2025-07-21 NOTE — ICUWATCH
RRT Clinical Rounding Nurse Update    Vitals:    07/21/25 0236 07/21/25 0315 07/21/25 0520 07/21/25 0530   BP: 102/61      Pulse: 63 57     Resp: 20 20     Temp: 97.9 °F (36.6 °C)      TempSrc: Oral      SpO2: 92% 95% (!) 76% 93%   Weight:       Height:            DETERIORATION INDEX SCORE: 47    ASSESSMENT:  Previous outreach assessment was reviewed. Continues to have episodes of increased work of breathing and decreased O2 sats, but recovering. Currently on 60L/100% airvo.    PLAN:  Will follow per RRT Clinical Rounding Program protocol.    Joan Guerin RN  [place]: [phone]  [place]: [phone]

## 2025-07-21 NOTE — PROGRESS NOTES
Progress Note    Patient: Jonathan Zurita MRN: [id number]  SSN: [id number]    YOB: 1945  Age: 80 y.o.  Sex: male      Admit Date: 7/18/2025    LOS: 2 days     Subjective:     Doing okay. Notes he has a sore throat.  States leg is sore as well. Says it feels \"heavy\"    Objective:     Vitals:    07/21/25 0735 07/21/25 1059 07/21/25 1135 07/21/25 1215   BP:  (!) 103/50     Pulse: 91 59 60 61   Resp: 19 20 18    Temp:  98.4 °F (36.9 °C)     TempSrc:  Oral     SpO2: 96% 93% 94%    Weight:       Height:            Intake and Output:  Current Shift: 07/21 0701 - 07/21 1900  In: 60 [P.O.:60]  Out: -   Last three shifts: 07/19 1901 - 07/21 0700  In: 50 [P.O.:50]  Out: 820 [Urine:820]    alert and oriented to person place time and situation    Right Lower Extremity  - proximal dressing with some mild drainage. No erythema  - Sensation: SILT Sa/Augutsine/T/SP/DP   - Motor: 5/5 TA/EHL/FHL/GS   - Digits warm, well-perfused, brisk cap refill    Lab/Data Review:  Recent Labs     07/21/25  0423   HGB 7.8*   HCT 25.4*          Assessment:     ORTHO INJURIES:  Right closed displaced intertrochanteric/peritrochanteric proximal femur fracture     ORTHO PROCEDURES:  [facility] 7/7  Open treatment of right peritrochanteric/enteroenteric proximal femur fracture with intramedullary nail fixation   Plan:     Doing well from RLE standpoint. Leave staples today, will re-eval on Wednesday.  Wound care:   change dressing only as needed: xeroform or adaptec, 4x4 gauze, secure with aquacel, medipore tape, or tegaderm.   Multimodal pain control per orders   WB status: WBAT RLE  PT/OT for mobility/ADLs   Diet:  Diet NPO Exceptions are: Ice Chips, Other (Specify); Specify Other Exceptions: small sips of water via cup, non-oral meds please!  DVT prophylaxis: Defer to primary team    Dispo: pending    F/up w/ [facility] in 4 weeks    Signed By: ASHLY Carroll     July 21, 2025

## 2025-07-22 ENCOUNTER — APPOINTMENT (OUTPATIENT)
Dept: GENERAL RADIOLOGY | Age: 80
DRG: 190 | End: 2025-07-22
Payer: OTHER GOVERNMENT

## 2025-07-22 ENCOUNTER — APPOINTMENT (OUTPATIENT)
Dept: NON INVASIVE DIAGNOSTICS | Age: 80
DRG: 190 | End: 2025-07-22
Attending: INTERNAL MEDICINE
Payer: OTHER GOVERNMENT

## 2025-07-22 LAB
ALBUMIN SERPL-MCNC: 2.1 G/DL (ref 3.2–4.6)
ALBUMIN/GLOB SERPL: 0.8 (ref 1–1.9)
ALP SERPL-CCNC: 140 U/L (ref 40–129)
ALT SERPL-CCNC: 45 U/L (ref 8–55)
ANION GAP SERPL CALC-SCNC: 9 MMOL/L (ref 7–16)
AST SERPL-CCNC: 62 U/L (ref 15–37)
BILIRUB SERPL-MCNC: 0.8 MG/DL (ref 0–1.2)
BUN SERPL-MCNC: 27 MG/DL (ref 8–23)
CALCIUM SERPL-MCNC: 8.2 MG/DL (ref 8.8–10.2)
CHLORIDE SERPL-SCNC: 101 MMOL/L (ref 98–107)
CO2 SERPL-SCNC: 22 MMOL/L (ref 20–29)
CREAT SERPL-MCNC: 0.74 MG/DL (ref 0.8–1.3)
ECHO BSA: 1.67 M2
ECHO LV EF PHYSICIAN: 63 %
EKG ATRIAL RATE: 53 BPM
EKG DIAGNOSIS: NORMAL
EKG P AXIS: 58 DEGREES
EKG P-R INTERVAL: 154 MS
EKG Q-T INTERVAL: 414 MS
EKG QRS DURATION: 90 MS
EKG QTC CALCULATION (BAZETT): 388 MS
EKG R AXIS: 52 DEGREES
EKG T AXIS: 77 DEGREES
EKG VENTRICULAR RATE: 53 BPM
ERYTHROCYTE [DISTWIDTH] IN BLOOD BY AUTOMATED COUNT: 16.3 % (ref 11.9–14.6)
GLOBULIN SER CALC-MCNC: 2.8 G/DL (ref 2.3–3.5)
GLUCOSE SERPL-MCNC: 146 MG/DL (ref 70–99)
HCT VFR BLD AUTO: 27.1 % (ref 41.1–50.3)
HGB BLD-MCNC: 8.3 G/DL (ref 13.6–17.2)
MCH RBC QN AUTO: 27.7 PG (ref 26.1–32.9)
MCHC RBC AUTO-ENTMCNC: 30.6 G/DL (ref 31.4–35)
MCV RBC AUTO: 90.3 FL (ref 82–102)
NRBC # BLD: 0.03 K/UL (ref 0–0.2)
NT PRO BNP: 448 PG/ML (ref 0–450)
PLATELET # BLD AUTO: 401 K/UL (ref 150–450)
PMV BLD AUTO: 10.2 FL (ref 9.4–12.3)
POTASSIUM SERPL-SCNC: 4.6 MMOL/L (ref 3.5–5.1)
PROT SERPL-MCNC: 5 G/DL (ref 6.3–8.2)
RBC # BLD AUTO: 3 M/UL (ref 4.23–5.6)
SODIUM SERPL-SCNC: 132 MMOL/L (ref 136–145)
TROPONIN T SERPL HS-MCNC: 175 NG/L (ref 0–22)
TROPONIN T SERPL HS-MCNC: 178 NG/L (ref 0–22)
TROPONIN T SERPL HS-MCNC: 208 NG/L (ref 0–22)
UFH PPP CHRO-ACNC: 0.34 IU/ML (ref 0.3–0.7)
WBC # BLD AUTO: 19.2 K/UL (ref 4.3–11.1)

## 2025-07-22 PROCEDURE — 6370000000 HC RX 637 (ALT 250 FOR IP): Performed by: NURSE PRACTITIONER

## 2025-07-22 PROCEDURE — 2500000003 HC RX 250 WO HCPCS: Performed by: STUDENT IN AN ORGANIZED HEALTH CARE EDUCATION/TRAINING PROGRAM

## 2025-07-22 PROCEDURE — 97530 THERAPEUTIC ACTIVITIES: CPT

## 2025-07-22 PROCEDURE — 2580000003 HC RX 258: Performed by: HOSPITALIST

## 2025-07-22 PROCEDURE — 94640 AIRWAY INHALATION TREATMENT: CPT

## 2025-07-22 PROCEDURE — 93010 ELECTROCARDIOGRAM REPORT: CPT | Performed by: INTERNAL MEDICINE

## 2025-07-22 PROCEDURE — 2500000003 HC RX 250 WO HCPCS: Performed by: NURSE PRACTITIONER

## 2025-07-22 PROCEDURE — 94660 CPAP INITIATION&MGMT: CPT

## 2025-07-22 PROCEDURE — 6370000000 HC RX 637 (ALT 250 FOR IP): Performed by: HOSPITALIST

## 2025-07-22 PROCEDURE — 6360000002 HC RX W HCPCS: Performed by: NURSE PRACTITIONER

## 2025-07-22 PROCEDURE — 2700000000 HC OXYGEN THERAPY PER DAY

## 2025-07-22 PROCEDURE — 6360000002 HC RX W HCPCS: Performed by: INTERNAL MEDICINE

## 2025-07-22 PROCEDURE — 6370000000 HC RX 637 (ALT 250 FOR IP): Performed by: INTERNAL MEDICINE

## 2025-07-22 PROCEDURE — 36415 COLL VENOUS BLD VENIPUNCTURE: CPT

## 2025-07-22 PROCEDURE — 83880 ASSAY OF NATRIURETIC PEPTIDE: CPT

## 2025-07-22 PROCEDURE — 51798 US URINE CAPACITY MEASURE: CPT

## 2025-07-22 PROCEDURE — 85027 COMPLETE CBC AUTOMATED: CPT

## 2025-07-22 PROCEDURE — 85520 HEPARIN ASSAY: CPT

## 2025-07-22 PROCEDURE — 94669 MECHANICAL CHEST WALL OSCILL: CPT

## 2025-07-22 PROCEDURE — 80053 COMPREHEN METABOLIC PANEL: CPT

## 2025-07-22 PROCEDURE — 94761 N-INVAS EAR/PLS OXIMETRY MLT: CPT

## 2025-07-22 PROCEDURE — 5A09357 ASSISTANCE WITH RESPIRATORY VENTILATION, LESS THAN 24 CONSECUTIVE HOURS, CONTINUOUS POSITIVE AIRWAY PRESSURE: ICD-10-PCS

## 2025-07-22 PROCEDURE — 2500000003 HC RX 250 WO HCPCS: Performed by: INTERNAL MEDICINE

## 2025-07-22 PROCEDURE — 6360000002 HC RX W HCPCS: Performed by: STUDENT IN AN ORGANIZED HEALTH CARE EDUCATION/TRAINING PROGRAM

## 2025-07-22 PROCEDURE — 93005 ELECTROCARDIOGRAM TRACING: CPT | Performed by: INTERNAL MEDICINE

## 2025-07-22 PROCEDURE — 2060000000 HC ICU INTERMEDIATE R&B

## 2025-07-22 PROCEDURE — 99232 SBSQ HOSP IP/OBS MODERATE 35: CPT | Performed by: INTERNAL MEDICINE

## 2025-07-22 PROCEDURE — 93308 TTE F-UP OR LMTD: CPT

## 2025-07-22 PROCEDURE — 6360000002 HC RX W HCPCS: Performed by: HOSPITALIST

## 2025-07-22 PROCEDURE — 93308 TTE F-UP OR LMTD: CPT | Performed by: INTERNAL MEDICINE

## 2025-07-22 PROCEDURE — 51702 INSERT TEMP BLADDER CATH: CPT

## 2025-07-22 PROCEDURE — 84484 ASSAY OF TROPONIN QUANT: CPT

## 2025-07-22 PROCEDURE — 71045 X-RAY EXAM CHEST 1 VIEW: CPT

## 2025-07-22 PROCEDURE — 6370000000 HC RX 637 (ALT 250 FOR IP): Performed by: STUDENT IN AN ORGANIZED HEALTH CARE EDUCATION/TRAINING PROGRAM

## 2025-07-22 PROCEDURE — 2580000003 HC RX 258: Performed by: STUDENT IN AN ORGANIZED HEALTH CARE EDUCATION/TRAINING PROGRAM

## 2025-07-22 RX ORDER — MORPHINE SULFATE 2 MG/ML
1 INJECTION, SOLUTION INTRAMUSCULAR; INTRAVENOUS
Status: DISCONTINUED | OUTPATIENT
Start: 2025-07-22 | End: 2025-07-22

## 2025-07-22 RX ORDER — MORPHINE SULFATE 2 MG/ML
1 INJECTION, SOLUTION INTRAMUSCULAR; INTRAVENOUS EVERY 4 HOURS PRN
Status: DISCONTINUED | OUTPATIENT
Start: 2025-07-22 | End: 2025-07-28

## 2025-07-22 RX ADMIN — PIPERACILLIN AND TAZOBACTAM 3375 MG: 3; .375 INJECTION, POWDER, FOR SOLUTION INTRAVENOUS at 16:46

## 2025-07-22 RX ADMIN — PIPERACILLIN AND TAZOBACTAM 3375 MG: 3; .375 INJECTION, POWDER, FOR SOLUTION INTRAVENOUS at 09:21

## 2025-07-22 RX ADMIN — IPRATROPIUM BROMIDE AND ALBUTEROL SULFATE 1 DOSE: 2.5; .5 SOLUTION RESPIRATORY (INHALATION) at 15:40

## 2025-07-22 RX ADMIN — IPRATROPIUM BROMIDE AND ALBUTEROL SULFATE 1 DOSE: 2.5; .5 SOLUTION RESPIRATORY (INHALATION) at 19:39

## 2025-07-22 RX ADMIN — MORPHINE SULFATE 1 MG: 2 INJECTION, SOLUTION INTRAMUSCULAR; INTRAVENOUS at 02:46

## 2025-07-22 RX ADMIN — METOCLOPRAMIDE 5 MG: 5 INJECTION, SOLUTION INTRAMUSCULAR; INTRAVENOUS at 21:44

## 2025-07-22 RX ADMIN — MORPHINE SULFATE 1 MG: 2 INJECTION, SOLUTION INTRAMUSCULAR; INTRAVENOUS at 12:08

## 2025-07-22 RX ADMIN — IPRATROPIUM BROMIDE AND ALBUTEROL SULFATE 1 DOSE: 2.5; .5 SOLUTION RESPIRATORY (INHALATION) at 08:00

## 2025-07-22 RX ADMIN — IPRATROPIUM BROMIDE AND ALBUTEROL SULFATE 1 DOSE: 2.5; .5 SOLUTION RESPIRATORY (INHALATION) at 01:31

## 2025-07-22 RX ADMIN — HEPARIN SODIUM 15 UNITS/KG/HR: 10000 INJECTION, SOLUTION INTRAVENOUS at 03:57

## 2025-07-22 RX ADMIN — BUDESONIDE INHALATION 500 MCG: 0.5 SUSPENSION RESPIRATORY (INHALATION) at 08:00

## 2025-07-22 RX ADMIN — SODIUM CHLORIDE, PRESERVATIVE FREE 10 ML: 5 INJECTION INTRAVENOUS at 09:24

## 2025-07-22 RX ADMIN — SODIUM CHLORIDE, PRESERVATIVE FREE 10 ML: 5 INJECTION INTRAVENOUS at 21:44

## 2025-07-22 RX ADMIN — Medication 4 ML: at 08:00

## 2025-07-22 RX ADMIN — BUDESONIDE INHALATION 500 MCG: 0.5 SUSPENSION RESPIRATORY (INHALATION) at 19:39

## 2025-07-22 RX ADMIN — IPRATROPIUM BROMIDE AND ALBUTEROL SULFATE 1 DOSE: 2.5; .5 SOLUTION RESPIRATORY (INHALATION) at 11:19

## 2025-07-22 RX ADMIN — HYDROCODONE BITARTRATE AND HOMATROPINE METHYLBROMIDE 5 ML: 1.5; 5 SOLUTION ORAL at 02:18

## 2025-07-22 RX ADMIN — PIPERACILLIN AND TAZOBACTAM 3375 MG: 3; .375 INJECTION, POWDER, FOR SOLUTION INTRAVENOUS at 23:55

## 2025-07-22 RX ADMIN — LORAZEPAM 0.5 MG: 0.5 TABLET ORAL at 01:33

## 2025-07-22 RX ADMIN — METOCLOPRAMIDE 5 MG: 5 INJECTION, SOLUTION INTRAMUSCULAR; INTRAVENOUS at 09:16

## 2025-07-22 RX ADMIN — METOCLOPRAMIDE 5 MG: 5 INJECTION, SOLUTION INTRAMUSCULAR; INTRAVENOUS at 16:38

## 2025-07-22 RX ADMIN — METHYLPREDNISOLONE SODIUM SUCCINATE 40 MG: 40 INJECTION, POWDER, LYOPHILIZED, FOR SOLUTION INTRAMUSCULAR; INTRAVENOUS at 06:19

## 2025-07-22 RX ADMIN — ARFORMOTEROL TARTRATE 15 MCG: 15 SOLUTION RESPIRATORY (INHALATION) at 08:00

## 2025-07-22 RX ADMIN — MORPHINE SULFATE 1 MG: 2 INJECTION, SOLUTION INTRAMUSCULAR; INTRAVENOUS at 15:25

## 2025-07-22 RX ADMIN — HYDROCODONE BITARTRATE AND HOMATROPINE METHYLBROMIDE 5 ML: 1.5; 5 SOLUTION ORAL at 21:42

## 2025-07-22 RX ADMIN — ARFORMOTEROL TARTRATE 15 MCG: 15 SOLUTION RESPIRATORY (INHALATION) at 19:40

## 2025-07-22 RX ADMIN — METHYLPREDNISOLONE SODIUM SUCCINATE 40 MG: 40 INJECTION, POWDER, LYOPHILIZED, FOR SOLUTION INTRAMUSCULAR; INTRAVENOUS at 16:38

## 2025-07-22 ASSESSMENT — PAIN SCALES - GENERAL
PAINLEVEL_OUTOF10: 3
PAINLEVEL_OUTOF10: 5
PAINLEVEL_OUTOF10: 7
PAINLEVEL_OUTOF10: 7

## 2025-07-22 ASSESSMENT — PAIN - FUNCTIONAL ASSESSMENT
PAIN_FUNCTIONAL_ASSESSMENT: ACTIVITIES ARE NOT PREVENTED
PAIN_FUNCTIONAL_ASSESSMENT: PREVENTS OR INTERFERES SOME ACTIVE ACTIVITIES AND ADLS

## 2025-07-22 ASSESSMENT — PAIN DESCRIPTION - DESCRIPTORS
DESCRIPTORS: ACHING
DESCRIPTORS: ACHING;SQUEEZING

## 2025-07-22 ASSESSMENT — PAIN DESCRIPTION - LOCATION
LOCATION: CHEST;BACK;ABDOMEN
LOCATION: ABDOMEN;CHEST

## 2025-07-22 NOTE — PROGRESS NOTES
Hospitalist Progress Note   Admit Date:  2025 11:53 AM   Name:  Jonathan Zurita   Age:  80 y.o.  Sex:  male  :  1945   MRN:  [id number]   Room:  [facility]/    Presenting/Chief Complaint: Shortness of Breath     Reason(s) for Admission: COPD exacerbation (HCC) [J44.1]  Acute respiratory failure with hypoxia (HCC) [J96.01]  Edema of right lower extremity [R60.0]  Leukocytosis, unspecified type [D72.829]     Hospital Course:     Jonathan Zurita is a 80 y.o. male with medical history of :    -afib on eliquis  -CLL ibrutinib  -tobacco use  -COPD  -prior pneumonia with suspected aspiration/ dysphagia   -right femur fracture     Admitted with RLE DVT  Placed on IV heparin  Reported to be taking eliquis PTA    He developed acute hypoxic respiratory failure/ COPD exacerbation -progressed to need for AIRVO/ as needed BIPAP  Covering with rocephin and azithro for pneumonia and IV solumedrol   Antibiotics adjusted to zosyn   No fluid for thoracentesis on bedside US   He is NPO  SLP following   CTA no PE  He has chronic atelectasis and prior esophageal distention/ EGD     ECHO pending       Discharge plans pending     Subjective & 24hr Events:     Resting in bed on AIRVO  Denies dyspnea  Some cough  NPO  Waiting on his family     Assessment & Plan:     Principal Problem:    COPD exacerbation (HCC)  Plan:     Acute respiratory failure with hypoxia (HCC)  Plan:     Atelectasis  Plan:     Pneumonia of left lower lobe due to infectious organism  Plan:   25  Pulmonary following   Maxed on AIRVO with intermittent desats  NPO  Zosyn  IV solumedrol   Nebs             Chronic lymphocytic leukemia of B-cell type in remission (HCC)  Plan:   25  Ibrutinib        Coronary artery disease due to calcified coronary lesion  Plan:   25  Trend troponin           Atrial fibrillation (HCC)  Plan:   25  On IV heparin   Amiodarone         RLE DVT:  25  IV heparin

## 2025-07-22 NOTE — PROGRESS NOTES
Nutrition Assessment  Assessment Type: Reassess  Reason for visit:  Best Practice Alert: Malnutrition Screening Tool   Malnutrition Screening Tool Score: 2  Unintentional weight loss PTA: 2 to 13 pounds (1 point)  Eating poorly due to decreased appetite: Yes (1 point)    Nutrition Intervention:   Food and/or Nutrient Delivery:   Meals and Snacks:  Diet: Continue NPO per SLP evaluation  Medical Food Supplements:   Medical food supplement therapy:  None Ensure Plus High Protein (high calorie high protein) 350 calories, 20 grams protein per 8 ounce serving  and Deferred NPO  Coordination of Nutrition Care:  Coordination with health care provider Lacie CANNON     Malnutrition Assessment:  Academy/A.S.P.E.N Clinical Malnutrition Criteria  Malnutrition Status: Moderate malnutrition  Context: Acute Illness  Findings of clinical characteristics of malnutrition:   Energy Intake:  Mild decrease in energy intake  Weight Loss:  Mild weight loss     Body Fat Loss:  Mild body fat loss Triceps, Buccal region, Orbital   Muscle Mass Loss:  Mild muscle mass loss Temples (temporalis), Clavicles (pectoralis & deltoids), Hand (interosseous)    Nutrition Assessment:  Food/Nutrition Related History: Pt reports decreased intake for the past week or so due to poor appetite. Daughter stated pt has not eaten well since his previous hospitalization earlier this month. She stated he went to a rehab facility where he was still not eating well. She stated [facility] to [facility] Ensure to have. She stated they had not received any so she purchased him the high calorie high protein Ensure. She stated she was providing these for pt at the facility. He reports he was drinking 1-2 servings per day.     Do You Have Any Cultural, Yazdanism, or Ethnic Food Preferences?: No   Weight History: Pt reports wt loss of ~10lbs in the past few weeks. Daughter stated he has had some swelling in his leg. She stated she

## 2025-07-22 NOTE — PROGRESS NOTES
Patient has not voided since urena removed this afternoon. Bladder scan 438ml. Straight cath with 450ml clear, yellow urine drained.

## 2025-07-22 NOTE — ICUWATCH
RRT Clinical Rounding Nurse Update    Vitals:    07/22/25 0246 07/22/25 0347 07/22/25 0420 07/22/25 0729   BP:   (!) 114/58 (!) 103/91   Pulse:  65 57 59   Resp: 26 25 17 20   Temp:   97.3 °F (36.3 °C) 97.9 °F (36.6 °C)   TempSrc:   Axillary Axillary   SpO2:  96% 99% 100%   Weight:       Height:            DETERIORATION INDEX SCORE: 47    ASSESSMENT:  Previous outreach assessment was reviewed. There have been no significant changes since previous assessment. Pt had another episode last night of increased WOB and accompanied by desaturation. Max airvo w/  NRB / followed by morphine and BIPAP. Pt recovered and is back to his baseline o2 requirements from yesterday. AM labs and notes reviewed.     PLAN:  Will follow per RRT Clinical Rounding Program protocol.    Rajan Grigsby RN  [facility]: [phone]  [facility]: [phone]

## 2025-07-22 NOTE — PROGRESS NOTES
Patient has not voided. Bladder scan 432ml. Dr. Cruz notified via perfectserve. Pederson 16 fr. Inserted per order.

## 2025-07-22 NOTE — ICUWATCH
RRT Clinical Rounding Nurse Update    Vitals:    07/22/25 0241 07/22/25 0246 07/22/25 0347 07/22/25 0420   BP:    (!) 114/58   Pulse: 65  65 57   Resp: 29 26 25 17   Temp:    97.3 °F (36.3 °C)   TempSrc:    Axillary   SpO2: 95%  96% 99%   Weight:       Height:            DETERIORATION INDEX SCORE: 40    ASSESSMENT:  Previous outreach assessment and chart were reviewed. Pt had episode of desat into 80's on 100% AirVo with increased WOB and RR 40. Morphine x1 given and pt placed on BiPap 100%. RR improved to 24 and O2 sat 97%.    PLAN:  Will follow per RRT Clinical Rounding Program protocol.    Shreya Agustin RN  [facility] [place]: [phone]  [facility]: [phone]

## 2025-07-22 NOTE — ICUWATCH
RRT Clinical Rounding Nurse Update    Vitals:    07/22/25 0803 07/22/25 1056 07/22/25 1119 07/22/25 1208   BP:  (!) 105/56     Pulse:  59 58    Resp:  20 20 26   Temp:  98.4 °F (36.9 °C)     TempSrc:  Oral     SpO2: 92% 90% 94%    Weight:       Height:            DETERIORATION INDEX SCORE: 53    ASSESSMENT:  Previous outreach assessment was reviewed. There have been no significant changes since previous assessment.    PLAN:  Will follow per RRT Clinical Rounding Program protocol.    Rajan Grigsby RN  [facility]: [phone]  [facility]: [phone]

## 2025-07-22 NOTE — PROGRESS NOTES
ACUTE PHYSICAL THERAPY GOALS:   (Developed with and agreed upon by patient and/or caregiver.)  (1.) Jonathan Zurita  will move from supine to sit and sit to supine  with MINIMAL ASSIST within 7 treatment day(s).    (2.) Jonathan Zurita will transfer from bed to chair and chair to bed with MINIMAL ASSIST using the least restrictive device within 7 treatment day(s).    (3.) Jonathan Zurita will ambulate with MINIMAL ASSIST for 50 feet with the least restrictive device within 7 treatment day(s).   (4.) Jonathan Zurita will perform standing static and dynamic balance activities x 10 minutes with MINIMAL ASSIST to improve safety within 7 treatment day(s).  (5.) Jonathan Zurita will perform therapeutic exercises x 20 min for HEP with INDEPENDENCE to improve strength, endurance, and functional mobility within 7 treatment day(s).     PHYSICAL THERAPY: Daily Note AM   (Link to Caseload Tracking: PT Visit Days : 2  Time In/Out PT Charge Capture  Rehab Caseload Tracker  Orders    WBAT RLE    Jonathan Zurita is a 80 y.o. male   PRIMARY DIAGNOSIS: COPD exacerbation (HCC)  COPD exacerbation (HCC) [J44.1]  Acute respiratory failure with hypoxia (HCC) [J96.01]  Edema of right lower extremity [R60.0]  Leukocytosis, unspecified type [D72.829]       Inpatient: Payor: [employer] / Plan: [employer] / Product Type: *No Product type* /     ASSESSMENT:     REHAB RECOMMENDATIONS:   Recommendation to date pending progress:  Setting:  Short-term Rehab    Equipment:    To Be Determined     ASSESSMENT:  Mr. Zurita was supine upon contact and agreeable to PT. Patient is on airvo 60L/100%. RN and pulmonary MD wesley'd session. Patient performed supine to sit with min assist, additional time, and cues for technique. Encouraged pursed lipped breathing, activity pacing, and rest breaks throughout all activity. Patient performed sit to stand with mod assist and cues for technique/hand placement. Once standing patient

## 2025-07-22 NOTE — PROGRESS NOTES
Did check with US and very small effusions noted b/l. Not enough to drain with risks>benefits. Told staff to restart heparin drip.     Machine did not record US    Pablo Guzmán MD

## 2025-07-22 NOTE — ICUWATCH
RRT Clinical Rounding Nurse Progress Report      SUBJECTIVE: Patient assessed secondary to RN/provider concern - increased O2 needs.      Vitals:    07/21/25 1523 07/21/25 1735 07/21/25 1800 07/21/25 1931   BP:    (!) 103/56   Pulse: 62   60   Resp: 18   18   Temp:    97.5 °F (36.4 °C)   TempSrc:    Oral   SpO2: 97% (!) 85% 95% 97%   Weight:       Height:            DETERIORATION INDEX SCORE: 43    ASSESSMENT:  Pt is A&O x4 and appears to be in NAD at this time. O2 sat 97% on AirVo 60L/90%, NSR on tele monitor. Pt denies any pain and voices no complaints. Discussed pt with charge RN and chart reviewed. Heparin gtt infusing.      PLAN:  Will follow per RRT Clinical Rounding Program protocol.    hSreya Agustin RN  [facility]: [phone]  [facility]: [phone]

## 2025-07-22 NOTE — PLAN OF CARE
Problem: Respiratory - Adult  Goal: Achieves optimal ventilation and oxygenation  Outcome: Progressing  Flowsheets (Taken 7/22/2025 0803)  Achieves optimal ventilation and oxygenation:   Assess for changes in respiratory status   Position to facilitate oxygenation and minimize respiratory effort   Respiratory therapy support as indicated   Assess for changes in mentation and behavior   Oxygen supplementation based on oxygen saturation or arterial blood gases   Encourage broncho-pulmonary hygiene including cough, deep breathe, incentive spirometry   Assess and instruct to report shortness of breath or any respiratory difficulty

## 2025-07-22 NOTE — PROGRESS NOTES
SPEECH LANGUAGE PATHOLOGY: ATTEMPT     NAME: Jonathan Zurita  : 1945  MRN: [id number]    ADMISSION DATE: 2025  PRIMARY DIAGNOSIS: COPD exacerbation (HCC)    Speech Therapy attempted. Patient within increased O2 needs overnight. Discussed instrumental swallow study with Dr. Barton, who requested we hold at this time due to respiratory status. RN aware. Will attempt to see patient later this date pending respiratory status.      Lacie Kaiser M.S., CCC-SLP   2025 10:31 AM

## 2025-07-22 NOTE — PROGRESS NOTES
Daily Progress Note: 7/22/2025    Jonathan Zurita  Admission Date: 7/18/2025     Length of Stay: 3 days      Background:  Patient is a 80 y.o.  male seen and evaluated at the request of Dr. Gould for increasing O2 needs. History of  Afib on eliquis, CLL on ibrutinib, tobacco use and mild COPD per PFTs in 2024. Uses Wixela, Spiriva and prn Albuterol at home. Hx multiple episodes pneumonia in 2023 and 2024. + dysphagia with suspected aspiration events.  Has seen Dr. Tan in office 6/13/25. Admitted 6/14- 6/20 for pneumonia.  Recently admitted 7/6-7/11 for fall with R femur fracture and repair. Walking short distances at rehab with walker. Presented to ER on 7/18 with complaints of worsening SOB, chest tightness and cough.He also endorsed R leg pain and increased swelling. CT Chest this admission was negative for PE, doppler + for DVT of RLE. He states he has been taking his eliquis, inhalers. Having cough but not able to clear secretions. Has been eating a normal diet as OP. He was started on heparin gtt. WBC 17.1, elevated LFTs. Started on ceftriaxone and azithromycin. Overnight went from 7lpm to airvo 60L/100%.  Frequent ED visits. Previous CT scan showing B patulous esophagus with large amount of debris within the esophageal lumenconsolidation concerning for aspiration. sST saw him and recommended soft, bite sized, thin. EGD was done in 2023: findings long segments barretts, hital hernia, nonerosive gastritis. Recommended that he follow up in 1 year (2024 not done).   Added CPT and flutter to see if mucus plugging clearance may help. His PFTs last year were not very impressive for significant COPD and primarily the concern was aspiration. He has chronic LLL atelectasis presumably, whether it opens up intermittently is possible, but looked this was in 2023 as well on CT.   CT this admission with Hiatal hernia and distended, gas and fluid-containing esophagus.     Subjective:     On 100%  for this study. Our CT scanners  use one or all of the following: Automated exposure control, adjustment of the  mA and/or kV according to patient size, iterative reconstruction.    FINDINGS:  STUDY QUALITY: Adequate    AIRWAYS: Patent    LUNGS: Chronic posteromedial left lower lobe atelectasis. This is essentially  unchanged since 8/14/2023. Posteromedial right lower lobe atelectasis is  slightly improved since 2023. No new infiltrate.    PLEURA: No pneumothorax. Resolution of the previously identified small right  pleural effusion.    HEART:  Coronary artery calcification. No pericardial effusion.  THORACIC AORTA: Scattered atherosclerotic calcifications. No thoracic aortic  aneurysm, stenosis, or dissection.    PULMONARY ARTERY: No pulmonary artery filling defect. Unremarkable main  pulmonary artery    MEDIASTINUM/ZEUS: No mediastinal lymphadenopathy. Hiatal hernia. Distended  esophagus with an air-fluid level.    CHEST WALL: No chest wall mass.    UPPER ABDOMEN: Unremarkable    BONES: No destructive bone lesion.    Impression  1.  No pulmonary embolus.  2.  No acute findings within the chest.  3.   Hiatal hernia and distended, gas and fluid-containing, esophagus.  4.   Chronic bilateral lower lobe atelectasis likely related to the hiatal  hernia and esophageal dilation.      Electronically signed by OTILIO VILLEDA  Recent Labs     07/20/25 0522 07/21/25 0423 07/22/25  0531   WBC 20.0* 22.8* 19.2*   HGB 7.7* 7.8* 8.3*   HCT 23.6* 25.4* 27.1*    394 401     Recent Labs     07/20/25 0522 07/21/25 0423 07/22/25  0531   * 133* 132*   K 4.7 4.6 4.6    102 101   CO2 22 22 22   BUN 31* 31* 27*   CREATININE 0.72* 0.64* 0.74*   BILITOT 0.8 0.8 0.8   AST 53* 62* 62*   ALT 57* 50 45   ALKPHOS 145* 146* 140*     No results for input(s): \"TROPHS\", \"NTPROBNP\", \"CRP\", \"ESR\" in the last 72 hours.  Recent Labs     07/20/25 0522 07/21/25 0423 07/22/25  0531   GLUCOSE 166* 133* 146*     No

## 2025-07-22 NOTE — PROGRESS NOTES
Patient in respiratory distress complaining of difficulty breathing. SpO2 80% on Airvo 60L/100%. RR 40. Increased work of breathing observed, including use of accessory muscles. NRB mask applied. However, spO2 dropped further to 70%. RT notified and placed patient on BIPAP. Patient continues to exhibit increased work of breathing despite improved oxygen saturation. Dr. Cruz notified of patient's condition. Morphine 1mg IV given per order. Chest xray ordered. ICU rover also notified. Will continue to monitor.     0300  Respiratory rate decreased to 24. spO2 97% on BIPAP. Patient states he feels much better.

## 2025-07-22 NOTE — PROGRESS NOTES
PT Daily Note:  HOLD PT per RN due to current respiratory status. Will check back on patient at a later date/time if schedule permits.  Thank you,  Payton Ness, PTA

## 2025-07-22 NOTE — PROGRESS NOTES
OT Note:    OT attempted to see patient for therapy. Pt is on HOLD today due to medical status. Will re-attempt to see patient when appropriate.    Sari Davies AGATA

## 2025-07-23 LAB
ALBUMIN SERPL-MCNC: 1.8 G/DL (ref 3.2–4.6)
ALBUMIN/GLOB SERPL: 0.7 (ref 1–1.9)
ALP SERPL-CCNC: 120 U/L (ref 40–129)
ALT SERPL-CCNC: 41 U/L (ref 8–55)
ANION GAP SERPL CALC-SCNC: 9 MMOL/L (ref 7–16)
AST SERPL-CCNC: 51 U/L (ref 15–37)
BACTERIA SPEC CULT: NORMAL
BACTERIA SPEC CULT: NORMAL
BILIRUB DIRECT SERPL-MCNC: 0.5 MG/DL (ref 0–0.3)
BILIRUB SERPL-MCNC: 0.9 MG/DL (ref 0–1.2)
BUN SERPL-MCNC: 25 MG/DL (ref 8–23)
CALCIUM SERPL-MCNC: 8.2 MG/DL (ref 8.8–10.2)
CHLORIDE SERPL-SCNC: 103 MMOL/L (ref 98–107)
CO2 SERPL-SCNC: 23 MMOL/L (ref 20–29)
CREAT SERPL-MCNC: 0.59 MG/DL (ref 0.8–1.3)
ERYTHROCYTE [DISTWIDTH] IN BLOOD BY AUTOMATED COUNT: 16.1 % (ref 11.9–14.6)
FERRITIN SERPL-MCNC: 334 NG/ML (ref 8–388)
FOLATE SERPL-MCNC: 9.8 NG/ML (ref 3.1–17.5)
GLOBULIN SER CALC-MCNC: 2.8 G/DL (ref 2.3–3.5)
GLUCOSE SERPL-MCNC: 128 MG/DL (ref 70–99)
HCT VFR BLD AUTO: 26.3 % (ref 41.1–50.3)
HCT VFR BLD AUTO: 29.7 % (ref 41.1–50.3)
HGB BLD-MCNC: 8 G/DL (ref 13.6–17.2)
HGB BLD-MCNC: 9.1 G/DL (ref 13.6–17.2)
IRON SATN MFR SERPL: 9 % (ref 20–50)
IRON SERPL-MCNC: 17 UG/DL (ref 35–100)
MCH RBC QN AUTO: 26.8 PG (ref 26.1–32.9)
MCHC RBC AUTO-ENTMCNC: 30.4 G/DL (ref 31.4–35)
MCV RBC AUTO: 88.3 FL (ref 82–102)
NRBC # BLD: 0.02 K/UL (ref 0–0.2)
PLATELET # BLD AUTO: 377 K/UL (ref 150–450)
PMV BLD AUTO: 10.4 FL (ref 9.4–12.3)
POTASSIUM SERPL-SCNC: 4.2 MMOL/L (ref 3.5–5.1)
PROT SERPL-MCNC: 4.6 G/DL (ref 6.3–8.2)
RBC # BLD AUTO: 2.98 M/UL (ref 4.23–5.6)
SERVICE CMNT-IMP: NORMAL
SERVICE CMNT-IMP: NORMAL
SODIUM SERPL-SCNC: 136 MMOL/L (ref 136–145)
TIBC SERPL-MCNC: 191 UG/DL (ref 240–450)
UFH PPP CHRO-ACNC: 0.25 IU/ML (ref 0.3–0.7)
UFH PPP CHRO-ACNC: 0.26 IU/ML (ref 0.3–0.7)
UFH PPP CHRO-ACNC: 0.38 IU/ML (ref 0.3–0.7)
UIBC SERPL-MCNC: 174 UG/DL (ref 112–347)
VIT B12 SERPL-MCNC: 1428 PG/ML (ref 193–986)
WBC # BLD AUTO: 16.7 K/UL (ref 4.3–11.1)

## 2025-07-23 PROCEDURE — 6360000002 HC RX W HCPCS: Performed by: HOSPITALIST

## 2025-07-23 PROCEDURE — 2580000003 HC RX 258: Performed by: HOSPITALIST

## 2025-07-23 PROCEDURE — 6360000002 HC RX W HCPCS: Performed by: STUDENT IN AN ORGANIZED HEALTH CARE EDUCATION/TRAINING PROGRAM

## 2025-07-23 PROCEDURE — 2500000003 HC RX 250 WO HCPCS: Performed by: INTERNAL MEDICINE

## 2025-07-23 PROCEDURE — 82746 ASSAY OF FOLIC ACID SERUM: CPT

## 2025-07-23 PROCEDURE — 80053 COMPREHEN METABOLIC PANEL: CPT

## 2025-07-23 PROCEDURE — 94660 CPAP INITIATION&MGMT: CPT

## 2025-07-23 PROCEDURE — 82248 BILIRUBIN DIRECT: CPT

## 2025-07-23 PROCEDURE — 6370000000 HC RX 637 (ALT 250 FOR IP): Performed by: STUDENT IN AN ORGANIZED HEALTH CARE EDUCATION/TRAINING PROGRAM

## 2025-07-23 PROCEDURE — 94664 DEMO&/EVAL PT USE INHALER: CPT

## 2025-07-23 PROCEDURE — 6360000002 HC RX W HCPCS: Performed by: INTERNAL MEDICINE

## 2025-07-23 PROCEDURE — 92612 ENDOSCOPY SWALLOW (FEES) VID: CPT

## 2025-07-23 PROCEDURE — 36415 COLL VENOUS BLD VENIPUNCTURE: CPT

## 2025-07-23 PROCEDURE — 2060000000 HC ICU INTERMEDIATE R&B

## 2025-07-23 PROCEDURE — 85027 COMPLETE CBC AUTOMATED: CPT

## 2025-07-23 PROCEDURE — 85520 HEPARIN ASSAY: CPT

## 2025-07-23 PROCEDURE — 85014 HEMATOCRIT: CPT

## 2025-07-23 PROCEDURE — 94640 AIRWAY INHALATION TREATMENT: CPT

## 2025-07-23 PROCEDURE — 83540 ASSAY OF IRON: CPT

## 2025-07-23 PROCEDURE — 92526 ORAL FUNCTION THERAPY: CPT

## 2025-07-23 PROCEDURE — 2580000003 HC RX 258: Performed by: STUDENT IN AN ORGANIZED HEALTH CARE EDUCATION/TRAINING PROGRAM

## 2025-07-23 PROCEDURE — 2500000003 HC RX 250 WO HCPCS: Performed by: NURSE PRACTITIONER

## 2025-07-23 PROCEDURE — 6360000002 HC RX W HCPCS: Performed by: NURSE PRACTITIONER

## 2025-07-23 PROCEDURE — 82607 VITAMIN B-12: CPT

## 2025-07-23 PROCEDURE — 83550 IRON BINDING TEST: CPT

## 2025-07-23 PROCEDURE — 85018 HEMOGLOBIN: CPT

## 2025-07-23 PROCEDURE — 94761 N-INVAS EAR/PLS OXIMETRY MLT: CPT

## 2025-07-23 PROCEDURE — 2700000000 HC OXYGEN THERAPY PER DAY

## 2025-07-23 PROCEDURE — 94669 MECHANICAL CHEST WALL OSCILL: CPT

## 2025-07-23 PROCEDURE — 82728 ASSAY OF FERRITIN: CPT

## 2025-07-23 PROCEDURE — 97530 THERAPEUTIC ACTIVITIES: CPT

## 2025-07-23 PROCEDURE — 2500000003 HC RX 250 WO HCPCS: Performed by: STUDENT IN AN ORGANIZED HEALTH CARE EDUCATION/TRAINING PROGRAM

## 2025-07-23 PROCEDURE — 99232 SBSQ HOSP IP/OBS MODERATE 35: CPT | Performed by: INTERNAL MEDICINE

## 2025-07-23 RX ORDER — DIAZEPAM 10 MG/2ML
2.5 INJECTION, SOLUTION INTRAMUSCULAR; INTRAVENOUS EVERY 6 HOURS PRN
Status: DISCONTINUED | OUTPATIENT
Start: 2025-07-23 | End: 2025-07-24

## 2025-07-23 RX ADMIN — METHYLPREDNISOLONE SODIUM SUCCINATE 40 MG: 40 INJECTION, POWDER, LYOPHILIZED, FOR SOLUTION INTRAMUSCULAR; INTRAVENOUS at 04:40

## 2025-07-23 RX ADMIN — SODIUM CHLORIDE, PRESERVATIVE FREE 10 ML: 5 INJECTION INTRAVENOUS at 08:41

## 2025-07-23 RX ADMIN — Medication 4 ML: at 07:58

## 2025-07-23 RX ADMIN — IPRATROPIUM BROMIDE AND ALBUTEROL SULFATE 1 DOSE: 2.5; .5 SOLUTION RESPIRATORY (INHALATION) at 07:58

## 2025-07-23 RX ADMIN — HEPARIN SODIUM 17 UNITS/KG/HR: 10000 INJECTION, SOLUTION INTRAVENOUS at 08:22

## 2025-07-23 RX ADMIN — PIPERACILLIN AND TAZOBACTAM 3375 MG: 3; .375 INJECTION, POWDER, FOR SOLUTION INTRAVENOUS at 08:44

## 2025-07-23 RX ADMIN — PIPERACILLIN AND TAZOBACTAM 3375 MG: 3; .375 INJECTION, POWDER, FOR SOLUTION INTRAVENOUS at 15:41

## 2025-07-23 RX ADMIN — METOCLOPRAMIDE 5 MG: 5 INJECTION, SOLUTION INTRAMUSCULAR; INTRAVENOUS at 08:30

## 2025-07-23 RX ADMIN — METOCLOPRAMIDE 5 MG: 5 INJECTION, SOLUTION INTRAMUSCULAR; INTRAVENOUS at 20:26

## 2025-07-23 RX ADMIN — BUDESONIDE INHALATION 500 MCG: 0.5 SUSPENSION RESPIRATORY (INHALATION) at 19:43

## 2025-07-23 RX ADMIN — BUDESONIDE INHALATION 500 MCG: 0.5 SUSPENSION RESPIRATORY (INHALATION) at 07:58

## 2025-07-23 RX ADMIN — IPRATROPIUM BROMIDE AND ALBUTEROL SULFATE 1 DOSE: 2.5; .5 SOLUTION RESPIRATORY (INHALATION) at 11:19

## 2025-07-23 RX ADMIN — ARFORMOTEROL TARTRATE 15 MCG: 15 SOLUTION RESPIRATORY (INHALATION) at 07:58

## 2025-07-23 RX ADMIN — IPRATROPIUM BROMIDE AND ALBUTEROL SULFATE 1 DOSE: 2.5; .5 SOLUTION RESPIRATORY (INHALATION) at 15:20

## 2025-07-23 RX ADMIN — METOCLOPRAMIDE 5 MG: 5 INJECTION, SOLUTION INTRAMUSCULAR; INTRAVENOUS at 15:31

## 2025-07-23 RX ADMIN — IPRATROPIUM BROMIDE AND ALBUTEROL SULFATE 1 DOSE: 2.5; .5 SOLUTION RESPIRATORY (INHALATION) at 19:43

## 2025-07-23 RX ADMIN — ARFORMOTEROL TARTRATE 15 MCG: 15 SOLUTION RESPIRATORY (INHALATION) at 19:43

## 2025-07-23 RX ADMIN — Medication 4 ML: at 19:43

## 2025-07-23 RX ADMIN — PIPERACILLIN AND TAZOBACTAM 3375 MG: 3; .375 INJECTION, POWDER, FOR SOLUTION INTRAVENOUS at 23:37

## 2025-07-23 RX ADMIN — HEPARIN SODIUM 2500 UNITS: 1000 INJECTION, SOLUTION INTRAVENOUS; SUBCUTANEOUS at 05:51

## 2025-07-23 RX ADMIN — PANTOPRAZOLE SODIUM 40 MG: 40 INJECTION, POWDER, FOR SOLUTION INTRAVENOUS at 08:33

## 2025-07-23 RX ADMIN — SODIUM CHLORIDE, PRESERVATIVE FREE 10 ML: 5 INJECTION INTRAVENOUS at 20:26

## 2025-07-23 NOTE — PLAN OF CARE
Problem: Respiratory - Adult  Goal: Achieves optimal ventilation and oxygenation  Outcome: Progressing  Flowsheets (Taken 7/23/2025 0804)  Achieves optimal ventilation and oxygenation:   Assess for changes in respiratory status   Position to facilitate oxygenation and minimize respiratory effort   Respiratory therapy support as indicated   Assess for changes in mentation and behavior   Oxygen supplementation based on oxygen saturation or arterial blood gases   Encourage broncho-pulmonary hygiene including cough, deep breathe, incentive spirometry   Assess and instruct to report shortness of breath or any respiratory difficulty

## 2025-07-23 NOTE — PROGRESS NOTES
Pt requesting something for cough. RN observed pt drinking sips of water without complications. Pt reminded of NPO status but they wanted me to message on call about request. On call reminded of NPO status but that pt has been drinking thin liquids and is allowed ice. On call approved giving hycodan.     Pt told to take small sips and let medication coat their tongue before swallowing. HOB elevated and pt reminded to tuck chin in. Hycodan administered without complication.

## 2025-07-23 NOTE — PROGRESS NOTES
RRT Clinical Rounding Nurse Update    Vitals:    07/23/25 0445 07/23/25 0605 07/23/25 0711 07/23/25 0758   BP:   (!) 118/56    Pulse:   54 56   Resp:   17 18   Temp:   98.4 °F (36.9 °C)    TempSrc:   Oral    SpO2: 97% 94% 98% 97%   Weight:       Height:            DETERIORATION INDEX SCORE: 42    ASSESSMENT:  Previous outreach assessment was reviewed. There have been no significant changes since previous assessment. Patient AOX4, in room with Hosp provider, and nurse at this time. On airvo 50L/60%. On bipap at night. Episodes of bradycardia at night, which improved on its own. Not in any distress at this time. To go down for SLP eval today to hopefully get some nutrition today.     PLAN:  Will follow per RRT Clinical Rounding Program protocol.    Yogesh Betancourt RN  [facility]: [phone]  [facility]: [phone]

## 2025-07-23 NOTE — PLAN OF CARE
Attempted to see patient today. Was undergoing speech eval.   Will see in AM.    ASHLY Carroll  Orthopedic trauma services

## 2025-07-23 NOTE — PROGRESS NOTES
Hospitalist Progress Note   Admit Date:  2025 11:53 AM   Name:  Jonathan Zurita   Age:  80 y.o.  Sex:  male  :  1945   MRN:  [id number]   Room:  [facility]/    Presenting/Chief Complaint: Shortness of Breath     Reason(s) for Admission: COPD exacerbation (HCC) [J44.1]  Acute respiratory failure with hypoxia (HCC) [J96.01]  Edema of right lower extremity [R60.0]  Leukocytosis, unspecified type [D72.829]     Hospital Course:     Jonathan Zurita is a 80 y.o. male with medical history of :    -afib on eliquis  -CLL ibrutinib  -tobacco use  -COPD  -prior pneumonia with suspected aspiration/ dysphagia   -right femur fracture     Admitted with RLE DVT  Placed on IV heparin  Reported to be taking eliquis PTA    He developed acute hypoxic respiratory failure/ COPD exacerbation -progressed to need for AIRVO/ as needed BIPAP  Covering with rocephin and azithro for pneumonia and IV solumedrol   Antibiotics adjusted to zosyn   No fluid for thoracentesis on bedside US   He is NPO  SLP following   CTA no PE  He has chronic atelectasis and prior esophageal distention/ EGD     ECHO no shunt, preserved EF       Discharge plans pending     Subjective & 24hr Events:     Bradly Rollins bedside   Would like hospice at discharge but would like to be stable at that time   NPO- hungry  AIRVO improved O2 needs   Bleeding right thigh wound per RN  Unable to take oral meds     Discussed with EVELIO Dominique bedside     Assessment & Plan:     Principal Problem:    COPD exacerbation (HCC)  Plan:     Acute respiratory failure with hypoxia (HCC)  Plan:     Atelectasis  Plan:     Pneumonia of left lower lobe due to infectious organism  Plan:   25  Pulmonary following - I discussed with Barbara Aguiar NP Pulmonary   Wean AIRVO as able   NPO  Zosyn  IV solumedrol   Nebs         Chronic lymphocytic leukemia of B-cell type in remission (HCC)  Plan:   25  Ibrutinib        Coronary artery disease due to calcified    Vitamin B12    Collection Time: 07/23/25  4:38 AM   Result Value Ref Range    Vitamin B-12 1428 (H) 193 - 986 pg/mL   Bilirubin, Direct    Collection Time: 07/23/25  4:38 AM   Result Value Ref Range    Bilirubin, Direct 0.5 (H) 0.0 - 0.3 MG/DL   Ferritin    Collection Time: 07/23/25  4:38 AM   Result Value Ref Range    Ferritin 334 8 - 388 NG/ML   Folate    Collection Time: 07/23/25  4:38 AM   Result Value Ref Range    Folate 9.8 3.1 - 17.5 ng/mL   Anti-XA, Heparin    Collection Time: 07/23/25 12:39 PM   Result Value Ref Range    Anti-XA Unfrac Heparin 0.38 0.3 - 0.7 IU/mL   Hemoglobin and Hematocrit    Collection Time: 07/23/25 12:39 PM   Result Value Ref Range    Hemoglobin 9.1 (L) 13.6 - 17.2 g/dL    Hematocrit 29.7 (L) 41.1 - 50.3 %       No results for input(s): \"COVID19\" in the last 72 hours.    Current Meds:  Current Facility-Administered Medications   Medication Dose Route Frequency    pantoprazole (PROTONIX) 40 mg in sodium chloride (PF) 0.9 % 10 mL injection  40 mg IntraVENous Daily    [START ON 7/24/2025] methylPREDNISolone sodium succ (SOLU-MEDROL) 40 mg in sterile water 1 mL injection  40 mg IntraVENous Daily    morphine (PF) injection 1 mg  1 mg IntraVENous Q4H PRN    piperacillin-tazobactam (ZOSYN) 3,375 mg in sodium chloride 0.9 % 50 mL IVPB (addEASE)  3,375 mg IntraVENous Q8H    medicated lip ointment (BLISTEX)   Topical PRN    metoclopramide (REGLAN) injection 5 mg  5 mg IntraVENous TID    ipratropium 0.5 mg-albuterol 2.5 mg (DUONEB) nebulizer solution 1 Dose  1 Dose Inhalation Q4H PRN    budesonide (PULMICORT) nebulizer suspension 500 mcg  0.5 mg Nebulization BID RT    arformoterol tartrate (BROVANA) nebulizer solution 15 mcg  15 mcg Nebulization BID RT    sodium chloride (Inhalant) 3 % nebulizer solution 4 mL  4 mL Nebulization BID    LORazepam (ATIVAN) tablet 0.5 mg  0.5 mg Oral Q6H PRN    oxyCODONE (ROXICODONE) immediate release tablet 5 mg  5 mg Oral Q6H PRN    HYDROcodone homatropine

## 2025-07-23 NOTE — PROGRESS NOTES
Rich note-  Reviewed pt's chart, labs and vitals.  Pt on Airvo 60L 80%.  Sat 98%.  Pt appears in no obvious distress.  VSS.    Will continue to monitor.     0330 update- wore bipap for a few hours, Airvo is on now and has been weaned to 60L 75%.

## 2025-07-23 NOTE — FLOWSHEET NOTE
MD Fabricio made aware of the patient's 3rd dressing change of femoral Right incision within 12 hr.    07/23/25 1229   Incision 07/10/25 Femoral Right   Date First Assessed/Time First Assessed: 07/10/25 0535   Present on Original Admission: No  Incision Approximate Age at First Assessment (Weeks): 1 weeks  Location: Femoral  Incision Location Orientation: Right  Incision Outcome: Well approximated   Dressing Status Old drainage noted;New dressing applied;Dry;Intact;Clean   Incision Cleansed Cleansed with saline   Dressing/Treatment Foam   Drainage Amount Moderate (25-50%)   Drainage Description Sanguinous   Odor None     New new orders. Patient care ongoing. Patient resting in bed, bed alarm on, family at bedside, no further acute needs noted.

## 2025-07-23 NOTE — PROGRESS NOTES
present    Positioning: Upright in bed     Respiratory Status: Airvo in place 50L at 60%    Feeding Tube Present: none    Scope Used: AMBU Single Use Scope    Nare Used: right .   Scope was passed without difficulty    Anatomy:   - Palate: long velum reaching base of tongue   - Base of Tongue: WNL  - Valleculae: WNL  - Epiglottis:WNL  -  Arytenoids: WNL  - False Vocal Folds WNL  - True Vocal Folds: WNL  - Pyriform Sinus: WNL   - Additional Comments: narrow pharyngeal cavity    Secretions  - Location Score: 1  - Amount Score: 1  - Response Score: 1  Total [place] Secretion Rating Score: 3   - Additional Comments: Patient able to cough and clear secretions with double swallow   [place] Secretion Rating Scale (Kenn et al., 2017)   Category  Symptom Score   Location No Secretions in pyriform sinus or laryngeal vestibule 0    Secretions in pyriform sinus 1    Secretions in pyriform sinus and laryngeal vestibule  2   Amount in Pyriform Sinuses No secretions 0    Secretions in pyriform sinus (20%-80% full) 1    Secretion in pyriform sinus (greater than 80% or spilling over) 2   Response No secretions or secretions cleared 0    Ineffective attempts to clear from pyriform sinus 1    Ineffective attempts to clear from laryngeal vestibule 2    No response 10 secretions in laryngeal vestibule 3   Total (>4 has a predictive value with the incidence of pneumonia      Laryngeal Function:  - Adduction: WNL  - Abduction: WNL  - Arytenoid movement: WNL  - Vocal Quality: WNL  - Additional Comments: functional    Consistencies Presented: Thin Tsp, Cup, and Straw, Mildly thick Tsp, Moderately thick Tsp, Apple sauce    Pharyngeal characteristics:  decreased retraction of base of tongue  decreased hyolaryngeal elevation/excursion  decreased epiglottic inversion  decreased constriction of posterior pharyngeal wall  decreased laryngeal closure    Kate Residue Scale  Vallecular Residue: mild  Pyriform Sinus Residue: moderate  Kate  RN/ PCT, RT, and SLP    Medical Necessity    Skilled intervention continues to be required due to patient still consuming a modified diet.    Education:   Patient and/or family/caregiver educated on Results of evaluation, Role of speech therapy, Diet recommendations, SLP recommendations, and SLP plan  Education response: Verbalizes understanding and Demonstrates understanding    Safety:   Call light within reach  In Bed  RN notified   MD notified  Family/visitors at bedside  Recommendations written on whiteboard    Therapy Time:  Time In: 1250  Time Out: 1403  Minutes: 73    KASSANDRA MACKEY  7/23/2025 2:18 PM

## 2025-07-23 NOTE — PROGRESS NOTES
ACUTE PHYSICAL THERAPY GOALS:   (Developed with and agreed upon by patient and/or caregiver.)  (1.) Jonathan Zurita  will move from supine to sit and sit to supine  with MINIMAL ASSIST within 7 treatment day(s).    (2.) Jonathan Zurita will transfer from bed to chair and chair to bed with MINIMAL ASSIST using the least restrictive device within 7 treatment day(s).    (3.) Jonathan Zurita will ambulate with MINIMAL ASSIST for 50 feet with the least restrictive device within 7 treatment day(s).   (4.) Jonathan Zurita will perform standing static and dynamic balance activities x 10 minutes with MINIMAL ASSIST to improve safety within 7 treatment day(s).  (5.) Jonathan Zurita will perform therapeutic exercises x 20 min for HEP with INDEPENDENCE to improve strength, endurance, and functional mobility within 7 treatment day(s).     PHYSICAL THERAPY: Daily Note AM   (Link to Caseload Tracking: PT Visit Days : 3  Time In/Out PT Charge Capture  Rehab Caseload Tracker  Orders    WBAT RLE    Jonathan Zurita is a 80 y.o. male   PRIMARY DIAGNOSIS: COPD exacerbation (HCC)  COPD exacerbation (HCC) [J44.1]  Acute respiratory failure with hypoxia (HCC) [J96.01]  Edema of right lower extremity [R60.0]  Leukocytosis, unspecified type [D72.829]       Inpatient: Payor: [employer] / Plan: [employer] / Product Type: *No Product type* /     ASSESSMENT:     REHAB RECOMMENDATIONS:   Recommendation to date pending progress:  Setting:  Short-term Rehab    Equipment:    To Be Determined     ASSESSMENT:  Mr. Zurita was supine upon contact and agreeable to PT. Patient is on airvo 50L/60%. Patient performed supine to sit and sit to stand with mod assist, additional time, and cues for technique. Once standing patient ambulated 4' to recliner chair with rolling walker, min-mod assist and cues for sequencing with rolling walker. O2 sats maintained above 90%. Patient took a rest break then participated in LE exercises

## 2025-07-23 NOTE — ICUWATCH
RRT Clinical Rounding Nurse Update    Vitals:    07/23/25 0758 07/23/25 1055 07/23/25 1119 07/23/25 1121   BP:  (!) 104/47  (!) 106/48   Pulse: 56 57 58 56   Resp: 18 17 21    Temp:  98.4 °F (36.9 °C)     TempSrc:  Oral     SpO2: 97% (!) 89% 92%    Weight:       Height:            DETERIORATION INDEX SCORE: 47    ASSESSMENT:  Previous outreach assessment was reviewed. There have been no significant changes since previous assessment. Patient on same o2 settings as before, no complaints per patient. VSS. Just came back from FEES patient NPO, high risk for aspiration.     PLAN:  Will follow per RRT Clinical Rounding Program protocol.    Yogesh Betancourt RN  [facility]: [phone]  [facility]: [phone]

## 2025-07-23 NOTE — PROGRESS NOTES
Telemetry called to report HR dropped to 43. HR increased back into upper 50s. On call made aware of decrease in HR.

## 2025-07-23 NOTE — PROGRESS NOTES
SPIRITUAL HEALTH   Note for Initial Spiritual Assessment                   Room # 814/01    Name: Jonathan Zurita           Age: 80 y.o.    Gender: male          MRN: [id number]  Voodoo: Uatsdin       Preferred Language: English    Date: 07/23/25  Visit Time: Begin Time: 1047 End Time : 1108 Complexity of Encounter: Low      Visit Summary: Spiritual consult for HC POA.  talked with Family in hallway. Family and  recognized each other from previous hospitalization. Family briefly updated  on Patient.  asked to visit later. Daughter helped with HC POA witness. Later  visited with Patient and Family.  reviewed chart and checked with RN to discern Patient's ability to make decisions. Patient's ability to make decisions is not in question at this time.  asked Patient and Family about HC POA. Patient already has a HC POA. Patient updated  on health. Patient appreciates prayer and expressed importance of Uatsdin yanni.  provided active listening, spiritual and emotional support, and prayer.  offered support and gave Patient and Family the on-call  number.   thanked Patient and Family and is available for emergent needs.     Referral/Consult From: Patient, Nurse, Family  Encounter Overview/Reason: Advance Care Planning  Service Provided For: Patient and family together, Family     Patient was available.    Yanni, Belief, Meaning:   Patient identifies as spiritual  is connected with a yanni tradition or spiritual practice  has beliefs or practices that help with coping during difficult times  yanni/ spirituality is a source of strength  Family/Friends identifies as spiritual  are connected with a yanni tradition or spiritual practice  have beliefs or practices that help with coping during difficult times  Rituals (if applicable)      Importance and Influence:  Patient has spiritual/personal beliefs that influence

## 2025-07-23 NOTE — PROGRESS NOTES
Telemetry called to report HR dropped to 38. HR increased back into upper 50s. On call made aware of decrease in HR.

## 2025-07-23 NOTE — PROGRESS NOTES
Advance Care Planning     Advance Care Planning Inpatient Note  Spiritual Care Department    Today's Date: 7/23/2025  Unit: [facility] 8 MED SURG    Received request from HealthCare Provider.  Upon review of chart and communication with care team, patient's decision making abilities are not in question.. Patient and Child/Children was/were present in the room during visit.    Goals of ACP Conversation:  Discuss advance care planning documents  Facilitate a discussion related to patient's goals of care as they align with the patient's values and beliefs.    Assessment:  Spiritual consult for HC POA.  talked with Family in [facility]. Family and  recognized each other from previous hospitalization. Family briefly updated  on Patient.  asked to visit later. Daughter helped with HC POA witness. Later  visited with Patient and Family.  reviewed chart and checked with RN to discern Patient's ability to make decisions. Patient's ability to make decisions is not in question at this time.  asked Patient and Family about HC POA. Patient already has a HC POA. Patient updated  on health. Patient appreciates prayer and expressed importance of Religious sol.  provided active listening, spiritual and emotional support, and prayer.  offered support and gave Patient and Family the on-call  number.   thanked Patient and Family and is available for emergent needs.     Interventions:  Patient DECLINED ACP conversation    Electronically signed by DOREEN DC on 7/23/2025 at 12:02 PM

## 2025-07-23 NOTE — ACP (ADVANCE CARE PLANNING)
Advance Care Planning Note   Admit Date:  2025 11:53 AM   Name:  Jonathan Zurita   Age:  80 y.o.  Sex:  male  :  1945   MRN:  [id number]   Room:  [facility]/    Jonathan Zurita has capacity to make his own decisions:   Yes        Other people present:   Daughter Laly in person  Bradly Rollins phone         Other ACP topics discussed, if applicable:   Discussed possibility of hospice or comfort measures.   Discussed artificial feeding.    Patient would like to have NGT for nutrition due to recommendation of strict NPO     We discussed aspiration risk and possible recurrent respiratory failure and death if he takes in oral intake and he voiced understanding   I discussed comfort care would be recommended if he would like to eat     Patient or surrogate consented to discussion of the current conditions, workup, management plans, prognosis, and the risk for further deterioration.  Time spent: 30 minutes in direct discussion.      Signed:  Oralia Warner MD

## 2025-07-23 NOTE — PROGRESS NOTES
Daily Progress Note: 7/23/2025    Jonatahn Zurita  Admission Date: 7/18/2025     Length of Stay: 4 days      Background:  Patient is a 80 y.o.  male seen and evaluated at the request of Dr. Gould for increasing O2 needs. History of  Afib on eliquis, CLL on ibrutinib, tobacco use and mild COPD per PFTs in 2024. Uses Wixela, Spiriva and prn Albuterol at home. Hx multiple episodes pneumonia in 2023 and 2024. + dysphagia with suspected aspiration events.  Has seen Dr. Tan in office 6/13/25. Admitted 6/14- 6/20 for pneumonia.  Recently admitted 7/6-7/11 for fall with R femur fracture and repair. Walking short distances at rehab with walker. Presented to ER on 7/18 with complaints of worsening SOB, chest tightness and cough.He also endorsed R leg pain and increased swelling. CT Chest this admission was negative for PE, doppler + for DVT of RLE. He states he has been taking his eliquis, inhalers. Having cough but not able to clear secretions. Has been eating a normal diet as OP. He was started on heparin gtt. WBC 17.1, elevated LFTs. Started on ceftriaxone and azithromycin. Overnight went from 7lpm to airvo 60L/100%.  Frequent ED visits. Previous CT scan showing B patulous esophagus with large amount of debris within the esophageal lumenconsolidation concerning for aspiration. sST saw him and recommended soft, bite sized, thin. EGD was done in 2023: findings long segments barretts, hital hernia, nonerosive gastritis. Recommended that he follow up in 1 year (2024 not done).   Added CPT and flutter to see if mucus plugging clearance may help. His PFTs last year were not very impressive for significant COPD and primarily the concern was aspiration. He has chronic LLL atelectasis presumably, whether it opens up intermittently is possible, but looked this was in 2023 as well on CT.   CT this admission with Hiatal hernia and distended, gas and fluid-containing esophagus.   7/22 B ultrasound and small

## 2025-07-23 NOTE — PROGRESS NOTES
Flexible Endoscopic Evaluation of Swallowing (FEES) Consent Form    Explanation:  A FEES exam entails passing a flexible fiberoptic endoscope through the nose into the hypopharynx achieving a panoramic view of the pharynx and larynx. The scope remains above the level of the true vocal folds and trachea throughout the examination. A variety of foods and liquids will be given in order to observe the swallow function and address any problems. The patient's medical history has been reviewed and need for the FEES examination was judged as necessary and appropriate by the patient's physician with medical order received.     Possible adverse reactions: Epistasis, Fainting, laryngospasm, Aspiration, Sneezing, runny nose, Vasovagal episode    Benefits of FEES exam:  FEES assessment may identify a loss of airway protective reflexes as well as swallowing problems. Based on this information, recommendations will be made regarding diet modifications to prevent food and liquid from entering the airway during the swallow.     Alternatives:   Alternatives to the FEES exam are the modified barium swallow study, which entails administration of food and liquids coated with barium while undergoing x-ray. The other alternative is the bedside swallow evaluation, which involves presentation of foods and liquids while observing various symptoms suggestive of a swallowing problem and possible aspiration (food/liquid entering the trachea). A bedside swallowing evaluation is not able to fully confirm or rule out the presence of aspiration.     SLP discussed the risks, benefits, expected outcome, and alternative to the recommended procedure with the patient.  Patient was given the opportunity to ask questions about the procedure. Patient expresses understanding and wants to proceed.      Lacie Kaiser M.S., CCC-SLP

## 2025-07-24 PROBLEM — R13.10 DYSPHAGIA: Status: ACTIVE | Noted: 2025-07-18

## 2025-07-24 LAB
ALBUMIN SERPL-MCNC: 1.9 G/DL (ref 3.2–4.6)
ALBUMIN/GLOB SERPL: 0.8 (ref 1–1.9)
ALP SERPL-CCNC: 134 U/L (ref 40–129)
ALT SERPL-CCNC: 39 U/L (ref 8–55)
ANION GAP SERPL CALC-SCNC: 9 MMOL/L (ref 7–16)
AST SERPL-CCNC: 60 U/L (ref 15–37)
BASOPHILS # BLD: 0.01 K/UL (ref 0–0.2)
BASOPHILS NFR BLD: 0.1 % (ref 0–2)
BILIRUB SERPL-MCNC: 0.9 MG/DL (ref 0–1.2)
BUN SERPL-MCNC: 24 MG/DL (ref 8–23)
CALCIUM SERPL-MCNC: 8.2 MG/DL (ref 8.8–10.2)
CHLORIDE SERPL-SCNC: 103 MMOL/L (ref 98–107)
CO2 SERPL-SCNC: 23 MMOL/L (ref 20–29)
CREAT SERPL-MCNC: 0.69 MG/DL (ref 0.8–1.3)
DIFFERENTIAL METHOD BLD: ABNORMAL
EOSINOPHIL # BLD: 0 K/UL (ref 0–0.8)
EOSINOPHIL NFR BLD: 0 % (ref 0.5–7.8)
ERYTHROCYTE [DISTWIDTH] IN BLOOD BY AUTOMATED COUNT: 16 % (ref 11.9–14.6)
GLOBULIN SER CALC-MCNC: 2.5 G/DL (ref 2.3–3.5)
GLUCOSE SERPL-MCNC: 98 MG/DL (ref 70–99)
HCT VFR BLD AUTO: 25.1 % (ref 41.1–50.3)
HGB BLD-MCNC: 7.7 G/DL (ref 13.6–17.2)
IMM GRANULOCYTES # BLD AUTO: 0.08 K/UL (ref 0–0.5)
IMM GRANULOCYTES NFR BLD AUTO: 0.6 % (ref 0–5)
LYMPHOCYTES # BLD: 3.1 K/UL (ref 0.5–4.6)
LYMPHOCYTES NFR BLD: 21.4 % (ref 13–44)
MCH RBC QN AUTO: 27.1 PG (ref 26.1–32.9)
MCHC RBC AUTO-ENTMCNC: 30.7 G/DL (ref 31.4–35)
MCV RBC AUTO: 88.4 FL (ref 82–102)
MONOCYTES # BLD: 0.75 K/UL (ref 0.1–1.3)
MONOCYTES NFR BLD: 5.2 % (ref 4–12)
NEUTS SEG # BLD: 10.57 K/UL (ref 1.7–8.2)
NEUTS SEG NFR BLD: 72.7 % (ref 43–78)
NRBC # BLD: 0 K/UL (ref 0–0.2)
PLATELET # BLD AUTO: 350 K/UL (ref 150–450)
PMV BLD AUTO: 10.4 FL (ref 9.4–12.3)
POTASSIUM SERPL-SCNC: 4 MMOL/L (ref 3.5–5.1)
PROT SERPL-MCNC: 4.4 G/DL (ref 6.3–8.2)
RBC # BLD AUTO: 2.84 M/UL (ref 4.23–5.6)
SODIUM SERPL-SCNC: 135 MMOL/L (ref 136–145)
UFH PPP CHRO-ACNC: 0.56 IU/ML (ref 0.3–0.7)
UFH PPP CHRO-ACNC: 0.57 IU/ML (ref 0.3–0.7)
WBC # BLD AUTO: 14.5 K/UL (ref 4.3–11.1)

## 2025-07-24 PROCEDURE — 97535 SELF CARE MNGMENT TRAINING: CPT

## 2025-07-24 PROCEDURE — 6360000002 HC RX W HCPCS: Performed by: FAMILY MEDICINE

## 2025-07-24 PROCEDURE — 2580000003 HC RX 258: Performed by: HOSPITALIST

## 2025-07-24 PROCEDURE — 85520 HEPARIN ASSAY: CPT

## 2025-07-24 PROCEDURE — 2060000000 HC ICU INTERMEDIATE R&B

## 2025-07-24 PROCEDURE — 97112 NEUROMUSCULAR REEDUCATION: CPT

## 2025-07-24 PROCEDURE — 99232 SBSQ HOSP IP/OBS MODERATE 35: CPT | Performed by: INTERNAL MEDICINE

## 2025-07-24 PROCEDURE — 2500000003 HC RX 250 WO HCPCS: Performed by: NURSE PRACTITIONER

## 2025-07-24 PROCEDURE — 2500000003 HC RX 250 WO HCPCS: Performed by: INTERNAL MEDICINE

## 2025-07-24 PROCEDURE — 6360000002 HC RX W HCPCS: Performed by: STUDENT IN AN ORGANIZED HEALTH CARE EDUCATION/TRAINING PROGRAM

## 2025-07-24 PROCEDURE — 94760 N-INVAS EAR/PLS OXIMETRY 1: CPT

## 2025-07-24 PROCEDURE — 94640 AIRWAY INHALATION TREATMENT: CPT

## 2025-07-24 PROCEDURE — 94668 MNPJ CHEST WALL SBSQ: CPT

## 2025-07-24 PROCEDURE — 2700000000 HC OXYGEN THERAPY PER DAY

## 2025-07-24 PROCEDURE — 6360000002 HC RX W HCPCS: Performed by: HOSPITALIST

## 2025-07-24 PROCEDURE — 97530 THERAPEUTIC ACTIVITIES: CPT

## 2025-07-24 PROCEDURE — 5A0955A ASSISTANCE WITH RESPIRATORY VENTILATION, GREATER THAN 96 CONSECUTIVE HOURS, HIGH NASAL FLOW/VELOCITY: ICD-10-PCS

## 2025-07-24 PROCEDURE — 80053 COMPREHEN METABOLIC PANEL: CPT

## 2025-07-24 PROCEDURE — 6360000002 HC RX W HCPCS: Performed by: INTERNAL MEDICINE

## 2025-07-24 PROCEDURE — 85025 COMPLETE CBC W/AUTO DIFF WBC: CPT

## 2025-07-24 PROCEDURE — 94761 N-INVAS EAR/PLS OXIMETRY MLT: CPT

## 2025-07-24 PROCEDURE — 2500000003 HC RX 250 WO HCPCS: Performed by: STUDENT IN AN ORGANIZED HEALTH CARE EDUCATION/TRAINING PROGRAM

## 2025-07-24 PROCEDURE — 36415 COLL VENOUS BLD VENIPUNCTURE: CPT

## 2025-07-24 PROCEDURE — 2580000003 HC RX 258: Performed by: INTERNAL MEDICINE

## 2025-07-24 PROCEDURE — 6370000000 HC RX 637 (ALT 250 FOR IP): Performed by: INTERNAL MEDICINE

## 2025-07-24 PROCEDURE — 6360000002 HC RX W HCPCS: Performed by: NURSE PRACTITIONER

## 2025-07-24 PROCEDURE — 2580000003 HC RX 258: Performed by: STUDENT IN AN ORGANIZED HEALTH CARE EDUCATION/TRAINING PROGRAM

## 2025-07-24 PROCEDURE — 92526 ORAL FUNCTION THERAPY: CPT

## 2025-07-24 PROCEDURE — 6370000000 HC RX 637 (ALT 250 FOR IP): Performed by: STUDENT IN AN ORGANIZED HEALTH CARE EDUCATION/TRAINING PROGRAM

## 2025-07-24 RX ORDER — AMIODARONE HYDROCHLORIDE 100 MG/1
200 TABLET ORAL 2 TIMES DAILY
Status: DISCONTINUED | OUTPATIENT
Start: 2025-07-24 | End: 2025-07-26

## 2025-07-24 RX ORDER — POTASSIUM CHLORIDE 7.45 MG/ML
10 INJECTION INTRAVENOUS PRN
Status: DISCONTINUED | OUTPATIENT
Start: 2025-07-24 | End: 2025-07-29

## 2025-07-24 RX ORDER — MORPHINE SULFATE 2 MG/ML
1 INJECTION, SOLUTION INTRAMUSCULAR; INTRAVENOUS ONCE
Refills: 0 | Status: COMPLETED | OUTPATIENT
Start: 2025-07-24 | End: 2025-07-24

## 2025-07-24 RX ORDER — POTASSIUM CHLORIDE 29.8 MG/ML
20 INJECTION INTRAVENOUS PRN
Status: DISCONTINUED | OUTPATIENT
Start: 2025-07-24 | End: 2025-07-29

## 2025-07-24 RX ORDER — GUAIFENESIN 200 MG/10ML
200 LIQUID ORAL EVERY 6 HOURS
Status: DISCONTINUED | OUTPATIENT
Start: 2025-07-24 | End: 2025-07-28

## 2025-07-24 RX ADMIN — MORPHINE SULFATE 1 MG: 2 INJECTION, SOLUTION INTRAMUSCULAR; INTRAVENOUS at 13:51

## 2025-07-24 RX ADMIN — IPRATROPIUM BROMIDE AND ALBUTEROL SULFATE 1 DOSE: 2.5; .5 SOLUTION RESPIRATORY (INHALATION) at 15:54

## 2025-07-24 RX ADMIN — IPRATROPIUM BROMIDE AND ALBUTEROL SULFATE 1 DOSE: 2.5; .5 SOLUTION RESPIRATORY (INHALATION) at 20:05

## 2025-07-24 RX ADMIN — ARFORMOTEROL TARTRATE 15 MCG: 15 SOLUTION RESPIRATORY (INHALATION) at 20:06

## 2025-07-24 RX ADMIN — HEPARIN SODIUM 2500 UNITS: 1000 INJECTION, SOLUTION INTRAVENOUS; SUBCUTANEOUS at 00:10

## 2025-07-24 RX ADMIN — PANTOPRAZOLE SODIUM 40 MG: 40 INJECTION, POWDER, FOR SOLUTION INTRAVENOUS at 09:07

## 2025-07-24 RX ADMIN — METOCLOPRAMIDE 5 MG: 5 INJECTION, SOLUTION INTRAMUSCULAR; INTRAVENOUS at 09:06

## 2025-07-24 RX ADMIN — PIPERACILLIN AND TAZOBACTAM 3375 MG: 3; .375 INJECTION, POWDER, FOR SOLUTION INTRAVENOUS at 15:53

## 2025-07-24 RX ADMIN — PIPERACILLIN AND TAZOBACTAM 3375 MG: 3; .375 INJECTION, POWDER, FOR SOLUTION INTRAVENOUS at 22:14

## 2025-07-24 RX ADMIN — SODIUM CHLORIDE, PRESERVATIVE FREE 10 ML: 5 INJECTION INTRAVENOUS at 09:07

## 2025-07-24 RX ADMIN — BUDESONIDE INHALATION 500 MCG: 0.5 SUSPENSION RESPIRATORY (INHALATION) at 07:45

## 2025-07-24 RX ADMIN — METOCLOPRAMIDE 5 MG: 5 INJECTION, SOLUTION INTRAMUSCULAR; INTRAVENOUS at 15:48

## 2025-07-24 RX ADMIN — WATER 40 MG: 1 INJECTION INTRAMUSCULAR; INTRAVENOUS; SUBCUTANEOUS at 09:06

## 2025-07-24 RX ADMIN — MORPHINE SULFATE 1 MG: 2 INJECTION, SOLUTION INTRAMUSCULAR; INTRAVENOUS at 18:11

## 2025-07-24 RX ADMIN — CALCIUM GLUCONATE: 98 INJECTION INTRAVENOUS at 18:15

## 2025-07-24 RX ADMIN — ARFORMOTEROL TARTRATE 15 MCG: 15 SOLUTION RESPIRATORY (INHALATION) at 07:45

## 2025-07-24 RX ADMIN — Medication 4 ML: at 07:45

## 2025-07-24 RX ADMIN — IPRATROPIUM BROMIDE AND ALBUTEROL SULFATE 1 DOSE: 2.5; .5 SOLUTION RESPIRATORY (INHALATION) at 02:31

## 2025-07-24 RX ADMIN — IPRATROPIUM BROMIDE AND ALBUTEROL SULFATE 1 DOSE: 2.5; .5 SOLUTION RESPIRATORY (INHALATION) at 07:45

## 2025-07-24 RX ADMIN — MORPHINE SULFATE 1 MG: 2 INJECTION, SOLUTION INTRAMUSCULAR; INTRAVENOUS at 02:40

## 2025-07-24 RX ADMIN — SODIUM CHLORIDE, PRESERVATIVE FREE 10 ML: 5 INJECTION INTRAVENOUS at 22:15

## 2025-07-24 RX ADMIN — HEPARIN SODIUM 19 UNITS/KG/HR: 10000 INJECTION, SOLUTION INTRAVENOUS at 07:07

## 2025-07-24 RX ADMIN — BUDESONIDE INHALATION 500 MCG: 0.5 SUSPENSION RESPIRATORY (INHALATION) at 20:05

## 2025-07-24 RX ADMIN — I.V. FAT EMULSION 250 ML: 20 EMULSION INTRAVENOUS at 18:17

## 2025-07-24 RX ADMIN — METOCLOPRAMIDE 5 MG: 5 INJECTION, SOLUTION INTRAMUSCULAR; INTRAVENOUS at 22:11

## 2025-07-24 RX ADMIN — IPRATROPIUM BROMIDE AND ALBUTEROL SULFATE 1 DOSE: 2.5; .5 SOLUTION RESPIRATORY (INHALATION) at 11:33

## 2025-07-24 RX ADMIN — MORPHINE SULFATE 1 MG: 2 INJECTION, SOLUTION INTRAMUSCULAR; INTRAVENOUS at 04:53

## 2025-07-24 RX ADMIN — MORPHINE SULFATE 1 MG: 2 INJECTION, SOLUTION INTRAMUSCULAR; INTRAVENOUS at 22:47

## 2025-07-24 RX ADMIN — Medication 4 ML: at 20:05

## 2025-07-24 RX ADMIN — PIPERACILLIN AND TAZOBACTAM 3375 MG: 3; .375 INJECTION, POWDER, FOR SOLUTION INTRAVENOUS at 09:29

## 2025-07-24 ASSESSMENT — PAIN DESCRIPTION - LOCATION
LOCATION: HIP
LOCATION: HIP

## 2025-07-24 ASSESSMENT — PAIN SCALES - GENERAL
PAINLEVEL_OUTOF10: 7
PAINLEVEL_OUTOF10: 10

## 2025-07-24 ASSESSMENT — PAIN DESCRIPTION - ORIENTATION
ORIENTATION: RIGHT
ORIENTATION: RIGHT

## 2025-07-24 NOTE — PROGRESS NOTES
Hospitalist Progress Note   Admit Date:  2025 11:53 AM   Name:  Jonathan Zurita   Age:  80 y.o.  Sex:  male  :  1945   MRN:  [id number]   Room:  [facility]    Presenting/Chief Complaint: Shortness of Breath     Reason(s) for Admission: COPD exacerbation (HCC) [J44.1]  Acute respiratory failure with hypoxia (HCC) [J96.01]  Edema of right lower extremity [R60.0]  Leukocytosis, unspecified type [D72.829]     Hospital Course:     Jonathan Zurita is a 80 y.o. male with medical history of :    -afib on eliquis  -CLL ibrutinib  -tobacco use  -COPD  -prior pneumonia with suspected aspiration/ dysphagia   -right femur fracture     Admitted with RLE DVT  Placed on IV heparin  Reported to be taking eliquis PTA but was held due to right thigh wound bleeding at STR    He developed acute hypoxic respiratory failure/ COPD exacerbation -progressed to need for AIRVO/ as needed BIPAP  Covering with rocephin and azithro for pneumonia and IV solumedrol   Antibiotics adjusted to zosyn   No fluid for thoracentesis on bedside US   He has been NPO  SLP following , failed swallowing evaluations due to aspiration   He has agrees to NGT     CTA no PE  He has chronic atelectasis and prior esophageal distention/ EGD     ECHO no shunt, preserved EF     He has right thigh bleeding wound  Orthopedics has followed       Discharge plans pending     Subjective & 24hr Events:     Discussed with RN Trip  Unable to place bedside NGT x 2  He is up to chair  AIRVO on wean   Has cough, dyspnea, sputum   Still wound with bloody drainage per RN and walks with foot drop     Assessment & Plan:     Principal Problem:    COPD exacerbation (HCC)  Plan:     Acute respiratory failure with hypoxia (HCC)  Plan:     Atelectasis  Plan:     Pneumonia of left lower lobe due to infectious organism  Plan:   25  Pulmonary following  Wean AIRVO as able, 85%/50L   NPO  Zosyn  IV solumedrol   Nebs         Chronic lymphocytic leukemia  No wheezing, rhonchi, or rales.  Symmetric expansion. Anterior   Abdomen:   Soft, nontender, nondistended.  Extremities: No cyanosis or clubbing.  No edema, mild bleeding on right lateral thigh bandage   Skin:     No rashes.  Normal coloration.   Warm and dry.    Neuro:  grossly intact.    Psych:  Mood and affect appropriate      I have personally reviewed labs and tests:  Recent Labs:  Recent Results (from the past 48 hours)   CBC    Collection Time: 07/23/25  4:38 AM   Result Value Ref Range    WBC 16.7 (H) 4.3 - 11.1 K/uL    RBC 2.98 (L) 4.23 - 5.6 M/uL    Hemoglobin 8.0 (L) 13.6 - 17.2 g/dL    Hematocrit 26.3 (L) 41.1 - 50.3 %    MCV 88.3 82 - 102 FL    MCH 26.8 26.1 - 32.9 PG    MCHC 30.4 (L) 31.4 - 35.0 g/dL    RDW 16.1 (H) 11.9 - 14.6 %    Platelets 377 150 - 450 K/uL    MPV 10.4 9.4 - 12.3 FL    nRBC 0.02 0.0 - 0.2 K/uL   Comprehensive Metabolic Panel w/ Reflex to MG    Collection Time: 07/23/25  4:38 AM   Result Value Ref Range    Sodium 136 136 - 145 mmol/L    Potassium 4.2 3.5 - 5.1 mmol/L    Chloride 103 98 - 107 mmol/L    CO2 23 20 - 29 mmol/L    Anion Gap 9 7 - 16 mmol/L    Glucose 128 (H) 70 - 99 mg/dL    BUN 25 (H) 8 - 23 MG/DL    Creatinine 0.59 (L) 0.80 - 1.30 MG/DL    Est, Glom Filt Rate >90 >60 ml/min/1.73m2    Calcium 8.2 (L) 8.8 - 10.2 MG/DL    Total Bilirubin 0.9 0.0 - 1.2 MG/DL    ALT 41 8 - 55 U/L    AST 51 (H) 15 - 37 U/L    Alk Phosphatase 120 40 - 129 U/L    Total Protein 4.6 (L) 6.3 - 8.2 g/dL    Albumin 1.8 (L) 3.2 - 4.6 g/dL    Globulin 2.8 2.3 - 3.5 g/dL    Albumin/Globulin Ratio 0.7 (L) 1.0 - 1.9     Anti-XA, Heparin    Collection Time: 07/23/25  4:38 AM   Result Value Ref Range    Anti-XA Unfrac Heparin 0.25 (L) 0.3 - 0.7 IU/mL   Iron and TIBC    Collection Time: 07/23/25  4:38 AM   Result Value Ref Range    Iron 17 (L) 35 - 100 ug/dL    TIBC 191 (L) 240 - 450 ug/dL    Iron % Saturation 9 (L) 20 - 50 %    UIBC 174.0 112.0 - 347.0 ug/dL   Vitamin B12    Collection Time: 07/23/25

## 2025-07-24 NOTE — CONSULTS
Nutrition Assessment  Assessment Type: Reassess, Consult  Reason for visit:  Best Practice Alert: Malnutrition Screening Tool , Tube Feeding Management (Hospitalists), and Parenteral Nutrition Management (Hospitalists)  Malnutrition Screening Tool Score: 2  Unintentional weight loss PTA: 2 to 13 pounds (1 point)  Eating poorly due to decreased appetite: Yes (1 point)    Nutrition Intervention:   Food and/or Nutrient Delivery:   Parenteral Nutrition:  Peripheral parenteral nutrition (Osmolarity 897)   Peripheral line infusion  Initiate: Dex 5%, 4.25% AA 2 L (85ml/hr)   Initiate 250 ml 20% lipids daily  Electrolytes:   Sodium (90 mEq/L sodium chloride), Potassium (10 mmol/L potassium phosphate, 10 mEq/L potassium chloride), Calcium gluconate (4.5 mEq/L), Magnesium sulfate 8 mEq/L   Additives:   Adult multivitamin with vit K  Trace Elements  Thiamine 100 mg daily x 7 days (EOT 7/30)  Folic Acid 1 mg daily x 7 days (EOT 7/30)  PN regimen provides per 24 hours: 1180 kcal/day (93% of needs), 85 grams of protein/day (112% of needs), 100 grams of CHO/24 hours and ~2250 ml of total volume/24 hours.   Above regimen: Intended to meet macronutrient goals  Biochemical Data:   Basic Metabolic Panel, Hepatic Function Panel, Magnesium and Phosphorus active per nutrition parameters  Triglyceride tomorrow  POC Glucoses/SSI Not indicated  Nutrition Related Medication Management:  Electrolyte Replacement:   Initiate prn protocol Phosphorus, continue Mag, modify K for IV administration  Intravenous fluids:  Not applicable     Meals and Snacks:  Diet: Continue NPO per SLP evaluation  Medical Food Supplements:   Medical food supplement therapy:  None Ensure Plus High Protein (high calorie high protein) 350 calories, 20 grams protein per 8 ounce serving  and Deferred NPO  Coordination of Nutrition Care:  Coordination with health care provider Provider, Dr. Regalado, RN, Trip, SLP, Lacie, and Dharmesh in Pharmacy     Malnutrition

## 2025-07-24 NOTE — PROGRESS NOTES
Progress Note    Patient: Jonathan Zurita MRN: [id number]  SSN: [id number]    YOB: 1945  Age: 80 y.o.  Sex: male      Admit Date: 7/18/2025    LOS: 5 days     Subjective:     Doing okay. Would like to get back in bed.     Objective:     Vitals:    07/24/25 0747 07/24/25 1113 07/24/25 1116 07/24/25 1133   BP:   (!) 110/47    Pulse:   56    Resp:   19    Temp:   98.4 °F (36.9 °C)    TempSrc:   Oral    SpO2: 94%  94% 96%   Weight:       Height:  1.575 m (5' 2\")          Intake and Output:  Current Shift: 07/24 0701 - 07/24 1900  In: -   Out: 500 [Urine:500]  Last three shifts: 07/22 1901 - 07/24 0700  In: 0   Out: 1000 [Urine:1000]    alert and oriented to person place time and situation    Right Lower Extremity  - dressings c/d/I today. Changed about 1.5 hours ago.  - Sensation: SILT Sa/[place]ine/T/SP/DP   - Motor: 5/5 TA/EHL/FHL/GS   - Digits warm, well-perfused, brisk cap refill    Lab/Data Review:  Recent Labs     07/24/25  0535   HGB 7.7*   HCT 25.1*          Assessment:     ORTHO INJURIES:  Right closed displaced intertrochanteric/peritrochanteric proximal femur fracture     ORTHO PROCEDURES:  [facility] 7/7  Open treatment of right peritrochanteric/enteroenteric proximal femur fracture with intramedullary nail fixation   Plan:     Will continue to leave staples as he has ongoing drainage from proximal incision. Recommend pressure dressing changing as needed. Does have underlying hematoma to right hip.  Patient is on heparin drip for DVT.  May have staples removed next week.  Wound care:   change dressing only as needed: xeroform or adaptec, 4x4 gauze, secure with aquacel, medipore tape, or tegaderm.   Multimodal pain control per orders   WB status: WBAT RLE  PT/OT for mobility/ADLs   Diet:  Diet NPO  PN-Adult Premix 4.25/5 - Peripheral Line  DVT prophylaxis: Defer to primary team    Dispo: pending    F/up w/ [facility] in 4 weeks    Signed By: ASHLY Carroll     July 24, 2025

## 2025-07-24 NOTE — PROGRESS NOTES
ACUTE PHYSICAL THERAPY GOALS:   (Developed with and agreed upon by patient and/or caregiver.)  (1.) Jonathan Zurita  will move from supine to sit and sit to supine  with MINIMAL ASSIST within 7 treatment day(s).    (2.) Jonathan Zurita will transfer from bed to chair and chair to bed with MINIMAL ASSIST using the least restrictive device within 7 treatment day(s).    (3.) Jonathan Zurita will ambulate with MINIMAL ASSIST for 50 feet with the least restrictive device within 7 treatment day(s).   (4.) Jonathan Zurita will perform standing static and dynamic balance activities x 10 minutes with MINIMAL ASSIST to improve safety within 7 treatment day(s).  (5.) Jonathan Zurita will perform therapeutic exercises x 20 min for HEP with INDEPENDENCE to improve strength, endurance, and functional mobility within 7 treatment day(s).     PHYSICAL THERAPY: Daily Note AM   (Link to Caseload Tracking: PT Visit Days : 4  Time In/Out PT Charge Capture  Rehab Caseload Tracker  Orders    WBAT RLE    Jonathan Zurita is a 80 y.o. male   PRIMARY DIAGNOSIS: COPD exacerbation (HCC)  COPD exacerbation (HCC) [J44.1]  Acute respiratory failure with hypoxia (HCC) [J96.01]  Edema of right lower extremity [R60.0]  Leukocytosis, unspecified type [D72.829]       Inpatient: Payor: [employer] / Plan: [employer] / Product Type: *No Product type* /     ASSESSMENT:     REHAB RECOMMENDATIONS:   Recommendation to date pending progress:  Setting:  Short-term Rehab    Equipment:    To Be Determined     ASSESSMENT:  Mr. Zurita was supine upon contact and agreeable to PT. Patient is on airvo 50L/85%. Patient performed supine to sit with min assist, additional time, and cues for technique. Patient then participated exercises to BLE's with cues for improved quality of exercise as well as AAROM provided to RLE. Patient also participated in prolonged static/dynamic seated balance activities while performing functional tasks EOB

## 2025-07-24 NOTE — PROGRESS NOTES
ACUTE OCCUPATIONAL THERAPY GOALS:   (Developed with and agreed upon by patient and/or caregiver.)  1. Patient will complete lower body bathing and dressing with MODERATE ASSIST and adaptive equipment as needed.   2. Patient will complete toileting with MINIMUM ASSIST.  3. Patient will tolerate 30 minutes of OT treatment with 1-2 rest breaks to increase activity tolerance for ADLs.   4. Patient will complete functional transfers with MINIMUM ASSIST and adaptive equipment as needed.   5. Patient will complete functional activity while seated edge of bed with SBA and adaptive equipment as needed.  6. Patient will maintain edge of bed unsupported sitting balance with SBA in preparation for OOB activity.  7. Patient will tolerate 15 minutes BUE therapeutic activities to increase use of BUE during ADL performance.      Timeframe: 7 visits     OCCUPATIONAL THERAPY: Daily Note AM   OT Visit Days: 3   Time In/Out  OT Charge Capture  Rehab Caseload Tracker  OT Orders    Jonathan Zurita is a 80 y.o. male   PRIMARY DIAGNOSIS: COPD exacerbation (HCC)  COPD exacerbation (HCC) [J44.1]  Acute respiratory failure with hypoxia (HCC) [J96.01]  Edema of right lower extremity [R60.0]  Leukocytosis, unspecified type [D72.829]       Inpatient: Payor: [employer] / Plan: [employer] / Product Type: *No Product type* /     ASSESSMENT:     REHAB RECOMMENDATIONS:   Recommendation to date pending progress:  Setting:  Short-term Rehab    Equipment:    To Be Determined     ASSESSMENT:  Mr. Zurita is doing fair today. Pt presents supine upon arrival. Pt required min A for bed mobility and additional time. Fair sitting balance noted at edge of bed. Pt was assisted with self care tasks while seated. Required more assistance with shaving and LB bathing/dressing. Was able to stand with min A x2 and transfer over to chair. Tolerated session fair even though on AirVo. Making progress with goals. Will continue to benefit from skilled OT

## 2025-07-24 NOTE — ICUWATCH
RRT Clinical Rounding Nurse Update    Vitals:    07/24/25 0231 07/24/25 0238 07/24/25 0329 07/24/25 0446   BP:   107/60    Pulse: 58 57 59    Resp: 17 17 18 16   Temp:   98.6 °F (37 °C)    TempSrc:   Oral    SpO2: 91% 93% 97%    Weight:       Height:            DETERIORATION INDEX SCORE: 35    ASSESSMENT:  Previous outreach assessment was reviewed. There have been no significant changes since previous assessment.    PLAN:  Will follow per RRT Clinical Rounding Program protocol.    Joan Guerin RN  [facility]: [phone]  [facility]: [phone]

## 2025-07-24 NOTE — ICUWATCH
RRT Clinical Rounding Nurse Progress Report      SUBJECTIVE: Patient assessed secondary to elevated Deterioration Index Score.      Vitals:    07/24/25 0231 07/24/25 0238 07/24/25 0329 07/24/25 0446   BP:   107/60    Pulse: 58 57 59    Resp: 17 17 18 16   Temp:   98.6 °F (37 °C)    TempSrc:   Oral    SpO2: 91% 93% 97%    Weight:       Height:            DETERIORATION INDEX SCORE: 41    ASSESSMENT:  Upon arrival to room, pt in bed resting quietly. Wakes to voice & follows commands. Respirations even and unlabored, bilaterally diminished/with coarse crackles. Currently on Airvo 50L/85% with O2 sat 95% & HR 58. Recent labs/notes/vitals reviewed and pt discussed with primary RN.    PLAN:  Will follow per RRT Clinical Rounding Program protocol.    Joan Guerin RN  [facility]: [phone]  [facility]: [phone]

## 2025-07-24 NOTE — PROGRESS NOTES
Rn unsuccessfully attempted to place Dobbhoff per order. Secondary Rn Dobbhoff placement attempt also unsuccessful. MD made aware, see new orders for details. Patient care ongoing. No further acute needs noted at this time.

## 2025-07-24 NOTE — CONSULTS
Consult Note            Date:7/24/2025        Patient Name:Jonathan Zurita     YOB: 1945     Age:80 y.o.    Inpatient consult to GI  Consult performed by: Arianne Panchal PA-C  Consult ordered by: Oralia Warner MD        Chief Complaint     Chief Complaint   Patient presents with    Shortness of Breath      History Obtained From   Patient and family    History of Present Illness     Pt is an 79 yo male w PMHx of Paz's, chronic gastritis, dysphagia, CAD, COPD, AFIB, DVT, CLL on ibrutinib admitted for COPD exacerbation and aspiration pneumonia on hospice consulted for PEG tube placement. Pt has hx of severe dysphagia causing him to aspirate and develop pneumonia, currently on Zosyn and IV solumedrol. Attempt to place NG tube failed and pt/family requesting PEG tube for long term care. No hx of cirrhosis. Last EGD 8/6/2023 found Barretts, HH, and gastritis.     Pt on AIRVO for COPD exacerbation. Eliquis held since 7/18. Pt on heparin drip for DVT prophylaxis. Denies N/V/D, abdominal pain. States he has not had BM 1wk d/t lack of oral intake. Denies alcohol/tobacco use. Denies NSAID use. Pt is NPO. Consistently hypotensive    Labs: BUN 24, Cr 0.69, WBC 14.5, Hgb 7.7 (baseline 7-8),    Imaging:   XR CHEST PORTABLE  Result Date: 7/22/2025  Small to moderate bilateral pleural effusions, not significantly changed from the prior exam    Medications: Zosyn, Solumedrol, Heparin drip DVT proph, reglan, glycolax     Past Medical History:   Diagnosis Date    Acute encephalopathy 6/8/2023    Hyperlipidemia     Hypertension       Past Surgical History     Past Surgical History:   Procedure Laterality Date    FEMUR SURGERY Right 07/07/2025    FEMUR INTRAMEDULLARY NAIL performed by Lanre Braxton MD at [facility] MAIN OR    UPPER GASTROINTESTINAL ENDOSCOPY N/A 08/15/2023    EGD BIOPSY performed by Javier Moss MD at [facility] ENDOSCOPY        Medications     Prior to Admission medications    Medication Sig

## 2025-07-24 NOTE — PROGRESS NOTES
Jonathan Zurita  Admission Date: 7/18/2025         Daily Progress Note: 7/24/2025    The patient's chart is reviewed and the patient is discussed with the staff.    Background:   80 y.o.  male seen and evaluated at the request of Dr. Gould for increasing O2 needs. History of Afib on eliquis, CLL on ibrutinib, tobacco use and mild COPD per PFTs in 2024. Uses Wixela, Spiriva and prn Albuterol at home. Hx multiple episodes pneumonia in 2023 and 2024. + dysphagia with suspected aspiration events.  Has seen Dr. Tan in office 6/13/25. Admitted 6/14- 6/20 for pneumonia.    Recently admitted 7/6-7/11 for fall with R femur fracture and repair. Walking short distances at rehab with walker. Presented to ER on 7/18 with complaints of worsening SOB, chest tightness and cough.He also endorsed R leg pain and increased swelling. CT Chest this admission was negative for PE, doppler + for DVT of RLE. He states he has been taking his eliquis, inhalers. Having cough but not able to clear secretions. Has been eating a normal diet as OP. He was started on heparin gtt. WBC 17.1, elevated LFTs. Started on ceftriaxone and azithromycin. Overnight went from 7lpm to airvo 60L/100%.  Frequent ED visits. Previous CT scan showing B patulous esophagus with large amount of debris within the esophageal lumenconsolidation concerning for aspiration. sST saw him and recommended soft, bite sized, thin. EGD was done in 2023: findings long segments barretts, hital hernia, nonerosive gastritis. Recommended that he follow up in 1 year (2024 not done).     Added CPT and flutter to see if mucus plugging clearance may help. His PFTs last year were not very impressive for significant COPD and primarily the concern was aspiration. He has chronic LLL atelectasis presumably, whether it opens up intermittently is possible, but looked this was in 2023 as well on CT.     CT this admission with Hiatal hernia and distended, gas and

## 2025-07-24 NOTE — ICUWATCH
RRT Clinical Rounding Nurse Update    Vitals:    07/24/25 0329 07/24/25 0446 07/24/25 0738 07/24/25 0747   BP: 107/60  (!) 105/52    Pulse: 59  60    Resp: 18 16 17    Temp: 98.6 °F (37 °C)  98.4 °F (36.9 °C)    TempSrc: Oral      SpO2: 97%  92% 94%   Weight:       Height:            DETERIORATION INDEX SCORE: 37    ASSESSMENT:  Previous outreach assessment was reviewed. Patient is resting upright on the side of the bed, working with PT/OT. Alert and oriented, following commands and answering questions appropriately. Unlabored, regular breathing on Airvo 50L/85%. Lung sounds diminished, but clear bilaterally. SpO2 % at time of exam. Denies productive cough. Endorses breathing feeling better, feels slightly weak with exertion. Failed NGT placement this AM, plan for PEG? Son at bedside endorses to be discharged with hospice when medically stable. Otherwise, VSS.Wean FiO2 as tolerated.    PLAN:  Will follow per RRT Clinical Rounding Program protocol.    Armand Grace RN  [facility]: [phone]  [facility]: [phone]

## 2025-07-24 NOTE — PROGRESS NOTES
LTG: Patient will maintain adequate hydration/nutrition with optimum safety and efficiency of swallowing function with PO intake without overt signs or symptoms of aspiration for the highest appropriate diet level.  STG:   Patient will consume sips of water via cup and ice chips via spoon without overt signs or symptoms of airway compromise. DISCONTINUE- STRICT NPO 25  Patient will perform chin tuck and small sips to improve swallow safety with minimal cues 90% of the time. DISCONTINUE   Patient will safely ingest diet trials during therapeutic feedings with SLP without overt signs or symptoms of respiratory compromise in efforts to advance diet.  Patient will complete a Fiberoptic Endoscopic Evaluation of the Swallow to fully assess physiology and anatomy of the swallow and determine safest appropriate diet and/or rehabilitation strategies, as medically indicated. COMPLETED 25    SPEECH LANGUAGE PATHOLOGY: DYSPHAGIA Daily Note #1    Acknowledge Order  I  Therapy Time  I   Charges     I  Rehab Caseload Tracker      NAME: Jonathan Zurita  : 1945  MRN: [id number]    ADMISSION DATE: 2025  PRIMARY DIAGNOSIS: COPD exacerbation (HCC)    ICD-10: Treatment Diagnosis: R13.12 Dysphagia, Oropharyngeal Phase    RECOMMENDATIONS   Diet:    NPO for solids  Thin Liquids via cup  Ice chips via spoon     Medication: non-oral   Compensatory Swallowing Strategies:   Ice chips for pleasure  Frequent oral care   Therapeutic Intervention:   Patient/family education  Instrumental swallow assessment  Dysphagia treatment  Consider referral to GI   Patient continues to require skilled intervention:  Yes. Recommend ongoing speech therapy services during this hospitalization.     Anticipated Discharge Needs: Ongoing speech therapy is recommended at next level of care.      ASSESSMENT    Continue to recommend NPO due to high risk of aspiration with all intake. Patient verbalizes desire to pursue PEG as he feels this

## 2025-07-24 NOTE — PLAN OF CARE
Problem: Respiratory - Adult  Goal: Achieves optimal ventilation and oxygenation  Outcome: Progressing  Flowsheets (Taken 7/24/2025 2510)  Achieves optimal ventilation and oxygenation:   Assess for changes in respiratory status   Position to facilitate oxygenation and minimize respiratory effort   Respiratory therapy support as indicated   Assess and instruct to report shortness of breath or any respiratory difficulty   Encourage broncho-pulmonary hygiene including cough, deep breathe, incentive spirometry   Assess for changes in mentation and behavior

## 2025-07-25 ENCOUNTER — APPOINTMENT (OUTPATIENT)
Dept: GENERAL RADIOLOGY | Age: 80
DRG: 190 | End: 2025-07-25
Payer: OTHER GOVERNMENT

## 2025-07-25 PROBLEM — Z51.5 ENCOUNTER FOR PALLIATIVE CARE: Status: ACTIVE | Noted: 2025-07-25

## 2025-07-25 PROBLEM — R54 FRAILTY: Status: ACTIVE | Noted: 2025-07-25

## 2025-07-25 LAB
ALBUMIN SERPL-MCNC: 1.8 G/DL (ref 3.2–4.6)
ALBUMIN/GLOB SERPL: 0.7 (ref 1–1.9)
ALP SERPL-CCNC: 117 U/L (ref 40–129)
ALT SERPL-CCNC: 39 U/L (ref 8–55)
ANION GAP BLD CALC-SCNC: ABNORMAL MMOL/L
ANION GAP SERPL CALC-SCNC: 9 MMOL/L (ref 7–16)
ARTERIAL PATENCY WRIST A: POSITIVE
AST SERPL-CCNC: 43 U/L (ref 15–37)
BASE EXCESS BLD CALC-SCNC: 1.7 MMOL/L
BASOPHILS # BLD: 0 K/UL (ref 0–0.2)
BASOPHILS NFR BLD: 0 % (ref 0–2)
BDY SITE: ABNORMAL
BILIRUB SERPL-MCNC: 0.9 MG/DL (ref 0–1.2)
BUN SERPL-MCNC: 23 MG/DL (ref 8–23)
CA-I BLD-MCNC: 1.24 MMOL/L (ref 1.12–1.32)
CALCIUM SERPL-MCNC: 8 MG/DL (ref 8.8–10.2)
CHLORIDE SERPL-SCNC: 104 MMOL/L (ref 98–107)
CO2 BLD-SCNC: 24 MMOL/L (ref 13–23)
CO2 SERPL-SCNC: 22 MMOL/L (ref 20–29)
CREAT SERPL-MCNC: 0.53 MG/DL (ref 0.8–1.3)
DIFFERENTIAL METHOD BLD: ABNORMAL
EKG DIAGNOSIS: NORMAL
EKG Q-T INTERVAL: 380 MS
EKG QRS DURATION: 82 MS
EKG QTC CALCULATION (BAZETT): 464 MS
EKG R AXIS: 24 DEGREES
EKG T AXIS: 75 DEGREES
EKG VENTRICULAR RATE: 90 BPM
EOSINOPHIL # BLD: 0 K/UL (ref 0–0.8)
EOSINOPHIL NFR BLD: 0 % (ref 0.5–7.8)
ERYTHROCYTE [DISTWIDTH] IN BLOOD BY AUTOMATED COUNT: 15.9 % (ref 11.9–14.6)
FIO2 ON VENT: 70 %
GAS FLOW.O2 O2 DELIVERY SYS: ABNORMAL
GLOBULIN SER CALC-MCNC: 2.6 G/DL (ref 2.3–3.5)
GLUCOSE SERPL-MCNC: 150 MG/DL (ref 70–99)
HCO3 BLD-SCNC: 25.2 MMOL/L (ref 21–28)
HCT VFR BLD AUTO: 25.7 % (ref 41.1–50.3)
HGB BLD-MCNC: 7.9 G/DL (ref 13.6–17.2)
IMM GRANULOCYTES # BLD AUTO: 0.11 K/UL (ref 0–0.5)
IMM GRANULOCYTES NFR BLD AUTO: 0.8 % (ref 0–5)
LYMPHOCYTES # BLD: 2.71 K/UL (ref 0.5–4.6)
LYMPHOCYTES NFR BLD: 20 % (ref 13–44)
MAGNESIUM SERPL-MCNC: 2.2 MG/DL (ref 1.8–2.4)
MCH RBC QN AUTO: 27.1 PG (ref 26.1–32.9)
MCHC RBC AUTO-ENTMCNC: 30.7 G/DL (ref 31.4–35)
MCV RBC AUTO: 88 FL (ref 82–102)
MONOCYTES # BLD: 0.72 K/UL (ref 0.1–1.3)
MONOCYTES NFR BLD: 5.3 % (ref 4–12)
NEUTS SEG # BLD: 10.04 K/UL (ref 1.7–8.2)
NEUTS SEG NFR BLD: 73.9 % (ref 43–78)
NRBC # BLD: 0 K/UL (ref 0–0.2)
PCO2 BLD: 34 MMHG (ref 35–45)
PH BLD: 7.48 (ref 7.35–7.45)
PHOSPHATE SERPL-MCNC: 3 MG/DL (ref 2.5–4.5)
PLATELET # BLD AUTO: 336 K/UL (ref 150–450)
PMV BLD AUTO: 10.5 FL (ref 9.4–12.3)
PO2 BLD: 78 MMHG (ref 75–100)
POTASSIUM BLD-SCNC: 3.7 MMOL/L (ref 3.5–5.1)
POTASSIUM SERPL-SCNC: 3.8 MMOL/L (ref 3.5–5.1)
PROT SERPL-MCNC: 4.5 G/DL (ref 6.3–8.2)
RBC # BLD AUTO: 2.92 M/UL (ref 4.23–5.6)
SAO2 % BLD: 96 %
SERVICE CMNT-IMP: ABNORMAL
SERVICE CMNT-IMP: ABNORMAL
SODIUM BLD-SCNC: 136 MMOL/L (ref 136–145)
SODIUM SERPL-SCNC: 135 MMOL/L (ref 136–145)
SPECIMEN SITE: ABNORMAL
TRIGL SERPL-MCNC: 84 MG/DL (ref 0–150)
TROPONIN T SERPL HS-MCNC: 39 NG/L (ref 0–22)
UFH PPP CHRO-ACNC: 0.14 IU/ML (ref 0.3–0.7)
WBC # BLD AUTO: 13.6 K/UL (ref 4.3–11.1)

## 2025-07-25 PROCEDURE — 94761 N-INVAS EAR/PLS OXIMETRY MLT: CPT

## 2025-07-25 PROCEDURE — 99232 SBSQ HOSP IP/OBS MODERATE 35: CPT | Performed by: INTERNAL MEDICINE

## 2025-07-25 PROCEDURE — 6360000002 HC RX W HCPCS: Performed by: STUDENT IN AN ORGANIZED HEALTH CARE EDUCATION/TRAINING PROGRAM

## 2025-07-25 PROCEDURE — 36600 WITHDRAWAL OF ARTERIAL BLOOD: CPT

## 2025-07-25 PROCEDURE — 6360000002 HC RX W HCPCS: Performed by: NURSE PRACTITIONER

## 2025-07-25 PROCEDURE — 85025 COMPLETE CBC W/AUTO DIFF WBC: CPT

## 2025-07-25 PROCEDURE — 2060000000 HC ICU INTERMEDIATE R&B

## 2025-07-25 PROCEDURE — 84100 ASSAY OF PHOSPHORUS: CPT

## 2025-07-25 PROCEDURE — 6370000000 HC RX 637 (ALT 250 FOR IP): Performed by: INTERNAL MEDICINE

## 2025-07-25 PROCEDURE — 6370000000 HC RX 637 (ALT 250 FOR IP): Performed by: STUDENT IN AN ORGANIZED HEALTH CARE EDUCATION/TRAINING PROGRAM

## 2025-07-25 PROCEDURE — 84132 ASSAY OF SERUM POTASSIUM: CPT

## 2025-07-25 PROCEDURE — 2580000003 HC RX 258: Performed by: STUDENT IN AN ORGANIZED HEALTH CARE EDUCATION/TRAINING PROGRAM

## 2025-07-25 PROCEDURE — 71045 X-RAY EXAM CHEST 1 VIEW: CPT

## 2025-07-25 PROCEDURE — 2500000003 HC RX 250 WO HCPCS: Performed by: INTERNAL MEDICINE

## 2025-07-25 PROCEDURE — 82330 ASSAY OF CALCIUM: CPT

## 2025-07-25 PROCEDURE — 84484 ASSAY OF TROPONIN QUANT: CPT

## 2025-07-25 PROCEDURE — 93010 ELECTROCARDIOGRAM REPORT: CPT | Performed by: INTERNAL MEDICINE

## 2025-07-25 PROCEDURE — 84295 ASSAY OF SERUM SODIUM: CPT

## 2025-07-25 PROCEDURE — 82947 ASSAY GLUCOSE BLOOD QUANT: CPT

## 2025-07-25 PROCEDURE — 83735 ASSAY OF MAGNESIUM: CPT

## 2025-07-25 PROCEDURE — 2700000000 HC OXYGEN THERAPY PER DAY

## 2025-07-25 PROCEDURE — 6360000002 HC RX W HCPCS: Performed by: INTERNAL MEDICINE

## 2025-07-25 PROCEDURE — 84478 ASSAY OF TRIGLYCERIDES: CPT

## 2025-07-25 PROCEDURE — 99222 1ST HOSP IP/OBS MODERATE 55: CPT | Performed by: INTERNAL MEDICINE

## 2025-07-25 PROCEDURE — 2500000003 HC RX 250 WO HCPCS: Performed by: NURSE PRACTITIONER

## 2025-07-25 PROCEDURE — 82803 BLOOD GASES ANY COMBINATION: CPT

## 2025-07-25 PROCEDURE — 36415 COLL VENOUS BLD VENIPUNCTURE: CPT

## 2025-07-25 PROCEDURE — 2580000003 HC RX 258: Performed by: INTERNAL MEDICINE

## 2025-07-25 PROCEDURE — 2500000003 HC RX 250 WO HCPCS: Performed by: STUDENT IN AN ORGANIZED HEALTH CARE EDUCATION/TRAINING PROGRAM

## 2025-07-25 PROCEDURE — 85520 HEPARIN ASSAY: CPT

## 2025-07-25 PROCEDURE — 93005 ELECTROCARDIOGRAM TRACING: CPT | Performed by: NURSE PRACTITIONER

## 2025-07-25 PROCEDURE — 94668 MNPJ CHEST WALL SBSQ: CPT

## 2025-07-25 PROCEDURE — 6360000002 HC RX W HCPCS: Performed by: FAMILY MEDICINE

## 2025-07-25 PROCEDURE — 97535 SELF CARE MNGMENT TRAINING: CPT

## 2025-07-25 PROCEDURE — 94660 CPAP INITIATION&MGMT: CPT

## 2025-07-25 PROCEDURE — 97112 NEUROMUSCULAR REEDUCATION: CPT

## 2025-07-25 PROCEDURE — 97530 THERAPEUTIC ACTIVITIES: CPT

## 2025-07-25 PROCEDURE — 80053 COMPREHEN METABOLIC PANEL: CPT

## 2025-07-25 PROCEDURE — 94640 AIRWAY INHALATION TREATMENT: CPT

## 2025-07-25 RX ORDER — FUROSEMIDE 10 MG/ML
40 INJECTION INTRAMUSCULAR; INTRAVENOUS ONCE
Status: DISCONTINUED | OUTPATIENT
Start: 2025-07-25 | End: 2025-07-25

## 2025-07-25 RX ORDER — FUROSEMIDE 10 MG/ML
20 INJECTION INTRAMUSCULAR; INTRAVENOUS ONCE
Status: COMPLETED | OUTPATIENT
Start: 2025-07-25 | End: 2025-07-25

## 2025-07-25 RX ADMIN — WATER 40 MG: 1 INJECTION INTRAMUSCULAR; INTRAVENOUS; SUBCUTANEOUS at 09:14

## 2025-07-25 RX ADMIN — PIPERACILLIN AND TAZOBACTAM 3375 MG: 3; .375 INJECTION, POWDER, FOR SOLUTION INTRAVENOUS at 21:27

## 2025-07-25 RX ADMIN — METOCLOPRAMIDE 5 MG: 5 INJECTION, SOLUTION INTRAMUSCULAR; INTRAVENOUS at 14:57

## 2025-07-25 RX ADMIN — PANTOPRAZOLE SODIUM 40 MG: 40 INJECTION, POWDER, FOR SOLUTION INTRAVENOUS at 09:18

## 2025-07-25 RX ADMIN — HEPARIN SODIUM 2500 UNITS: 1000 INJECTION, SOLUTION INTRAVENOUS; SUBCUTANEOUS at 22:33

## 2025-07-25 RX ADMIN — FUROSEMIDE 20 MG: 10 INJECTION, SOLUTION INTRAMUSCULAR; INTRAVENOUS at 05:13

## 2025-07-25 RX ADMIN — IPRATROPIUM BROMIDE AND ALBUTEROL SULFATE 1 DOSE: 2.5; .5 SOLUTION RESPIRATORY (INHALATION) at 22:59

## 2025-07-25 RX ADMIN — Medication 4 ML: at 08:12

## 2025-07-25 RX ADMIN — HEPARIN SODIUM 19 UNITS/KG/HR: 10000 INJECTION, SOLUTION INTRAVENOUS at 14:56

## 2025-07-25 RX ADMIN — IPRATROPIUM BROMIDE AND ALBUTEROL SULFATE 1 DOSE: 2.5; .5 SOLUTION RESPIRATORY (INHALATION) at 00:55

## 2025-07-25 RX ADMIN — MORPHINE SULFATE 1 MG: 2 INJECTION, SOLUTION INTRAMUSCULAR; INTRAVENOUS at 21:25

## 2025-07-25 RX ADMIN — BUDESONIDE INHALATION 500 MCG: 0.5 SUSPENSION RESPIRATORY (INHALATION) at 19:58

## 2025-07-25 RX ADMIN — ARFORMOTEROL TARTRATE 15 MCG: 15 SOLUTION RESPIRATORY (INHALATION) at 08:11

## 2025-07-25 RX ADMIN — IPRATROPIUM BROMIDE AND ALBUTEROL SULFATE 1 DOSE: 2.5; .5 SOLUTION RESPIRATORY (INHALATION) at 19:58

## 2025-07-25 RX ADMIN — IPRATROPIUM BROMIDE AND ALBUTEROL SULFATE 1 DOSE: 2.5; .5 SOLUTION RESPIRATORY (INHALATION) at 15:36

## 2025-07-25 RX ADMIN — Medication 4 ML: at 19:59

## 2025-07-25 RX ADMIN — IPRATROPIUM BROMIDE AND ALBUTEROL SULFATE 1 DOSE: 2.5; .5 SOLUTION RESPIRATORY (INHALATION) at 08:12

## 2025-07-25 RX ADMIN — PIPERACILLIN AND TAZOBACTAM 3375 MG: 3; .375 INJECTION, POWDER, FOR SOLUTION INTRAVENOUS at 15:13

## 2025-07-25 RX ADMIN — METOCLOPRAMIDE 5 MG: 5 INJECTION, SOLUTION INTRAMUSCULAR; INTRAVENOUS at 09:22

## 2025-07-25 RX ADMIN — SODIUM CHLORIDE, PRESERVATIVE FREE 10 ML: 5 INJECTION INTRAVENOUS at 21:08

## 2025-07-25 RX ADMIN — MORPHINE SULFATE 1 MG: 2 INJECTION, SOLUTION INTRAMUSCULAR; INTRAVENOUS at 00:42

## 2025-07-25 RX ADMIN — ARFORMOTEROL TARTRATE 15 MCG: 15 SOLUTION RESPIRATORY (INHALATION) at 19:58

## 2025-07-25 RX ADMIN — BUDESONIDE INHALATION 500 MCG: 0.5 SUSPENSION RESPIRATORY (INHALATION) at 08:11

## 2025-07-25 RX ADMIN — METOCLOPRAMIDE 5 MG: 5 INJECTION, SOLUTION INTRAMUSCULAR; INTRAVENOUS at 21:06

## 2025-07-25 RX ADMIN — I.V. FAT EMULSION 250 ML: 20 EMULSION INTRAVENOUS at 18:33

## 2025-07-25 RX ADMIN — MORPHINE SULFATE 1 MG: 2 INJECTION, SOLUTION INTRAMUSCULAR; INTRAVENOUS at 11:12

## 2025-07-25 RX ADMIN — PIPERACILLIN AND TAZOBACTAM 3375 MG: 3; .375 INJECTION, POWDER, FOR SOLUTION INTRAVENOUS at 09:59

## 2025-07-25 RX ADMIN — CALCIUM GLUCONATE: 98 INJECTION INTRAVENOUS at 18:43

## 2025-07-25 RX ADMIN — MORPHINE SULFATE 1 MG: 2 INJECTION, SOLUTION INTRAMUSCULAR; INTRAVENOUS at 04:11

## 2025-07-25 RX ADMIN — PIPERACILLIN AND TAZOBACTAM 3375 MG: 3; .375 INJECTION, POWDER, FOR SOLUTION INTRAVENOUS at 03:24

## 2025-07-25 RX ADMIN — SODIUM CHLORIDE, PRESERVATIVE FREE 10 ML: 5 INJECTION INTRAVENOUS at 09:23

## 2025-07-25 RX ADMIN — IPRATROPIUM BROMIDE AND ALBUTEROL SULFATE 1 DOSE: 2.5; .5 SOLUTION RESPIRATORY (INHALATION) at 12:00

## 2025-07-25 ASSESSMENT — PAIN DESCRIPTION - DESCRIPTORS: DESCRIPTORS: ACHING;DISCOMFORT

## 2025-07-25 ASSESSMENT — PAIN DESCRIPTION - LOCATION: LOCATION: KNEE

## 2025-07-25 ASSESSMENT — PAIN DESCRIPTION - ORIENTATION: ORIENTATION: RIGHT;LEFT

## 2025-07-25 NOTE — PROGRESS NOTES
Resting quietly at present. NAD noted. Safety maintained through out the shift. Remains on airvo--50L/90% with O2 sats 93% at rest. D-sats easily on exertion with longer recovery period. Managed to sit up in recliner today. Tolerated fairly. No family at bedside at present. To report off to on coming nurse.

## 2025-07-25 NOTE — ICUWATCH
RRT Clinical Rounding Nurse Progress Report      SUBJECTIVE: Patient assessed secondary to RN/provider concern - shortness of breath.      Vitals:    07/25/25 0444 07/25/25 0504 07/25/25 0534 07/25/25 0557   BP:  110/68  119/71   Pulse: 88 (!) 109 83    Resp: 20      Temp:       TempSrc:       SpO2: 93%  96%    Weight:       Height:            DETERIORATION INDEX SCORE: 45    ASSESSMENT:  Called to assess pt d/t shortness of breath, CP. Upon arrival to room, pt in bed on Airvo wit O2 sat 94%. Pt expressing severe shortness of breath. Respiration bilaterally shallow with coarse crackles noted. Pt having period of apnea lasting up to thirty seconds and becomes drowsy. 20mg IV lasix given per MD.     RT to bedside, obtaining ABG.      Latest Reference Range & Units 07/25/25 05:19   POC pH 7.35 - 7.45   7.48 (H)   POC pCO2 35 - 45 MMHG 34.0 (L)   POC PO2 75 - 100 MMHG 78   POC HCO3 21 - 28 MMOL/L 25.2   POC O2 SAT % 96   POC TCO2 13 - 23 MMOL/L 24 (H)   Base Excess mmol/L 1.7   POC Ionized Calcium 1.12 - 1.32 mmol/L 1.24   POC Potassium 3.5 - 5.1 MMOL/L 3.7   POC Sodium 136 - 145 MMOL/L 136       Pt placed on 75% BIPAP for work of breathing by RT.    PLAN:  Will follow per RRT Clinical Rounding Program protocol.    Joan Guerin RN  [facility]: [phone]  [facility]: [phone]

## 2025-07-25 NOTE — PROGRESS NOTES
ACUTE PHYSICAL THERAPY GOALS:   (Developed with and agreed upon by patient and/or caregiver.)  (1.) Jonathan Zurita  will move from supine to sit and sit to supine  with MINIMAL ASSIST within 7 treatment day(s).    (2.) Jonathan Zurita will transfer from bed to chair and chair to bed with MINIMAL ASSIST using the least restrictive device within 7 treatment day(s).    (3.) Jonathan Zurita will ambulate with MINIMAL ASSIST for 50 feet with the least restrictive device within 7 treatment day(s).   (4.) Jonathan Zurita will perform standing static and dynamic balance activities x 10 minutes with MINIMAL ASSIST to improve safety within 7 treatment day(s).  (5.) Jonathan Zurita will perform therapeutic exercises x 20 min for HEP with INDEPENDENCE to improve strength, endurance, and functional mobility within 7 treatment day(s).     PHYSICAL THERAPY: Daily Note AM   (Link to Caseload Tracking: PT Visit Days : 5  Time In/Out PT Charge Capture  Rehab Caseload Tracker  Orders    WBAT RLE    Jonathan Zurita is a 80 y.o. male   PRIMARY DIAGNOSIS: COPD exacerbation (HCC)  COPD exacerbation (HCC) [J44.1]  Acute respiratory failure with hypoxia (HCC) [J96.01]  Edema of right lower extremity [R60.0]  Leukocytosis, unspecified type [D72.829]  Procedure(s) (LRB):  ESOPHAGOGASTRODUODENOSCOPY PERCUTANEOUS ENDOSCOPIC GASTROSTOMY TUBE INSERTION (N/A)     Inpatient: Payor: [employer] / Plan: [employer] / Product Type: *No Product type* /     ASSESSMENT:     REHAB RECOMMENDATIONS:   Recommendation to date pending progress:  Setting:  Short-term Rehab    Equipment:    To Be Determined     ASSESSMENT:  Mr. Zurita was supine upon contact and agreeable to PT. Patient is on airvo 50L/100%. Patient performed supine to sit with min assist x 2, additional time, and cues for technique. Patient then participated in exercises to BLE's with cues for improved quality of exercise as well as AAROM provided to RLE.

## 2025-07-25 NOTE — CONSULTS
Review of this patient's imaging shows a moderate to large hiatal hernia with the majority of the stomach in the thoracic cavity.  This prevents percutaneous placement of a gastrostomy tube.  If the patient still requires enteral feeds, the only approach may be surgical.

## 2025-07-25 NOTE — PROGRESS NOTES
Bedside report received from night nurse Cecelia. Assessment done as noted  Respiration even and unlabored 20/min at rest; denies pain or nausea at present. Remains on Bi-PAP. Demanding to \"take this mask off and put the other oxygen back on. I am only supposed to be on this for an hour and we are passed that hour\". Pt and family educated regarding respiratory event overnight. Made aware that will notify RT and they can reassess and make adjustment regarding O2 needs. Family at bedside and upset. \"We are going to do what papa wants and if the other oxygen is what he wants then we are going to do that. And if you are the nurse who can't do it, we demand for another nurse who can.\" Family member starts removing bi-pap. Nurse assisted putting airvo back on pts. \"Feels much better\". Sats remains 88-9% at rest on airvo 50L/75%.remains NPO for PEG today. Noted TPN/Lipid on hold for now. Noted Heparin drip on hold for procedure. Barbara Aguiar, NP and RT updated of above. Encouraged to call with needs  0810 Dr. Palomares updated of above. No new orders received at present. Awaiting transport to GI-Lab.  1410 No plans for PEG per GI/IR . OK to restart PEG. OK to restart PPN/Lipis tonight at 1800 tonight. Can restart Heparin drip at previous rate of 19u/kg/hr 12.1cc/hr - recheck anit-xa in 6 hours after heparin drip has started

## 2025-07-25 NOTE — PLAN OF CARE
EGD for PEG cancelled today; too high risk for anesthesia given new respiratory concerns. Will consult IR to see if possible to place PEG; if not then can follow up Monday to see if stable for PEG then.     ASHLY Boyd

## 2025-07-25 NOTE — PROGRESS NOTES
Nutrition Assessment  Assessment Type: Reassess  Reason for visit:  Best Practice Alert: Malnutrition Screening Tool , Tube Feeding Management (Hospitalists), and Parenteral Nutrition Management (Hospitalists)  Malnutrition Screening Tool Score: 2  Unintentional weight loss PTA: 2 to 13 pounds (1 point)  Eating poorly due to decreased appetite: Yes (1 point)    Nutrition Intervention:   Food and/or Nutrient Delivery:   Parenteral Nutrition:  Peripheral parenteral nutrition (Osmolarity 897)   Peripheral line infusion  Continue: Dex 5%, 4.25% AA 2 L (85ml/hr)   Continue 250 ml 20% lipids daily  Electrolytes:   Sodium (90 mEq/L sodium chloride), Potassium (10 mmol/L potassium phosphate, 10 mEq/L potassium chloride), Calcium gluconate (4.5 mEq/L), Magnesium sulfate 8 mEq/L   Additives:   Adult multivitamin with vit K  Trace Elements  Thiamine 100 mg daily x 7 days (EOT 7/30)  Folic Acid 1 mg daily x 7 days (EOT 7/30)  PN regimen provides per 24 hours: 1180 kcal/day (93% of needs), 85 grams of protein/day (>100% of needs), 100 grams of CHO/24 hours and ~2290 ml of total volume/24 hours.   Above regimen: Intended to meet macronutrient goals  Biochemical Data:   Basic Metabolic Panel, Hepatic Function Panel, Magnesium and Phosphorus active per nutrition parameters  Triglyceride weekly on Fridays  POC Glucoses/SSI Not indicated  Nutrition Related Medication Management:  Electrolyte Replacement:   Continue prn protocol Magnesium, Potassium, and Phosphorus  Intravenous fluids:  Not applicable   Meals and Snacks:  Diet: NPO  Medical Food Supplements:   Medical food supplement therapy:  None Deferred NPO  Coordination of Nutrition Care:  Coordination with health care provider Provider, Dr. Regalado and RN, Meera. PPN to be resumed now     Malnutrition Assessment:7/19  [facility] Clinical Malnutrition Criteria  Malnutrition Status: Moderate malnutrition  Context: Acute Illness  Findings of clinical characteristics of

## 2025-07-25 NOTE — PROGRESS NOTES
Pt stating their chest feels tight. Tachycardic -115. White frothy sputum observed. TPN and Lipids paused. On call notified. Rt notified. EKG, troponin, chest XR, and another dose of PRN morphine ordered.

## 2025-07-25 NOTE — PLAN OF CARE
Problem: Respiratory - Adult  Goal: Achieves optimal ventilation and oxygenation  Outcome: Not Progressing  Flowsheets (Taken 7/25/2025 0817)  Achieves optimal ventilation and oxygenation:   Assess for changes in respiratory status   Position to facilitate oxygenation and minimize respiratory effort   Respiratory therapy support as indicated   Assess and instruct to report shortness of breath or any respiratory difficulty   Encourage broncho-pulmonary hygiene including cough, deep breathe, incentive spirometry   Assess for changes in mentation and behavior   Oxygen supplementation based on oxygen saturation or arterial blood gases

## 2025-07-25 NOTE — ACP (ADVANCE CARE PLANNING)
Advance Care Planning Note   Admit Date:  2025 11:53 AM   Name:  Jonathan Zurita   Age:  80 y.o.  Sex:  male  :  1945   MRN:  [id number]   Room:  [facility]    Jonathan Zurita has capacity to make his own decisions:   Yes    If pt unable to make decisions, POA/surrogate decision maker:  Daughter Ria       Other ACP topics discussed, if applicable:   Discussed possibility of hospice or comfort measures.      Patient or surrogate consented to discussion of the current conditions, workup, management plans, prognosis, and the risk for further deterioration.  Time spent: 30 minutes in direct discussion.      Signed:  Oralia Warner MD

## 2025-07-25 NOTE — CONSULTS
Patient: Jonathan Zurita MRN: [id number]  SSN: [id number]    YOB: 1945  Age: 80 y.o.  Sex: male       Date of Request: 7/25/2025  Date of Consult:  7/25/2025  Reason for Consult:  pain and symptom management, family support and education, and medical decision making  Requesting Physician: Dr. Warner     Assessment/Plan:     Principal Diagnosis:    Dyspnea  R06.00    Additional Diagnoses:   Debility, Unspecified  R53.81  Frailty  R54  Counseling, Encounter for Medical Advice  Z71.9  Encounter for Palliative Care  Z51.5    Palliative Performance Scale (PPS):       Medical Decision Making:   Reviewed and summarized labs and imaging from admission.  Pt alert on airvo.  Gets dyspneic with movement.  Unable to reliably swallow.  Expressed concerns with managing pt symptoms at home as they would not have iv access.  Recommended hospice house for pt care if he is stable for transfer.  Pt expressed understanding.  Notified CM and hospitalist regarding conversation.     Will discuss findings with members of the interdisciplinary team.      Thank you for this referral.         Subjective:     History obtained from:  Patient, Family, and Chart    Chief Complaint: dyspnea   History of Present Illness:   80 y.o. male with medical history of atrial fibrillation, CLL, COPD, heart failure with preserved ejection fraction, anxiety, depression, hyperlipidemia, CVA, recent hospitalization status post fall and right femur fracture status post repair who presented with worsening shortness of breath and chest tightness that has been progressively worsening over the past week.  Patient endorses cough but denies increased sputum production.  He was requiring nasal cannula oxygen at rehab facility.  He also endorses right knee pain and right lower extremity swelling that has persisted since his right femur fracture surgery.     On arrival to the ED patient hypoxic with [id number] that improved with high flow nasal

## 2025-07-25 NOTE — PROGRESS NOTES
Neutrophils Absolute 10.57 (H) 1.70 - 8.20 K/UL    Lymphocytes Absolute 3.10 0.50 - 4.60 K/UL    Monocytes Absolute 0.75 0.10 - 1.30 K/UL    Eosinophils Absolute 0.00 0.00 - 0.80 K/UL    Basophils Absolute 0.01 0.00 - 0.20 K/UL    Immature Granulocytes Absolute 0.08 0.0 - 0.5 K/UL   Comprehensive Metabolic Panel    Collection Time: 07/24/25  5:35 AM   Result Value Ref Range    Sodium 135 (L) 136 - 145 mmol/L    Potassium 4.0 3.5 - 5.1 mmol/L    Chloride 103 98 - 107 mmol/L    CO2 23 20 - 29 mmol/L    Anion Gap 9 7 - 16 mmol/L    Glucose 98 70 - 99 mg/dL    BUN 24 (H) 8 - 23 MG/DL    Creatinine 0.69 (L) 0.80 - 1.30 MG/DL    Est, Glom Filt Rate >90 >60 ml/min/1.73m2    Calcium 8.2 (L) 8.8 - 10.2 MG/DL    Total Bilirubin 0.9 0.0 - 1.2 MG/DL    ALT 39 8 - 55 U/L    AST 60 (H) 15 - 37 U/L    Alk Phosphatase 134 (H) 40 - 129 U/L    Total Protein 4.4 (L) 6.3 - 8.2 g/dL    Albumin 1.9 (L) 3.2 - 4.6 g/dL    Globulin 2.5 2.3 - 3.5 g/dL    Albumin/Globulin Ratio 0.8 (L) 1.0 - 1.9     Anti-XA, Heparin    Collection Time: 07/24/25  5:35 AM   Result Value Ref Range    Anti-XA Unfrac Heparin 0.56 0.3 - 0.7 IU/mL   Anti-XA, Heparin    Collection Time: 07/24/25  1:24 PM   Result Value Ref Range    Anti-XA Unfrac Heparin 0.57 0.3 - 0.7 IU/mL   EKG 12 Lead    Collection Time: 07/25/25 12:48 AM   Result Value Ref Range    Ventricular Rate 90 BPM    QRS Duration 82 ms    Q-T Interval 380 ms    QTc Calculation (Bazett) 464 ms    R Axis 24 degrees    T Axis 75 degrees    Diagnosis       Atrial fibrillation  Abnormal ECG  When compared with ECG of 22-JUL-2025 15:38,  Atrial fibrillation has replaced Sinus rhythm  Vent. rate has increased BY  37 BPM  QT has lengthened  Confirmed by PATI CAMPOS (), CYNTHIA MAK ([id number]) on 7/25/2025 6:53:20 AM     CBC with Auto Differential    Collection Time: 07/25/25  1:04 AM   Result Value Ref Range    WBC 13.6 (H) 4.3 - 11.1 K/uL    RBC 2.92 (L) 4.23 - 5.6 M/uL    Hemoglobin 7.9 (L) 13.6 - 17.2 g/dL     Collection Time: 07/25/25  5:19 AM   Result Value Ref Range    POC pH 7.48 (H) 7.35 - 7.45      POC pCO2 34.0 (L) 35 - 45 MMHG    POC PO2 78 75 - 100 MMHG    POC Sodium 136 136 - 145 MMOL/L    POC Potassium 3.7 3.5 - 5.1 MMOL/L    POC Ionized Calcium 1.24 1.12 - 1.32 mmol/L    Base Excess 1.7 mmol/L    POC HCO3 25.2 21 - 28 MMOL/L    POC TCO2 24 (H) 13 - 23 MMOL/L    POC O2 SAT 96 %    Source ARTERIAL      Site RIGHT RADIAL      Obi Test Positive      DEVICE High Flow Nasal Cannula      FIO2 Arterial 70 %    Performed by: [email or web address]     Anion Gap, POC Cannot be calculated mmol/L    eGFR, POC Cannot be calculated >60 ml/min/1.73m2    Respiratory Comment: 50L70%        No results for input(s): \"COVID19\" in the last 72 hours.    Current Meds:  Current Facility-Administered Medications   Medication Dose Route Frequency    PN-Adult Premix 4.25/5 - Peripheral Line   IntraVENous Continuous TPN    amiodarone (PACERONE) tablet 200 mg  200 mg Per NG tube BID    guaiFENesin (ROBITUSSIN) 100 MG/5ML liquid 200 mg  200 mg Per NG tube Q6H    PN-Adult Premix 4.25/5 - Peripheral Line   IntraVENous Continuous TPN    fat emulsion (INTRALIPID/NUTRILIPID) 20 % infusion 250 mL  250 mL IntraVENous Daily    potassium chloride 20 mEq/50 mL IVPB (Central Line)  20 mEq IntraVENous PRN    Or    potassium chloride 10 mEq/100 mL IVPB (Peripheral Line)  10 mEq IntraVENous PRN    sodium phosphate 15 mmol in sodium chloride 0.9 % 250 mL IVPB  15 mmol IntraVENous PRN    piperacillin-tazobactam (ZOSYN) 3,375 mg in sodium chloride 0.9 % 50 mL IVPB (addEASE)  3,375 mg IntraVENous Q6H    ceFAZolin (ANCEF) 2000 mg in sterile water 20 mL IV syringe  2,000 mg IntraVENous On Call    pantoprazole (PROTONIX) 40 mg in sodium chloride (PF) 0.9 % 10 mL injection  40 mg IntraVENous Daily    methylPREDNISolone sodium succ (SOLU-MEDROL) 40 mg in sterile water 1 mL injection  40 mg IntraVENous Daily    morphine (PF) injection 1 mg  1 mg IntraVENous Q4H

## 2025-07-25 NOTE — PROGRESS NOTES
ACUTE OCCUPATIONAL THERAPY GOALS:   (Developed with and agreed upon by patient and/or caregiver.)  1. Patient will complete lower body bathing and dressing with MODERATE ASSIST and adaptive equipment as needed.   2. Patient will complete toileting with MINIMUM ASSIST.  3. Patient will tolerate 30 minutes of OT treatment with 1-2 rest breaks to increase activity tolerance for ADLs.   4. Patient will complete functional transfers with MINIMUM ASSIST and adaptive equipment as needed.   5. Patient will complete functional activity while seated edge of bed with SBA and adaptive equipment as needed.  6. Patient will maintain edge of bed unsupported sitting balance with SBA in preparation for OOB activity.  7. Patient will tolerate 15 minutes BUE therapeutic activities to increase use of BUE during ADL performance.      Timeframe: 7 visits     OCCUPATIONAL THERAPY: Daily Note AM   OT Visit Days: 4   Time In/Out  OT Charge Capture  Rehab Caseload Tracker  OT Orders    Jonathan Zurita is a 80 y.o. male   PRIMARY DIAGNOSIS: COPD exacerbation (HCC)  COPD exacerbation (HCC) [J44.1]  Acute respiratory failure with hypoxia (HCC) [J96.01]  Edema of right lower extremity [R60.0]  Leukocytosis, unspecified type [D72.829]  Procedure(s) (LRB):  ESOPHAGOGASTRODUODENOSCOPY PERCUTANEOUS ENDOSCOPIC GASTROSTOMY TUBE INSERTION (N/A)     Inpatient: Payor: [employer] / Plan: [employer] / Product Type: *No Product type* /     ASSESSMENT:     REHAB RECOMMENDATIONS:   Recommendation to date pending progress:  Setting:  Short-term Rehab    Equipment:    To Be Determined     ASSESSMENT:  Mr. Zurita is doing fair today. Pt presents supine upon arrival. Pt required min A for bed mobility and additional time. Fair sitting balance noted at edge of bed. Pt was assisted with oral care tasks while seated edge of bed/chair. Was able to stand with min A x2 and transfer over to chair. Pt rested some before attempting to perform a short bout of

## 2025-07-25 NOTE — PROGRESS NOTES
Jonathan Zurita  Admission Date: 7/18/2025         Daily Progress Note: 7/25/2025    The patient's chart is reviewed and the patient is discussed with the staff.    Background:   80 y.o.  male seen and evaluated at the request of Dr. Gould for increasing O2 needs. History of Afib on eliquis, CLL on ibrutinib, tobacco use and mild COPD per PFTs in 2024. Uses Wixela, Spiriva and prn Albuterol at home. Hx multiple episodes pneumonia in 2023 and 2024. + dysphagia with suspected aspiration events.  Has seen Dr. Tan in office 6/13/25. Admitted 6/14- 6/20 for pneumonia.    Recently admitted 7/6-7/11 for fall with R femur fracture and repair. Walking short distances at rehab with walker. Presented to ER on 7/18 with complaints of worsening SOB, chest tightness and cough.He also endorsed R leg pain and increased swelling. CT Chest this admission was negative for PE, doppler + for DVT of RLE. He states he has been taking his eliquis, inhalers. Having cough but not able to clear secretions. Has been eating a normal diet as OP. He was started on heparin gtt. WBC 17.1, elevated LFTs. Started on ceftriaxone and azithromycin. Overnight went from 7lpm to airvo 60L/100%.  Frequent ED visits. Previous CT scan showing B patulous esophagus with large amount of debris within the esophageal lumenconsolidation concerning for aspiration. sST saw him and recommended soft, bite sized, thin. EGD was done in 2023: findings long segments barretts, hital hernia, nonerosive gastritis. Recommended that he follow up in 1 year (2024 not done).     Added CPT and flutter to see if mucus plugging clearance may help. His PFTs last year were not very impressive for significant COPD and primarily the concern was aspiration. He has chronic LLL atelectasis presumably, whether it opens up intermittently is possible, but looked this was in 2023 as well on CT.     CT this admission with Hiatal hernia and distended, gas and  and only treating symptoms. Son and his wife in room and aware will not get better at this time. Told to consider hospice/comfort care. Not sure will be able to go anywhere due to high oxygen needs.          Pablo Guzmán MD

## 2025-07-25 NOTE — ICUWATCH
RRT Clinical Rounding Nurse Update    Vitals:    07/25/25 0534 07/25/25 0557 07/25/25 0724 07/25/25 0815   BP:  119/71 (!) 102/56    Pulse: 83  86 91   Resp:   17 18   Temp:   97.7 °F (36.5 °C)    TempSrc:   Oral    SpO2: 96%  95% 93%   Weight:       Height:            DETERIORATION INDEX SCORE: 43    ASSESSMENT:  Previous outreach assessment was reviewed. Patient is resting upright in bed, alert and oriented, following commands and answering questions appropriately. Unlabored, regular breathing on Airvo 50L/100%, SpO2 95-96%. Lung sounds clear. Denies productive cough. Declined overnight reportedly, requiring BiPAP, doing better on Airvo. VSS.      PLAN:  Will follow per RRT Clinical Rounding Program protocol.    Armand Grace RN  [facility]: [phone]  [facility]: [phone]

## 2025-07-25 NOTE — PROGRESS NOTES
TRANSFER - OUT REPORT:    Verbal report given to Mylene on Jonathan Zurita  being transferred to  for ordered procedure       Report consisted of patient's Situation, Background, Assessment and   Recommendations(SBAR).     Information from the following report(s) Nurse Handoff Report was reviewed with the receiving nurse.           Lines:   Peripheral IV 07/18/25 Left Forearm (Active)   Site Assessment Clean, dry & intact 07/24/25 2045   Line Status Infusing 07/24/25 2045   Line Care Connections checked and tightened 07/24/25 2045   Phlebitis Assessment No symptoms 07/24/25 2045   Infiltration Assessment 0 07/24/25 2045   Alcohol Cap Used Yes 07/24/25 2045   Dressing Status Clean, dry & intact 07/24/25 2045   Dressing Type Transparent 07/24/25 2045       Peripheral IV 07/21/25 Right Antecubital (Active)   Site Assessment Clean, dry & intact 07/24/25 2045   Line Status Infusing 07/24/25 2045   Line Care Connections checked and tightened 07/24/25 2045   Phlebitis Assessment No symptoms 07/24/25 2045   Infiltration Assessment 0 07/24/25 2045   Alcohol Cap Used Yes 07/24/25 2045   Dressing Status Clean, dry & intact 07/24/25 2045   Dressing Type Transparent 07/24/25 2045   Dressing Intervention New 07/21/25 0734       Peripheral IV 07/24/25 Right;Anterior Wrist (Active)   Site Assessment Clean, dry & intact 07/24/25 2155   Line Status Brisk blood return;Flushed 07/24/25 2155   Phlebitis Assessment No symptoms 07/24/25 2155   Infiltration Assessment 0 07/24/25 2155   Dressing Status New dressing applied 07/24/25 2155   Dressing Type Transparent 07/24/25 2155   Dressing Intervention New 07/24/25 2155        Opportunity for questions and clarification was provided.      Patient transported with: 50L/75%O2 @ Airvo L/lpm

## 2025-07-25 NOTE — PROGRESS NOTES
Pt stated they could not breathe. On call, RT, and renee notified. BIPAP placed. 20 mg lasix IV administered per order. TPN and Lipids paused. ABG completed. Renee assessed pt at the bedside.

## 2025-07-26 LAB
ALBUMIN SERPL-MCNC: 1.9 G/DL (ref 3.2–4.6)
ALBUMIN/GLOB SERPL: 0.7 (ref 1–1.9)
ALP SERPL-CCNC: 121 U/L (ref 40–129)
ALT SERPL-CCNC: 33 U/L (ref 8–55)
ANION GAP SERPL CALC-SCNC: 9 MMOL/L (ref 7–16)
AST SERPL-CCNC: 32 U/L (ref 15–37)
BASOPHILS # BLD: 0.01 K/UL (ref 0–0.2)
BASOPHILS NFR BLD: 0.1 % (ref 0–2)
BILIRUB SERPL-MCNC: 0.8 MG/DL (ref 0–1.2)
BUN SERPL-MCNC: 24 MG/DL (ref 8–23)
CALCIUM SERPL-MCNC: 8.2 MG/DL (ref 8.8–10.2)
CHLORIDE SERPL-SCNC: 103 MMOL/L (ref 98–107)
CO2 SERPL-SCNC: 23 MMOL/L (ref 20–29)
CREAT SERPL-MCNC: 0.48 MG/DL (ref 0.8–1.3)
DIFFERENTIAL METHOD BLD: ABNORMAL
EOSINOPHIL # BLD: 0 K/UL (ref 0–0.8)
EOSINOPHIL NFR BLD: 0 % (ref 0.5–7.8)
ERYTHROCYTE [DISTWIDTH] IN BLOOD BY AUTOMATED COUNT: 16.3 % (ref 11.9–14.6)
EST. AVERAGE GLUCOSE BLD GHB EST-MCNC: 107 MG/DL
GLOBULIN SER CALC-MCNC: 2.8 G/DL (ref 2.3–3.5)
GLUCOSE BLD STRIP.AUTO-MCNC: 188 MG/DL (ref 65–100)
GLUCOSE SERPL-MCNC: 211 MG/DL (ref 70–99)
HBA1C MFR BLD: 5.4 % (ref 0–5.6)
HCT VFR BLD AUTO: 27.9 % (ref 41.1–50.3)
HGB BLD-MCNC: 8.5 G/DL (ref 13.6–17.2)
IMM GRANULOCYTES # BLD AUTO: 0.1 K/UL (ref 0–0.5)
IMM GRANULOCYTES NFR BLD AUTO: 0.6 % (ref 0–5)
LYMPHOCYTES # BLD: 3.27 K/UL (ref 0.5–4.6)
LYMPHOCYTES NFR BLD: 20.3 % (ref 13–44)
MAGNESIUM SERPL-MCNC: 2.3 MG/DL (ref 1.8–2.4)
MCH RBC QN AUTO: 26.6 PG (ref 26.1–32.9)
MCHC RBC AUTO-ENTMCNC: 30.5 G/DL (ref 31.4–35)
MCV RBC AUTO: 87.2 FL (ref 82–102)
MONOCYTES # BLD: 0.87 K/UL (ref 0.1–1.3)
MONOCYTES NFR BLD: 5.4 % (ref 4–12)
NEUTS SEG # BLD: 11.84 K/UL (ref 1.7–8.2)
NEUTS SEG NFR BLD: 73.6 % (ref 43–78)
NRBC # BLD: 0 K/UL (ref 0–0.2)
PHOSPHATE SERPL-MCNC: 2.8 MG/DL (ref 2.5–4.5)
PLATELET # BLD AUTO: 323 K/UL (ref 150–450)
PMV BLD AUTO: 10.5 FL (ref 9.4–12.3)
POTASSIUM SERPL-SCNC: 4 MMOL/L (ref 3.5–5.1)
PROT SERPL-MCNC: 4.7 G/DL (ref 6.3–8.2)
RBC # BLD AUTO: 3.2 M/UL (ref 4.23–5.6)
SERVICE CMNT-IMP: ABNORMAL
SODIUM SERPL-SCNC: 135 MMOL/L (ref 136–145)
UFH PPP CHRO-ACNC: 0.44 IU/ML (ref 0.3–0.7)
UFH PPP CHRO-ACNC: 0.6 IU/ML (ref 0.3–0.7)
WBC # BLD AUTO: 16.1 K/UL (ref 4.3–11.1)

## 2025-07-26 PROCEDURE — 94668 MNPJ CHEST WALL SBSQ: CPT

## 2025-07-26 PROCEDURE — 83735 ASSAY OF MAGNESIUM: CPT

## 2025-07-26 PROCEDURE — 6360000002 HC RX W HCPCS: Performed by: INTERNAL MEDICINE

## 2025-07-26 PROCEDURE — 6360000002 HC RX W HCPCS: Performed by: NURSE PRACTITIONER

## 2025-07-26 PROCEDURE — 80053 COMPREHEN METABOLIC PANEL: CPT

## 2025-07-26 PROCEDURE — 94669 MECHANICAL CHEST WALL OSCILL: CPT

## 2025-07-26 PROCEDURE — 2580000003 HC RX 258: Performed by: INTERNAL MEDICINE

## 2025-07-26 PROCEDURE — 2500000003 HC RX 250 WO HCPCS: Performed by: INTERNAL MEDICINE

## 2025-07-26 PROCEDURE — 85520 HEPARIN ASSAY: CPT

## 2025-07-26 PROCEDURE — 85025 COMPLETE CBC W/AUTO DIFF WBC: CPT

## 2025-07-26 PROCEDURE — 84100 ASSAY OF PHOSPHORUS: CPT

## 2025-07-26 PROCEDURE — 92526 ORAL FUNCTION THERAPY: CPT

## 2025-07-26 PROCEDURE — 2500000003 HC RX 250 WO HCPCS: Performed by: NURSE PRACTITIONER

## 2025-07-26 PROCEDURE — 6360000002 HC RX W HCPCS: Performed by: STUDENT IN AN ORGANIZED HEALTH CARE EDUCATION/TRAINING PROGRAM

## 2025-07-26 PROCEDURE — 2580000003 HC RX 258: Performed by: STUDENT IN AN ORGANIZED HEALTH CARE EDUCATION/TRAINING PROGRAM

## 2025-07-26 PROCEDURE — 94640 AIRWAY INHALATION TREATMENT: CPT

## 2025-07-26 PROCEDURE — 2700000000 HC OXYGEN THERAPY PER DAY

## 2025-07-26 PROCEDURE — 36415 COLL VENOUS BLD VENIPUNCTURE: CPT

## 2025-07-26 PROCEDURE — 99232 SBSQ HOSP IP/OBS MODERATE 35: CPT | Performed by: INTERNAL MEDICINE

## 2025-07-26 PROCEDURE — 82962 GLUCOSE BLOOD TEST: CPT

## 2025-07-26 PROCEDURE — 83036 HEMOGLOBIN GLYCOSYLATED A1C: CPT

## 2025-07-26 PROCEDURE — 2500000003 HC RX 250 WO HCPCS: Performed by: STUDENT IN AN ORGANIZED HEALTH CARE EDUCATION/TRAINING PROGRAM

## 2025-07-26 PROCEDURE — 94761 N-INVAS EAR/PLS OXIMETRY MLT: CPT

## 2025-07-26 PROCEDURE — 99222 1ST HOSP IP/OBS MODERATE 55: CPT | Performed by: INTERNAL MEDICINE

## 2025-07-26 PROCEDURE — 2060000000 HC ICU INTERMEDIATE R&B

## 2025-07-26 PROCEDURE — 6370000000 HC RX 637 (ALT 250 FOR IP): Performed by: STUDENT IN AN ORGANIZED HEALTH CARE EDUCATION/TRAINING PROGRAM

## 2025-07-26 RX ORDER — ACETYLCYSTEINE 200 MG/ML
600 SOLUTION ORAL; RESPIRATORY (INHALATION) 2 TIMES DAILY
Status: DISCONTINUED | OUTPATIENT
Start: 2025-07-26 | End: 2025-07-28

## 2025-07-26 RX ORDER — AMIODARONE HYDROCHLORIDE 100 MG/1
200 TABLET ORAL DAILY
Status: DISCONTINUED | OUTPATIENT
Start: 2025-07-27 | End: 2025-07-29

## 2025-07-26 RX ORDER — ALBUTEROL SULFATE 0.83 MG/ML
2.5 SOLUTION RESPIRATORY (INHALATION) EVERY 4 HOURS PRN
Status: DISCONTINUED | OUTPATIENT
Start: 2025-07-26 | End: 2025-07-29

## 2025-07-26 RX ADMIN — PIPERACILLIN AND TAZOBACTAM 3375 MG: 3; .375 INJECTION, POWDER, FOR SOLUTION INTRAVENOUS at 16:15

## 2025-07-26 RX ADMIN — IPRATROPIUM BROMIDE AND ALBUTEROL SULFATE 1 DOSE: 2.5; .5 SOLUTION RESPIRATORY (INHALATION) at 20:16

## 2025-07-26 RX ADMIN — IPRATROPIUM BROMIDE AND ALBUTEROL SULFATE 1 DOSE: 2.5; .5 SOLUTION RESPIRATORY (INHALATION) at 07:20

## 2025-07-26 RX ADMIN — Medication 4 ML: at 20:16

## 2025-07-26 RX ADMIN — CALCIUM GLUCONATE: 98 INJECTION INTRAVENOUS at 18:44

## 2025-07-26 RX ADMIN — MORPHINE SULFATE 1 MG: 2 INJECTION, SOLUTION INTRAMUSCULAR; INTRAVENOUS at 21:29

## 2025-07-26 RX ADMIN — SODIUM CHLORIDE, PRESERVATIVE FREE 10 ML: 5 INJECTION INTRAVENOUS at 09:51

## 2025-07-26 RX ADMIN — METOCLOPRAMIDE 5 MG: 5 INJECTION, SOLUTION INTRAMUSCULAR; INTRAVENOUS at 09:47

## 2025-07-26 RX ADMIN — ARFORMOTEROL TARTRATE 15 MCG: 15 SOLUTION RESPIRATORY (INHALATION) at 07:20

## 2025-07-26 RX ADMIN — METOCLOPRAMIDE 5 MG: 5 INJECTION, SOLUTION INTRAMUSCULAR; INTRAVENOUS at 20:47

## 2025-07-26 RX ADMIN — BUDESONIDE INHALATION 500 MCG: 0.5 SUSPENSION RESPIRATORY (INHALATION) at 20:15

## 2025-07-26 RX ADMIN — PIPERACILLIN AND TAZOBACTAM 3375 MG: 3; .375 INJECTION, POWDER, FOR SOLUTION INTRAVENOUS at 20:51

## 2025-07-26 RX ADMIN — METOCLOPRAMIDE 5 MG: 5 INJECTION, SOLUTION INTRAMUSCULAR; INTRAVENOUS at 16:08

## 2025-07-26 RX ADMIN — I.V. FAT EMULSION 250 ML: 20 EMULSION INTRAVENOUS at 18:43

## 2025-07-26 RX ADMIN — IPRATROPIUM BROMIDE AND ALBUTEROL SULFATE 1 DOSE: 2.5; .5 SOLUTION RESPIRATORY (INHALATION) at 11:31

## 2025-07-26 RX ADMIN — BUDESONIDE INHALATION 500 MCG: 0.5 SUSPENSION RESPIRATORY (INHALATION) at 07:20

## 2025-07-26 RX ADMIN — PANTOPRAZOLE SODIUM 40 MG: 40 INJECTION, POWDER, FOR SOLUTION INTRAVENOUS at 09:48

## 2025-07-26 RX ADMIN — PIPERACILLIN AND TAZOBACTAM 3375 MG: 3; .375 INJECTION, POWDER, FOR SOLUTION INTRAVENOUS at 03:09

## 2025-07-26 RX ADMIN — HEPARIN SODIUM 21 UNITS/KG/HR: 10000 INJECTION, SOLUTION INTRAVENOUS at 11:38

## 2025-07-26 RX ADMIN — IPRATROPIUM BROMIDE AND ALBUTEROL SULFATE 1 DOSE: 2.5; .5 SOLUTION RESPIRATORY (INHALATION) at 16:37

## 2025-07-26 RX ADMIN — SODIUM CHLORIDE, PRESERVATIVE FREE 10 ML: 5 INJECTION INTRAVENOUS at 21:30

## 2025-07-26 RX ADMIN — ALBUTEROL SULFATE 2.5 MG: 2.5 SOLUTION RESPIRATORY (INHALATION) at 12:16

## 2025-07-26 RX ADMIN — ACETYLCYSTEINE 600 MG: 200 SOLUTION ORAL; RESPIRATORY (INHALATION) at 20:16

## 2025-07-26 RX ADMIN — Medication 4 ML: at 07:20

## 2025-07-26 RX ADMIN — ACETYLCYSTEINE 600 MG: 200 SOLUTION ORAL; RESPIRATORY (INHALATION) at 12:16

## 2025-07-26 RX ADMIN — WATER 40 MG: 1 INJECTION INTRAMUSCULAR; INTRAVENOUS; SUBCUTANEOUS at 09:58

## 2025-07-26 RX ADMIN — PIPERACILLIN AND TAZOBACTAM 3375 MG: 3; .375 INJECTION, POWDER, FOR SOLUTION INTRAVENOUS at 10:03

## 2025-07-26 RX ADMIN — ARFORMOTEROL TARTRATE 15 MCG: 15 SOLUTION RESPIRATORY (INHALATION) at 20:16

## 2025-07-26 ASSESSMENT — PAIN SCALES - GENERAL
PAINLEVEL_OUTOF10: 0
PAINLEVEL_OUTOF10: 8

## 2025-07-26 ASSESSMENT — PAIN DESCRIPTION - ORIENTATION: ORIENTATION: RIGHT

## 2025-07-26 ASSESSMENT — PAIN DESCRIPTION - LOCATION: LOCATION: HIP;LEG

## 2025-07-26 NOTE — ICUWATCH
RRT Clinical Rounding Nurse Update    Vitals:    07/26/25 1204 07/26/25 1216 07/26/25 1538 07/26/25 1637   BP: 132/62  134/63    Pulse: 56  56    Resp: 18  18    Temp: 97.5 °F (36.4 °C)  98.2 °F (36.8 °C)    TempSrc: Axillary  Oral    SpO2: 92% 92% 96% 95%   Weight:       Height:            ASSESSMENT:  Previous outreach assessment was reviewed. There have been no significant changes since previous assessment. On 50L 75% HHFNC.    PLAN:  Will follow per RRT Clinical Rounding Program protocol.    Ramiro Oseguera RN  [facility]: [phone]  [facility]: [phone]

## 2025-07-26 NOTE — CONSULTS
Highest education level: Not on file   Occupational History    Not on file   Tobacco Use    Smoking status: Former     Current packs/day: 0.00     Average packs/day: 1 pack/day for 26.5 years (26.5 ttl pk-yrs)     Types: Cigarettes     Start date:      Quit date: 2019     Years since quittin.0    Smokeless tobacco: Never   Substance and Sexual Activity    Alcohol use: Yes     Comment: OCC    Drug use: Never    Sexual activity: Not on file   Other Topics Concern    Not on file   Social History Narrative    Not on file     Social Drivers of Health     Financial Resource Strain: Low Risk  (2023)    Overall Financial Resource Strain (CARDIA)     Difficulty of Paying Living Expenses: Not hard at all   Food Insecurity: No Food Insecurity (2025)    Hunger Vital Sign     Worried About Running Out of Food in the Last Year: Never true     Ran Out of Food in the Last Year: Never true   Transportation Needs: No Transportation Needs (2025)    PRAPARE - Transportation     Lack of Transportation (Medical): No     Lack of Transportation (Non-Medical): No   Recent Concern: Transportation Needs - Unmet Transportation Needs (2025)    PRAPARE - Transportation     Lack of Transportation (Medical): Yes     Lack of Transportation (Non-Medical): No   Physical Activity: Inactive (2023)    [facility]    Exercise Vital Sign     Days of Exercise per Week: 0 days     Minutes of Exercise per Session: 0 min   Stress: Patient Declined (2023)    [facility]    [employer] [place] [employer] - Occupational Stress Questionnaire     Feeling of Stress : Patient declined   Social Connections: Unknown (2023)    Received from [facility]    Social Connections     Frequency of Communication with Friends and Family: Not asked  gtt. by primary team for DVT and consideration of surgical PEG placement.  Recommend resuming Eliquis once stable to do so after possible surgical intervention.    Hx of WCT  - Telemetry stable since admission.  Continue monitoring.    RLE DVT  - Currently on heparin gtt.  Will need Eliquis at discharge.    HLD  - Statin held on primary team due to elevated transaminase which is now normalized.      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      Thank you for requesting cardiac evaluation and allowing us to participate in the care of this patient. We will continue to follow along with you.      DAVID Hooper - NP-C  Supervising Physician: Dr. Barclay

## 2025-07-26 NOTE — PLAN OF CARE
Problem: Chronic Conditions and Co-morbidities  Goal: Patient's chronic conditions and co-morbidity symptoms are monitored and maintained or improved  Outcome: Progressing     Problem: Discharge Planning  Goal: Discharge to home or other facility with appropriate resources  Outcome: Progressing     Problem: Pain  Goal: Verbalizes/displays adequate comfort level or baseline comfort level  Outcome: Progressing     Problem: Skin/Tissue Integrity  Goal: Skin integrity remains intact  Description: 1.  Monitor for areas of redness and/or skin breakdown  2.  Assess vascular access sites hourly  3.  Every 4-6 hours minimum:  Change oxygen saturation probe site  4.  Every 4-6 hours:  If on nasal continuous positive airway pressure, respiratory therapy assess nares and determine need for appliance change or resting period  Outcome: Progressing     Problem: ABCDS Injury Assessment  Goal: Absence of physical injury  Outcome: Progressing     Problem: Safety - Adult  Goal: Free from fall injury  Outcome: Progressing     Problem: Respiratory - Adult  Goal: Achieves optimal ventilation and oxygenation  7/26/2025 1240 by Timbo Centeno RN  Outcome: Progressing  7/26/2025 1225 by Rufus Alex RCP  Outcome: Progressing     Problem: Skin/Tissue Integrity - Adult  Goal: Incisions, wounds, or drain sites healing without S/S of infection  Outcome: Progressing     Problem: Musculoskeletal - Adult  Goal: Return mobility to safest level of function  Outcome: Progressing  Goal: Maintain proper alignment of affected body part  Outcome: Progressing     Problem: Gastrointestinal - Adult  Goal: Maintains adequate nutritional intake  Outcome: Progressing  Goal: Establish and maintain optimal ostomy function  Outcome: Progressing     Problem: Genitourinary - Adult  Goal: Urinary catheter remains patent  Outcome: Progressing     Problem: Hematologic - Adult  Goal: Maintains hematologic stability  Outcome: Progressing     Problem: Infection -

## 2025-07-26 NOTE — PLAN OF CARE
Problem: Respiratory - Adult  Goal: Achieves optimal ventilation and oxygenation  Outcome: Progressing       0720: Weaned AirVo from 50L/85% to 50L/80% with pt maintaining SpO2 in low 90s. Will continue to wean as tolerated throughout the day.     1130: Performed 2 sets of 5 breaths with flutter valve.  Pt has good cough when encouraged.  He was able to bring up a moderate amount of clear/white, thick/thin sputum.  Assisted pt with IS, but he was only able to consistently achieve 500mL on 5 attempts.  Encouraged him to use both regularly throughout the day.    1216:  Mucomyst added to scheduled nebs.  Gave with albuterol.  Pt tolerating well.  Pt had good cough, producing a small amount of white/clear, thick/thin sputum.     1637:  Weaned AirVo from 50L/80% to 50L/75%.  Pt maintaining SpO2  in low to mid 90s.

## 2025-07-26 NOTE — PROGRESS NOTES
[phone]     Recent Labs     07/24/25  0535 07/25/25  0104 07/26/25  0430   * 135* 135*   K 4.0 3.8 4.0    104 103   CO2 23 22 23   BUN 24* 23 24*   CREATININE 0.69* 0.53* 0.48*   MG  --  2.2 2.3   PHOS  --  3.0 2.8   BILITOT 0.9 0.9 0.8   AST 60* 43* 32   ALT 39 39 33   ALKPHOS 134* 117 121     No results for input(s): \"TROPHS\", \"NTPROBNP\", \"CRP\", \"ESR\" in the last 72 hours.  Recent Labs     07/24/25  0535 07/25/25  0104 07/26/25  0430   GLUCOSE 98 150* 211*      Microbiology:   No results for input(s): \"CULTURE\" in the last 72 hours.  No results for input(s): \"COVID19\" in the last 72 hours.  ECHO: 07/18/25    ECHO (TTE) LIMITED (PRN CONTRAST/BUBBLE/STRAIN/3D) 07/22/2025  3:29 PM (Final)    Interpretation Summary    Left Ventricle: Normal left ventricular systolic function with a visually estimated EF of 60 - 65%. Normal wall motion.    Interatrial Septum: Grade 0 Absence of bubbles. Agitated saline study was negative with and without provocation.    Image quality is adequate.    Signed by: Brooks Alaniz MD on 7/22/2025  3:29 PM    Assessment and Plan:  (Medical Decision Making)   Impression: 80 y.o. frail male patient evaluated for increasing O2 needs. History of Afib on eliquis, CLL on ibrutinib, tobacco use and mild COPD per PFTs in 2024. Uses Wixela, Spiriva and prn Albuterol at home. Hx multiple episodes pneumonia, + dysphagia with suspected aspiration events. Admitted 7/18 for RLE DVT despite being on eliquis at [facility] for a-fib. Developed acute hypoxic respiratory failure d/t COPD exacerbation needing Airvo and BIPAP.  Discussion with palliative care ongoing.     Principal Problem:    COPD exacerbation (HCC)  Plan: Continue brovana, pulmicort, duonebs, 3% saline nebs. Continue IV solumedrol, Airvo titration based on oxygen needs. BiPAP PRN and as patient tolerates. Working with PT/OT to improve ambulatory abilities, limited by respiratory status but patient participates. GOC discussions

## 2025-07-26 NOTE — PROGRESS NOTES
Hospitalist Progress Note   Admit Date:  2025 11:53 AM   Name:  Jonathan Zurita   Age:  80 y.o.  Sex:  male  :  1945   MRN:  [id number]   Room:  [facility]    Presenting/Chief Complaint: Shortness of Breath     Reason(s) for Admission: COPD exacerbation (HCC) [J44.1]  Acute respiratory failure with hypoxia (HCC) [J96.01]  Edema of right lower extremity [R60.0]  Leukocytosis, unspecified type [D72.829]     Hospital Course:     Jonathan Zurita is a 80 y.o. male with medical history of afib on eliquis, CLL ibrutinib,tobacco use,COPD, prior pneumonia with suspected aspiration/ dysphagia, right femur fracture  who has been Admitted with RLE DVT and acute hypoxic respiratory failure with aspiration pneumonia.   He was Reported to be taking eliquis PTA but medication  was held due to right thigh wound bleeding at [facility]. Since admit he is on IV heparin drip. He developed acute hypoxic respiratory failure/ COPD exacerbation / aspiration pneumonia and progressed to AIRVO/ as needed BIPAP. He has been on zosyn and seen by pulmonary.  There was No fluid for thoracentesis on bedside US. CTA no PE. He has chronic atelectasis and prior esophageal distention/ EGD . ECHO no shunt, preserved EF.    He has been NPO due to concern for aspiration and SLP following. He has failed swallowing evaluations and agrees to NGT but not unable to be placed at bedside by nursing. GI consulted for PEG but he is too unstable in light of oxygen requirements. IR evaluated for PEG but due to his hiatal hernia, IR is unable to place percutaneous tube (most of stomach in chest). he could have consult for possible surgical placement. He has been on PPN ordered 7-.   Palliative care following and goals discussions are ongoing.   He has right thigh bleeding wound/ hematoma and orthopedics has followed and is leaving staples in place.  He has ongoing anemia in the setting of IV heparin drip but no transfusion needed to date.

## 2025-07-26 NOTE — ACP (ADVANCE CARE PLANNING)
Advance Care Planning Note   Admit Date:  2025 11:53 AM   Name:  Jonathan Zurita   Age:  80 y.o.  Sex:  male  :  1945   MRN:  [id number]   Room:  [facility]    Jonathan Zurita has capacity to make his own decisions:   Yes    If pt unable to make decisions, POA/surrogate decision maker:  Daughter/ grandchild- updated POA paperwork to be brought today    Other people present:   Grandson Juan José     Patient / surrogate decision-maker directed code status:  Full Code    Other ACP topics discussed, if applicable:   Discussed artificial feeding.      Patient or surrogate consented to discussion of the current conditions, workup, management plans, prognosis, and the risk for further deterioration.  Time spent: 16 minutes in direct discussion.      Signed:  Oralia Warner MD

## 2025-07-26 NOTE — PROGRESS NOTES
Nutrition Assessment  Assessment Type: Reassess  Reason for visit:  Best Practice Alert: Malnutrition Screening Tool , Tube Feeding Management (Hospitalists), and Parenteral Nutrition Management (Hospitalists)  Malnutrition Screening Tool Score: 2  Unintentional weight loss PTA: 2 to 13 pounds (1 point)  Eating poorly due to decreased appetite: Yes (1 point)    Nutrition Intervention:   Food and/or Nutrient Delivery:   Parenteral Nutrition:  Peripheral parenteral nutrition (Osmolarity 897)   Peripheral line infusion  Continue: Dex 5%, 4.25% AA 2 L (85ml/hr)   Continue 250 ml 20% lipids daily  Electrolytes:   Sodium (90 mEq/L sodium chloride), Potassium (10 mmol/L potassium phosphate, 10 mEq/L potassium chloride), Calcium gluconate (4.5 mEq/L), Magnesium sulfate 8 mEq/L   Additives:   Adult multivitamin with vit K  Trace Elements  Thiamine 100 mg daily x 7 days (EOT 7/30)  Folic Acid 1 mg daily x 7 days (EOT 7/30)  PN regimen provides per 24 hours: 1180 kcal/day (93% of needs), 85 grams of protein/day (>100% of needs), 100 grams of CHO/24 hours and ~2290 ml of total volume/24 hours.   Above regimen: Intended to meet macronutrient goals  Biochemical Data:   Basic Metabolic Panel, Hepatic Function Panel, Magnesium and Phosphorus active per nutrition parameters  Triglyceride weekly on Fridays  POC Glucoses/SSI POC once to substantiate BMP value  Nutrition Related Medication Management:  Electrolyte Replacement:   Continue prn protocol Magnesium, Potassium, and Phosphorus  Intravenous fluids:  Not applicable   Meals and Snacks:  Diet: NPO  Medical Food Supplements:   Medical food supplement therapy:  None Deferred NPO  Coordination of Nutrition Care:  Coordination with health care provider RN, Jalen.      Malnutrition Assessment:7/19  [facility] Clinical Malnutrition Criteria  Malnutrition Status: Moderate malnutrition  Context: Acute Illness  Findings of clinical characteristics of malnutrition:   Energy  volume/24 hours.   Current Intake:   Average Meal Intake: NPO        Anthropometric Measures:  Height: 157.5 cm (5' 2\")  Current Body Wt: 63.5 kg (140 lb) (7/18), Weight source: Stated  BMI: 25.6, Overweight (BMI 25.0-29.9)  Admission Body Weight: 63.5 kg (140 lb) (7/18- stated)  Ideal Body Weight (Kg) (Calculated): 54 kg (118 lbs),    BMI Category Overweight (BMI 25.0-29.9)  Comparative Standards:  Energy (kcal/day): 3269-6735 (20-25 kcal/kg) (Kcal/kg Weight used: 63.5 kg Admission  Protein (g/day): 76-95 (1.2-1.5 g/kg) Weight Used: (Admission) 63.5 kg  Fluid (ml/day):   (1 ml/kcal)    Nutrition Diagnosis:   Inadequate oral intake related to Altered GI structure, swallowing difficulty as evidenced by NPO or clear liquid status due to medical condition, GI abnormality, swallow study results  Moderate malnutrition, in context of acute illness or injury related to inadequate protein-energy intake as evidenced by criteria as identified in malnutrition assessment    Nutrition Goal(s):   Previous Goal Met: Goal(s) Achieved  Active Goal:  (PPN to meet >75% of estimated needs)  Type of Goal: Continue current goal    Discharge Planning:    Too soon to determine    ANGÉLICA MANCILLA RD

## 2025-07-26 NOTE — ICUWATCH
RRT Clinical Rounding Nurse Update    Vitals:    07/26/25 0356 07/26/25 0720 07/26/25 0741 07/26/25 1131   BP:   (!) 118/52    Pulse: 51  53    Resp: 18  18    Temp:   97.3 °F (36.3 °C)    TempSrc:   Axillary    SpO2: 98% 97% 99% 92%   Weight:       Height:          ASSESSMENT:  Previous outreach assessment was reviewed. There have been no significant changes since previous assessment. Currently on 50L 80% HHFNC with SpO2 92%. Code status changed to Full Code today.    PLAN:  Will follow per RRT Clinical Rounding Program protocol.    Ramiro Oseguera RN  [facility]: [phone]  [facility]: [phone]

## 2025-07-26 NOTE — ICUWATCH
RRT Clinical Rounding Nurse Update    Vitals:    07/25/25 2259 07/25/25 2313 07/26/25 0013 07/26/25 0312   BP:    (!) 146/60   Pulse: 52 56 57 55   Resp: 18 18 18 18   Temp:    98.1 °F (36.7 °C)   TempSrc:    Oral   SpO2: 96% 98% 96% 97%   Weight:       Height:            DETERIORATION INDEX SCORE: 36    ASSESSMENT:  Previous outreach assessment and chart were reviewed. There have been no significant changes since previous assessment. Pt wore BiPap 85% for about 1 hour. Now back on AirVo 50L/85%.    PLAN:  Will follow per RRT Clinical Rounding Program protocol.    Shreya Agustin RN  [facility]: [phone]  [facility] [place]: [phone]

## 2025-07-26 NOTE — ICUWATCH
RRT Clinical Rounding Nurse Update    Vitals:    07/26/25 1204 07/26/25 1216 07/26/25 1538 07/26/25 1637   BP: 132/62  134/63    Pulse: 56  56    Resp: 18  18    Temp: 97.5 °F (36.4 °C)  98.2 °F (36.8 °C)    TempSrc: Axillary  Oral    SpO2: 92% 92% 96% 95%   Weight:       Height:         ASSESSMENT:  Previous outreach assessment was reviewed. There have been no significant changes since previous assessment.    PLAN:  Will follow per RRT Clinical Rounding Program protocol.    Ramiro Oseguera RN  [facility]: [phone]  [facility]: [phone]

## 2025-07-26 NOTE — PROGRESS NOTES
07/25/25 5660   NIV Type   $NIV $Daily Charge   NIV Started/Stopped On   Equipment Type V60   Mode AVAPS   Mask Type Under the nose   Mask Size Medium   Assessment   Pulse 56   Respirations 18   SpO2 98 %   Comfort Level Good   Using Accessory Muscles No   Mask Compliance Good   Skin Assessment Clean, dry, & intact   Settings/Measurements   PIP Observed 16 cm H20   CPAP/EPAP 8 cmH2O   IPAP Min 10 cmH2O   IPAP Max 20 cmH2O   Vt (Set, mL) 450 mL   Vt (Measured) 618 mL   Rate Ordered 14   Insp Rise Time (%) 4 %   FiO2  85 %   I Time/ I Time % 1 s   Minute Volume (L/min) 14.9 Liters   Mask Leak (lpm) 34 lpm   Patient's Home Machine No   Alarm Settings   Alarms On Y   Low Pressure (cmH2O) 5 cmH2O   High Pressure (cmH2O) 35 cmH2O   Apnea (secs) 20 secs   RR Low (bpm) 10   RR High (bpm) 40 br/min

## 2025-07-26 NOTE — PROGRESS NOTES
07/25/25 2332   NIV Type   Mode Bilevel   Mask Type Under the nose   Mask Size Medium   Settings/Measurements   PIP Observed 16 cm H20   IPAP 16 cmH20   CPAP/EPAP 8 cmH2O   Vt (Measured) 476 mL   Rate Ordered 14   Insp Rise Time (%) 4 %   FiO2  85 %   I Time/ I Time % 0.9 s   Minute Volume (L/min) 12 Liters   Mask Leak (lpm) 37 lpm   Patient's Home Machine No   Alarm Settings   Alarms On Y   Low Pressure (cmH2O) 5 cmH2O   High Pressure (cmH2O) 35 cmH2O   Apnea (secs) 20 secs   RR Low (bpm) 10   RR High (bpm) 40 br/min     Settings changed

## 2025-07-26 NOTE — ICUWATCH
RRT Clinical Rounding Nurse Update    Vitals:    07/25/25 1202 07/25/25 1538 07/25/25 1539 07/25/25 1922   BP:  (!) 104/59  (!) 115/57   Pulse: 66 81 80 59   Resp: 18 18 18 18   Temp:  98.6 °F (37 °C)  98.4 °F (36.9 °C)   TempSrc:  Oral  Oral   SpO2: 96% 94% 95% 95%   Weight:       Height:            DETERIORATION INDEX SCORE: 36    ASSESSMENT:  Previous outreach assessment and chart were reviewed. There have been no significant changes since previous assessment. Pt on AirVo 50L/90% with O2 sat 99%, Afib on tele monitor- HR 61. Heparin gtt and TPN infusing.    PLAN:  Will follow per RRT Clinical Rounding Program protocol.    Shreya Agustin RN  [facility]: [phone]  [facility]: [phone]

## 2025-07-26 NOTE — PROGRESS NOTES
LTG: Patient will maintain adequate hydration/nutrition with optimum safety and efficiency of swallowing function with PO intake without overt signs or symptoms of aspiration for the highest appropriate diet level.  STG:   Patient will consume sips of water via cup and ice chips via spoon without overt signs or symptoms of airway compromise. DISCONTINUE- STRICT NPO 25  Patient will perform chin tuck and small sips to improve swallow safety with minimal cues 90% of the time. DISCONTINUE   Patient will safely ingest diet trials during therapeutic feedings with SLP without overt signs or symptoms of respiratory compromise in efforts to advance diet.  Patient will complete a Fiberoptic Endoscopic Evaluation of the Swallow to fully assess physiology and anatomy of the swallow and determine safest appropriate diet and/or rehabilitation strategies, as medically indicated. COMPLETED 25    SPEECH LANGUAGE PATHOLOGY: DYSPHAGIA Daily Note #2    Acknowledge Order  I  Therapy Time  I   Charges     I  Rehab Caseload Tracker      NAME: Jonathan Zurita  : 1945  MRN: [id number]    ADMISSION DATE: 2025  PRIMARY DIAGNOSIS: COPD exacerbation (HCC)    ICD-10: Treatment Diagnosis: R13.12 Dysphagia, Oropharyngeal Phase    RECOMMENDATIONS   Diet:  Strict NPO    Medication: non-oral   Compensatory Swallowing Strategies:     Frequent oral care   Therapeutic Intervention:   Patient/family education  Instrumental swallow assessment  Dysphagia treatment   Patient continues to require skilled intervention:  Yes. Recommend ongoing speech therapy services during this hospitalization.     Anticipated Discharge Needs: Ongoing speech therapy is recommended at next level of care.      ASSESSMENT    Continue to recommend NPO due to high risk of aspiration with all intake. Patient verbalizes desire to pursue PEG as he feels this gives him the opportunity to improve and meet his goals of returning home. Due to complications from

## 2025-07-27 PROBLEM — D72.829 LEUKOCYTOSIS: Status: ACTIVE | Noted: 2025-07-27

## 2025-07-27 LAB
ANION GAP SERPL CALC-SCNC: 8 MMOL/L (ref 7–16)
BASOPHILS # BLD: 0.01 K/UL (ref 0–0.2)
BASOPHILS NFR BLD: 0.1 % (ref 0–2)
BUN SERPL-MCNC: 28 MG/DL (ref 8–23)
CALCIUM SERPL-MCNC: 7.9 MG/DL (ref 8.8–10.2)
CHLORIDE SERPL-SCNC: 107 MMOL/L (ref 98–107)
CO2 SERPL-SCNC: 20 MMOL/L (ref 20–29)
CREAT SERPL-MCNC: 0.47 MG/DL (ref 0.8–1.3)
DIFFERENTIAL METHOD BLD: ABNORMAL
EOSINOPHIL # BLD: 0 K/UL (ref 0–0.8)
EOSINOPHIL NFR BLD: 0 % (ref 0.5–7.8)
ERYTHROCYTE [DISTWIDTH] IN BLOOD BY AUTOMATED COUNT: 16.5 % (ref 11.9–14.6)
GLUCOSE SERPL-MCNC: 192 MG/DL (ref 70–99)
HCT VFR BLD AUTO: 26.2 % (ref 41.1–50.3)
HGB BLD-MCNC: 7.8 G/DL (ref 13.6–17.2)
IMM GRANULOCYTES # BLD AUTO: 0.13 K/UL (ref 0–0.5)
IMM GRANULOCYTES NFR BLD AUTO: 0.8 % (ref 0–5)
LYMPHOCYTES # BLD: 3.14 K/UL (ref 0.5–4.6)
LYMPHOCYTES NFR BLD: 19.9 % (ref 13–44)
MAGNESIUM SERPL-MCNC: 2.5 MG/DL (ref 1.8–2.4)
MCH RBC QN AUTO: 26.1 PG (ref 26.1–32.9)
MCHC RBC AUTO-ENTMCNC: 29.8 G/DL (ref 31.4–35)
MCV RBC AUTO: 87.6 FL (ref 82–102)
MONOCYTES # BLD: 0.83 K/UL (ref 0.1–1.3)
MONOCYTES NFR BLD: 5.3 % (ref 4–12)
NEUTS SEG # BLD: 11.65 K/UL (ref 1.7–8.2)
NEUTS SEG NFR BLD: 73.9 % (ref 43–78)
NRBC # BLD: 0.02 K/UL (ref 0–0.2)
PHOSPHATE SERPL-MCNC: 2.3 MG/DL (ref 2.5–4.5)
PLATELET # BLD AUTO: 322 K/UL (ref 150–450)
PMV BLD AUTO: 10.7 FL (ref 9.4–12.3)
POTASSIUM SERPL-SCNC: 4.1 MMOL/L (ref 3.5–5.1)
RBC # BLD AUTO: 2.99 M/UL (ref 4.23–5.6)
SODIUM SERPL-SCNC: 135 MMOL/L (ref 136–145)
UFH PPP CHRO-ACNC: 0.57 IU/ML (ref 0.3–0.7)
WBC # BLD AUTO: 15.8 K/UL (ref 4.3–11.1)

## 2025-07-27 PROCEDURE — 2500000003 HC RX 250 WO HCPCS: Performed by: STUDENT IN AN ORGANIZED HEALTH CARE EDUCATION/TRAINING PROGRAM

## 2025-07-27 PROCEDURE — 2580000003 HC RX 258: Performed by: STUDENT IN AN ORGANIZED HEALTH CARE EDUCATION/TRAINING PROGRAM

## 2025-07-27 PROCEDURE — 6370000000 HC RX 637 (ALT 250 FOR IP): Performed by: INTERNAL MEDICINE

## 2025-07-27 PROCEDURE — 82784 ASSAY IGA/IGD/IGG/IGM EACH: CPT

## 2025-07-27 PROCEDURE — 2580000003 HC RX 258: Performed by: INTERNAL MEDICINE

## 2025-07-27 PROCEDURE — 85520 HEPARIN ASSAY: CPT

## 2025-07-27 PROCEDURE — 6360000002 HC RX W HCPCS: Performed by: INTERNAL MEDICINE

## 2025-07-27 PROCEDURE — 2500000003 HC RX 250 WO HCPCS: Performed by: INTERNAL MEDICINE

## 2025-07-27 PROCEDURE — 6370000000 HC RX 637 (ALT 250 FOR IP): Performed by: STUDENT IN AN ORGANIZED HEALTH CARE EDUCATION/TRAINING PROGRAM

## 2025-07-27 PROCEDURE — 2060000000 HC ICU INTERMEDIATE R&B

## 2025-07-27 PROCEDURE — 94761 N-INVAS EAR/PLS OXIMETRY MLT: CPT

## 2025-07-27 PROCEDURE — 80048 BASIC METABOLIC PNL TOTAL CA: CPT

## 2025-07-27 PROCEDURE — 94640 AIRWAY INHALATION TREATMENT: CPT

## 2025-07-27 PROCEDURE — 85025 COMPLETE CBC W/AUTO DIFF WBC: CPT

## 2025-07-27 PROCEDURE — 94668 MNPJ CHEST WALL SBSQ: CPT

## 2025-07-27 PROCEDURE — 2500000003 HC RX 250 WO HCPCS: Performed by: NURSE PRACTITIONER

## 2025-07-27 PROCEDURE — 84100 ASSAY OF PHOSPHORUS: CPT

## 2025-07-27 PROCEDURE — 6360000002 HC RX W HCPCS: Performed by: STUDENT IN AN ORGANIZED HEALTH CARE EDUCATION/TRAINING PROGRAM

## 2025-07-27 PROCEDURE — 83735 ASSAY OF MAGNESIUM: CPT

## 2025-07-27 PROCEDURE — 2700000000 HC OXYGEN THERAPY PER DAY

## 2025-07-27 PROCEDURE — 6360000002 HC RX W HCPCS: Performed by: NURSE PRACTITIONER

## 2025-07-27 PROCEDURE — 36415 COLL VENOUS BLD VENIPUNCTURE: CPT

## 2025-07-27 PROCEDURE — 94669 MECHANICAL CHEST WALL OSCILL: CPT

## 2025-07-27 PROCEDURE — 99232 SBSQ HOSP IP/OBS MODERATE 35: CPT | Performed by: INTERNAL MEDICINE

## 2025-07-27 RX ORDER — DIPHENHYDRAMINE HYDROCHLORIDE 50 MG/ML
10 INJECTION, SOLUTION INTRAMUSCULAR; INTRAVENOUS NIGHTLY PRN
Status: DISCONTINUED | OUTPATIENT
Start: 2025-07-27 | End: 2025-07-29

## 2025-07-27 RX ADMIN — METOCLOPRAMIDE 5 MG: 5 INJECTION, SOLUTION INTRAMUSCULAR; INTRAVENOUS at 14:30

## 2025-07-27 RX ADMIN — IPRATROPIUM BROMIDE AND ALBUTEROL SULFATE 1 DOSE: 2.5; .5 SOLUTION RESPIRATORY (INHALATION) at 20:37

## 2025-07-27 RX ADMIN — SODIUM CHLORIDE, PRESERVATIVE FREE 10 ML: 5 INJECTION INTRAVENOUS at 09:49

## 2025-07-27 RX ADMIN — ARFORMOTEROL TARTRATE 15 MCG: 15 SOLUTION RESPIRATORY (INHALATION) at 07:26

## 2025-07-27 RX ADMIN — PANTOPRAZOLE SODIUM 40 MG: 40 INJECTION, POWDER, FOR SOLUTION INTRAVENOUS at 09:41

## 2025-07-27 RX ADMIN — PIPERACILLIN AND TAZOBACTAM 3375 MG: 3; .375 INJECTION, POWDER, FOR SOLUTION INTRAVENOUS at 14:46

## 2025-07-27 RX ADMIN — SODIUM PHOSPHATE, MONOBASIC, MONOHYDRATE AND SODIUM PHOSPHATE, DIBASIC, ANHYDROUS 15 MMOL: 142; 276 INJECTION, SOLUTION INTRAVENOUS at 14:45

## 2025-07-27 RX ADMIN — IPRATROPIUM BROMIDE AND ALBUTEROL SULFATE 1 DOSE: 2.5; .5 SOLUTION RESPIRATORY (INHALATION) at 15:27

## 2025-07-27 RX ADMIN — METOCLOPRAMIDE 5 MG: 5 INJECTION, SOLUTION INTRAMUSCULAR; INTRAVENOUS at 09:42

## 2025-07-27 RX ADMIN — SODIUM CHLORIDE, PRESERVATIVE FREE 10 ML: 5 INJECTION INTRAVENOUS at 20:38

## 2025-07-27 RX ADMIN — MORPHINE SULFATE 1 MG: 2 INJECTION, SOLUTION INTRAMUSCULAR; INTRAVENOUS at 03:18

## 2025-07-27 RX ADMIN — PIPERACILLIN AND TAZOBACTAM 3375 MG: 3; .375 INJECTION, POWDER, FOR SOLUTION INTRAVENOUS at 20:43

## 2025-07-27 RX ADMIN — IPRATROPIUM BROMIDE AND ALBUTEROL SULFATE 1 DOSE: 2.5; .5 SOLUTION RESPIRATORY (INHALATION) at 04:33

## 2025-07-27 RX ADMIN — MORPHINE SULFATE 1 MG: 2 INJECTION, SOLUTION INTRAMUSCULAR; INTRAVENOUS at 21:19

## 2025-07-27 RX ADMIN — Medication 4 ML: at 20:37

## 2025-07-27 RX ADMIN — I.V. FAT EMULSION 250 ML: 20 EMULSION INTRAVENOUS at 18:34

## 2025-07-27 RX ADMIN — BUDESONIDE INHALATION 500 MCG: 0.5 SUSPENSION RESPIRATORY (INHALATION) at 20:37

## 2025-07-27 RX ADMIN — METOCLOPRAMIDE 5 MG: 5 INJECTION, SOLUTION INTRAMUSCULAR; INTRAVENOUS at 20:38

## 2025-07-27 RX ADMIN — CALCIUM GLUCONATE: 98 INJECTION INTRAVENOUS at 18:34

## 2025-07-27 RX ADMIN — BUDESONIDE INHALATION 500 MCG: 0.5 SUSPENSION RESPIRATORY (INHALATION) at 07:25

## 2025-07-27 RX ADMIN — ARFORMOTEROL TARTRATE 15 MCG: 15 SOLUTION RESPIRATORY (INHALATION) at 20:37

## 2025-07-27 RX ADMIN — PIPERACILLIN AND TAZOBACTAM 3375 MG: 3; .375 INJECTION, POWDER, FOR SOLUTION INTRAVENOUS at 02:41

## 2025-07-27 RX ADMIN — HEPARIN SODIUM 21 UNITS/KG/HR: 10000 INJECTION, SOLUTION INTRAVENOUS at 07:09

## 2025-07-27 RX ADMIN — IPRATROPIUM BROMIDE AND ALBUTEROL SULFATE 1 DOSE: 2.5; .5 SOLUTION RESPIRATORY (INHALATION) at 07:25

## 2025-07-27 RX ADMIN — IPRATROPIUM BROMIDE AND ALBUTEROL SULFATE 1 DOSE: 2.5; .5 SOLUTION RESPIRATORY (INHALATION) at 00:31

## 2025-07-27 RX ADMIN — DIPHENHYDRAMINE HYDROCHLORIDE 10 MG: 50 INJECTION INTRAMUSCULAR; INTRAVENOUS at 21:19

## 2025-07-27 RX ADMIN — WATER 40 MG: 1 INJECTION INTRAMUSCULAR; INTRAVENOUS; SUBCUTANEOUS at 09:42

## 2025-07-27 RX ADMIN — ACETYLCYSTEINE 600 MG: 200 SOLUTION ORAL; RESPIRATORY (INHALATION) at 07:26

## 2025-07-27 RX ADMIN — IPRATROPIUM BROMIDE AND ALBUTEROL SULFATE 1 DOSE: 2.5; .5 SOLUTION RESPIRATORY (INHALATION) at 11:18

## 2025-07-27 RX ADMIN — Medication 4 ML: at 07:26

## 2025-07-27 RX ADMIN — PIPERACILLIN AND TAZOBACTAM 3375 MG: 3; .375 INJECTION, POWDER, FOR SOLUTION INTRAVENOUS at 09:58

## 2025-07-27 ASSESSMENT — PAIN DESCRIPTION - ORIENTATION
ORIENTATION: RIGHT
ORIENTATION: RIGHT;LEFT

## 2025-07-27 ASSESSMENT — PAIN DESCRIPTION - LOCATION
LOCATION: LEG
LOCATION: HIP

## 2025-07-27 ASSESSMENT — PAIN SCALES - GENERAL
PAINLEVEL_OUTOF10: 8
PAINLEVEL_OUTOF10: 8

## 2025-07-27 ASSESSMENT — PAIN DESCRIPTION - DESCRIPTORS
DESCRIPTORS: DISCOMFORT;ACHING
DESCRIPTORS: ACHING

## 2025-07-27 NOTE — PROGRESS NOTES
Hospitalist Progress Note   Admit Date:  2025 11:53 AM   Name:  Jonathan Zurita   Age:  80 y.o.  Sex:  male  :  1945   MRN:  [id number]   Room:  [facility]    Presenting/Chief Complaint: Shortness of Breath     Reason(s) for Admission: COPD exacerbation (HCC) [J44.1]  Acute respiratory failure with hypoxia (HCC) [J96.01]  Edema of right lower extremity [R60.0]  Leukocytosis, unspecified type [D72.829]     Hospital Course:     Jonathan Zurita is a 80 y.o. male with medical history of afib on eliquis, CLL ibrutinib,tobacco use,COPD, prior pneumonia with suspected aspiration/ dysphagia, right femur fracture  who has been Admitted with RLE DVT and acute hypoxic respiratory failure with aspiration pneumonia.   He was Reported to be taking eliquis PTA but medication  was held due to right thigh wound bleeding at [facility]. Since admit he is on IV heparin drip. He developed acute hypoxic respiratory failure/ COPD exacerbation / aspiration pneumonia and progressed to AIRVO/ as needed BIPAP. He has been on zosyn and seen by pulmonary.  There was No fluid for thoracentesis on bedside US. CTA no PE. He has chronic atelectasis and prior esophageal distention/ EGD . ECHO no shunt, preserved EF.    He has been NPO due to concern for aspiration and SLP following. He has failed swallowing evaluations and agrees to NGT but not unable to be placed at bedside by nursing. GI consulted for PEG but he is too unstable in light of oxygen requirements. IR evaluated for PEG but due to his hiatal hernia, IR is unable to place percutaneous tube (most of stomach in chest). Surgery is  consulted for possible surgical placement. He has been on PPN ordered 7-24.   Palliative care following and goals discussions are ongoing.   He has right thigh bleeding wound/ hematoma and orthopedics has followed and is leaving staples in place.  He has ongoing anemia in the setting of IV heparin drip but no transfusion needed to date.  Range    POC Glucose 188 (H) 65 - 100 mg/dL    Performed by: Barbara    Anti-XA, Heparin    Collection Time: 07/26/25  2:07 PM   Result Value Ref Range    Anti-XA Unfrac Heparin 0.44 0.3 - 0.7 IU/mL   Basic Metabolic Panel    Collection Time: 07/27/25  6:15 AM   Result Value Ref Range    Sodium 135 (L) 136 - 145 mmol/L    Potassium 4.1 3.5 - 5.1 mmol/L    Chloride 107 98 - 107 mmol/L    CO2 20 20 - 29 mmol/L    Anion Gap 8 7 - 16 mmol/L    Glucose 192 (H) 70 - 99 mg/dL    BUN 28 (H) 8 - 23 MG/DL    Creatinine 0.47 (L) 0.80 - 1.30 MG/DL    Est, Glom Filt Rate >90 >60 ml/min/1.73m2    Calcium 7.9 (L) 8.8 - 10.2 MG/DL   Magnesium    Collection Time: 07/27/25  6:15 AM   Result Value Ref Range    Magnesium 2.5 (H) 1.8 - 2.4 mg/dL   Phosphorus    Collection Time: 07/27/25  6:15 AM   Result Value Ref Range    Phosphorus 2.3 (L) 2.5 - 4.5 MG/DL   CBC with Auto Differential    Collection Time: 07/27/25  6:15 AM   Result Value Ref Range    WBC 15.8 (H) 4.3 - 11.1 K/uL    RBC 2.99 (L) 4.23 - 5.6 M/uL    Hemoglobin 7.8 (L) 13.6 - 17.2 g/dL    Hematocrit 26.2 (L) 41.1 - 50.3 %    MCV 87.6 82 - 102 FL    MCH 26.1 26.1 - 32.9 PG    MCHC 29.8 (L) 31.4 - 35.0 g/dL    RDW 16.5 (H) 11.9 - 14.6 %    Platelets 322 150 - 450 K/uL    MPV 10.7 9.4 - 12.3 FL    nRBC 0.02 0.0 - 0.2 K/uL    Differential Type AUTOMATED      Neutrophils % 73.9 43.0 - 78.0 %    Lymphocytes % 19.9 13.0 - 44.0 %    Monocytes % 5.3 4.0 - 12.0 %    Eosinophils % 0.0 (L) 0.5 - 7.8 %    Basophils % 0.1 0.0 - 2.0 %    Immature Granulocytes % 0.8 0.0 - 5.0 %    Neutrophils Absolute 11.65 (H) 1.70 - 8.20 K/UL    Lymphocytes Absolute 3.14 0.50 - 4.60 K/UL    Monocytes Absolute 0.83 0.10 - 1.30 K/UL    Eosinophils Absolute 0.00 0.00 - 0.80 K/UL    Basophils Absolute 0.01 0.00 - 0.20 K/UL    Immature Granulocytes Absolute 0.13 0.0 - 0.5 K/UL   Anti-XA, Heparin    Collection Time: 07/27/25  6:15 AM   Result Value Ref Range    Anti-XA Unfrac Heparin 0.57 0.3 - 0.7 IU/mL

## 2025-07-27 NOTE — PROGRESS NOTES
Jonathan Zurita  Admission Date: 7/18/2025         Daily Progress Note: 7/27/2025      Background: 80 y.o.  male seen and evaluated at the request of Dr. Gould for increasing O2 needs. History of Afib on eliquis, CLL on ibrutinib, tobacco use and mild COPD per PFTs in 2024. Uses Wixela, Spiriva and prn Albuterol at home. Hx multiple episodes pneumonia in 2023 and 2024. + dysphagia with suspected aspiration events.  Has seen Dr. Tan in office 6/13/25. Admitted 6/14- 6/20 for pneumonia.  Recently admitted 7/6-7/11 for fall with R femur fracture and repair. Walking short distances at rehab with walker. Presented to ER on 7/18 with complaints of worsening SOB, chest tightness and cough. He also endorsed R leg pain and increased swelling. CT Chest this admission was negative for PE, doppler + for DVT of RLE. He states he has been taking his eliquis, inhalers. Having cough but not able to clear secretions. Has been eating a normal diet as OP. He was started on heparin gtt. WBC 17.1, elevated LFTs. Started on ceftriaxone and azithromycin. Went from requiring hi-flow NC to Airvo the night he was admitted.  Added CPT and flutter to see if mucus plugging clearance may help. His PFTs last year were not very impressive for significant COPD and primarily the concern was aspiration. He has chronic LLL atelectasis presumably, whether it opens up intermittently is possible, but looked this was in 2023 as well on CT. CT this admission with Hiatal hernia and distended, gas and fluid-containing esophagus.   7/22 B ultrasound and small effusions noted. On 100% FiO2, echo WNL  7/23 Down to 50%, echo without shunting, NPO with concerns for aspiration and ST confirmed, hospice discussed with family. PEG planned, was getting out of bed  7/24 up to 85%, GI consulted for PEG - pt too high risk for anesthesia with respiratory status.  7/25 IR consulted for PEG but feel it would require surgical approach. Remains on

## 2025-07-27 NOTE — ICUWATCH
RRT Clinical Rounding Nurse Update    Vitals:    07/27/25 0031 07/27/25 0302 07/27/25 0403 07/27/25 0433   BP:  (!) 114/56     Pulse: 60 58 54 63   Resp: 18 18 18 18   Temp:  98.4 °F (36.9 °C)     TempSrc:  Oral     SpO2: 96% 93% 94% 93%   Weight:       Height:            DETERIORATION INDEX SCORE: 37    ASSESSMENT:  Previous outreach assessment was reviewed. There have been no significant changes since previous assessment. Currently on Airvo  50L/75% with VSS.    PLAN:  Will follow per RRT Clinical Rounding Program protocol.    Joan Guerin RN  [place]: [phone]  [place]: [phone]

## 2025-07-27 NOTE — PROGRESS NOTES
Nutrition Assessment  Assessment Type: Reassess  Reason for visit:  Best Practice Alert: Malnutrition Screening Tool , Tube Feeding Management (Hospitalists), and Parenteral Nutrition Management (Hospitalists)  Malnutrition Screening Tool Score: 2  Unintentional weight loss PTA: 2 to 13 pounds (1 point)  Eating poorly due to decreased appetite: Yes (1 point)    Nutrition Intervention:   Food and/or Nutrient Delivery:   Parenteral Nutrition:  Peripheral parenteral nutrition (Osmolarity 852)   Peripheral line infusion  Continue: Dex 5%, 4.25% AA 2 L (85ml/hr)   Continue 250 ml 20% lipids daily  Electrolytes:   Sodium (60 mEq/L sodium chloride), Potassium (15 mmol/L potassium phosphate, 10 mEq/L potassium chloride), Calcium gluconate (4.5 mEq/L), Magnesium sulfate 5 mEq/L   Additives:   Adult multivitamin with vit K  Trace Elements  Thiamine 100 mg daily x 7 days (EOT 7/30)  Folic Acid 1 mg daily x 7 days (EOT 7/30)  PN regimen provides per 24 hours: 1180 kcal/day (93% of needs), 85 grams of protein/day (>100% of needs), 100 grams of CHO/24 hours and ~2290 ml of total volume/24 hours.   Above regimen: Intended to meet macronutrient goals  Biochemical Data:   Basic Metabolic Panel, Hepatic Function Panel, Magnesium and Phosphorus active per nutrition parameters  Triglyceride weekly on Fridays  POC Glucoses/SSI Discussed with Dr Regalado and will defer adding POC/SSI since A1C WDL and elevation of BG is d/t steroids  Nutrition Related Medication Management:  Electrolyte Replacement:   Continue prn protocol Magnesium, Potassium, and Phosphorus  Intravenous fluids:  Not applicable   Meals and Snacks:  Diet: NPO  Medical Food Supplements:   Medical food supplement therapy:  None Deferred NPO  Coordination of Nutrition Care:  Coordination with health care provider Provider, Dr Regalado and RN, Jalen. Alerted to need for prn Phos replacement     Malnutrition Assessment:7/19  [facility] Clinical Malnutrition

## 2025-07-27 NOTE — ICUWATCH
RRT Clinical Rounding Nurse Update    Vitals:    07/27/25 0403 07/27/25 0433 07/27/25 0725 07/27/25 0730   BP:   (!) 113/56    Pulse: 54 63 63 65   Resp: 18 18 18 18   Temp:   98.8 °F (37.1 °C)    TempSrc:   Oral    SpO2: 94% 93% 92% 92%   Weight:       Height:          ASSESSMENT:  Previous outreach assessment was reviewed. There have been no significant changes since previous assessment. Patient alert and oriented. Respirations unlabored on 50L 75% HHFNC. Cough more productive today. VS, labs, and progress notes reviewed. Awaiting general surgery consult regarding feeding tube.     PLAN:  Will follow per RRT Clinical Rounding Program protocol.    Ramiro Oseguera RN  [facility]: [phone]  [facility]: [phone]

## 2025-07-27 NOTE — PROGRESS NOTES
General Surgery Consult received for pt Jonathan Zurita 80 y.o. male for evaluation for PEG placement (in the setting of large Hiatal Hernia with majority of stomach in the thoracic cavity). Will see pt in am for non-urgent consult.     Kimberly Frommel, NP

## 2025-07-27 NOTE — PLAN OF CARE
Problem: Respiratory - Adult  Goal: Achieves optimal ventilation and oxygenation  Outcome: Progressing  Flowsheets (Taken 7/27/2025 6928)  Achieves optimal ventilation and oxygenation:   Assess for changes in respiratory status   Respiratory therapy support as indicated   Position to facilitate oxygenation and minimize respiratory effort   Assess and instruct to report shortness of breath or any respiratory difficulty   Encourage broncho-pulmonary hygiene including cough, deep breathe, incentive spirometry   Assess for changes in mentation and behavior

## 2025-07-27 NOTE — PLAN OF CARE
Problem: Chronic Conditions and Co-morbidities  Goal: Patient's chronic conditions and co-morbidity symptoms are monitored and maintained or improved  Outcome: Progressing     Problem: Discharge Planning  Goal: Discharge to home or other facility with appropriate resources  Outcome: Progressing     Problem: Pain  Goal: Verbalizes/displays adequate comfort level or baseline comfort level  Outcome: Progressing     Problem: Skin/Tissue Integrity  Goal: Skin integrity remains intact  Description: 1.  Monitor for areas of redness and/or skin breakdown  2.  Assess vascular access sites hourly  3.  Every 4-6 hours minimum:  Change oxygen saturation probe site  4.  Every 4-6 hours:  If on nasal continuous positive airway pressure, respiratory therapy assess nares and determine need for appliance change or resting period  Outcome: Progressing     Problem: ABCDS Injury Assessment  Goal: Absence of physical injury  Outcome: Progressing     Problem: Safety - Adult  Goal: Free from fall injury  Outcome: Progressing     Problem: Respiratory - Adult  Goal: Achieves optimal ventilation and oxygenation  7/27/2025 1552 by Timbo Centeno RN  Outcome: Progressing  7/27/2025 0732 by Yahaira Pagan RCP  Outcome: Progressing  Flowsheets (Taken 7/27/2025 0732)  Achieves optimal ventilation and oxygenation:   Assess for changes in respiratory status   Respiratory therapy support as indicated   Position to facilitate oxygenation and minimize respiratory effort   Assess and instruct to report shortness of breath or any respiratory difficulty   Encourage broncho-pulmonary hygiene including cough, deep breathe, incentive spirometry   Assess for changes in mentation and behavior     Problem: Skin/Tissue Integrity - Adult  Goal: Incisions, wounds, or drain sites healing without S/S of infection  Outcome: Progressing     Problem: Musculoskeletal - Adult  Goal: Return mobility to safest level of function  Outcome: Progressing  Goal: Maintain

## 2025-07-28 ENCOUNTER — APPOINTMENT (OUTPATIENT)
Dept: CT IMAGING | Age: 80
DRG: 190 | End: 2025-07-28
Payer: OTHER GOVERNMENT

## 2025-07-28 ENCOUNTER — APPOINTMENT (OUTPATIENT)
Dept: GENERAL RADIOLOGY | Age: 80
DRG: 190 | End: 2025-07-28
Payer: OTHER GOVERNMENT

## 2025-07-28 LAB
ANION GAP SERPL CALC-SCNC: 19 MMOL/L (ref 7–16)
ANION GAP SERPL CALC-SCNC: 7 MMOL/L (ref 7–16)
ANION GAP SERPL CALC-SCNC: 9 MMOL/L (ref 7–16)
BASOPHILS # BLD: 0.01 K/UL (ref 0–0.2)
BASOPHILS NFR BLD: 0.1 % (ref 0–2)
BUN SERPL-MCNC: 22 MG/DL (ref 8–23)
BUN SERPL-MCNC: 23 MG/DL (ref 8–23)
BUN SERPL-MCNC: 24 MG/DL (ref 8–23)
CALCIUM SERPL-MCNC: 7.3 MG/DL (ref 8.8–10.2)
CALCIUM SERPL-MCNC: 7.8 MG/DL (ref 8.8–10.2)
CALCIUM SERPL-MCNC: 7.9 MG/DL (ref 8.8–10.2)
CHLORIDE SERPL-SCNC: 104 MMOL/L (ref 98–107)
CHLORIDE SERPL-SCNC: 105 MMOL/L (ref 98–107)
CHLORIDE SERPL-SCNC: 89 MMOL/L (ref 98–107)
CO2 SERPL-SCNC: 16 MMOL/L (ref 20–29)
CO2 SERPL-SCNC: 18 MMOL/L (ref 20–29)
CO2 SERPL-SCNC: 19 MMOL/L (ref 20–29)
CREAT SERPL-MCNC: 0.49 MG/DL (ref 0.8–1.3)
CREAT SERPL-MCNC: 0.49 MG/DL (ref 0.8–1.3)
CREAT SERPL-MCNC: 0.67 MG/DL (ref 0.8–1.3)
DIFFERENTIAL METHOD BLD: ABNORMAL
EOSINOPHIL # BLD: 0 K/UL (ref 0–0.8)
EOSINOPHIL NFR BLD: 0 % (ref 0.5–7.8)
ERYTHROCYTE [DISTWIDTH] IN BLOOD BY AUTOMATED COUNT: 16.9 % (ref 11.9–14.6)
GLUCOSE BLD STRIP.AUTO-MCNC: 135 MG/DL (ref 65–100)
GLUCOSE BLD STRIP.AUTO-MCNC: 176 MG/DL (ref 65–100)
GLUCOSE BLD STRIP.AUTO-MCNC: 210 MG/DL (ref 65–100)
GLUCOSE BLD STRIP.AUTO-MCNC: 224 MG/DL (ref 65–100)
GLUCOSE SERPL-MCNC: 315 MG/DL (ref 70–99)
GLUCOSE SERPL-MCNC: 349 MG/DL (ref 70–99)
GLUCOSE SERPL-MCNC: 746 MG/DL (ref 70–99)
HCT VFR BLD AUTO: 25.7 % (ref 41.1–50.3)
HGB BLD-MCNC: 8.2 G/DL (ref 13.6–17.2)
IGG SERPL-MCNC: 217 MG/DL (ref 700–1600)
IMM GRANULOCYTES # BLD AUTO: 0.12 K/UL (ref 0–0.5)
IMM GRANULOCYTES NFR BLD AUTO: 0.7 % (ref 0–5)
LYMPHOCYTES # BLD: 2.97 K/UL (ref 0.5–4.6)
LYMPHOCYTES NFR BLD: 18.6 % (ref 13–44)
MAGNESIUM SERPL-MCNC: 2.2 MG/DL (ref 1.8–2.4)
MCH RBC QN AUTO: 29.2 PG (ref 26.1–32.9)
MCHC RBC AUTO-ENTMCNC: 31.9 G/DL (ref 31.4–35)
MCV RBC AUTO: 91.5 FL (ref 82–102)
MONOCYTES # BLD: 0.86 K/UL (ref 0.1–1.3)
MONOCYTES NFR BLD: 5.4 % (ref 4–12)
MRSA DNA SPEC QL NAA+PROBE: NOT DETECTED
NEUTS SEG # BLD: 12.05 K/UL (ref 1.7–8.2)
NEUTS SEG NFR BLD: 75.2 % (ref 43–78)
NRBC # BLD: 0 K/UL (ref 0–0.2)
PHOSPHATE SERPL-MCNC: 5 MG/DL (ref 2.5–4.5)
PLATELET # BLD AUTO: 311 K/UL (ref 150–450)
PMV BLD AUTO: 11.2 FL (ref 9.4–12.3)
POTASSIUM SERPL-SCNC: 4 MMOL/L (ref 3.5–5.1)
POTASSIUM SERPL-SCNC: 5.4 MMOL/L (ref 3.5–5.1)
POTASSIUM SERPL-SCNC: 5.5 MMOL/L (ref 3.5–5.1)
POTASSIUM SERPL-SCNC: 6.1 MMOL/L (ref 3.5–5.1)
PROCALCITONIN SERPL-MCNC: 0.03 NG/ML (ref 0–0.1)
RBC # BLD AUTO: 2.81 M/UL (ref 4.23–5.6)
S AUREUS CPE NOSE QL NAA+PROBE: NOT DETECTED
SERVICE CMNT-IMP: ABNORMAL
SODIUM SERPL-SCNC: 123 MMOL/L (ref 136–145)
SODIUM SERPL-SCNC: 129 MMOL/L (ref 136–145)
SODIUM SERPL-SCNC: 132 MMOL/L (ref 136–145)
UFH PPP CHRO-ACNC: 0.35 IU/ML (ref 0.3–0.7)
WBC # BLD AUTO: 16 K/UL (ref 4.3–11.1)

## 2025-07-28 PROCEDURE — 83735 ASSAY OF MAGNESIUM: CPT

## 2025-07-28 PROCEDURE — 36415 COLL VENOUS BLD VENIPUNCTURE: CPT

## 2025-07-28 PROCEDURE — 2500000003 HC RX 250 WO HCPCS: Performed by: NURSE PRACTITIONER

## 2025-07-28 PROCEDURE — 84100 ASSAY OF PHOSPHORUS: CPT

## 2025-07-28 PROCEDURE — 84132 ASSAY OF SERUM POTASSIUM: CPT

## 2025-07-28 PROCEDURE — 0W9B3ZZ DRAINAGE OF LEFT PLEURAL CAVITY, PERCUTANEOUS APPROACH: ICD-10-PCS | Performed by: INTERNAL MEDICINE

## 2025-07-28 PROCEDURE — 6370000000 HC RX 637 (ALT 250 FOR IP): Performed by: STUDENT IN AN ORGANIZED HEALTH CARE EDUCATION/TRAINING PROGRAM

## 2025-07-28 PROCEDURE — 94640 AIRWAY INHALATION TREATMENT: CPT

## 2025-07-28 PROCEDURE — 92526 ORAL FUNCTION THERAPY: CPT

## 2025-07-28 PROCEDURE — 97530 THERAPEUTIC ACTIVITIES: CPT

## 2025-07-28 PROCEDURE — 99233 SBSQ HOSP IP/OBS HIGH 50: CPT | Performed by: INTERNAL MEDICINE

## 2025-07-28 PROCEDURE — 2500000003 HC RX 250 WO HCPCS: Performed by: STUDENT IN AN ORGANIZED HEALTH CARE EDUCATION/TRAINING PROGRAM

## 2025-07-28 PROCEDURE — 6370000000 HC RX 637 (ALT 250 FOR IP): Performed by: INTERNAL MEDICINE

## 2025-07-28 PROCEDURE — 82962 GLUCOSE BLOOD TEST: CPT

## 2025-07-28 PROCEDURE — 2060000000 HC ICU INTERMEDIATE R&B

## 2025-07-28 PROCEDURE — 6360000002 HC RX W HCPCS: Performed by: NURSE PRACTITIONER

## 2025-07-28 PROCEDURE — 85025 COMPLETE CBC W/AUTO DIFF WBC: CPT

## 2025-07-28 PROCEDURE — 80048 BASIC METABOLIC PNL TOTAL CA: CPT

## 2025-07-28 PROCEDURE — 87641 MR-STAPH DNA AMP PROBE: CPT

## 2025-07-28 PROCEDURE — 6360000002 HC RX W HCPCS: Performed by: INTERNAL MEDICINE

## 2025-07-28 PROCEDURE — 6360000002 HC RX W HCPCS: Performed by: STUDENT IN AN ORGANIZED HEALTH CARE EDUCATION/TRAINING PROGRAM

## 2025-07-28 PROCEDURE — 94761 N-INVAS EAR/PLS OXIMETRY MLT: CPT

## 2025-07-28 PROCEDURE — 71045 X-RAY EXAM CHEST 1 VIEW: CPT

## 2025-07-28 PROCEDURE — 97535 SELF CARE MNGMENT TRAINING: CPT

## 2025-07-28 PROCEDURE — 2700000000 HC OXYGEN THERAPY PER DAY

## 2025-07-28 PROCEDURE — 2500000003 HC RX 250 WO HCPCS: Performed by: INTERNAL MEDICINE

## 2025-07-28 PROCEDURE — 2580000003 HC RX 258: Performed by: STUDENT IN AN ORGANIZED HEALTH CARE EDUCATION/TRAINING PROGRAM

## 2025-07-28 PROCEDURE — 97112 NEUROMUSCULAR REEDUCATION: CPT

## 2025-07-28 PROCEDURE — 85520 HEPARIN ASSAY: CPT

## 2025-07-28 PROCEDURE — 99222 1ST HOSP IP/OBS MODERATE 55: CPT | Performed by: SURGERY

## 2025-07-28 PROCEDURE — 84145 PROCALCITONIN (PCT): CPT

## 2025-07-28 PROCEDURE — 94668 MNPJ CHEST WALL SBSQ: CPT

## 2025-07-28 PROCEDURE — 2580000003 HC RX 258: Performed by: INTERNAL MEDICINE

## 2025-07-28 RX ORDER — OLANZAPINE 2.5 MG/1
2.5 TABLET, FILM COATED ORAL EVERY 12 HOURS PRN
Status: DISCONTINUED | OUTPATIENT
Start: 2025-07-28 | End: 2025-07-29

## 2025-07-28 RX ORDER — MORPHINE SULFATE 2 MG/ML
2 INJECTION, SOLUTION INTRAMUSCULAR; INTRAVENOUS EVERY 4 HOURS PRN
Status: DISCONTINUED | OUTPATIENT
Start: 2025-07-28 | End: 2025-07-29

## 2025-07-28 RX ADMIN — PIPERACILLIN AND TAZOBACTAM 3375 MG: 3; .375 INJECTION, POWDER, FOR SOLUTION INTRAVENOUS at 20:48

## 2025-07-28 RX ADMIN — BUDESONIDE INHALATION 500 MCG: 0.5 SUSPENSION RESPIRATORY (INHALATION) at 19:19

## 2025-07-28 RX ADMIN — WATER 40 MG: 1 INJECTION INTRAMUSCULAR; INTRAVENOUS; SUBCUTANEOUS at 09:29

## 2025-07-28 RX ADMIN — ALBUTEROL SULFATE 2.5 MG: 2.5 SOLUTION RESPIRATORY (INHALATION) at 03:25

## 2025-07-28 RX ADMIN — MORPHINE SULFATE 2 MG: 2 INJECTION, SOLUTION INTRAMUSCULAR; INTRAVENOUS at 15:20

## 2025-07-28 RX ADMIN — Medication 4 ML: at 08:55

## 2025-07-28 RX ADMIN — PANTOPRAZOLE SODIUM 40 MG: 40 INJECTION, POWDER, FOR SOLUTION INTRAVENOUS at 09:29

## 2025-07-28 RX ADMIN — METHYLPREDNISOLONE SODIUM SUCCINATE 40 MG: 40 INJECTION, POWDER, LYOPHILIZED, FOR SOLUTION INTRAMUSCULAR; INTRAVENOUS at 22:21

## 2025-07-28 RX ADMIN — MORPHINE SULFATE 1 MG: 2 INJECTION, SOLUTION INTRAMUSCULAR; INTRAVENOUS at 02:22

## 2025-07-28 RX ADMIN — IPRATROPIUM BROMIDE AND ALBUTEROL SULFATE 1 DOSE: 2.5; .5 SOLUTION RESPIRATORY (INHALATION) at 00:43

## 2025-07-28 RX ADMIN — IPRATROPIUM BROMIDE AND ALBUTEROL SULFATE 1 DOSE: 2.5; .5 SOLUTION RESPIRATORY (INHALATION) at 15:11

## 2025-07-28 RX ADMIN — SODIUM CHLORIDE, PRESERVATIVE FREE 10 ML: 5 INJECTION INTRAVENOUS at 09:30

## 2025-07-28 RX ADMIN — PIPERACILLIN AND TAZOBACTAM 3375 MG: 3; .375 INJECTION, POWDER, FOR SOLUTION INTRAVENOUS at 15:20

## 2025-07-28 RX ADMIN — PIPERACILLIN AND TAZOBACTAM 3375 MG: 3; .375 INJECTION, POWDER, FOR SOLUTION INTRAVENOUS at 02:24

## 2025-07-28 RX ADMIN — IPRATROPIUM BROMIDE AND ALBUTEROL SULFATE 1 DOSE: 2.5; .5 SOLUTION RESPIRATORY (INHALATION) at 11:08

## 2025-07-28 RX ADMIN — BUDESONIDE INHALATION 500 MCG: 0.5 SUSPENSION RESPIRATORY (INHALATION) at 08:55

## 2025-07-28 RX ADMIN — HEPARIN SODIUM 21 UNITS/KG/HR: 10000 INJECTION, SOLUTION INTRAVENOUS at 02:20

## 2025-07-28 RX ADMIN — I.V. FAT EMULSION 250 ML: 20 EMULSION INTRAVENOUS at 18:10

## 2025-07-28 RX ADMIN — ARFORMOTEROL TARTRATE 15 MCG: 15 SOLUTION RESPIRATORY (INHALATION) at 08:55

## 2025-07-28 RX ADMIN — MORPHINE SULFATE 2 MG: 2 INJECTION, SOLUTION INTRAMUSCULAR; INTRAVENOUS at 11:01

## 2025-07-28 RX ADMIN — SODIUM CHLORIDE, PRESERVATIVE FREE 10 ML: 5 INJECTION INTRAVENOUS at 20:50

## 2025-07-28 RX ADMIN — MORPHINE SULFATE 2 MG: 2 INJECTION, SOLUTION INTRAMUSCULAR; INTRAVENOUS at 21:32

## 2025-07-28 RX ADMIN — CALCIUM GLUCONATE: 98 INJECTION INTRAVENOUS at 18:11

## 2025-07-28 RX ADMIN — IPRATROPIUM BROMIDE AND ALBUTEROL SULFATE 1 DOSE: 2.5; .5 SOLUTION RESPIRATORY (INHALATION) at 19:19

## 2025-07-28 RX ADMIN — DIPHENHYDRAMINE HYDROCHLORIDE 10 MG: 50 INJECTION INTRAMUSCULAR; INTRAVENOUS at 22:50

## 2025-07-28 RX ADMIN — ARFORMOTEROL TARTRATE 15 MCG: 15 SOLUTION RESPIRATORY (INHALATION) at 19:19

## 2025-07-28 RX ADMIN — Medication 4 ML: at 19:19

## 2025-07-28 RX ADMIN — PIPERACILLIN AND TAZOBACTAM 3375 MG: 3; .375 INJECTION, POWDER, FOR SOLUTION INTRAVENOUS at 09:41

## 2025-07-28 RX ADMIN — IPRATROPIUM BROMIDE AND ALBUTEROL SULFATE 1 DOSE: 2.5; .5 SOLUTION RESPIRATORY (INHALATION) at 08:55

## 2025-07-28 ASSESSMENT — PAIN DESCRIPTION - LOCATION
LOCATION: HIP
LOCATION: HIP
LOCATION: LEG
LOCATION: HIP

## 2025-07-28 ASSESSMENT — PAIN DESCRIPTION - ORIENTATION
ORIENTATION: RIGHT

## 2025-07-28 ASSESSMENT — PAIN SCALES - GENERAL
PAINLEVEL_OUTOF10: 7
PAINLEVEL_OUTOF10: 8
PAINLEVEL_OUTOF10: 8
PAINLEVEL_OUTOF10: 9
PAINLEVEL_OUTOF10: 9
PAINLEVEL_OUTOF10: 8
PAINLEVEL_OUTOF10: 0

## 2025-07-28 ASSESSMENT — PAIN DESCRIPTION - DESCRIPTORS
DESCRIPTORS: ACHING;DISCOMFORT
DESCRIPTORS: DISCOMFORT
DESCRIPTORS: SORE;TENDER
DESCRIPTORS: ACHING;DISCOMFORT

## 2025-07-28 NOTE — FLOWSHEET NOTE
Wound care completed per orders, in addition to staple removal per ASHLY Vazquez's verbal order. Patient tolerated procedure well. No bleeding or abnormal healing processes noted at incisions on R hip, thigh, and knee. 28 staples removed in total. Patient care plan ongoing. No further acute needs to this time. Safety measures in place.    07/28/25 1534   Incision 07/10/25 Thigh Right   Date First Assessed/Time First Assessed: 07/10/25 0535   Present on Original Admission: No  Incision Approximate Age at First Assessment (Weeks): 1 weeks  Location: Thigh  Incision Location Orientation: Right  Incision Outcome: Well approximated   Closure Open to air  (staples removed)   Drainage Amount None (dry)   Tashia-incision Assessment Intact   Incision 07/10/25 Femoral Right   Date First Assessed/Time First Assessed: 07/10/25 0535   Present on Original Admission: No  Incision Approximate Age at First Assessment (Weeks): 1 weeks  Location: Femoral  Incision Location Orientation: Right  Incision Outcome: Well approximated   Dressing Status Clean;Dry;Intact;New dressing applied   Dressing/Treatment Foam  (staples removed)

## 2025-07-28 NOTE — CONSULTS
H&P/Consult Note/Progress Note/Office Note:   Jonathan Zurita  MRN: [id number]  :1945  Age:80 y.o.    HPI: Jonathan Zurita is a 80 y.o. male who we are asked by Hospitalist for consideration of surgical placement of a PEG. The patient has a PMHx of afib on ELIQUIS, CLL ibrutinib, tobacco use, COPD, hx of pneumonia with suspected aspiration/dysphagia, R femur fracture 2025 who was admitted with RLE DVT and acute hyposix respiratory failure with aspiration pneumonia on 25.     He is currently maintained on Airvo.  He is being treated with Zosyn and IV solumedrol, Nebulizer treatments.   The patient tells me that he was aware of a HH for \"many years.\"    CT Chest 25 and CTA chest 25 both demonstrate large Hiatal hernia with distended esophagus with an air-fluid level. Images have been reviewed. [facility] is involved in his care. He remains dyspneic.     Speech Therapy Consult:  Repeat FLEXIBLE ENDOSCOPIC EVALUATION OF SWALLOW 25: Fiberoptic Endoscopic Evaluation of Swallowing ([id number]): Patient presents with moderate to severe pharyngeal dysphagia. Penetration and aspiration of thin liquids, mildly thick liquid (nectar) and puree solids observed on majority of swallows with intermittent cough response to material being observed in the pharyngeal cavity. With cough response, patient was able to partially clear residue remaining in the laryngeal vestibule and on the vocal cords, however trace residue remained on the anterior portion of the vocal cords as able to be visualized during the study. Double and triple swallow did successfully clear residue from pyriforms, though patient does being to exhibit increased work of breathing as study progressed.      No penetration or aspiration observed during trials of honey thickened liquids, however diffuse residue remained in and on pharyngeal structures are likely to result in eventual aspiration of material.    Cardiology  Consult: for amiodarone dosing for pAF.  Currently on Heparin drip for DVT.  Cardiology recommends return to ELIQUIS once stable and after any surgery    Palliative Care Consult: Symptom management at home without IV access.  Unable to reliably swallow.  Recommendation: Hospice House.     GI Consult: unable to place PEG due to cancellation as too high risk per Anesthesia with respiratory concerns.       Anticoagulation/Antiplatelet: ELIQUIS held since admission      Past Medical History:   Diagnosis Date    Acute encephalopathy 6/8/2023    Hyperlipidemia     Hypertension      Past Surgical History:   Procedure Laterality Date    FEMUR SURGERY Right 07/07/2025    FEMUR INTRAMEDULLARY NAIL performed by Lanre Braxton MD at [facility] MAIN OR    UPPER GASTROINTESTINAL ENDOSCOPY N/A 08/15/2023    EGD BIOPSY performed by Javier Moss MD at [facility] ENDOSCOPY     Current Facility-Administered Medications   Medication Dose Route Frequency    morphine (PF) injection 2 mg  2 mg IntraVENous Q4H PRN    PN-Adult Premix 4.25/5 - Peripheral Line   IntraVENous Continuous TPN    diphenhydrAMINE (BENADRYL) injection 10 mg  10 mg IntraVENous Nightly PRN    amiodarone (PACERONE) tablet 200 mg  200 mg Per NG tube Daily    albuterol (PROVENTIL) (2.5 MG/3ML) 0.083% nebulizer solution 2.5 mg  2.5 mg Nebulization Q4H PRN    fat emulsion (INTRALIPID/NUTRILIPID) 20 % infusion 250 mL  250 mL IntraVENous Daily    potassium chloride 20 mEq/50 mL IVPB (Central Line)  20 mEq IntraVENous PRN    Or    potassium chloride 10 mEq/100 mL IVPB (Peripheral Line)  10 mEq IntraVENous PRN    sodium phosphate 15 mmol in sodium chloride 0.9 % 250 mL IVPB  15 mmol IntraVENous PRN    piperacillin-tazobactam (ZOSYN) 3,375 mg in sodium chloride 0.9 % 50 mL IVPB (addEASE)  3,375 mg IntraVENous Q6H    pantoprazole (PROTONIX) 40 mg in sodium chloride (PF) 0.9 % 10 mL injection  40 mg IntraVENous Daily    methylPREDNISolone sodium succ (SOLU-MEDROL) 40 mg in sterile

## 2025-07-28 NOTE — PROGRESS NOTES
ACUTE OCCUPATIONAL THERAPY GOALS:   (Developed with and agreed upon by patient and/or caregiver.)  1. Patient will complete lower body bathing and dressing with MODERATE ASSIST and adaptive equipment as needed.   2. Patient will complete toileting with MINIMUM ASSIST.  3. Patient will tolerate 30 minutes of OT treatment with 1-2 rest breaks to increase activity tolerance for ADLs.   4. Patient will complete functional transfers with MINIMUM ASSIST and adaptive equipment as needed.   5. Patient will complete functional activity while seated edge of bed with SBA and adaptive equipment as needed.  6. Patient will maintain edge of bed unsupported sitting balance with SBA in preparation for OOB activity.  7. Patient will tolerate 15 minutes BUE therapeutic activities to increase use of BUE during ADL performance.      Timeframe: 7 visits     OCCUPATIONAL THERAPY: Daily Note AM   OT Visit Days: 5   Time In/Out  OT Charge Capture  Rehab Caseload Tracker  OT Orders    Jonathan Zurita is a 80 y.o. male   PRIMARY DIAGNOSIS: COPD exacerbation (HCC)  COPD exacerbation (HCC) [J44.1]  Acute respiratory failure with hypoxia (HCC) [J96.01]  Edema of right lower extremity [R60.0]  Leukocytosis, unspecified type [D72.829]  Procedure(s) (LRB):  ESOPHAGOGASTRODUODENOSCOPY PERCUTANEOUS ENDOSCOPIC GASTROSTOMY TUBE INSERTION (N/A)     Inpatient: Payor: [employer] / Plan: [employer] / Product Type: *No Product type* /     ASSESSMENT:     REHAB RECOMMENDATIONS:   Recommendation to date pending progress:  Setting:  Short-term Rehab    Equipment:    To Be Determined     ASSESSMENT:  Mr. Zurita is doing fair today. Pt presents supine upon arrival. Pt required min/mod A for bed mobility and additional time. Fair sitting balance noted at edge of bed. Pt was assisted self care tasks while seated edge of bed. Pt had a coughing spell. Returned to supine due to that. Fatigued but left comfortable. No progress with goals today. Will  increase independence, decrease assistance required, increase activity tolerance, and increase safety awareness. The patient was educated on compensatory technique during ADL  and energy conservation techniques during ADL/IADLS and patient verbalized understanding and demonstrated understanding.     TREATMENT GRID:  N/A    AFTER TREATMENT PRECAUTIONS: Bed, Bed/Chair Locked, Call light within reach, Needs within reach, RN notified, and Visitors at bedside    INTERDISCIPLINARY COLLABORATION:  RN/ PCT, PT/ PTA, and OT/ [place]    EDUCATION:  OT educated patient  on energy conservation techniques to use during ADL/IADL. Patient will need continued education at next session.          TOTAL TREATMENT DURATION AND TIME:  Time In: 1008  Time Out: 1034  Minutes: 26    AGATA Devine

## 2025-07-28 NOTE — ICUWATCH
RRT Clinical Rounding Nurse Update    Vitals:    07/28/25 1110 07/28/25 1131 07/28/25 1511 07/28/25 1550   BP:   118/68    Pulse: 64  87    Resp: 24 20 24 20   Temp:   99 °F (37.2 °C)    TempSrc:   Oral    SpO2: 92%  91%    Weight:       Height:            DETERIORATION INDEX SCORE: 47    ASSESSMENT:  Previous outreach assessment was reviewed. There have been no significant changes since previous assessment.  Pt respiratory status remains the same. Pt now with heavy secretions that he is now having trouble managing. Per surgery they would like to hold off on PEG tube due to high risk of remaining intubated following procedure. Staples removed today by ortho. Pt respiratory status continues to fluctuate.     FROM SURGERY \"Dr Joyce has examined patient and has reviewed images. Surgery to repair Hiatal Hernia and then place Gastric tube would be high risk but doable however the complicating factor would be his current Pulmonary status and the possibility of being unable to come off ventilator is another possibility. The patient and family wish to think about information and form an opinion whether or not to proceed.\"    PLAN:  Will follow per RRT Clinical Rounding Program protocol.    Rajan Grigsby RN  [facility]: [phone]  [facility]: [phone]

## 2025-07-28 NOTE — WOUND CARE
Patient seen by wound care as follow up on LUE skin tear.     Patient lying supine, HOB all the way up, alert and awake. Caregiver at bedside. Patient LUE skin tear now scabbed and dry without drainage. I recommend leaving open to air at this time. May reapply previous wound care dressing if drainage noted.

## 2025-07-28 NOTE — PLAN OF CARE
Problem: Respiratory - Adult  Goal: Achieves optimal ventilation and oxygenation  Outcome: Progressing  Flowsheets  Taken 7/28/2025 0902 by Yahaira Pagan RCP  Achieves optimal ventilation and oxygenation:   Assess for changes in respiratory status   Position to facilitate oxygenation and minimize respiratory effort   Respiratory therapy support as indicated   Assess and instruct to report shortness of breath or any respiratory difficulty   Encourage broncho-pulmonary hygiene including cough, deep breathe, incentive spirometry   Assess for changes in mentation and behavior  Taken 7/28/2025 0730 by Maureen Hernandez RN  Achieves optimal ventilation and oxygenation:   Assess for changes in respiratory status   Assess for changes in mentation and behavior   Oxygen supplementation based on oxygen saturation or arterial blood gases   Position to facilitate oxygenation and minimize respiratory effort   Assess the need for suctioning and aspirate as needed   Respiratory therapy support as indicated   Assess and instruct to report shortness of breath or any respiratory difficulty

## 2025-07-28 NOTE — PROGRESS NOTES
LTG: Patient will maintain adequate hydration/nutrition with optimum safety and efficiency of swallowing function with PO intake without overt signs or symptoms of aspiration for the highest appropriate diet level.  STG:   Patient will consume sips of water via cup and ice chips via spoon without overt signs or symptoms of airway compromise. DISCONTINUE- STRICT NPO 25  Patient will perform chin tuck and small sips to improve swallow safety with minimal cues 90% of the time. DISCONTINUE   Patient will safely ingest diet trials during therapeutic feedings with SLP without overt signs or symptoms of respiratory compromise in efforts to advance diet.  Patient will complete a Fiberoptic Endoscopic Evaluation of the Swallow to fully assess physiology and anatomy of the swallow and determine safest appropriate diet and/or rehabilitation strategies, as medically indicated. COMPLETED 25    SPEECH LANGUAGE PATHOLOGY: DYSPHAGIA Daily Note #3    Acknowledge Order  I  Therapy Time  I   Charges     I  Rehab Caseload Tracker      NAME: Jonathan Zurita  : 1945  MRN: [id number]    ADMISSION DATE: 2025  PRIMARY DIAGNOSIS: COPD exacerbation (HCC)    ICD-10: Treatment Diagnosis: R13.12 Dysphagia, Oropharyngeal Phase    RECOMMENDATIONS   Diet:    NPO   Oral swabs for oral moistening     Medication: non-oral   Compensatory Swallowing Strategies:   Ice chips for pleasure  Frequent oral care  Younker set up for suction and secretion management   Therapeutic Intervention:   Patient/family education  Instrumental swallow assessment  Dysphagia treatment   Patient continues to require skilled intervention:  Yes. Recommend ongoing speech therapy services during this hospitalization.     Anticipated Discharge Needs: Ongoing speech therapy is recommended at next level of care.      ASSESSMENT    Continue to recommend NPO due to high risk of aspiration with all intake. Conversation with Trip Sanchez, RN, daughter  FEES.      No penetration or aspiration observed with mildly thick/nectar thick liquids, purees, soft solids, solids, and ice chips. Mild residual noted in the vallecula and pyriform sinus with purees though able to clear with spontaneous secondary swallow.      Of note, patient does report globus sensation with all PO intake with only trace residual in the pharynx. Globus sensation may be a referred symptoms as well as patient also reports globus sensation in upper chest region. And vocal folds do exhibit erythema which could be contributing to globus sensation as well.\"  Repeat FLEXIBLE ENDOSCOPIC EVALUATION OF SWALLOW 7/23/25: Fiberoptic Endoscopic Evaluation of Swallowing ([id number]): Patient presents with moderate to severe pharyngeal dysphagia. Penetration and aspiration of thin liquids, mildly thick liquid (nectar) and puree solids observed on majority of swallows with intermittent cough response to material being observed in the pharyngeal cavity. With cough response, patient was able to partially clear residue remaining in the laryngeal vestibule and on the vocal cords, however trace residue remained on the anterior portion of the vocal cords as able to be visualized during the study. Double and triple swallow did successfully clear residue from pyriforms, though patient does being to exhibit increased work of breathing as study progressed.      No penetration or aspiration observed during trials of honey thickened liquids, however diffuse residue remained in and on pharyngeal structures are likely to result in eventual aspiration of material.    Current Diet:  NPO      Respiratory Status: Airvo 55L @ 90%    Pain:  Patient does not c/o pain  Pain intervention: None- No pain observed  Pain response: Patient satisfied    OBJECTIVE    Patient seen for skilled speech therapy treatment with MD and RN at bedside.     Called daughters seperately to discuss current status and reiterate current recommendation of

## 2025-07-28 NOTE — PROGRESS NOTES
His BMP has been suspected a incorrect due to PPN, POC glucose Is acceptable, potassium now 4.0  Oralia Warner MD

## 2025-07-28 NOTE — PROCEDURES
PROCEDURE NOTE  Date: 7/28/2025   Name: Jonathan Zurita  YOB: 1945    Procedures    Indication - possible b/l effusion. Acute hypoxemic respiratory failure.     Summary:    After informed consent was obtained, the patient was positioned upright in the usual fashion.  Ultrasound was used to identify the optimal spot for pleural drainage.    scant effusion noted in b/l chest. No attempt made to drain since Risks > benefits    EBL --none    Patient stable post procedure    No samples sent.    Can restart heparin  Will get CT chest to r/o PE and check PNA.     Signed By: Pablo Guzmán MD

## 2025-07-28 NOTE — PROGRESS NOTES
Nutrition Assessment  Assessment Type: Reassess  Reason for visit:  Best Practice Alert: Malnutrition Screening Tool , Tube Feeding Management (Hospitalists), and Parenteral Nutrition Management (Hospitalists)  Malnutrition Screening Tool Score: 2  Unintentional weight loss PTA: 2 to 13 pounds (1 point)  Eating poorly due to decreased appetite: Yes (1 point)    Nutrition Intervention:   Food and/or Nutrient Delivery:   Parenteral Nutrition:  Peripheral parenteral nutrition (Osmolarity 852)   Peripheral line infusion  Decrease: Dex 5%, 4.25% AA 1.56 L (65ml/hr)   Continue 250 ml 20% lipids daily  Electrolytes:   Sodium (90 mEq/L sodium chloride), Potassium (5 mmol/L potassium phosphate), Calcium gluconate (4.5 mEq/L), Magnesium sulfate 5 mEq/L   Additives:   Adult multivitamin with vit K  Trace Elements  Thiamine 100 mg daily x 7 days (EOT 7/30)  Folic Acid 1 mg daily x 7 days (EOT 7/30)  PN regimen provides per 24 hours: 1030 kcal/day (81% of needs), 66 grams of protein/day (87% of needs), 78 grams of CHO/24 hours and ~1810 ml of total volume/24 hours.   Above regimen: Intended to meet macronutrient goals  Biochemical Data:   Basic Metabolic Panel, Hepatic Function Panel, Magnesium and Phosphorus active per nutrition parameters  Triglyceride weekly on Fridays  POC Glucoses/SSI per discussions with MD, current BMP glucose is skewed/contaminated. Initiate POC checks at this time.  Nutrition Related Medication Management:  Electrolyte Replacement:   Continue prn protocol Magnesium, Potassium, and Phosphorus  Intravenous fluids:  Not applicable   Meals and Snacks:  Diet: NPO  Medical Food Supplements:   Medical food supplement therapy:  None Deferred NPO  Coordination of Nutrition Care:  Coordination with health care provider Provider, Dr. Regalado and Dr. Guzmán and RN, Trip     Malnutrition Assessment:7/19  [facility] Clinical Malnutrition Criteria  Malnutrition Status: Moderate malnutrition  Context: Acute

## 2025-07-28 NOTE — ICUWATCH
RRT Clinical Rounding Nurse Update    Vitals:    07/28/25 0252 07/28/25 0316 07/28/25 0325 07/28/25 0719   BP:  (!) 118/56  (!) 122/58   Pulse:  83 89 64   Resp: 20 18 18 22   Temp:  98.6 °F (37 °C)  99.3 °F (37.4 °C)   TempSrc:  Oral  Oral   SpO2:  (!) 87% 90% 95%   Weight:       Height:            DETERIORATION INDEX SCORE: 50    ASSESSMENT:  Previous outreach assessment was reviewed. There have been no significant changes since previous assessment. Pt overall status remains the same. Strict npo. Waiting for respiratory improvement with hopes for possible peg tube placement. Pt has good days and bad nights. Primary RN reports pt wakes up in the middle of the night with high anxiety. Pulmonology at bedside 55/90% airvo. Coarse diminished BS LYDIA/  heavy secretions.     PLAN:  Will follow per RRT Clinical Rounding Program protocol.    Rajan Grigsby RN  [facility]: [phone]  [facility]: [phone]

## 2025-07-28 NOTE — PROGRESS NOTES
MG/DL    Creatinine 0.49 (L) 0.80 - 1.30 MG/DL    Est, Glom Filt Rate >90 >60 ml/min/1.73m2    Calcium 7.9 (L) 8.8 - 10.2 MG/DL       No results for input(s): \"COVID19\" in the last 72 hours.    Current Meds:  Current Facility-Administered Medications   Medication Dose Route Frequency    morphine (PF) injection 2 mg  2 mg IntraVENous Q4H PRN    PN-Adult Premix 4.25/5 - Peripheral Line   IntraVENous Continuous TPN    diphenhydrAMINE (BENADRYL) injection 10 mg  10 mg IntraVENous Nightly PRN    amiodarone (PACERONE) tablet 200 mg  200 mg Per NG tube Daily    albuterol (PROVENTIL) (2.5 MG/3ML) 0.083% nebulizer solution 2.5 mg  2.5 mg Nebulization Q4H PRN    fat emulsion (INTRALIPID/NUTRILIPID) 20 % infusion 250 mL  250 mL IntraVENous Daily    potassium chloride 20 mEq/50 mL IVPB (Central Line)  20 mEq IntraVENous PRN    Or    potassium chloride 10 mEq/100 mL IVPB (Peripheral Line)  10 mEq IntraVENous PRN    sodium phosphate 15 mmol in sodium chloride 0.9 % 250 mL IVPB  15 mmol IntraVENous PRN    piperacillin-tazobactam (ZOSYN) 3,375 mg in sodium chloride 0.9 % 50 mL IVPB (addEASE)  3,375 mg IntraVENous Q6H    pantoprazole (PROTONIX) 40 mg in sodium chloride (PF) 0.9 % 10 mL injection  40 mg IntraVENous Daily    methylPREDNISolone sodium succ (SOLU-MEDROL) 40 mg in sterile water 1 mL injection  40 mg IntraVENous Daily    medicated lip ointment (BLISTEX)   Topical PRN    ipratropium 0.5 mg-albuterol 2.5 mg (DUONEB) nebulizer solution 1 Dose  1 Dose Inhalation Q4H PRN    budesonide (PULMICORT) nebulizer suspension 500 mcg  0.5 mg Nebulization BID RT    arformoterol tartrate (BROVANA) nebulizer solution 15 mcg  15 mcg Nebulization BID RT    sodium chloride (Inhalant) 3 % nebulizer solution 4 mL  4 mL Nebulization BID    LORazepam (ATIVAN) tablet 0.5 mg  0.5 mg Oral Q6H PRN    HYDROcodone homatropine (HYCODAN) 5-1.5 MG/5ML solution 5 mL  5 mL Oral Q4H PRN    sodium chloride flush 0.9 % injection 5-40 mL  5-40 mL IntraVENous 2

## 2025-07-28 NOTE — ACP (ADVANCE CARE PLANNING)
Advance Care Planning Note   Admit Date:  2025 11:53 AM   Name:  Jonathan Zurita   Age:  80 y.o.  Sex:  male  :  1945   MRN:  [id number]   Room:  [facility]/    Jonathan Zurita has capacity to make his own decisions:   Yes    If pt unable to make decisions, POA/surrogate decision maker:  Daughter Ria , grandson Juan José    Other people present:   Nursing, SLP      Other ACP topics discussed, if applicable:   Discussed artificial feeding.   Respiratory status     Patient or surrogate consented to discussion of the current conditions, workup, management plans, prognosis, and the risk for further deterioration.  Time spent: 30 minutes in direct discussion.      Signed:  Oralia Warner MD

## 2025-07-28 NOTE — PROGRESS NOTES
Jonathan Zurita  Admission Date: 7/18/2025         Daily Progress Note: 7/28/2025    The patient's chart is reviewed and the patient is discussed with the staff.    Background:   80 y.o.  male seen and evaluated at the request of Dr. Gould for increasing O2 needs. History of Afib on eliquis, CLL on ibrutinib, tobacco use and mild COPD per PFTs in 2024. Uses Wixela, Spiriva and prn Albuterol at home. Hx multiple episodes pneumonia in 2023 and 2024. + dysphagia with suspected aspiration events.  Has seen Dr. Tan in office 6/13/25. Admitted 6/14- 6/20 for pneumonia.  Recently admitted 7/6-7/11 for fall with R femur fracture and repair. Walking short distances at rehab with walker. Presented to ER on 7/18 with complaints of worsening SOB, chest tightness and cough. He also endorsed R leg pain and increased swelling. CT Chest this admission was negative for PE, doppler + for DVT of RLE. He states he has been taking his eliquis, inhalers. Having cough but not able to clear secretions. Has been eating a normal diet as OP. He was started on heparin gtt. WBC 17.1, elevated LFTs. Started on ceftriaxone and azithromycin. Went from requiring hi-flow NC to Airvo the night he was admitted.  Added CPT and flutter to see if mucus plugging clearance may help. His PFTs last year were not very impressive for significant COPD and primarily the concern was aspiration. He has chronic LLL atelectasis presumably, whether it opens up intermittently is possible, but looked this was in 2023 as well on CT. CT this admission with Hiatal hernia and distended, gas and fluid-containing esophagus.   7/22 B ultrasound and small effusions noted. On 100% FiO2, echo WNL  7/23 Down to 50%, echo without shunting, NPO with concerns for aspiration and ST confirmed, hospice discussed with family. PEG planned, was getting out of bed  7/24 up to 85%, GI consulted for PEG - pt too high risk for anesthesia with respiratory

## 2025-07-28 NOTE — PROGRESS NOTES
Progress Note    Patient: Jonathan Zurita MRN: [id number]  SSN: [id number]    YOB: 1945  Age: 80 y.o.  Sex: male      Admit Date: 7/18/2025    LOS: 9 days     Subjective:     Doing okay. Notes ongoing pain to hip. Unable to lift leg off of bed.     Objective:     Vitals:    07/28/25 0900 07/28/25 1043 07/28/25 1110 07/28/25 1131   BP:  136/62     Pulse: 62 63 64    Resp: 18 24 24 20   Temp:  99.3 °F (37.4 °C)     TempSrc:  Oral     SpO2:  93% 92%    Weight:       Height:            Intake and Output:  Current Shift: No intake/output data recorded.  Last three shifts: 07/26 1901 - 07/28 0700  In: 4577.7 [I.V.:336.1]  Out: 1900 [Urine:1900]    alert and oriented to person place time and situation    Right Lower Extremity  - dressings c/d/I today.   - Sensation: SILT Sa/[place]/T/SP/DP   - Motor: 5/5 TA/EHL/FHL/GS   - Digits warm, well-perfused, brisk cap refill    Lab/Data Review:  Recent Labs     07/28/25  0549   HGB 8.2*   HCT 25.7*          Assessment:     ORTHO INJURIES:  Right closed displaced intertrochanteric/peritrochanteric proximal femur fracture     ORTHO PROCEDURES:  [facility] 7/7  Open treatment of right peritrochanteric/enteroenteric proximal femur fracture with intramedullary nail fixation   Plan:     Okay to remove staples today.   Wound care:   change dressing only as needed: xeroform or adaptec, 4x4 gauze, secure with aquacel, medipore tape, or tegaderm.   Multimodal pain control per orders   WB status: WBAT RLE  PT/OT for mobility/ADLs   Diet:  Diet NPO  PN-Adult Premix 4.25/5 - Peripheral Line  PN-Adult Premix 4.25/5 - Peripheral Line  DVT prophylaxis: Defer to primary team    Dispo: pending    F/up w/ [facility] in 4 weeks    Signed By: ASHLY Carroll     July 28, 2025

## 2025-07-28 NOTE — ICUWATCH
RRT Clinical Rounding Nurse Update    Vitals:    07/28/25 0043 07/28/25 0252 07/28/25 0316 07/28/25 0325   BP:   (!) 118/56    Pulse: 60  83 89   Resp: 16 20 18 18   Temp:   98.6 °F (37 °C)    TempSrc:   Oral    SpO2: 96%  (!) 87%    Weight:       Height:            ASSESSMENT:  Previous outreach assessment was reviewed. There have been no significant changes since previous assessment. A&Ox4 on HHFNC 50L/75%. Chest expansion symmetric and unlabored. Recent VS, labs, and progress notes reviewed.    PLAN:  Will follow per RRT Clinical Rounding Program protocol.    Myron Moncada RN  [facility]: [phone]  [facility]: [phone]

## 2025-07-29 ENCOUNTER — APPOINTMENT (OUTPATIENT)
Dept: CT IMAGING | Age: 80
DRG: 190 | End: 2025-07-29
Payer: OTHER GOVERNMENT

## 2025-07-29 DIAGNOSIS — Z09 HOSPITAL DISCHARGE FOLLOW-UP: Primary | ICD-10-CM

## 2025-07-29 LAB
ANION GAP SERPL CALC-SCNC: 10 MMOL/L (ref 7–16)
BASOPHILS # BLD: 0.01 K/UL (ref 0–0.2)
BASOPHILS NFR BLD: 0.1 % (ref 0–2)
BUN SERPL-MCNC: 20 MG/DL (ref 8–23)
CALCIUM SERPL-MCNC: 8 MG/DL (ref 8.8–10.2)
CHLORIDE SERPL-SCNC: 104 MMOL/L (ref 98–107)
CO2 SERPL-SCNC: 18 MMOL/L (ref 20–29)
CREAT SERPL-MCNC: 0.41 MG/DL (ref 0.8–1.3)
DIFFERENTIAL METHOD BLD: ABNORMAL
EOSINOPHIL # BLD: 0 K/UL (ref 0–0.8)
EOSINOPHIL NFR BLD: 0 % (ref 0.5–7.8)
ERYTHROCYTE [DISTWIDTH] IN BLOOD BY AUTOMATED COUNT: 16.9 % (ref 11.9–14.6)
GLUCOSE BLD STRIP.AUTO-MCNC: 209 MG/DL (ref 65–100)
GLUCOSE BLD STRIP.AUTO-MCNC: 217 MG/DL (ref 65–100)
GLUCOSE BLD STRIP.AUTO-MCNC: 226 MG/DL (ref 65–100)
GLUCOSE SERPL-MCNC: 225 MG/DL (ref 70–99)
HCT VFR BLD AUTO: 27.7 % (ref 41.1–50.3)
HGB BLD-MCNC: 8.3 G/DL (ref 13.6–17.2)
IMM GRANULOCYTES # BLD AUTO: 0.14 K/UL (ref 0–0.5)
IMM GRANULOCYTES NFR BLD AUTO: 0.8 % (ref 0–5)
LYMPHOCYTES # BLD: 3.1 K/UL (ref 0.5–4.6)
LYMPHOCYTES NFR BLD: 17.3 % (ref 13–44)
MAGNESIUM SERPL-MCNC: 1.9 MG/DL (ref 1.8–2.4)
MCH RBC QN AUTO: 27 PG (ref 26.1–32.9)
MCHC RBC AUTO-ENTMCNC: 30 G/DL (ref 31.4–35)
MCV RBC AUTO: 90.2 FL (ref 82–102)
MONOCYTES # BLD: 0.43 K/UL (ref 0.1–1.3)
MONOCYTES NFR BLD: 2.4 % (ref 4–12)
NEUTS SEG # BLD: 14.2 K/UL (ref 1.7–8.2)
NEUTS SEG NFR BLD: 79.4 % (ref 43–78)
NRBC # BLD: 0 K/UL (ref 0–0.2)
PHOSPHATE SERPL-MCNC: 2.9 MG/DL (ref 2.5–4.5)
PLATELET # BLD AUTO: 282 K/UL (ref 150–450)
PMV BLD AUTO: 11.4 FL (ref 9.4–12.3)
POTASSIUM SERPL-SCNC: 4.2 MMOL/L (ref 3.5–5.1)
RBC # BLD AUTO: 3.07 M/UL (ref 4.23–5.6)
SERVICE CMNT-IMP: ABNORMAL
SODIUM SERPL-SCNC: 132 MMOL/L (ref 136–145)
UFH PPP CHRO-ACNC: 0.18 IU/ML (ref 0.3–0.7)
UFH PPP CHRO-ACNC: 0.59 IU/ML (ref 0.3–0.7)
WBC # BLD AUTO: 17.9 K/UL (ref 4.3–11.1)

## 2025-07-29 PROCEDURE — 2500000003 HC RX 250 WO HCPCS

## 2025-07-29 PROCEDURE — 2500000003 HC RX 250 WO HCPCS: Performed by: NURSE PRACTITIONER

## 2025-07-29 PROCEDURE — 94640 AIRWAY INHALATION TREATMENT: CPT

## 2025-07-29 PROCEDURE — 83735 ASSAY OF MAGNESIUM: CPT

## 2025-07-29 PROCEDURE — 6370000000 HC RX 637 (ALT 250 FOR IP): Performed by: STUDENT IN AN ORGANIZED HEALTH CARE EDUCATION/TRAINING PROGRAM

## 2025-07-29 PROCEDURE — 6360000002 HC RX W HCPCS: Performed by: STUDENT IN AN ORGANIZED HEALTH CARE EDUCATION/TRAINING PROGRAM

## 2025-07-29 PROCEDURE — 2700000000 HC OXYGEN THERAPY PER DAY

## 2025-07-29 PROCEDURE — 6360000002 HC RX W HCPCS: Performed by: INTERNAL MEDICINE

## 2025-07-29 PROCEDURE — 94761 N-INVAS EAR/PLS OXIMETRY MLT: CPT

## 2025-07-29 PROCEDURE — 84100 ASSAY OF PHOSPHORUS: CPT

## 2025-07-29 PROCEDURE — 99233 SBSQ HOSP IP/OBS HIGH 50: CPT | Performed by: INTERNAL MEDICINE

## 2025-07-29 PROCEDURE — 36415 COLL VENOUS BLD VENIPUNCTURE: CPT

## 2025-07-29 PROCEDURE — 6360000004 HC RX CONTRAST MEDICATION: Performed by: INTERNAL MEDICINE

## 2025-07-29 PROCEDURE — 2060000000 HC ICU INTERMEDIATE R&B

## 2025-07-29 PROCEDURE — 6360000002 HC RX W HCPCS: Performed by: NURSE PRACTITIONER

## 2025-07-29 PROCEDURE — 82962 GLUCOSE BLOOD TEST: CPT

## 2025-07-29 PROCEDURE — 80048 BASIC METABOLIC PNL TOTAL CA: CPT

## 2025-07-29 PROCEDURE — 85025 COMPLETE CBC W/AUTO DIFF WBC: CPT

## 2025-07-29 PROCEDURE — 2580000003 HC RX 258: Performed by: STUDENT IN AN ORGANIZED HEALTH CARE EDUCATION/TRAINING PROGRAM

## 2025-07-29 PROCEDURE — 2500000003 HC RX 250 WO HCPCS: Performed by: INTERNAL MEDICINE

## 2025-07-29 PROCEDURE — 2500000003 HC RX 250 WO HCPCS: Performed by: STUDENT IN AN ORGANIZED HEALTH CARE EDUCATION/TRAINING PROGRAM

## 2025-07-29 PROCEDURE — 85520 HEPARIN ASSAY: CPT

## 2025-07-29 PROCEDURE — 71260 CT THORAX DX C+: CPT

## 2025-07-29 RX ORDER — FUROSEMIDE 10 MG/ML
40 INJECTION INTRAMUSCULAR; INTRAVENOUS
Status: COMPLETED | OUTPATIENT
Start: 2025-07-29 | End: 2025-07-29

## 2025-07-29 RX ORDER — BUDESONIDE 0.5 MG/2ML
0.5 INHALANT ORAL 2 TIMES DAILY PRN
Status: DISCONTINUED | OUTPATIENT
Start: 2025-07-29 | End: 2025-07-30 | Stop reason: HOSPADM

## 2025-07-29 RX ORDER — ARFORMOTEROL TARTRATE 15 UG/2ML
15 SOLUTION RESPIRATORY (INHALATION) 2 TIMES DAILY PRN
Status: DISCONTINUED | OUTPATIENT
Start: 2025-07-29 | End: 2025-07-30 | Stop reason: HOSPADM

## 2025-07-29 RX ORDER — MORPHINE SULFATE 4 MG/ML
4 INJECTION, SOLUTION INTRAMUSCULAR; INTRAVENOUS
Refills: 0 | Status: DISCONTINUED | OUTPATIENT
Start: 2025-07-29 | End: 2025-07-30 | Stop reason: HOSPADM

## 2025-07-29 RX ORDER — IOPAMIDOL 755 MG/ML
75 INJECTION, SOLUTION INTRAVASCULAR
Status: COMPLETED | OUTPATIENT
Start: 2025-07-29 | End: 2025-07-29

## 2025-07-29 RX ORDER — DIAZEPAM 10 MG/2ML
10 INJECTION, SOLUTION INTRAMUSCULAR; INTRAVENOUS EVERY 4 HOURS PRN
Status: DISCONTINUED | OUTPATIENT
Start: 2025-07-29 | End: 2025-07-30 | Stop reason: HOSPADM

## 2025-07-29 RX ORDER — INSULIN LISPRO 100 [IU]/ML
0-8 INJECTION, SOLUTION INTRAVENOUS; SUBCUTANEOUS EVERY 6 HOURS
Status: DISCONTINUED | OUTPATIENT
Start: 2025-07-29 | End: 2025-07-29

## 2025-07-29 RX ORDER — MORPHINE SULFATE 2 MG/ML
2 INJECTION, SOLUTION INTRAMUSCULAR; INTRAVENOUS
Refills: 0 | Status: DISCONTINUED | OUTPATIENT
Start: 2025-07-29 | End: 2025-07-30 | Stop reason: HOSPADM

## 2025-07-29 RX ORDER — DIAZEPAM 10 MG/2ML
5 INJECTION, SOLUTION INTRAMUSCULAR; INTRAVENOUS EVERY 4 HOURS PRN
Status: DISCONTINUED | OUTPATIENT
Start: 2025-07-29 | End: 2025-07-29

## 2025-07-29 RX ORDER — GLYCOPYRROLATE 0.2 MG/ML
0.2 INJECTION INTRAMUSCULAR; INTRAVENOUS EVERY 4 HOURS PRN
Status: DISCONTINUED | OUTPATIENT
Start: 2025-07-29 | End: 2025-07-30 | Stop reason: HOSPADM

## 2025-07-29 RX ADMIN — MORPHINE SULFATE 2 MG: 2 INJECTION, SOLUTION INTRAMUSCULAR; INTRAVENOUS at 01:26

## 2025-07-29 RX ADMIN — IPRATROPIUM BROMIDE AND ALBUTEROL SULFATE 1 DOSE: 2.5; .5 SOLUTION RESPIRATORY (INHALATION) at 16:16

## 2025-07-29 RX ADMIN — IPRATROPIUM BROMIDE AND ALBUTEROL SULFATE 1 DOSE: 2.5; .5 SOLUTION RESPIRATORY (INHALATION) at 07:46

## 2025-07-29 RX ADMIN — Medication 4 ML: at 07:47

## 2025-07-29 RX ADMIN — HEPARIN SODIUM 23 UNITS/KG/HR: 10000 INJECTION, SOLUTION INTRAVENOUS at 09:40

## 2025-07-29 RX ADMIN — PANTOPRAZOLE SODIUM 40 MG: 40 INJECTION, POWDER, FOR SOLUTION INTRAVENOUS at 09:24

## 2025-07-29 RX ADMIN — WATER 40 MG: 1 INJECTION INTRAMUSCULAR; INTRAVENOUS; SUBCUTANEOUS at 09:24

## 2025-07-29 RX ADMIN — HEPARIN SODIUM 2500 UNITS: 1000 INJECTION, SOLUTION INTRAVENOUS; SUBCUTANEOUS at 05:23

## 2025-07-29 RX ADMIN — MORPHINE SULFATE 2 MG: 2 INJECTION, SOLUTION INTRAMUSCULAR; INTRAVENOUS at 22:44

## 2025-07-29 RX ADMIN — IOPAMIDOL 75 ML: 755 INJECTION, SOLUTION INTRAVENOUS at 11:27

## 2025-07-29 RX ADMIN — FUROSEMIDE 40 MG: 10 INJECTION, SOLUTION INTRAMUSCULAR; INTRAVENOUS at 12:26

## 2025-07-29 RX ADMIN — MORPHINE SULFATE 2 MG: 2 INJECTION, SOLUTION INTRAMUSCULAR; INTRAVENOUS at 16:52

## 2025-07-29 RX ADMIN — ALBUTEROL SULFATE 2.5 MG: 2.5 SOLUTION RESPIRATORY (INHALATION) at 02:49

## 2025-07-29 RX ADMIN — MORPHINE SULFATE 2 MG: 2 INJECTION, SOLUTION INTRAMUSCULAR; INTRAVENOUS at 05:22

## 2025-07-29 RX ADMIN — ARFORMOTEROL TARTRATE 15 MCG: 15 SOLUTION RESPIRATORY (INHALATION) at 07:47

## 2025-07-29 RX ADMIN — SODIUM CHLORIDE, PRESERVATIVE FREE 10 ML: 5 INJECTION INTRAVENOUS at 09:30

## 2025-07-29 RX ADMIN — MORPHINE SULFATE 2 MG: 2 INJECTION, SOLUTION INTRAMUSCULAR; INTRAVENOUS at 20:30

## 2025-07-29 RX ADMIN — IPRATROPIUM BROMIDE AND ALBUTEROL SULFATE 1 DOSE: 2.5; .5 SOLUTION RESPIRATORY (INHALATION) at 12:40

## 2025-07-29 RX ADMIN — BUDESONIDE INHALATION 500 MCG: 0.5 SUSPENSION RESPIRATORY (INHALATION) at 07:47

## 2025-07-29 ASSESSMENT — PAIN SCALES - GENERAL
PAINLEVEL_OUTOF10: 0
PAINLEVEL_OUTOF10: 9
PAINLEVEL_OUTOF10: 8
PAINLEVEL_OUTOF10: 0
PAINLEVEL_OUTOF10: 8
PAINLEVEL_OUTOF10: 8

## 2025-07-29 ASSESSMENT — PAIN DESCRIPTION - DESCRIPTORS
DESCRIPTORS: DISCOMFORT;ACHING
DESCRIPTORS: DISCOMFORT
DESCRIPTORS: ACHING

## 2025-07-29 ASSESSMENT — PAIN DESCRIPTION - LOCATION
LOCATION: OTHER (COMMENT)
LOCATION: HIP
LOCATION: OTHER (COMMENT)
LOCATION: HIP

## 2025-07-29 ASSESSMENT — PAIN DESCRIPTION - ORIENTATION: ORIENTATION: RIGHT

## 2025-07-29 NOTE — PROGRESS NOTES
Physical Therapy Note:    Attempted to see patient this PM for physical therapy treatment  session. Patient RN indicates that pt is now on comfort care, will d/c PT orders at this time, please re-consult if needed. Thank you,    Farooq Javed, PT     Rehab Caseload Tracker

## 2025-07-29 NOTE — PROGRESS NOTES
Physician Progress Note      PATIENT:               JUDY TERRAZAS  CSN #:                  [id number]  :                       1945  ADMIT DATE:       2025 11:53 AM  DISCH DATE:  RESPONDING  PROVIDER #:        Stephie Boswell NP          QUERY TEXT:    Fat emboli was documented in pulmonary note dated  and .  The   diagnosis was not noted in subsequent documentation.  Please clarify the   status of this condition.    The clinical indicators include:  81yo s/p right femur fx s/p repair.  Presented with shortness of breath and   chest tightness.(H&P)    Documented in H&P- \"Recent hip fracture status post repair: Right lower   extremity swelling\".\"Right lower extremity DVT: Ultrasound of right lower   extremity shows evidence of DVT.  Heparin drip started\".    Documentation in pulmonary consult note dated - \"Found to have R DVT,   negative for PE. Overnight had increasing O2 needs and now on max airvo. We   were consulted for further recommendations. Can transition back to eliquis 5   mg this time instead of 2.5mg and stop heparin. Rx COPD exacerbation, fat   emboli syndrome a possibility but Rx is AC and supportive care\".    Documentation in pulmonary progress note dated -\"Impression: 81 y/o with   AFIB/CLL/COPD/Smoker with R Femoral fracture after fall with Right DVT on   Eliquis at this time. Being treated for COPD exacerbation/Fat emboli. On 90%   on Airvo and 60 LPM\".    Supplemental oxygen. Heparin drip.  Options provided:  -- Fat emboli confirmed after study  -- Fat emboli ruled out after study  -- Other - I will add my own diagnosis  -- Disagree - Not applicable / Not valid  -- Disagree - Clinically unable to determine / Unknown  -- Refer to Clinical Documentation Reviewer    PROVIDER RESPONSE TEXT:    Provider is clinically unable to determine a response to this query.  unable to determine type of clot    Query created by: DEIRDRE JOSE on 2025 11:30

## 2025-07-29 NOTE — PROGRESS NOTES
adequate.    Signed by: Brooks Alaniz MD on 7/22/2025  3:29 PM    Assessment and Plan:  (Medical Decision Making)   Impression: 80 y.o. frail male patient evaluated for increasing O2 needs. History of Afib on eliquis, CLL on ibrutinib, tobacco use and mild COPD per PFTs in 2024. Uses Wixela, Spiriva and prn Albuterol at home. Hx multiple episodes pneumonia, + dysphagia with suspected aspiration events. Admitted 7/18 for RLE DVT despite being on eliquis at [facility] for a-fib. Developed acute hypoxic respiratory failure d/t COPD exacerbation needing Airvo and BIPAP.  Discussion with palliative care ongoing. Weaned to 75%/50L airvo.     Principal Problem:    COPD exacerbation (HCC)  Plan: Continue brovana, pulmicort, duonebs, 3% saline nebs. Continue IV solumedrol, Airvo titration based on oxygen needs. BiPAP PRN and as patient tolerates. Difficulty clearing secretions despite pulmonary clearance measures but bronchoscopy remains high risk.     Active Problems:    Shortness of breath    Atelectasis    Pneumonia of left lower lobe due to infectious organism     Acute respiratory failure with hypoxia (HCC)  Plan: oxygen requirements are still high and fluctuating. Now on 90%/60L Airvo. Offered BIPAP but does not tolerate it very long.  Dyspnea and desaturations with any exertion. Finished rounds of rocephin/azithromycin and zosyn for PNA. WBC is persistently elevated but procal normal thus likely steroid effect. On pulmonary clearance measures. CT chest ordered yesterday but was not done yet because he desaturates significantly when lying flat.     RLE DVT  Plan: remains on heparin drip; can transition to eliquis at D/c per cards     A-fib  Plan: on heparin drip for DVT; on amio due to NSVT now bradycardic and dose was decreased; cardiology following; Transition to eliquis when able to D/C.     Frailty    Moderate protein-calorie malnutrition    Encounter for palliative care  Plan: Changed from DNR - Full Code - Now  care with his family and they are here and agree. Overall told him given his condition, I did not think he would survive. Will given 40 lasix. Already on nebs/steroids and just completed ABX. Remaining tx as per above.                         Pablo Guzmán MD

## 2025-07-29 NOTE — PROGRESS NOTES
Hourly rounds performed this shift. Bed lowered and locked. Call light within reach. Bed alarm on. Family at bedside. PRN morphine given for pain per MAR. One time dose of solumedrol given per MAR.

## 2025-07-29 NOTE — PROGRESS NOTES
Nutrition Assessment  Assessment Type: Reassess  Reason for visit:  Best Practice Alert: Malnutrition Screening Tool , Tube Feeding Management (Hospitalists), and Parenteral Nutrition Management (Hospitalists)  Malnutrition Screening Tool Score: 2  Unintentional weight loss PTA: 2 to 13 pounds (1 point)  Eating poorly due to decreased appetite: Yes (1 point)    Nutrition Intervention:   Food and/or Nutrient Delivery:   Parenteral Nutrition:  Peripheral parenteral nutrition (Osmolarity 871)   Peripheral line infusion  Advance: Dex 5%, 4.25% AA 2 L (85ml/hr)   Continue 250 ml 20% lipids daily  Electrolytes:   Sodium (80 mEq/L sodium chloride and 10 mmol/L sodium phosphate), Potassium (5 mEq/L potassium chloride), Calcium gluconate (4.5 mEq/L), Magnesium sulfate 10 mEq/L   Additives:   Adult multivitamin with vit K  Trace Elements  Thiamine 100 mg daily x 7 days (EOT 7/30)  Folic Acid 1 mg daily x 7 days (EOT 7/30)  PN regimen provides per 24 hours: 1180 kcal/day (74% of needs), 85 grams of protein/day (100% of needs), 100 grams of CHO/24 hours and ~2290 ml of total volume/24 hours.   Above regimen: Intended to meet macronutrient goals  Biochemical Data:   Basic Metabolic Panel, Hepatic Function Panel, Magnesium and Phosphorus active per nutrition parameters  Triglyceride weekly on Fridays  POC Glucoses/SSI Activate: AM glucose >200 mg/dL  Nutrition Related Medication Management:  Electrolyte Replacement:   Continue prn protocol Magnesium, Potassium, and Phosphorus  Intravenous fluids:  Not applicable   Meals and Snacks:  Diet: NPO  Medical Food Supplements:   Medical food supplement therapy:  None Deferred NPO  Coordination of Nutrition Care:  Coordination with health care provider Provider, ZOILA Card, RN, Trip, and Interdisciplinary Rounds     Malnutrition Assessment:7/19  [facility] Clinical Malnutrition Criteria  Malnutrition Status: Moderate malnutrition  Context: Acute Illness  Findings of clinical

## 2025-07-29 NOTE — PROGRESS NOTES
Nutrition Note     Noted pt now on comfort care measures only. Notified pharmacy of d/c PN orders for tonight. RD to advance diet to purees with ONS for comfort only. Please re-consult RD if patient goals of care change.     Coordination of Nutrition Care:  Coordination with health care provider Provider, Dr. Mulu Castorena, RD

## 2025-07-29 NOTE — PROGRESS NOTES
SPEECH PATHOLOGY NOTE    Patient has transitioned to comfort measures. No further speech therapy indicated.     Thank you for allowing speech therapy to assist with patient's care.     Lacie Kaiser M.S., CCC-SLP  Speech Language Pathologist  Acute Rehabilitation Services  Contact: [place]

## 2025-07-29 NOTE — PROGRESS NOTES
Spiritual Health Progress Note  [facility] [place]      Room # 814/01    Name: Jonathan Zurita           Age: 80 y.o.    Gender: male          MRN: [id number]  Nondenominational: Oriental orthodox       Preferred Language: English      Date: 07/29/25  Visit Time: Begin Time: 1640 End Time : 1700  Complexity of Encounter: Moderate      Visit Summary:  met with patient and family in response to referral.  received call asking for visit due to patient moving to comfort measures.Patient is noted as Oriental orthodox. Patient has social support in children, extended family, and friends.  entered room with patient in bed and family around him. Patient and family were receptive to visit. Family gave a brief history of illness and the decision to move to comfort care. Patient and family stated that this is not what they were wanting, but are giving it all to God. Patient and family had various emotions during the conversation and processing the news.  offered prayer and ensured that all other spiritual and emotional needs were addressed. Family stated that there will be more family members arriving later today.  provided active listening, prayer, discussion of illness, and pastoral presence. 's plan of care includes follow up at patient's or family's request.    Referral/Consult From: Family  Encounter Overview/Reason: Spiritual/Emotional Needs, Grief, Loss, and Adjustments  Service Provided For: Patient and family together     Patient was available.    Yanni, Belief, Meaning:   Patient identifies as spiritual  is connected with a yanni tradition or spiritual practice  has beliefs or practices that help with coping during difficult times  Family/Friends identifies as spiritual  are connected with a yanni tradition or spiritual practice  have beliefs or practices that help with coping during difficult times  Rituals (if applicable)      Importance and Influence:  Patient does not have  spiritual/personal beliefs that influence decisions regarding their health  Family/Friends does not have spiritual/personal beliefs that influence decisions regarding the patient's health    Community:  Patient   indicated that they feel well-supported  Support System Includes   Children  Friends  Extended family  Family/Friends   is connected with a spiritual community  indicated that they feel well-supported  Support System Includes   Parent/s  Yanni Community  Friends  Extended family    Assessment and Plan of Care:   Emotions Expressed by Patient:   Assessment: Sad, Tearful, Shock, Coping    Interventions by :   Intervention: Sustaining Presence/Ministry of presence, Life review/Legacy, Read/Provided Scripture, Prayer (assurance of)/[place], Explored/Affirmed feelings, thoughts, concerns, Active listening, Discussed illness injury and it’s impact, Discussed belief system/Restorationism practices/yanni     Result/ Response by Patient:   Outcome: Receptive, Engaged in conversation, Grieving, Expressed Gratitude    Patient Plan of Care:   Plan and Referrals  Plan/Referrals: Continue Support (comment) (Patient and Family have  on-call number,) Spiritual care available upon referral.    Emotions Expressed by Spouse/Family/Friends:   grieving  sad  tearful  worried     Interventions with Spouse/ Family/Friends include:   active listening  explored belief system  facilitated expression of thoughts and feelings  prayer  provided ministry of presence    Spouse/Family/Friends Plan of Care:   Spiritual care available upon referral.      Electronically signed by Chaplain Caleb Paige on 7/29/2025 at 5:13 PM.  [facility]  [phone]

## 2025-07-29 NOTE — ICUWATCH
Patient transitioning to comfort measures only. Please call again if RRT surveillance and/or intervention is needed.

## 2025-07-29 NOTE — PLAN OF CARE
Problem: Respiratory - Adult  Goal: Achieves optimal ventilation and oxygenation  7/29/2025 0800 by Yahaira Pagan RCP  Outcome: Progressing  Flowsheets  Taken 7/29/2025 0800 by Yahaira Pagan RCP  Achieves optimal ventilation and oxygenation:   Assess for changes in respiratory status   Respiratory therapy support as indicated   Position to facilitate oxygenation and minimize respiratory effort   Assess and instruct to report shortness of breath or any respiratory difficulty   Encourage broncho-pulmonary hygiene including cough, deep breathe, incentive spirometry   Assess for changes in mentation and behavior  Taken 7/29/2025 0700 by Maureen Hernandez RN  Achieves optimal ventilation and oxygenation:   Assess for changes in respiratory status   Assess for changes in mentation and behavior   Position to facilitate oxygenation and minimize respiratory effort   Oxygen supplementation based on oxygen saturation or arterial blood gases   Assess the need for suctioning and aspirate as needed   Assess and instruct to report shortness of breath or any respiratory difficulty   Respiratory therapy support as indicated

## 2025-07-29 NOTE — PROGRESS NOTES
Patient unable to tolerate Sitting up in bed with non-rebreather mask for 20 minutes without dropping to 85%. Patient was also unable to lay flat without airvo. RN notified CT patient would be unable to travel, and to complete chest CT. Patient care ongoing.

## 2025-07-29 NOTE — FLOWSHEET NOTE
Patient oxygen weaned to 50L, 50% on AIRVO per nursing communication orders for end of life comfort measures. No further acute needs at this time. Safety measures in place. Patient care ongoing.    07/29/25 1800   Oxygen Therapy   O2 Device Heated high flow cannula   O2 Flow Rate (L/min) 50 L/min   FiO2  50 %

## 2025-07-29 NOTE — ICUWATCH
RRT Clinical Rounding Nurse Progress Report      SUBJECTIVE: Patient assessed secondary to RN/provider concern - high oxygen needs.      Vitals:    07/28/25 1131 07/28/25 1511 07/28/25 1550 07/28/25 1933   BP:  118/68  119/63   Pulse:  87  57   Resp: 20 24 20 18   Temp:  99 °F (37.2 °C)  98.2 °F (36.8 °C)   TempSrc:  Oral  Axillary   SpO2:  91%  90%   Weight:       Height:            DETERIORATION INDEX SCORE: 47    ASSESSMENT:  Pt is A&O x4 and appears to be in NAD at this time. O2 sat 89% on AirVo 60L/100%, SB on tele monitor- HR 58. Pt denies any pain and voices no complaints. Discussed pt with charge RN and chart reviewed. PPN and Heparin gtt infusing.       PLAN:  Will follow per RRT Clinical Rounding Program protocol.    Shreya Agustin RN  [facility]: [phone]  [facility]: [phone]

## 2025-07-29 NOTE — FLOWSHEET NOTE
Patient passed non-rebreather trial. Oxygen saturation maintaining above 92%. Patient transported to CT via stretcher bed from department. Patient care to resume upon return to the floor.      07/29/25 1047   Mobility   Location change/Off floor Out of room   Transport Method Bed;Stretcher

## 2025-07-29 NOTE — PROGRESS NOTES
Hospitalist Progress Note   Admit Date:  2025 11:53 AM   Name:  Jonathan Zurita   Age:  80 y.o.  Sex:  male  :  1945   MRN:  [id number]   Room:  [facility]/    Presenting/Chief Complaint: Shortness of Breath     Reason(s) for Admission: COPD exacerbation (HCC) [J44.1]  Acute respiratory failure with hypoxia (HCC) [J96.01]  Edema of right lower extremity [R60.0]  Leukocytosis, unspecified type [D72.829]     Hospital Course:   Jonathan Zurita is a 80-year-old  male with a complex medical history including atrial fibrillation on Eliquis, chronic lymphocytic leukemia (CLL) on ibrutinib, mild COPD per  PFTs, significant tobacco use, and recurrent pneumonia. He uses Wixela, Spiriva, and PRN albuterol at home. He also has known dysphagia with suspected aspiration events and has been followed by Dr. Tan in clinic (last seen 25).    The patient was recently admitted from - for pneumonia and again from - following a fall resulting in a right femur fracture, which was surgically repaired. He had been participating in rehabilitation with short-distance ambulation using a walker.    He presented to the ED on  with worsening shortness of breath, chest tightness, cough, and right leg pain/swelling. CT chest was negative for PE. However, Doppler ultrasound revealed a right lower extremity DVT, despite reported adherence to Eliquis and inhalers. He was started on a heparin drip. Labs were notable for leukocytosis (WBC 17.1) and elevated liver enzymes. He was empirically started on ceftriaxone and azithromycin. Oxygen requirements escalated from high-flow nasal cannula to Airvo on the night of admission.  Pulmonary hygiene measures were initiated, including chest physiotherapy and flutter valve therapy, due to concern for mucus plugging. His PFTs from  do not demonstrate significant obstructive lung disease; aspiration remains the primary concern. CT imaging     Hemoglobin 8.3 (L) 13.6 - 17.2 g/dL    Hematocrit 27.7 (L) 41.1 - 50.3 %    MCV 90.2 82 - 102 FL    MCH 27.0 26.1 - 32.9 PG    MCHC 30.0 (L) 31.4 - 35.0 g/dL    RDW 16.9 (H) 11.9 - 14.6 %    Platelets 282 150 - 450 K/uL    MPV 11.4 9.4 - 12.3 FL    nRBC 0.00 0.0 - 0.2 K/uL    Differential Type AUTOMATED      Neutrophils % 79.4 (H) 43.0 - 78.0 %    Lymphocytes % 17.3 13.0 - 44.0 %    Monocytes % 2.4 (L) 4.0 - 12.0 %    Eosinophils % 0.0 (L) 0.5 - 7.8 %    Basophils % 0.1 0.0 - 2.0 %    Immature Granulocytes % 0.8 0.0 - 5.0 %    Neutrophils Absolute 14.20 (H) 1.70 - 8.20 K/UL    Lymphocytes Absolute 3.10 0.50 - 4.60 K/UL    Monocytes Absolute 0.43 0.10 - 1.30 K/UL    Eosinophils Absolute 0.00 0.00 - 0.80 K/UL    Basophils Absolute 0.01 0.00 - 0.20 K/UL    Immature Granulocytes Absolute 0.14 0.0 - 0.5 K/UL   Anti-XA, Heparin    Collection Time: 07/29/25  3:57 AM   Result Value Ref Range    Anti-XA Unfrac Heparin 0.18 (L) 0.3 - 0.7 IU/mL       No results for input(s): \"COVID19\" in the last 72 hours.    Current Meds:  Current Facility-Administered Medications   Medication Dose Route Frequency    morphine (PF) injection 2 mg  2 mg IntraVENous Q4H PRN    PN-Adult Premix 4.25/5 - Peripheral Line   IntraVENous Continuous TPN    OLANZapine (ZYPREXA) tablet 2.5 mg  2.5 mg Oral Q12H PRN    diphenhydrAMINE (BENADRYL) injection 10 mg  10 mg IntraVENous Nightly PRN    amiodarone (PACERONE) tablet 200 mg  200 mg Per NG tube Daily    albuterol (PROVENTIL) (2.5 MG/3ML) 0.083% nebulizer solution 2.5 mg  2.5 mg Nebulization Q4H PRN    fat emulsion (INTRALIPID/NUTRILIPID) 20 % infusion 250 mL  250 mL IntraVENous Daily    potassium chloride 20 mEq/50 mL IVPB (Central Line)  20 mEq IntraVENous PRN    Or    potassium chloride 10 mEq/100 mL IVPB (Peripheral Line)  10 mEq IntraVENous PRN    sodium phosphate 15 mmol in sodium chloride 0.9 % 250 mL IVPB  15 mmol IntraVENous PRN    pantoprazole (PROTONIX) 40 mg in sodium chloride (PF) 0.9 %

## 2025-07-29 NOTE — ACP (ADVANCE CARE PLANNING)
Advance Care Planning Note   Admit Date:  2025 11:53 AM   Name:  Jonathan Zurita   Age:  80 y.o.  Sex:  male  :  1945   MRN:  [id number]   Room:  [facility]    Jonathan Zurita has capacity to make his own decisions:   Yes    If pt unable to make decisions, POA/surrogate decision maker:  Daughter    Other people present:   Daughter    Patient / surrogate decision-maker directed code status:  DNR/DNI    Other ACP topics discussed, if applicable:   Discussed possibility of hospice or comfort measures.      Patient or surrogate consented to discussion of the current conditions, workup, management plans, prognosis, and the risk for further deterioration.  Time spent: 25 minutes in direct discussion.      Signed:  Len Hardwick MD

## 2025-07-29 NOTE — PROGRESS NOTES
Patient stated he would try out the v60 for tonight, however when RT arrived pt declined v60. Patient remains on 60l/100% airvo sat 90%

## 2025-07-29 NOTE — ICUWATCH
RRT Clinical Rounding Nurse Update    Vitals:    07/29/25 0249 07/29/25 0552 07/29/25 0722 07/29/25 0747   BP:   126/77    Pulse: 81  70 72   Resp: 19 17 20 20   Temp:   97.9 °F (36.6 °C)    TempSrc:   Oral    SpO2: 95%  97% 95%   Weight:       Height:            DETERIORATION INDEX SCORE: 41    ASSESSMENT:  Previous outreach assessment was reviewed. Patient is resting supine in bed, alert and oriented, following commands and answering questions appropriately. Unlabored, regular breathing on NRB. SpO2 88-90% during exam. Lung sounds very coarse. Endorses productive cough. Patient currently trialing supine with NRB for CT chest ordered. Endorses feeling okay this morning, no complaints of pain or SOB. VSS.    PLAN:  Will follow per RRT Clinical Rounding Program protocol.    Armand Grace RN  [facility]: [phone]  [facility]: [phone]

## 2025-07-30 VITALS
SYSTOLIC BLOOD PRESSURE: 114 MMHG | HEIGHT: 62 IN | TEMPERATURE: 98.6 F | BODY MASS INDEX: 25.76 KG/M2 | OXYGEN SATURATION: 71 % | HEART RATE: 66 BPM | WEIGHT: 140 LBS | RESPIRATION RATE: 26 BRPM | DIASTOLIC BLOOD PRESSURE: 55 MMHG

## 2025-07-30 PROCEDURE — 94640 AIRWAY INHALATION TREATMENT: CPT

## 2025-07-30 PROCEDURE — 94761 N-INVAS EAR/PLS OXIMETRY MLT: CPT

## 2025-07-30 PROCEDURE — 6360000002 HC RX W HCPCS

## 2025-07-30 PROCEDURE — 94760 N-INVAS EAR/PLS OXIMETRY 1: CPT

## 2025-07-30 PROCEDURE — 2500000003 HC RX 250 WO HCPCS

## 2025-07-30 PROCEDURE — 2700000000 HC OXYGEN THERAPY PER DAY

## 2025-07-30 RX ORDER — MORPHINE SULFATE 4 MG/ML
4 INJECTION, SOLUTION INTRAMUSCULAR; INTRAVENOUS ONCE
Refills: 0 | Status: COMPLETED | OUTPATIENT
Start: 2025-07-30 | End: 2025-07-30

## 2025-07-30 RX ORDER — ALBUTEROL SULFATE 0.83 MG/ML
2.5 SOLUTION RESPIRATORY (INHALATION) EVERY 6 HOURS PRN
Status: DISCONTINUED | OUTPATIENT
Start: 2025-07-30 | End: 2025-07-30 | Stop reason: HOSPADM

## 2025-07-30 RX ORDER — ALBUTEROL SULFATE 0.83 MG/ML
SOLUTION RESPIRATORY (INHALATION)
Status: COMPLETED
Start: 2025-07-30 | End: 2025-07-30

## 2025-07-30 RX ADMIN — ALBUTEROL SULFATE 2.5 MG: 2.5 SOLUTION RESPIRATORY (INHALATION) at 02:46

## 2025-07-30 RX ADMIN — MORPHINE SULFATE 2 MG: 2 INJECTION, SOLUTION INTRAMUSCULAR; INTRAVENOUS at 08:14

## 2025-07-30 RX ADMIN — MORPHINE SULFATE 4 MG: 4 INJECTION INTRAVENOUS at 11:45

## 2025-07-30 RX ADMIN — MORPHINE SULFATE 2 MG: 2 INJECTION, SOLUTION INTRAMUSCULAR; INTRAVENOUS at 04:30

## 2025-07-30 RX ADMIN — ALBUTEROL SULFATE 2.5 MG: 0.83 SOLUTION RESPIRATORY (INHALATION) at 02:46

## 2025-07-30 RX ADMIN — DIAZEPAM 10 MG: 10 INJECTION, SOLUTION INTRAMUSCULAR; INTRAVENOUS at 10:57

## 2025-07-30 RX ADMIN — MORPHINE SULFATE 2 MG: 2 INJECTION, SOLUTION INTRAMUSCULAR; INTRAVENOUS at 02:44

## 2025-07-30 RX ADMIN — MORPHINE SULFATE 4 MG: 4 INJECTION INTRAVENOUS at 06:08

## 2025-07-30 ASSESSMENT — PAIN SCALES - GENERAL
PAINLEVEL_OUTOF10: 8

## 2025-07-30 ASSESSMENT — PAIN DESCRIPTION - LOCATION
LOCATION: CHEST;HIP
LOCATION: HIP
LOCATION: CHEST;HIP

## 2025-07-30 ASSESSMENT — PAIN DESCRIPTION - DESCRIPTORS
DESCRIPTORS: TIGHTNESS;ACHING;DISCOMFORT
DESCRIPTORS: DISCOMFORT
DESCRIPTORS: ACHING;DISCOMFORT

## 2025-07-30 ASSESSMENT — PAIN DESCRIPTION - ORIENTATION
ORIENTATION: RIGHT

## 2025-07-30 NOTE — PROGRESS NOTES
Patient is now comfort measures only. We will sign off but we appreciate the opportunity to be involved in this patient's care. Our thoughts and prayers are with the family during this difficult time. Please call us if we can be of any assistance.     No charge placed for this encounter   DAVID Wells - NP

## 2025-07-30 NOTE — PROGRESS NOTES
Hospitalist Progress Note   Admit Date:  2025 11:53 AM   Name:  Jonathan Zurita   Age:  80 y.o.  Sex:  male  :  1945   MRN:  [id number]   Room:  [facility]/    Presenting/Chief Complaint: Shortness of Breath     Reason(s) for Admission: COPD exacerbation (HCC) [J44.1]  Acute respiratory failure with hypoxia (HCC) [J96.01]  Edema of right lower extremity [R60.0]  Leukocytosis, unspecified type [D72.829]     Hospital Course:   Jonathan Zurita is a 80-year-old  male with a complex medical history including atrial fibrillation on Eliquis, chronic lymphocytic leukemia (CLL) on ibrutinib, mild COPD per  PFTs, significant tobacco use, and recurrent pneumonia. He uses Wixela, Spiriva, and PRN albuterol at home. He also has known dysphagia with suspected aspiration events and has been followed by Dr. Tan in clinic (last seen 25).    The patient was recently admitted from - for pneumonia and again from - following a fall resulting in a right femur fracture, which was surgically repaired. He had been participating in rehabilitation with short-distance ambulation using a walker.    He presented to the ED on  with worsening shortness of breath, chest tightness, cough, and right leg pain/swelling. CT chest was negative for PE. However, Doppler ultrasound revealed a right lower extremity DVT, despite reported adherence to Eliquis and inhalers. He was started on a heparin drip. Labs were notable for leukocytosis (WBC 17.1) and elevated liver enzymes. He was empirically started on ceftriaxone and azithromycin. Oxygen requirements escalated from high-flow nasal cannula to Airvo on the night of admission.  Pulmonary hygiene measures were initiated, including chest physiotherapy and flutter valve therapy, due to concern for mucus plugging. His PFTs from  do not demonstrate significant obstructive lung disease; aspiration remains the primary concern. CT imaging

## 2025-07-30 NOTE — DISCHARGE SUMMARY
Hospitalist Discharge Summary   Admit Date:  2025 11:53 AM   DC Note date: 2025  Name:  Jonathan Zurita   Age:  80 y.o.  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 *      Hospital Course:    Jonathan Zurita is a 80-year-old  male with a complex medical history including atrial fibrillation on Eliquis, chronic lymphocytic leukemia (CLL) on ibrutinib, mild COPD per  PFTs, significant tobacco use, and recurrent pneumonia. He uses Wixela, Spiriva, and PRN albuterol at home. He also has known dysphagia with suspected aspiration events and has been followed by Dr. Tan in clinic (last seen 25).     The patient was recently admitted from - for pneumonia and again from - following a fall resulting in a right femur fracture, which was surgically repaired. He had been participating in rehabilitation with

## (undated) DEVICE — GLOVE SURG SZ 8 L12IN FNGR THK79MIL GRN LTX FREE

## (undated) DEVICE — SOLUTION IRRIG 1000ML H2O PIC PLAS SHATTERPROOF CONTAINER

## (undated) DEVICE — NEEDLE SYR 18GA L1.5IN RED PLAS HUB S STL BLNT FILL W/O

## (undated) DEVICE — 7 DAY SILVER-COATED ANTIMICROBIAL BARRIER DRESSING: Brand: ACTICOAT 7  4" X 5"

## (undated) DEVICE — DRAPE,U/SHT,SPLIT,FILM,60X84,STERILE: Brand: MEDLINE

## (undated) DEVICE — TFN: Brand: MEDLINE INDUSTRIES, INC.

## (undated) DEVICE — KENDALL RADIOLUCENT FOAM MONITORING ELECTRODE RECTANGULAR SHAPE: Brand: KENDALL

## (undated) DEVICE — SUTURE MONOCRYL SZ 2-0 L27IN ABSRB UD CP-1 1 L36MM 1/2 CIR REV Y266H

## (undated) DEVICE — PAD,ABDOMINAL,5"X9",ST,LF,25/BX: Brand: MEDLINE

## (undated) DEVICE — BASIC SINGLE BASIN-LF: Brand: MEDLINE INDUSTRIES, INC.

## (undated) DEVICE — GAUZE,SPONGE,4"X4",12PLY,WOVEN,NS,LF: Brand: MEDLINE

## (undated) DEVICE — REAMER SHAFT, MOD.TRINKLE: Brand: BIXCUT

## (undated) DEVICE — GARMENT,MEDLINE,DVT,INT,CALF,MED, GEN2: Brand: MEDLINE

## (undated) DEVICE — PRECISION PIN: Brand: GAMMA

## (undated) DEVICE — CONNECTOR TBNG OD5-7MM O2 END DISP

## (undated) DEVICE — DRILL, AO, STERILE

## (undated) DEVICE — GLOVE ORANGE PI 7 1/2   MSG9075

## (undated) DEVICE — YANKAUER,BULB TIP,W/O VENT,RIGID,STERILE: Brand: MEDLINE

## (undated) DEVICE — SUTURE MONOCRYL SZ 2-0 L27IN ABSRB UD SH L26MM TAPERPOINT NDL Y417H

## (undated) DEVICE — FORMALIN SOLUTION 20

## (undated) DEVICE — AIRLIFE™ OXYGEN TUBING 7 FEET (2.1 M) CRUSH RESISTANT OXYGEN TUBING, VINYL TIPPED: Brand: AIRLIFE™

## (undated) DEVICE — K-WIRE

## (undated) DEVICE — STOCKINET,IMPERVIOUS,6X30: Brand: MEDLINE

## (undated) DEVICE — INTENDED FOR TISSUE SEPARATION, AND OTHER PROCEDURES THAT REQUIRE A SHARP SURGICAL BLADE TO PUNCTURE OR CUT.: Brand: BARD-PARKER ® STAINLESS STEEL BLADES

## (undated) DEVICE — CANNULA NSL ORAL AD FOR CAPNOFLEX CO2 O2 AIRLFE

## (undated) DEVICE — SYRINGE MED 3ML CLR PLAS STD N CTRL LUERLOCK TIP DISP

## (undated) DEVICE — BLOCK BITE AD 60FR W/ VELC STRP ADDRESSES MOST PT AND

## (undated) DEVICE — SYRINGE MED 10ML LUERLOCK TIP W/O SFTY DISP

## (undated) DEVICE — SINGLE PORT MANIFOLD: Brand: NEPTUNE 2

## (undated) DEVICE — FORCEPS BX L240CM JAW DIA2.8MM L CAP W/ NDL MIC MESH TOOTH

## (undated) DEVICE — C-ARMOR C-ARM EQUIPMENT COVERS CLEAR STERILE UNIVERSAL FIT 12 PER CASE: Brand: C-ARMOR

## (undated) DEVICE — SHEET, DRAPE, SPLIT, STERILE: Brand: MEDLINE

## (undated) DEVICE — GUIDE WIRE, BALL-TIPPED, STERILE

## (undated) DEVICE — SYRINGE, LUER SLIP, STERILE, 60ML: Brand: MEDLINE

## (undated) DEVICE — ENDOSCOPIC KIT 1.1+ OP4 CA DE 2 GWN AAMI LEVEL 3

## (undated) DEVICE — DRAPE C ARM W/ POLY STRP W42XL72IN FOR MOB XR

## (undated) DEVICE — SHEET,DRAPE,53X77,STERILE: Brand: MEDLINE

## (undated) DEVICE — FREEHAND DRILL

## (undated) DEVICE — SOLUTION IRRIG 1000ML 09% SOD CHL USP PIC PLAS CONTAINER